# Patient Record
Sex: MALE | Race: WHITE | NOT HISPANIC OR LATINO | Employment: OTHER | URBAN - METROPOLITAN AREA
[De-identification: names, ages, dates, MRNs, and addresses within clinical notes are randomized per-mention and may not be internally consistent; named-entity substitution may affect disease eponyms.]

---

## 2017-01-25 DIAGNOSIS — L40.0 PSORIASIS VULGARIS: Primary | ICD-10-CM

## 2017-01-26 RX ORDER — TRIAMCINOLONE ACETONIDE 1 MG/G
OINTMENT TOPICAL 2 TIMES DAILY
Qty: 454 G | Refills: 1 | Status: SHIPPED | OUTPATIENT
Start: 2017-01-26 | End: 2017-05-08 | Stop reason: SDUPTHER

## 2017-04-19 ENCOUNTER — OFFICE VISIT (OUTPATIENT)
Dept: OPHTHALMOLOGY | Facility: CLINIC | Age: 77
End: 2017-04-19
Payer: MEDICARE

## 2017-04-19 DIAGNOSIS — L40.9 PSORIASIS: Primary | ICD-10-CM

## 2017-04-19 DIAGNOSIS — H02.533 LID RETRACTION, RIGHT: ICD-10-CM

## 2017-04-19 DIAGNOSIS — H02.536 LID RETRACTION, LEFT: ICD-10-CM

## 2017-04-19 PROCEDURE — 99499 UNLISTED E&M SERVICE: CPT | Mod: S$GLB,,,

## 2017-04-19 PROCEDURE — 99999 PR PBB SHADOW E&M-EST. PATIENT-LVL II: CPT | Mod: PBBFAC,,,

## 2017-04-19 PROCEDURE — 92012 INTRM OPH EXAM EST PATIENT: CPT | Mod: S$GLB,,,

## 2017-04-19 RX ORDER — CLOBETASOL PROPIONATE 0.5 MG/G
CREAM TOPICAL 2 TIMES DAILY
Qty: 60 G | Refills: 3 | Status: SHIPPED | OUTPATIENT
Start: 2017-04-19 | End: 2017-06-02

## 2017-04-19 NOTE — PROGRESS NOTES
HPI     epiphora    Additional comments: constant tearing           Comments   76 yr old here for evaluation of constant tearing.  History of psoriasis.    Mr. Majano states that his face has been affected by the psoriasis and he   was having symptoms to the brow.  The skin was flaking off into the eyes   causing tearing, symptoms progressed and his face became swollen and very   red.  The lower lids started to pull from the eye.  Mr. Majano is   concerned because of the constant tearing has become a fall risk, he is   walking with a cane because he is not seeing well through the tearing.    Using cortisone cream on face TID, including upper and lower lids.  AFT's   using 3-4 times a day.  Baby shampoo once a day.           Last edited by Rozina Ochoa on 4/19/2017  8:40 AM. (History)            Assessment /Plan     For exam results, see Encounter Report.    Psoriasis    Lid retraction, right    Lid retraction, left        I have reviewed the patient history,review of systems,and findings as recorded by the technician and resident and accept.  Significant  Systemic skin changes but main lid problem is contracture causing retraction. Since corneas clear on regimen of  Lubricants,will strart using both the triamcinalone cream and the psoriasis Rx to both lower lids and nose twice daily.  Consider light treatments for the generalized psoriasis. Continue AT

## 2017-04-19 NOTE — MR AVS SNAPSHOT
[facility]  [address]  [facility] [address]  Phone: [phone]  Fax: [phone]                  Darinel Majano   2017 8:30 AM   Office Visit    Description:  Male : 1940   Provider:  Marquise Chand MD   Department:  [facility]           Reason for Visit     epiphora           Diagnoses this Visit        Comments    Psoriasis    -  Primary     Lid retraction, right         Lid retraction, left                To Do List           Goals (5 Years of Data)     None      [facility] On Call     [facility] On Call Nurse Care Line -  Assistance  Unless otherwise directed by your provider, please contact [facility], our nurse care line that is available for  assistance.     Registered nurses in the [facility] provide: appointment scheduling, clinical advisement, health education, and other advisory services.  Call: [phone] (toll free)               Medications           Message regarding Medications     Verify the changes and/or additions to your medication regime listed below are the same as discussed with your clinician today.  If any of these changes or additions are incorrect, please notify your healthcare provider.             Verify that the below list of medications is an accurate representation of the medications you are currently taking.  If none reported, the list may be blank. If incorrect, please contact your healthcare provider. Carry this list with you in case of emergency.           Current Medications     triamcinolone acetonide 0.1% (KENALOG) 0.1 % ointment Apply topically 2 (two) times daily. Prn flare. Not more than 2 weeks straight in the same [facility]           Clinical Reference Information           Allergies as of 2017     No Known Allergies      Immunizations Administered on Date of Encounter - 2017     None      Instructions    Use both the creams on lower lids and nose twice daily. Continue with  artificial tears.  Consider the light treatment for generalized changes (Dr. Coon).   Return if lids/eyes get worse.       Language Assistance Services     ATTENTION: Language assistance services are available, free of charge. Please call [phone].      ATENCIÓN: Si prema rodriguez, tiene a fam disposición servicios gratuitos de asistencia lingüística. Llame al [phone].     [facility] Ý: N?u b?n nói Ti?ng Vi?t, có các d?ch v? h? tr? ngôn ng? mi?n phí dành cho b?n. G?i s? [phone].         [facility] complies with applicable Federal civil rights laws and does not discriminate on the basis of race, color, national origin, age, disability, or sex.

## 2017-04-19 NOTE — PATIENT INSTRUCTIONS
Use both the creams on lower lids and nose twice daily. Continue with artificial tears.  Consider the light treatment for generalized changes (Dr. Coon).   Return if lids/eyes get worse.

## 2017-04-26 ENCOUNTER — TELEPHONE (OUTPATIENT)
Dept: DERMATOLOGY | Facility: CLINIC | Age: 77
End: 2017-04-26

## 2017-04-26 NOTE — TELEPHONE ENCOUNTER
----- Message from Lashay Taylor MA sent at 4/26/2017  7:55 AM CDT -----  Contact: pts daughter Angela  Clobetasol cream too expensive. Pt also wants urgent appt  ----- Message -----     From: Angela Husain     Sent: 4/25/2017   4:32 PM       To: Jaymie MANSFIELD Staff    JGD-pt- pts daughter Angela is calling to speak with the nurse pt said some medication was called in pts daughter said its to expensive pt is having a psoriasis flare pt needs to be seen asap pts daughter would like to be at the appt. Pt is in pain it burns. Can you please call Angela [phone].    TEMO       Pt will be scheduled tomorrow

## 2017-04-27 ENCOUNTER — LAB VISIT (OUTPATIENT)
Dept: LAB | Facility: HOSPITAL | Age: 77
End: 2017-04-27
Attending: DERMATOLOGY
Payer: MEDICARE

## 2017-04-27 ENCOUNTER — OFFICE VISIT (OUTPATIENT)
Dept: DERMATOLOGY | Facility: CLINIC | Age: 77
End: 2017-04-27
Payer: MEDICARE

## 2017-04-27 DIAGNOSIS — L53.9 ERYTHRODERMA: Primary | ICD-10-CM

## 2017-04-27 DIAGNOSIS — L53.9 ERYTHRODERMA: ICD-10-CM

## 2017-04-27 LAB
ALBUMIN SERPL BCP-MCNC: 3.1 G/DL
ALP SERPL-CCNC: 78 U/L
ALT SERPL W/O P-5'-P-CCNC: 62 U/L
ANION GAP SERPL CALC-SCNC: 14 MMOL/L
AST SERPL-CCNC: 41 U/L
BILIRUB SERPL-MCNC: 1.2 MG/DL
BUN SERPL-MCNC: 7 MG/DL
CALCIUM SERPL-MCNC: 8.8 MG/DL
CHLORIDE SERPL-SCNC: 104 MMOL/L
CHOLEST/HDLC SERPL: 1.6 {RATIO}
CO2 SERPL-SCNC: 20 MMOL/L
CREAT SERPL-MCNC: 0.7 MG/DL
EST. GFR  (AFRICAN AMERICAN): >60 ML/MIN/1.73 M^2
EST. GFR  (NON AFRICAN AMERICAN): >60 ML/MIN/1.73 M^2
FERRITIN SERPL-MCNC: 261 NG/ML
GLUCOSE SERPL-MCNC: 92 MG/DL
HBV SURFACE AB SER-ACNC: NEGATIVE M[IU]/ML
HBV SURFACE AG SERPL QL IA: NEGATIVE
HCV AB SERPL QL IA: NEGATIVE
HDL/CHOLESTEROL RATIO: 64 %
HDLC SERPL-MCNC: 111 MG/DL
HDLC SERPL-MCNC: 71 MG/DL
LDLC SERPL CALC-MCNC: 28.4 MG/DL
NONHDLC SERPL-MCNC: 40 MG/DL
POTASSIUM SERPL-SCNC: 3.5 MMOL/L
PROT SERPL-MCNC: 6.7 G/DL
SODIUM SERPL-SCNC: 138 MMOL/L
TRIGL SERPL-MCNC: 58 MG/DL
TSH SERPL DL<=0.005 MIU/L-ACNC: 2.42 UIU/ML
VIT B12 SERPL-MCNC: 861 PG/ML

## 2017-04-27 PROCEDURE — 36415 COLL VENOUS BLD VENIPUNCTURE: CPT | Mod: PO

## 2017-04-27 PROCEDURE — 11101 PR BIOPSY, EACH ADDED LESION: CPT | Mod: S$GLB,,, | Performed by: DERMATOLOGY

## 2017-04-27 PROCEDURE — 82607 VITAMIN B-12: CPT

## 2017-04-27 PROCEDURE — 11100 PR BIOPSY OF SKIN LESION: CPT | Mod: S$GLB,,, | Performed by: DERMATOLOGY

## 2017-04-27 PROCEDURE — 86038 ANTINUCLEAR ANTIBODIES: CPT

## 2017-04-27 PROCEDURE — 80061 LIPID PANEL: CPT

## 2017-04-27 PROCEDURE — 99499 UNLISTED E&M SERVICE: CPT | Mod: S$GLB,,, | Performed by: DERMATOLOGY

## 2017-04-27 PROCEDURE — 99214 OFFICE O/P EST MOD 30 MIN: CPT | Mod: 25,S$GLB,, | Performed by: DERMATOLOGY

## 2017-04-27 PROCEDURE — 80053 COMPREHEN METABOLIC PANEL: CPT

## 2017-04-27 PROCEDURE — 86803 HEPATITIS C AB TEST: CPT

## 2017-04-27 PROCEDURE — 88305 TISSUE EXAM BY PATHOLOGIST: CPT | Performed by: PATHOLOGY

## 2017-04-27 PROCEDURE — 99999 PR PBB SHADOW E&M-EST. PATIENT-LVL III: CPT | Mod: PBBFAC,,, | Performed by: DERMATOLOGY

## 2017-04-27 PROCEDURE — 86706 HEP B SURFACE ANTIBODY: CPT

## 2017-04-27 PROCEDURE — 84443 ASSAY THYROID STIM HORMONE: CPT

## 2017-04-27 PROCEDURE — 1160F RVW MEDS BY RX/DR IN RCRD: CPT | Mod: S$GLB,,, | Performed by: DERMATOLOGY

## 2017-04-27 PROCEDURE — 82728 ASSAY OF FERRITIN: CPT

## 2017-04-27 PROCEDURE — 1159F MED LIST DOCD IN RCRD: CPT | Mod: S$GLB,,, | Performed by: DERMATOLOGY

## 2017-04-27 PROCEDURE — 87340 HEPATITIS B SURFACE AG IA: CPT

## 2017-04-27 RX ORDER — PREDNISONE 10 MG/1
TABLET ORAL
Qty: 49 TABLET | Refills: 0 | Status: SHIPPED | OUTPATIENT
Start: 2017-04-27 | End: 2017-06-06 | Stop reason: ALTCHOICE

## 2017-04-27 NOTE — Clinical Note
Just FYI- pt severely erythrodermic, im doing an extensive w/u before i start him on any systemic for presumed erythrodermic psoraisis,. Thanks, jairo

## 2017-04-27 NOTE — MR AVS SNAPSHOT
[facility]   [facility]  [place] [address]  Phone: [phone]  Fax: [phone]                  Darinel Majano   2017 11:10 AM   Office Visit    Description:  Male : 1940   Provider:  Little Coon MD   Department:  [facility]           Reason for Visit     Psoriasis           Diagnoses this Visit        Comments    Erythroderma    -  Primary            To Do List           Future Appointments        Provider Department Dept Phone    2017 12:15 PM LAB, [facility] [phone]    2017 12:30 PM LAB, FIDELINAIRIAMRIK [facility] [phone]    5/10/2017 11:10 AM MD [facility] [phone]    2017 8:10 AM Little Coon MD [facility] [phone]      Goals (5 Years of Data)     None       These Medications        Disp Refills Start End    predniSONE (DELTASONE) 10 MG tablet 49 tablet 0 2017     Sig 4 pills po qam x 1 week then 2 pills po qam x 1 week then 1 pill po qam x 1 week    Pharmacy: [id number] - [place]  [address] [facility] #: [phone]         [facility]     [facility] Nurse Care [facility] -  Assistance  Unless otherwise directed by your provider, please contact [facility], our nurse care line that is available for  assistance.     Registered nurses in the [facility] provide: appointment scheduling, clinical advisement, health education, and other advisory services.  Call: [phone] (toll free)               Medications           Message regarding Medications     Verify the changes and/or additions to your medication regime listed below are the same as discussed with your clinician today.  If any of these changes or additions are incorrect, please notify your healthcare provider.        START taking these NEW medications        Refills    predniSONE (DELTASONE) 10 MG tablet 0    Sig: Sig 4 pills po qam x  1 week then 2 pills po qam x 1 week then 1 pill po qam x 1 week    Class: Normal           Verify that the below list of medications is an accurate representation of the medications you are currently taking.  If none reported, the list may be blank. If incorrect, please contact your healthcare provider. Carry this list with you in case of emergency.           Current Medications     clobetasol (TEMOVATE) 0.05 % cream Apply topically 2 (two) times daily.    predniSONE (DELTASONE) 10 MG tablet Sig 4 pills po qam x 1 week then 2 pills po qam x 1 week then 1 pill po qam x 1 week    triamcinolone acetonide 0.1% (KENALOG) 0.1 % ointment Apply topically 2 (two) times daily. Prn flare. Not more than 2 weeks straight in the same [facility]           Clinical Reference Information           Allergies as of 4/27/2017     No Known Allergies      Immunizations Administered on Date of Encounter - 4/27/2017     None      Orders Placed During Today's Visit      Normal Orders This Visit    Tissue Specimen To Pathology, Dermatology     Tissue Specimen To Pathology, Dermatology     Future Labs/Procedures Expected by Expires    ANKUR  4/27/2017 4/27/2018    CBC auto differential  4/27/2017 6/26/2018    Comprehensive metabolic panel  4/27/2017 4/27/2018    FERRITIN  4/27/2017 6/26/2018    Hepatitis B surface antibody  4/27/2017 4/27/2018    Hepatitis B surface antigen  4/27/2017 4/27/2018    Hepatitis C antibody  4/27/2017 4/27/2018    Lipid panel  4/27/2017 4/27/2018    Quantiferon Gold TB  4/27/2017 4/27/2018    TSH  4/27/2017 4/27/2018    VITAMIN B12  4/27/2017 6/26/2018    X-Ray Chest PA And Lateral  4/27/2017 4/27/2018      Instructions    Discussed benefits and risks of treatment with prednisone including but not limited to increased appetite, weight gain, irritability, insomnia, fluid retention, GI upset, increased blood pressure, and increased blood sugars. If Prednisone is needed long term (>4 weeks), additional side effects such  as kidney stones, gastric ulceration, avascular necrosis of femoral head may be encountered.    Recommend taking daily prilosec or zantac as well as Vit D and Ca++ supplementation while on Prednisone.    Discussed  benefits and risks of Taltz including but not limited to injection site reactions, increased risk of infection hiram candida/tinea, and decreased tumor surveillance. Patient informed to discontinue Taltz and notify our office if an infection develops.  Patient counseled to avoid live vaccines.    Start Taltz at 160mg SQ on day 1 then 80mg SQ day 14 and qoweek to week 12 (# 8 vials total) then 80mg SQ qmonth    Will need CBC and LFTs q 3 months x 2 then q 3 - 6 months. Will need Quantiferon gold annually         Language Assistance Services     ATTENTION: Language assistance services are available, free of charge. Please call [phone].      ATENCIÓN: Si habla jennifer, tiene a fam disposición servicios gratuitos de asistencia lingüística. Llame al [phone].     [facility] Ý: N?u b?n nói Ti?ng Vi?t, có các d?ch v? h? tr? ngôn ng? mi?n phí dành cho b?n. G?i s? [phone].         [facility] complies with applicable Federal civil rights laws and does not discriminate on the basis of race, color, national origin, age, disability, or sex.

## 2017-04-27 NOTE — PATIENT INSTRUCTIONS
Discussed benefits and risks of treatment with prednisone including but not limited to increased appetite, weight gain, irritability, insomnia, fluid retention, GI upset, increased blood pressure, and increased blood sugars. If Prednisone is needed long term (>4 weeks), additional side effects such as kidney stones, gastric ulceration, avascular necrosis of femoral head may be encountered.    Recommend taking daily prilosec or zantac as well as Vit D and Ca++ supplementation while on Prednisone.    Discussed  benefits and risks of Taltz including but not limited to injection site reactions, increased risk of infection hirma candida/tinea, and decreased tumor surveillance. Patient informed to discontinue Taltz and notify our office if an infection develops.  Patient counseled to avoid live vaccines.    Start Taltz at 160mg SQ on day 1 then 80mg SQ day 14 and qoweek to week 12 (# 8 vials total) then 80mg SQ qmonth    Will need CBC and LFTs q 3 months x 2 then q 3 - 6 months. Will need Quantiferon gold annually

## 2017-04-27 NOTE — PROGRESS NOTES
Subjective:       Patient ID:  Darinel Majano is a 76 y.o. male who presents for   Chief Complaint   Patient presents with    Psoriasis     HPI Comments: Pt c/o psoriasis flare all over body since January.  Pt with shaking chills and diffuse redness. Tx with clobetasol cream x a few days. Also has been using tac cream and coconut lotion. Not sure it tx is helping.  Pt has severe burning and itching.  Wife states he has progressed but did not want to come in to the doctor. He sits in his truck with the  Heater on low.    Pt isn on no systemic medications at all and takes only a multivitamin at home.     Hx of PV - had lung infiltrates after 1-2 injections of Humira      Psoriasis         Review of Systems   Constitutional: Positive for chills, fatigue and malaise. Negative for fever, weight loss and night sweats.   HENT: Negative for mouth sores.    Eyes: Positive for eye watering.   Musculoskeletal: Negative for joint swelling and arthralgias.   Skin: Positive for itching, rash and dry skin.        Objective:    Physical Exam   Constitutional: He appears well-developed and well-nourished. No distress.   Neurological: He is alert and oriented to person, place, and time. He is not disoriented.   Psychiatric: He has a normal mood and affect.   Skin:   Areas Examined (abnormalities noted in diagram):   Scalp / Hair Palpated and Inspected  Head / Face Inspection Performed  Neck Inspection Performed  Chest / Axilla Inspection Performed  Abdomen Inspection Performed  Genitals / Buttocks / Groin Inspection Performed  Back Inspection Performed  RUE Inspected  LUE Inspection Performed  RLE Inspected  LLE Inspection Performed  Nails and Digits Inspection Performed                   Diagram Legend     Erythematous scaling macule/papule c/w actinic keratosis       Vascular papule c/w angioma      Pigmented verrucoid papule/plaque c/w seborrheic keratosis      Yellow umbilicated papule c/w sebaceous hyperplasia       Irregularly shaped tan macule c/w lentigo     1-2 mm smooth white papules consistent with Milia      Movable subcutaneous cyst with punctum c/w epidermal inclusion cyst      Subcutaneous movable cyst c/w pilar cyst      Firm pink to brown papule c/w dermatofibroma      Pedunculated fleshy papule(s) c/w skin tag(s)      Evenly pigmented macule c/w junctional nevus     Mildly variegated pigmented, slightly irregular-bordered macule c/w mildly atypical nevus      Flesh colored to evenly pigmented papule c/w intradermal nevus       Pink pearly papule/plaque c/w basal cell carcinoma      Erythematous hyperkeratotic cursted plaque c/w SCC      Surgical scar with no sign of skin cancer recurrence      Open and closed comedones      Inflammatory papules and pustules      Verrucoid papule consistent consistent with wart     Erythematous eczematous patches and plaques     Dystrophic onycholytic nail with subungual debris c/w onychomycosis     Umbilicated papule    Erythematous-base heme-crusted tan verrucoid plaque consistent with inflamed seborrheic keratosis     Erythematous Silvery Scaling Plaque c/w Psoriasis     See annotation                      Assessment / Plan:      Pathology Orders:      Normal Orders This Visit    Tissue Specimen To Pathology, Dermatology     Questions:    Directional Terms:  Other(comment)    Clinical information:  r/o erythrodermic psoriasis    Specific Site:  right thigh    Tissue Specimen To Pathology, Dermatology     Questions:    Directional Terms:  Other(comment)    Clinical information:  r/o erythrodermic psoriasis    Specific Site:  mid back        Erythroderma    Punch biopsy procedure note: x 2   Punch biopsy performed after verbal consent obtained. Area marked and prepped with alcohol. Approximately 1cc of 1% lidocaine with epinephrine injected. 4 mm disposable punch used to remove lesion. Hemostasis obtained and biopsy site closed with 1 - 2 Prolene sutures. Wound care instructions  reviewed with patient and handout given.    -     CBC auto differential; Future  -     Comprehensive metabolic panel; Future  -     Lipid panel; Future  -     TSH; Future  -     ANKUR; Future  -     FERRITIN; Future  -     VITAMIN B12; Future  -     Quantiferon Gold TB; Future  -     Tissue Specimen To Pathology, Dermatology  -     Tissue Specimen To Pathology, Dermatology  -     Hepatitis C antibody; Future  -     Hepatitis B surface antigen; Future  -     Hepatitis B surface antibody; Future  -     X-Ray Chest PA And Lateral; Future  -     predniSONE (DELTASONE) 10 MG tablet; Sig 4 pills po qam x 1 week then 2 pills po qam x 1 week then 1 pill po qam x 1 week  Dispense: 49 tablet; Refill: 0    Discussed benefits and risks of treatment with prednisone including but not limited to increased appetite, weight gain, irritability, insomnia, fluid retention, GI upset, increased blood pressure, and increased blood sugars. If Prednisone is needed long term (>4 weeks), additional side effects such as kidney stones, gastric ulceration, avascular necrosis of femoral head may be encountered.    Recommend taking daily prilosec or zantac as well as Vit D and Ca++ supplementation while on Prednisone.    Discussed  benefits and risks of Taltz including but not limited to injection site reactions, increased risk of infection hiram candida/tinea, and decreased tumor surveillance. Patient informed to discontinue Taltz and notify our office if an infection develops.  Patient counseled to avoid live vaccines.    Start Taltz at 160mg SQ on day 1 then 80mg SQ day 14 and qoweek to week 12 (# 8 vials total) then 80mg SQ qmonth    Will need CBC and LFTs q 3 months x 2 then q 3 - 6 months. Will need Quantiferon gold annually    Pt needs to be seen by PCP and we will consider taltz. Discussed systemic steroids was only a bridge to a diff treatment.   Pt can get sauna suit and apply medication and aquaphor then suit   Pt must see PCP before we  start any biologic          Return in about 2 weeks (around 5/11/2017).

## 2017-04-28 DIAGNOSIS — Z79.899 ENCOUNTER FOR LONG-TERM (CURRENT) USE OF HIGH-RISK MEDICATION: Primary | ICD-10-CM

## 2017-04-28 LAB — ANA SER QL IF: NORMAL

## 2017-05-01 ENCOUNTER — HOSPITAL ENCOUNTER (OUTPATIENT)
Dept: RADIOLOGY | Facility: HOSPITAL | Age: 77
Discharge: HOME | End: 2017-05-01
Attending: DERMATOLOGY
Payer: MEDICARE

## 2017-05-01 DIAGNOSIS — L53.9 ERYTHRODERMA: ICD-10-CM

## 2017-05-01 PROCEDURE — 71020 XR CHEST PA AND LATERAL: CPT | Mod: 26,,, | Performed by: RADIOLOGY

## 2017-05-01 PROCEDURE — 71020 XR CHEST PA AND LATERAL: CPT | Mod: TC,PO

## 2017-05-04 ENCOUNTER — LAB VISIT (OUTPATIENT)
Dept: LAB | Facility: HOSPITAL | Age: 77
End: 2017-05-04
Attending: INTERNAL MEDICINE
Payer: MEDICARE

## 2017-05-04 ENCOUNTER — OFFICE VISIT (OUTPATIENT)
Dept: INTERNAL MEDICINE | Facility: CLINIC | Age: 77
End: 2017-05-04
Payer: MEDICARE

## 2017-05-04 VITALS
BODY MASS INDEX: 31.6 KG/M2 | DIASTOLIC BLOOD PRESSURE: 75 MMHG | HEIGHT: 60 IN | WEIGHT: 160.94 LBS | SYSTOLIC BLOOD PRESSURE: 130 MMHG

## 2017-05-04 DIAGNOSIS — R10.9 ABDOMINAL PAIN, OTHER SPECIFIED SITE: ICD-10-CM

## 2017-05-04 DIAGNOSIS — E55.9 VITAMIN D DEFICIENCY DISEASE: ICD-10-CM

## 2017-05-04 DIAGNOSIS — D84.9 IMMUNOSUPPRESSED STATUS: ICD-10-CM

## 2017-05-04 DIAGNOSIS — Z00.00 ANNUAL PHYSICAL EXAM: Primary | ICD-10-CM

## 2017-05-04 DIAGNOSIS — R63.4 UNEXPLAINED WEIGHT LOSS: ICD-10-CM

## 2017-05-04 DIAGNOSIS — D64.9 ANEMIA, UNSPECIFIED TYPE: ICD-10-CM

## 2017-05-04 DIAGNOSIS — I10 ESSENTIAL HYPERTENSION: ICD-10-CM

## 2017-05-04 DIAGNOSIS — L40.50 PSORIATIC ARTHRITIS: ICD-10-CM

## 2017-05-04 DIAGNOSIS — D69.6 THROMBOCYTOPENIA: ICD-10-CM

## 2017-05-04 DIAGNOSIS — M1A.0720 CHRONIC IDIOPATHIC GOUT INVOLVING TOE OF LEFT FOOT WITHOUT TOPHUS: ICD-10-CM

## 2017-05-04 DIAGNOSIS — F10.20 UNCOMPLICATED ALCOHOL DEPENDENCE: ICD-10-CM

## 2017-05-04 DIAGNOSIS — L40.0 PSORIASIS VULGARIS: ICD-10-CM

## 2017-05-04 DIAGNOSIS — N40.0 BENIGN NON-NODULAR PROSTATIC HYPERPLASIA WITHOUT LOWER URINARY TRACT SYMPTOMS: ICD-10-CM

## 2017-05-04 DIAGNOSIS — I77.1 TORTUOUS AORTA: ICD-10-CM

## 2017-05-04 DIAGNOSIS — K63.5 HYPERPLASTIC POLYP OF TRANSVERSE COLON: ICD-10-CM

## 2017-05-04 DIAGNOSIS — R74.8 ELEVATED LIVER ENZYMES: ICD-10-CM

## 2017-05-04 DIAGNOSIS — M81.0 OSTEOPOROSIS WITHOUT CURRENT PATHOLOGICAL FRACTURE, UNSPECIFIED OSTEOPOROSIS TYPE: ICD-10-CM

## 2017-05-04 DIAGNOSIS — K70.9 LIVER DISEASE DUE TO ALCOHOL: ICD-10-CM

## 2017-05-04 DIAGNOSIS — R76.11 POSITIVE TB TEST: ICD-10-CM

## 2017-05-04 DIAGNOSIS — J92.9 PLEURAL PLAQUE: ICD-10-CM

## 2017-05-04 LAB
25(OH)D3+25(OH)D2 SERPL-MCNC: 42 NG/ML
URATE SERPL-MCNC: 7.6 MG/DL

## 2017-05-04 PROCEDURE — 1157F ADVNC CARE PLAN IN RCRD: CPT | Mod: S$GLB,,, | Performed by: INTERNAL MEDICINE

## 2017-05-04 PROCEDURE — 1159F MED LIST DOCD IN RCRD: CPT | Mod: S$GLB,,, | Performed by: INTERNAL MEDICINE

## 2017-05-04 PROCEDURE — 99499 UNLISTED E&M SERVICE: CPT | Mod: S$GLB,,, | Performed by: INTERNAL MEDICINE

## 2017-05-04 PROCEDURE — 82306 VITAMIN D 25 HYDROXY: CPT

## 2017-05-04 PROCEDURE — 84165 PROTEIN E-PHORESIS SERUM: CPT | Mod: 26,,, | Performed by: PATHOLOGY

## 2017-05-04 PROCEDURE — 1126F AMNT PAIN NOTED NONE PRSNT: CPT | Mod: S$GLB,,, | Performed by: INTERNAL MEDICINE

## 2017-05-04 PROCEDURE — 84165 PROTEIN E-PHORESIS SERUM: CPT

## 2017-05-04 PROCEDURE — 1160F RVW MEDS BY RX/DR IN RCRD: CPT | Mod: S$GLB,,, | Performed by: INTERNAL MEDICINE

## 2017-05-04 PROCEDURE — 99215 OFFICE O/P EST HI 40 MIN: CPT | Mod: S$GLB,,, | Performed by: INTERNAL MEDICINE

## 2017-05-04 PROCEDURE — 36415 COLL VENOUS BLD VENIPUNCTURE: CPT

## 2017-05-04 PROCEDURE — 99999 PR PBB SHADOW E&M-EST. PATIENT-LVL IV: CPT | Mod: PBBFAC,,, | Performed by: INTERNAL MEDICINE

## 2017-05-04 PROCEDURE — 3078F DIAST BP <80 MM HG: CPT | Mod: S$GLB,,, | Performed by: INTERNAL MEDICINE

## 2017-05-04 PROCEDURE — 3075F SYST BP GE 130 - 139MM HG: CPT | Mod: S$GLB,,, | Performed by: INTERNAL MEDICINE

## 2017-05-04 PROCEDURE — 84550 ASSAY OF BLOOD/URIC ACID: CPT

## 2017-05-04 RX ORDER — TRIAMCINOLONE ACETONIDE 1 MG/G
OINTMENT TOPICAL 2 TIMES DAILY
Qty: 454 G | Refills: 1 | Status: CANCELLED | OUTPATIENT
Start: 2017-05-04

## 2017-05-04 NOTE — MR AVS SNAPSHOT
[facility]  [address]  [facility] [address]  Phone: [phone]  Fax: [phone]                  Darinel Majano   2017 10:00 AM   Office Visit    Description:  Male : 1940   Provider:  Annabella Montiel MD   Department:  [facility]           Reason for Visit     Annual Exam           Diagnoses this Visit        Comments    Annual physical exam    -  Primary     Psoriasis vulgaris         Hyperplastic polyp of transverse colon         Thrombocytopenia         Psoriatic arthritis         Essential hypertension         Chronic idiopathic gout involving toe of left foot without tophus         Liver disease due to alcohol         Benign non-nodular prostatic hyperplasia without lower urinary tract symptoms         Uncomplicated alcohol dependence         Osteoporosis without current pathological fracture, unspecified osteoporosis type         Positive TB test         Tortuous aorta         Pleural plaque         Elevated liver enzymes         Anemia, unspecified type         Immunosuppressed status         Abdominal pain, other specified site         Unexplained weight loss         Vitamin D deficiency disease                To Do List           Future Appointments        Provider Department Dept Phone    5/10/2017 11:10 AM MD [facility] [phone]    2017 8:10 AM Little Coon MD [facility] [phone]      To Schedule:     Please call the Endoscopy Department at [phone] to schedule your appointment.          Goals (5 Years of Data)     None      Follow-Up and Disposition     Return in about 4 weeks (around 2017) for EP.      [facility]     [facility] Nurse Care Line - 24/7 Assistance  Unless otherwise directed by your provider, please contact [facility], our nurse care line that is available for 24/7 assistance.     Registered nurses in the [facility] provide:  appointment scheduling, clinical advisement, health education, and other advisory services.  Call: [phone] (toll free)               Medications           Message regarding Medications     Verify the changes and/or additions to your medication regime listed below are the same as discussed with your clinician today.  If any of these changes or additions are incorrect, please notify your healthcare provider.             Verify that the below list of medications is an accurate representation of the medications you are currently taking.  If none reported, the list may be blank. If incorrect, please contact your healthcare provider. Carry this list with you in case of emergency.           Current Medications     predniSONE (DELTASONE) 10 MG tablet Sig 4 pills po qam x 1 week then 2 pills po qam x 1 week then 1 pill po qam x 1 week    triamcinolone acetonide 0.1% (KENALOG) 0.1 % ointment Apply topically 2 (two) times daily. Prn flare. Not more than 2 weeks straight in the same locaiton    clobetasol (TEMOVATE) 0.05 % cream Apply topically 2 (two) times daily.           Clinical Reference Information           Your Vitals Were     BP Height Weight BMI       130/75 (BP Location: Left arm, Patient Position: Sitting, BP Method: Manual) 5' (1.524 m) 73 kg (160 lb 15 oz) 31.43 kg/m2       Blood Pressure          Most Recent Value    BP  130/75      Allergies as of 5/4/2017     Humira [Adalimumab]      Immunizations Administered on Date of Encounter - 5/4/2017     None      Orders Placed During Today's Visit      Normal Orders This Visit    Ambulatory consult to Hematology     Ambulatory consult to Infectious Disease     Ambulatory consult to Psychiatry     Ambulatory Referral to Hepatology     Ambulatory referral to Pulmonology     Case request GI: COLONOSCOPY     Future Labs/Procedures Expected by Expires    CT Abdomen Pelvis W Wo Contrast  5/4/2017 5/4/2018    CT Chest Without Contrast  5/4/2017 5/4/2018    DXA Bone  Density Spine And Hip  5/4/2017 8/2/2017    Protein electrophoresis, serum  5/4/2017 5/4/2018    Uric acid  5/4/2017 5/4/2018    Urinalysis  5/4/2017 7/3/2018    Vitamin D  5/4/2017 (Approximate) 5/4/2018      Language Assistance Services     ATTENTION: Language assistance services are available, free of charge. Please call [phone].      ATENCIÓN: Si habla español, tiene a fam disposición servicios gratuitos de asistencia lingüística. Llame al [phone].     CH[facility] Ý: N?u b?n nói Ti?ng Vi?t, có các d?ch v? h? tr? ngôn ng? mi?n phí dành cho b?n. G?i s? [phone].         [facility] complies with applicable Federal civil rights laws and does not discriminate on the basis of race, color, national origin, age, disability, or sex.

## 2017-05-04 NOTE — PROGRESS NOTES
Subjective:       Patient ID: Darinel Majano is a 76 y.o. male.    Chief Complaint:  Annual exam      HPI Comments: Annual exam,    Here with his wife.    Bad flare up of psoriasis all over the body even arms, legs and face.  So bad on face that eye lid is being oulled away.    He had a bad reaction to Humira years ago    On prednisone for about 1 weeks- being tapered.    ETOH 12 beers nightly.    Due for colonoscopy polyp 2012 hyperplastic    Liver biopsy 2100 no cirrhosis: LIVER; NEEDLE BIOPSY:  NO STEATOSIS, NO IRON.  MINIMAL NONSPECIFIC LYMPHOCYTIC PORTAL BASED INFLAMMATION.  MILD PORTAL EXPANSION.  NO ZONE 3 FIBROSIS.  NO CIRRHOSIS.  IRON AND TRICHROME WITH APPROPRIATE CONTROLS    BP OK    Gout no issues.    Osteopenia 2013 no falls or fractures- due for DEXA.    CXR: tortuous aorta.  Pleural plaque.  Former smoker    Inderminate Quantiferon!    Anemia and low platelets.  Normal B12.  Liver labs elevated.    Patient Active Problem List:     Liver disease due to alcohol     Benign non-nodular prostatic hyperplasia without lower urinary tract symptoms     Osteoporosis     Thrombocytopenia     Psoriasis     Chronic idiopathic gout involving toe of left foot without tophus     Psoriatic arthritis     Alcohol dependence: 12 beers daily     Essential hypertension     Hyperplastic polyp of transverse colon: 2012 repeat 5 years      Review of Systems   Constitutional: Positive for unexpected weight change.        He says deliberate; wife not so sure   HENT: Negative for congestion, postnasal drip, rhinorrhea and sinus pressure.    Eyes: Negative for redness and visual disturbance.   Respiratory: Negative for apnea, choking, shortness of breath and stridor.    Cardiovascular: Negative for chest pain, palpitations and leg swelling.   Gastrointestinal: Negative for abdominal pain, constipation, diarrhea and nausea.   Genitourinary: Negative for difficulty urinating, flank pain, frequency, testicular pain and urgency.    Musculoskeletal: Positive for gait problem. Negative for arthralgias, back pain and myalgias.   Skin: Positive for rash. Negative for color change.   Neurological: Positive for tremors. Negative for seizures, light-headedness and numbness.   Psychiatric/Behavioral: The patient is nervous/anxious.        Objective:      Physical Exam   Constitutional: He is oriented to person, place, and time. He appears well-developed and well-nourished.   HENT:   Head: Normocephalic and atraumatic.   Right Ear: External ear normal.   Left Ear: External ear normal.   Eyes: Conjunctivae are normal.   R eye lid slightly pulled down   Neck: Normal range of motion. Neck supple. No thyromegaly present.   Cardiovascular: Normal rate and regular rhythm.    No murmur heard.  Pulmonary/Chest: Effort normal and breath sounds normal. No respiratory distress. He has no wheezes.   Abdominal: Soft. He exhibits no distension. There is no tenderness.   Liver firm, enlarged   Musculoskeletal: He exhibits no edema or tenderness.   Lymphadenopathy:     He has no cervical adenopathy.   Neurological: He is alert and oriented to person, place, and time. No cranial nerve deficit.   Tremulous   Skin: Skin is warm and dry. Rash noted.   Diffuse erythroderma and scaling   Psychiatric: He has a normal mood and affect. His behavior is normal.       Assessment:       1. Annual physical exam    2. Psoriasis vulgaris    3. Hyperplastic polyp of transverse colon: 2012 repeat 5 years    4. Thrombocytopenia    5. Psoriatic arthritis    6. Essential hypertension    7. Chronic idiopathic gout involving toe of left foot without tophus    8. Liver disease due to alcohol    9. Benign non-nodular prostatic hyperplasia without lower urinary tract symptoms    10. Alcohol dependence: 12 beers daily    11. Osteoporosis without current pathological fracture, unspecified osteoporosis type    12. 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 Tortuous aorta: see  CXR May 2017    14. Pleural plaque: see CXR May 2017    15. Elevated liver enzymes    16. Anemia, unspecified type    17. Immunosuppressed status    18. Abdominal pain, other specified site    19. Unexplained weight loss    20. Vitamin D deficiency disease        Plan:         Labs today  Urine home collect  DEXA  CT chest  CT abdomen and pelvis  ID ASAP for positive PPD on steroids  Hepatology  Hematology  Pulmonary  Psych- Dr Rosado for alcohol- ASAP.  Advised not to stop drinking abruptly - needs supervised detox  EP 1 month  Patient evaluated for over40 minutes withhis wife during this appoinment, including diagnostic testing and treatment.  All questions answered,  chart reviewed and care coordinated.  Alarm sx reviewed

## 2017-05-05 ENCOUNTER — PATIENT MESSAGE (OUTPATIENT)
Dept: INTERNAL MEDICINE | Facility: CLINIC | Age: 77
End: 2017-05-05

## 2017-05-05 ENCOUNTER — TELEPHONE (OUTPATIENT)
Dept: HEMATOLOGY/ONCOLOGY | Facility: CLINIC | Age: 77
End: 2017-05-05

## 2017-05-05 LAB
ALBUMIN SERPL ELPH-MCNC: 3.54 G/DL
ALPHA1 GLOB SERPL ELPH-MCNC: 0.34 G/DL
ALPHA2 GLOB SERPL ELPH-MCNC: 0.91 G/DL
B-GLOBULIN SERPL ELPH-MCNC: 0.88 G/DL
GAMMA GLOB SERPL ELPH-MCNC: 1.32 G/DL
PATHOLOGIST INTERPRETATION SPE: NORMAL
PROT SERPL-MCNC: 7 G/DL

## 2017-05-08 ENCOUNTER — TELEPHONE (OUTPATIENT)
Dept: RADIOLOGY | Facility: HOSPITAL | Age: 77
End: 2017-05-08

## 2017-05-08 DIAGNOSIS — L40.0 PSORIASIS VULGARIS: ICD-10-CM

## 2017-05-08 RX ORDER — TRIAMCINOLONE ACETONIDE 1 MG/G
OINTMENT TOPICAL
Qty: 454 G | Refills: 3 | Status: SHIPPED | OUTPATIENT
Start: 2017-05-08 | End: 2017-05-10 | Stop reason: SDUPTHER

## 2017-05-08 RX ORDER — TRIAMCINOLONE ACETONIDE 1 MG/G
OINTMENT TOPICAL 2 TIMES DAILY
Qty: 454 G | Refills: 1 | Status: SHIPPED | OUTPATIENT
Start: 2017-05-08 | End: 2017-05-24

## 2017-05-08 NOTE — TELEPHONE ENCOUNTER
----- Message from Lashay Taylor MA sent at 5/8/2017 12:29 PM CDT -----  Contact: Johny christopher at [phone]      ----- Message -----     From: Alexia Urbina     Sent: 5/8/2017  11:53 AM       To: Jaymie [facility]ANSFIELD Staff    Jaymie appt-Pt has been waiting for his Tac cream 1Lb jar.  Please call pharmacy and pt and let know why it has taken so long.  The small tube of something else that was sent over did  not help.  Wants his TAC 1lb jar.  Pt can be reached at [phone].  Please call pt and pharmacy and let know what is gong on.  thanks

## 2017-05-09 ENCOUNTER — PATIENT MESSAGE (OUTPATIENT)
Dept: DERMATOLOGY | Facility: CLINIC | Age: 77
End: 2017-05-09

## 2017-05-09 ENCOUNTER — TELEPHONE (OUTPATIENT)
Dept: INTERNAL MEDICINE | Facility: CLINIC | Age: 77
End: 2017-05-09

## 2017-05-09 ENCOUNTER — HOSPITAL ENCOUNTER (OUTPATIENT)
Dept: RADIOLOGY | Facility: HOSPITAL | Age: 77
Discharge: HOME | End: 2017-05-09
Attending: INTERNAL MEDICINE
Payer: MEDICARE

## 2017-05-09 DIAGNOSIS — J92.9 PLEURAL PLAQUE: ICD-10-CM

## 2017-05-09 DIAGNOSIS — R76.11 POSITIVE TB TEST: ICD-10-CM

## 2017-05-09 DIAGNOSIS — R10.9 ABDOMINAL PAIN, OTHER SPECIFIED SITE: ICD-10-CM

## 2017-05-09 DIAGNOSIS — I77.1 TORTUOUS AORTA: ICD-10-CM

## 2017-05-09 DIAGNOSIS — K70.9 LIVER DISEASE DUE TO ALCOHOL: ICD-10-CM

## 2017-05-09 DIAGNOSIS — R63.4 UNEXPLAINED WEIGHT LOSS: ICD-10-CM

## 2017-05-09 PROCEDURE — 74177 CT ABD & PELVIS W/CONTRAST: CPT | Mod: TC

## 2017-05-09 PROCEDURE — 25500020 PHARM REV CODE 255: Performed by: INTERNAL MEDICINE

## 2017-05-09 PROCEDURE — 71260 CT THORAX DX C+: CPT | Mod: 26,,, | Performed by: RADIOLOGY

## 2017-05-09 PROCEDURE — 74177 CT ABD & PELVIS W/CONTRAST: CPT | Mod: 26,,, | Performed by: RADIOLOGY

## 2017-05-09 PROCEDURE — 71260 CT THORAX DX C+: CPT | Mod: TC

## 2017-05-09 RX ADMIN — IOHEXOL 75 ML: 350 INJECTION, SOLUTION INTRAVENOUS at 09:05

## 2017-05-09 RX ADMIN — IOHEXOL 15 ML: 350 INJECTION, SOLUTION INTRAVENOUS at 07:05

## 2017-05-09 RX ADMIN — IOHEXOL 15 ML: 350 INJECTION, SOLUTION INTRAVENOUS at 08:05

## 2017-05-09 NOTE — TELEPHONE ENCOUNTER
I spoke to him and reviewed his test results.  I've encouraged him to keep his follow-up in hepatology and also in pulmonary given the abnormalities identified on his scan.  He is very worried about cirrhosis.  Not sure if he needs another liver biopsy but this will be evaluated closely when he sees hepatology.  He will see me in early June as well.

## 2017-05-10 ENCOUNTER — TELEPHONE (OUTPATIENT)
Dept: HEMATOLOGY/ONCOLOGY | Facility: CLINIC | Age: 77
End: 2017-05-10

## 2017-05-10 ENCOUNTER — OFFICE VISIT (OUTPATIENT)
Dept: INFECTIOUS DISEASES | Facility: CLINIC | Age: 77
End: 2017-05-10
Payer: MEDICARE

## 2017-05-10 VITALS
BODY MASS INDEX: 31.12 KG/M2 | DIASTOLIC BLOOD PRESSURE: 92 MMHG | HEART RATE: 115 BPM | WEIGHT: 158.5 LBS | SYSTOLIC BLOOD PRESSURE: 159 MMHG | HEIGHT: 60 IN | TEMPERATURE: 98 F

## 2017-05-10 DIAGNOSIS — R76.11 POSITIVE TB TEST: Primary | ICD-10-CM

## 2017-05-10 DIAGNOSIS — L40.50 PSORIATIC ARTHRITIS: ICD-10-CM

## 2017-05-10 DIAGNOSIS — K70.9 LIVER DISEASE DUE TO ALCOHOL: ICD-10-CM

## 2017-05-10 DIAGNOSIS — D84.9 IMMUNOSUPPRESSED STATUS: ICD-10-CM

## 2017-05-10 PROCEDURE — 99999 PR PBB SHADOW E&M-EST. PATIENT-LVL III: CPT | Mod: PBBFAC,,, | Performed by: INTERNAL MEDICINE

## 2017-05-10 PROCEDURE — 87385 HISTOPLASMA CAPSUL AG IA: CPT

## 2017-05-10 PROCEDURE — 3077F SYST BP >= 140 MM HG: CPT | Mod: S$GLB,,, | Performed by: INTERNAL MEDICINE

## 2017-05-10 PROCEDURE — 3080F DIAST BP >= 90 MM HG: CPT | Mod: S$GLB,,, | Performed by: INTERNAL MEDICINE

## 2017-05-10 PROCEDURE — 1159F MED LIST DOCD IN RCRD: CPT | Mod: S$GLB,,, | Performed by: INTERNAL MEDICINE

## 2017-05-10 PROCEDURE — 1160F RVW MEDS BY RX/DR IN RCRD: CPT | Mod: S$GLB,,, | Performed by: INTERNAL MEDICINE

## 2017-05-10 PROCEDURE — 99204 OFFICE O/P NEW MOD 45 MIN: CPT | Mod: S$GLB,,, | Performed by: INTERNAL MEDICINE

## 2017-05-10 PROCEDURE — 1126F AMNT PAIN NOTED NONE PRSNT: CPT | Mod: S$GLB,,, | Performed by: INTERNAL MEDICINE

## 2017-05-10 NOTE — LETTER
May 10, 2017      Annabella Montiel MD  [address]  [facility] [address]           [facility]ious [facility]  [address]  [facility] [address]  Phone: [phone]  Fax: [phone]          Patient: Darinel Majano   MR Number: [id number]   YOB: 1940   Date of Visit: 5/10/2017       Dear Dr. Annabella Motniel:    Thank you for referring Darinel Majano to me for evaluation. Attached you will find relevant portions of my assessment and plan of care.    If you have questions, please do not hesitate to call me. I look forward to following Darinel Majano along with you.    Sincerely,    Pj Davis MD    Enclosure  CC:  No Recipients    If you would like to receive this communication electronically, please contact [email or web address] or [phone] to request more information on [employer] access.    For providers and/or their staff who would like to refer a patient to [facility], please contact us through our one-stop-shop provider referral line, [facility], at [phone].    If you feel you have received this communication in error or would no longer like to receive these types of communications, please e-mail [email or web address]

## 2017-05-10 NOTE — PROGRESS NOTES
Infectious Diseases Clinic Note    Subjective:       Patient ID: Darinel Majano is a 76 y.o. male.    Chief Complaint: Positive tb test    HPI    75 y/o M h/o psoriasis (humira in past had bad reaction) currently on prednisone 10 mg daily, HTN, gout here for eval for indeterminate quant gold and lung mass    CT a/p with 3.6 cm mass with smooth margins and adjacent pleural thicking in R lung base  2008 quant gold negative  2017 quant gold indeterminate    [place]  [place], never in [facility]  No Tob, drinks 1 case of beer daily  Worked construction  No known TB exposures      Past Medical History:   Diagnosis Date    Anemia 5/4/2017    BPH (benign prostatic hypertrophy)     Chronic gout     Cortical senile cataract 5/15/2012    Degenerative disc disease     Degenerative disc disease     Degenerative disc disease     Essential hypertension 2/19/2016    Gout     Hyperplastic polyp of transverse colon: 2012 repeat 5 years 5/4/2017    Osteoporosis     Psoriasis     Psoriasis     Substance abuse     Thrombocytopenia        Social History     Social History    Marital status:      Spouse name: N/A    Number of children: N/A    Years of education: N/A     Occupational History    Not on file.     Social History Main Topics    Smoking status: Former Smoker     Packs/day: 1.00     Years: 20.00    Smokeless tobacco: Never Used    Alcohol use 7.2 oz/week     12 Cans of beer per week      Comment: 84 beers a week    Drug use: No    Sexual activity: Not on file     Other Topics Concern    Not on file     Social History Narrative         Current Outpatient Prescriptions:     clobetasol (TEMOVATE) 0.05 % cream, Apply topically 2 (two) times daily., Disp: 60 g, Rfl: 3    predniSONE (DELTASONE) 10 MG tablet, Sig 4 pills po qam x 1 week then 2 pills po qam x 1 week then 1 pill po qam x 1 week, Disp: 49 tablet, Rfl: 0    triamcinolone acetonide 0.1%  (KENALOG) 0.1 % ointment, Apply topically 2 (two) times daily. Prn flare. Not more than 2 weeks straight in the same locaiton, Disp: 454 g, Rfl: 1    Review of Systems   Constitutional: Negative for activity change, appetite change, chills, fatigue and fever.   HENT: Negative for congestion, dental problem, mouth sores and sinus pressure.    Eyes: Negative for pain, redness and visual disturbance.   Respiratory: Negative for cough, shortness of breath and wheezing.    Cardiovascular: Negative for chest pain and leg swelling.   Gastrointestinal: Negative for abdominal distention, abdominal pain, diarrhea, nausea and vomiting.   Endocrine: Negative for polyuria.   Genitourinary: Negative for decreased urine volume, dysuria and frequency.   Musculoskeletal: Negative for joint swelling.   Skin: Negative for color change.   Allergic/Immunologic: Negative for food allergies.   Neurological: Negative for dizziness, weakness and headaches.   Hematological: Negative for adenopathy.   Psychiatric/Behavioral: Negative for agitation and confusion. The patient is not nervous/anxious.            Objective:      Vitals:    05/10/17 1316   BP: (!) 159/92   Pulse: (!) 115   Temp: 98 °F (36.7 °C)     Physical Exam   Constitutional: He is oriented to person, place, and time. He appears well-developed and well-nourished.   HENT:   Head: Normocephalic and atraumatic.   Mouth/Throat: Oropharynx is clear and moist.   Eyes: Conjunctivae are normal.   Neck: Neck supple.   Cardiovascular: Normal rate, regular rhythm and normal heart sounds.    No murmur heard.  Pulmonary/Chest: Effort normal and breath sounds normal. No respiratory distress. He has no wheezes.   Abdominal: Soft. Bowel sounds are normal. He exhibits no distension. There is no tenderness.   Musculoskeletal: Normal range of motion. He exhibits no edema or tenderness.   Lymphadenopathy:     He has no cervical adenopathy.   Neurological: He is alert and oriented to person, place,  and time. Coordination normal.   Skin: Skin is warm and dry. No rash noted.   Diffusely erythematous and dry   Psychiatric: He has a normal mood and affect. His behavior is normal.           Assessment/Plan:       No diagnosis found.    77 y/o M h/o psoriasis (humira in past had bad reaction) currently on prednisone 10 mg daily, HTN, gout here for eval for indeterminate quant gold and lung mass  - check Tspot, if positive will not likely be able to treat patient given drinking 1 case of beer daily and would need to monitor with serial Xrays  - with lung mass (unclear how long present) Likely needs biopsy of this to r/o infection vs malignancy  - will send non invasive markers at this time  - counseled on ETOH use

## 2017-05-10 NOTE — TELEPHONE ENCOUNTER
----- Message from Jerica Gibbons sent at 5/10/2017  2:59 PM CDT -----  Patient's wife would like the nurse to give her a call back about the patient's appointment for Friday. She can be reached at [phone].      Could not leave message

## 2017-05-11 DIAGNOSIS — Z11.1 SCREENING-PULMONARY TB: Primary | ICD-10-CM

## 2017-05-12 LAB
HISTOPLASMA AG VALUE: 0 NG/ML
HISTOPLASMA ANTIGEN URINE: NEGATIVE

## 2017-05-15 ENCOUNTER — LAB VISIT (OUTPATIENT)
Dept: LAB | Facility: HOSPITAL | Age: 77
End: 2017-05-15
Payer: MEDICARE

## 2017-05-15 ENCOUNTER — INITIAL CONSULT (OUTPATIENT)
Dept: HEMATOLOGY/ONCOLOGY | Facility: CLINIC | Age: 77
End: 2017-05-15
Payer: MEDICARE

## 2017-05-15 VITALS
DIASTOLIC BLOOD PRESSURE: 70 MMHG | HEIGHT: 62 IN | WEIGHT: 158.5 LBS | BODY MASS INDEX: 29.17 KG/M2 | TEMPERATURE: 99 F | HEART RATE: 105 BPM | SYSTOLIC BLOOD PRESSURE: 149 MMHG

## 2017-05-15 DIAGNOSIS — Z11.1 SCREENING-PULMONARY TB: ICD-10-CM

## 2017-05-15 DIAGNOSIS — D52.0 ANEMIA, MACROCYTIC, NUTRITIONAL: Primary | ICD-10-CM

## 2017-05-15 PROCEDURE — 1125F AMNT PAIN NOTED PAIN PRSNT: CPT | Mod: S$GLB,,, | Performed by: INTERNAL MEDICINE

## 2017-05-15 PROCEDURE — 99205 OFFICE O/P NEW HI 60 MIN: CPT | Mod: S$GLB,,, | Performed by: INTERNAL MEDICINE

## 2017-05-15 PROCEDURE — 3077F SYST BP >= 140 MM HG: CPT | Mod: S$GLB,,, | Performed by: INTERNAL MEDICINE

## 2017-05-15 PROCEDURE — 1159F MED LIST DOCD IN RCRD: CPT | Mod: S$GLB,,, | Performed by: INTERNAL MEDICINE

## 2017-05-15 PROCEDURE — 1160F RVW MEDS BY RX/DR IN RCRD: CPT | Mod: S$GLB,,, | Performed by: INTERNAL MEDICINE

## 2017-05-15 PROCEDURE — 99999 PR PBB SHADOW E&M-EST. PATIENT-LVL III: CPT | Mod: PBBFAC,,, | Performed by: INTERNAL MEDICINE

## 2017-05-15 PROCEDURE — 3078F DIAST BP <80 MM HG: CPT | Mod: S$GLB,,, | Performed by: INTERNAL MEDICINE

## 2017-05-15 NOTE — LETTER
May 15, 2017      Annabella Montiel MD  [address]  [facility] [address]           [facility]  [address]  [facility] [address]  Phone: [phone]          Patient: Darinel Majano   MR Number: [id number]   YOB: 1940   Date of Visit: 5/15/2017       Dear Dr. Annabella Montiel:    Thank you for referring Darinel Majano to me for evaluation. Attached you will find relevant portions of my assessment and plan of care.    If you have questions, please do not hesitate to call me. I look forward to following Darinel Majano along with you.    Sincerely,    Janet Munguia MD    Enclosure  CC:  No Recipients    If you would like to receive this communication electronically, please contact [email or web address] or [phone] to request more information on [employer] access.    For providers and/or their staff who would like to refer a patient to [facility], please contact us through our one-stop-shop provider referral line, [facility] , at [phone].    If you feel you have received this communication in error or would no longer like to receive these types of communications, please e-mail [email or web address]

## 2017-05-15 NOTE — PROGRESS NOTES
Subjective:       Patient ID: Darinel Majano is a 76 y.o. male.    Chief Complaint: Anemia    Darinel presents with his wife for evaluation of a macrocytic anemia. Macrocytosis and thrombocytopenia date back to at least 2004 [facility]. Most pertinent to the patient's CBC changes is a history of alcohol use, 1 case of beer daily that the patient stopped 2 weeks ago. He also has liver enzyme changes and splenomegaly. His appetite and nutrition were low while drinking alcohol. Since starting new therapy for psoriasis he stopped all ETOH use and his skin is improving significantly.    HPI  Review of Systems   Constitutional: Negative.    HENT: Negative.    Eyes: Negative.    Respiratory: Negative.    Cardiovascular: Negative.    Gastrointestinal: Negative.    Endocrine: Negative.    Genitourinary: Negative.    Skin: Negative.    Allergic/Immunologic: Negative for environmental allergies, food allergies and immunocompromised state.   Neurological: Negative.    Hematological: Negative for adenopathy. Does not bruise/bleed easily.   Psychiatric/Behavioral: Negative.        Objective:      Physical Exam   Constitutional: He is oriented to person, place, and time. He appears well-developed and well-nourished.   HENT:   Head: Normocephalic and atraumatic.   Mouth/Throat: No oropharyngeal exudate.   Eyes: Conjunctivae are normal. No scleral icterus.   Neck: Normal range of motion. Neck supple.   Cardiovascular: Normal rate and intact distal pulses.    Pulmonary/Chest: Effort normal. No respiratory distress.   Abdominal: He exhibits no distension. There is no tenderness.   Musculoskeletal: Normal range of motion. He exhibits no edema.   Neurological: He is alert and oriented to person, place, and time. No cranial nerve deficit.   Skin: Skin is warm and dry.   Psychiatric: He has a normal mood and affect. His behavior is normal. Judgment and thought content normal.   Nursing note and vitals reviewed.      Assessment:        1. Anemia, macrocytic, nutritional        Plan:       Check status of B12, folate, homocysteine, and methylmalonic acid.   If nutritional studies do not explain CBC changes (macrocytic anemia) we will need a marrow biopsy  Thrombocytopenia is likely explained by ETOH liver changes and splenomegaly.  Return visit 5/18 to review results with patient.

## 2017-05-17 ENCOUNTER — OFFICE VISIT (OUTPATIENT)
Dept: DERMATOLOGY | Facility: CLINIC | Age: 77
End: 2017-05-17
Payer: MEDICARE

## 2017-05-17 DIAGNOSIS — L53.9 ERYTHRODERMA: ICD-10-CM

## 2017-05-17 DIAGNOSIS — D48.9 NEOPLASM OF UNCERTAIN BEHAVIOR: Primary | ICD-10-CM

## 2017-05-17 DIAGNOSIS — L40.0 PSORIASIS VULGARIS: ICD-10-CM

## 2017-05-17 PROCEDURE — 1160F RVW MEDS BY RX/DR IN RCRD: CPT | Mod: S$GLB,,, | Performed by: DERMATOLOGY

## 2017-05-17 PROCEDURE — 88305 TISSUE EXAM BY PATHOLOGIST: CPT | Performed by: PATHOLOGY

## 2017-05-17 PROCEDURE — 99999 PR PBB SHADOW E&M-EST. PATIENT-LVL II: CPT | Mod: PBBFAC,,, | Performed by: DERMATOLOGY

## 2017-05-17 PROCEDURE — 11100 PR BIOPSY OF SKIN LESION: CPT | Mod: S$GLB,,, | Performed by: DERMATOLOGY

## 2017-05-17 PROCEDURE — 99214 OFFICE O/P EST MOD 30 MIN: CPT | Mod: 25,S$GLB,, | Performed by: DERMATOLOGY

## 2017-05-17 PROCEDURE — 99499 UNLISTED E&M SERVICE: CPT | Mod: S$GLB,,, | Performed by: DERMATOLOGY

## 2017-05-17 PROCEDURE — 1159F MED LIST DOCD IN RCRD: CPT | Mod: S$GLB,,, | Performed by: DERMATOLOGY

## 2017-05-17 NOTE — PROGRESS NOTES
Subjective:       Patient ID:  Darinel Majano is a 76 y.o. male who presents for   Chief Complaint   Patient presents with    Psoriasis     HPI Comments: Pthere from erythrodermic psoriasis f/u. Pt is significantly improved on oral prednisone. Path indicates AGEP /pustular psoriasis. No new medications since he started flaring.  He has seen ID and they are working up his lung lesion and indeterminant quant gold  Pt is tolerating pred well. Chills have improved.     Also complains of lesion on right 3rd digit x 1 month. Started small and grew larger very quickly. Moderately painful. Getting larger. No tx. Not bleeding.       Psoriasis         Review of Systems   Constitutional: Negative for fever.   Respiratory: Negative for cough and shortness of breath.    Skin: Positive for itching, rash and dry skin.        Objective:    Physical Exam   Constitutional: He appears well-developed and well-nourished. No distress.   Neurological: He is alert and oriented to person, place, and time. He is not disoriented.   Psychiatric: He has a normal mood and affect.   Skin:   Areas Examined (abnormalities noted in diagram):   Scalp / Hair Palpated and Inspected  Head / Face Inspection Performed  Neck Inspection Performed  Chest / Axilla Inspection Performed  Abdomen Inspection Performed  Genitals / Buttocks / Groin Inspection Performed  Back Inspection Performed  RUE Inspected  LUE Inspection Performed  RLE Inspected  LLE Inspection Performed  Nails and Digits Inspection Performed                   r 3rd digit         Diagram Legend     Erythematous scaling macule/papule c/w actinic keratosis       Vascular papule c/w angioma      Pigmented verrucoid papule/plaque c/w seborrheic keratosis      Yellow umbilicated papule c/w sebaceous hyperplasia      Irregularly shaped tan macule c/w lentigo     1-2 mm smooth white papules consistent with Milia      Movable subcutaneous cyst with punctum c/w epidermal inclusion cyst       Subcutaneous movable cyst c/w pilar cyst      Firm pink to brown papule c/w dermatofibroma      Pedunculated fleshy papule(s) c/w skin tag(s)      Evenly pigmented macule c/w junctional nevus     Mildly variegated pigmented, slightly irregular-bordered macule c/w mildly atypical nevus      Flesh colored to evenly pigmented papule c/w intradermal nevus       Pink pearly papule/plaque c/w basal cell carcinoma      Erythematous hyperkeratotic cursted plaque c/w SCC      Surgical scar with no sign of skin cancer recurrence      Open and closed comedones      Inflammatory papules and pustules      Verrucoid papule consistent consistent with wart     Erythematous eczematous patches and plaques     Dystrophic onycholytic nail with subungual debris c/w onychomycosis     Umbilicated papule    Erythematous-base heme-crusted tan verrucoid plaque consistent with inflamed seborrheic keratosis     Erythematous Silvery Scaling Plaque c/w Psoriasis     See annotation      Assessment / Plan:      Pathology Orders:      Normal Orders This Visit    Tissue Specimen To Pathology, Dermatology     Questions:    Directional Terms:  Other(comment)    Clinical information:  r/o SCC    Specific Site:  right third digit        Neoplasm of uncertain behavior  Shave biopsy procedure note:    Shave biopsy performed after verbal consent including risk of infection, scar, recurrence, need for additional treatment of site. Area prepped with alcohol, anesthetized with approximately 1.0cc of 1% lidocaine with epinephrine. Lesional tissue shaved with razor blade. Hemostasis achieved with application of aluminum chloride followed by hyfrecation. No complications. Dressing applied. Wound care explained.    If biopsy positive for malignancy, refer to Dr. Floyd for Mohs surgery consultation.  -     Tissue Specimen To Pathology, Dermatology    Erythroderma  Psoriasis vulgaris  Pt has multiple medical co- morbidities making tx systemically difficult.   Systemic pred has calmed down the erythroderma.   Having w/u for liver, anemia, ID in progress, until he is cleared, biologics or immunosuppressants are not an option  WIll consider soriatane if cleared by hepatology  Will try to start NBUVB though if he becomes erythrodermic again, this is not a great option  TAC 0.1% ointment to body then sauna suit or warm pj's  Good skin care regimen discussed including limiting to one bath or shower/day, using lukewarm water with mild soap and moisturizing cream to skin 1 - 2x/day. Brochure was provided and reviewed with patient.  cerave cream   Dove soap sensitive skin   Dovonoex oitnment to face              Return in about 4 weeks (around 6/14/2017) for prn bx report, .

## 2017-05-18 ENCOUNTER — OFFICE VISIT (OUTPATIENT)
Dept: HEMATOLOGY/ONCOLOGY | Facility: CLINIC | Age: 77
End: 2017-05-18
Payer: MEDICARE

## 2017-05-18 ENCOUNTER — TELEPHONE (OUTPATIENT)
Dept: INTERNAL MEDICINE | Facility: CLINIC | Age: 77
End: 2017-05-18

## 2017-05-18 ENCOUNTER — TELEPHONE (OUTPATIENT)
Dept: HEMATOLOGY/ONCOLOGY | Facility: CLINIC | Age: 77
End: 2017-05-18

## 2017-05-18 VITALS
TEMPERATURE: 99 F | BODY MASS INDEX: 28.39 KG/M2 | HEIGHT: 62 IN | WEIGHT: 154.31 LBS | HEART RATE: 83 BPM | SYSTOLIC BLOOD PRESSURE: 160 MMHG | DIASTOLIC BLOOD PRESSURE: 72 MMHG

## 2017-05-18 DIAGNOSIS — D52.0 ANEMIA, MACROCYTIC, NUTRITIONAL: Primary | ICD-10-CM

## 2017-05-18 DIAGNOSIS — D53.9 MACROCYTIC ANEMIA: Primary | ICD-10-CM

## 2017-05-18 LAB — T-SPOT TB SCREENING TEST: NORMAL

## 2017-05-18 PROCEDURE — 1126F AMNT PAIN NOTED NONE PRSNT: CPT | Mod: S$GLB,,, | Performed by: INTERNAL MEDICINE

## 2017-05-18 PROCEDURE — 1160F RVW MEDS BY RX/DR IN RCRD: CPT | Mod: S$GLB,,, | Performed by: INTERNAL MEDICINE

## 2017-05-18 PROCEDURE — 99999 PR PBB SHADOW E&M-EST. PATIENT-LVL III: CPT | Mod: PBBFAC,,, | Performed by: INTERNAL MEDICINE

## 2017-05-18 PROCEDURE — 99215 OFFICE O/P EST HI 40 MIN: CPT | Mod: S$GLB,,, | Performed by: INTERNAL MEDICINE

## 2017-05-18 PROCEDURE — 3077F SYST BP >= 140 MM HG: CPT | Mod: S$GLB,,, | Performed by: INTERNAL MEDICINE

## 2017-05-18 PROCEDURE — 1157F ADVNC CARE PLAN IN RCRD: CPT | Mod: S$GLB,,, | Performed by: INTERNAL MEDICINE

## 2017-05-18 PROCEDURE — 3078F DIAST BP <80 MM HG: CPT | Mod: S$GLB,,, | Performed by: INTERNAL MEDICINE

## 2017-05-18 PROCEDURE — 1159F MED LIST DOCD IN RCRD: CPT | Mod: S$GLB,,, | Performed by: INTERNAL MEDICINE

## 2017-05-18 NOTE — TELEPHONE ENCOUNTER
Called pt to schedule his BMBX , pt was told to report to the second floor in the main hospital Same Day Surgery Dept. Pt was told to be NPO starting at midnight the day prior to surgery. Pt was advised to hold all blood thinning medications prior to his BMBX & was advised to have a ride home. Pt voiced verbal understanding of these directions. Will also mail instruction sheet to pt's home.  Case request pended and routed to Shannon Wu.    Chrissie Kidd

## 2017-05-18 NOTE — TELEPHONE ENCOUNTER
With his permission- reviewed test results with wife- Natividad    Lung abnormalities, liver possible cirrhosis; ASCVD-    Keep all appointments in Hepatology and Pulmonary, and me in June.   Continues to work reducing alcohol.      Call or return sooner with problems before next appt

## 2017-05-19 ENCOUNTER — PATIENT MESSAGE (OUTPATIENT)
Dept: HEMATOLOGY/ONCOLOGY | Facility: CLINIC | Age: 77
End: 2017-05-19

## 2017-05-19 NOTE — PROGRESS NOTES
Subjective:       Patient ID: Darinel Majano is a 76 y.o. male.    Chief Complaint: Anemia    Darinel presents with his wife for evaluation of a macrocytic anemia. Macrocytosis and thrombocytopenia date back to at least 2004 by [facility] lab review. Most pertinent to the patient's CBC changes is a history of alcohol use, 1 case of beer daily that the patient stopped 2 weeks ago. He also has liver enzyme changes and splenomegaly. His appetite and nutrition were low while drinking alcohol. Since starting new therapy for psoriasis he stopped all ETOH use and his skin is improving significantly.    Follow-up 5/18/17  B12 studies are normal. Patient does have a reticulocytosis without blood loss or evidence of peripheral hemolysis  Recommend marrow biopsy    Anemia   There has been no bruising/bleeding easily.     Review of Systems   Constitutional: Negative.    HENT: Negative.    Eyes: Negative.    Respiratory: Negative.    Cardiovascular: Negative.    Gastrointestinal: Negative.    Endocrine: Negative.    Genitourinary: Negative.    Skin: Negative.    Allergic/Immunologic: Negative for environmental allergies, food allergies and immunocompromised state.   Neurological: Negative.    Hematological: Negative for adenopathy. Does not bruise/bleed easily.   Psychiatric/Behavioral: Negative.        Objective:      Physical Exam   Constitutional: He is oriented to person, place, and time. He appears well-developed and well-nourished.   HENT:   Head: Normocephalic and atraumatic.   Mouth/Throat: No oropharyngeal exudate.   Eyes: Conjunctivae are normal. No scleral icterus.   Neck: Normal range of motion. Neck supple.   Cardiovascular: Normal rate and intact distal pulses.    Pulmonary/Chest: Effort normal. No respiratory distress.   Abdominal: He exhibits no distension. There is no tenderness.   Musculoskeletal: Normal range of motion. He exhibits no edema.   Neurological: He is alert and oriented to person, place, and time. No  cranial nerve deficit.   Skin: Skin is warm and dry.   Psychiatric: He has a normal mood and affect. His behavior is normal. Judgment and thought content normal.   Nursing note and vitals reviewed.      Assessment:       1. Anemia, macrocytic, nutritional        Plan:       Recommend marrow biopsy to assess macrocytic anemia inpatient over 65.  History of alcohol use. Reticulocytosis.

## 2017-05-21 ENCOUNTER — ANESTHESIA EVENT (OUTPATIENT)
Dept: SURGERY | Facility: HOSPITAL | Age: 77
End: 2017-05-21
Payer: MEDICARE

## 2017-05-22 ENCOUNTER — ANESTHESIA (OUTPATIENT)
Dept: SURGERY | Facility: HOSPITAL | Age: 77
End: 2017-05-22
Payer: MEDICARE

## 2017-05-22 ENCOUNTER — HOSPITAL ENCOUNTER (OUTPATIENT)
Facility: HOSPITAL | Age: 77
Discharge: HOME | End: 2017-05-22
Attending: INTERNAL MEDICINE | Admitting: INTERNAL MEDICINE
Payer: MEDICARE

## 2017-05-22 VITALS
HEIGHT: 62 IN | DIASTOLIC BLOOD PRESSURE: 63 MMHG | WEIGHT: 154 LBS | BODY MASS INDEX: 28.34 KG/M2 | SYSTOLIC BLOOD PRESSURE: 144 MMHG | TEMPERATURE: 98 F | HEART RATE: 77 BPM | OXYGEN SATURATION: 100 % | RESPIRATION RATE: 18 BRPM

## 2017-05-22 DIAGNOSIS — D53.9 MACROCYTIC ANEMIA: Primary | ICD-10-CM

## 2017-05-22 LAB — BONE MARROW WRIGHT STAIN COMMENT: NORMAL

## 2017-05-22 PROCEDURE — 36000705 HC OR TIME LEV I EA ADD 15 MIN: Performed by: INTERNAL MEDICINE

## 2017-05-22 PROCEDURE — D9220A PRA ANESTHESIA: Mod: ANES,,, | Performed by: ANESTHESIOLOGY

## 2017-05-22 PROCEDURE — 63600175 PHARM REV CODE 636 W HCPCS: Performed by: NURSE ANESTHETIST, CERTIFIED REGISTERED

## 2017-05-22 PROCEDURE — 88184 FLOWCYTOMETRY/ TC 1 MARKER: CPT | Performed by: PATHOLOGY

## 2017-05-22 PROCEDURE — 88237 TISSUE CULTURE BONE MARROW: CPT

## 2017-05-22 PROCEDURE — 38221 DX BONE MARROW BIOPSIES: CPT | Mod: LT,,, | Performed by: NURSE PRACTITIONER

## 2017-05-22 PROCEDURE — 71000044 HC DOSC ROUTINE RECOVERY FIRST HOUR: Performed by: INTERNAL MEDICINE

## 2017-05-22 PROCEDURE — 88305 TISSUE EXAM BY PATHOLOGIST: CPT | Mod: 26,,, | Performed by: PATHOLOGY

## 2017-05-22 PROCEDURE — 85097 BONE MARROW INTERPRETATION: CPT | Mod: ,,, | Performed by: PATHOLOGY

## 2017-05-22 PROCEDURE — 25000003 PHARM REV CODE 250: Performed by: ANESTHESIOLOGY

## 2017-05-22 PROCEDURE — 25000003 PHARM REV CODE 250: Performed by: NURSE ANESTHETIST, CERTIFIED REGISTERED

## 2017-05-22 PROCEDURE — 88185 FLOWCYTOMETRY/TC ADD-ON: CPT | Performed by: PATHOLOGY

## 2017-05-22 PROCEDURE — 88313 SPECIAL STAINS GROUP 2: CPT

## 2017-05-22 PROCEDURE — 88189 FLOWCYTOMETRY/READ 16 & >: CPT | Mod: ,,, | Performed by: PATHOLOGY

## 2017-05-22 PROCEDURE — 88311 DECALCIFY TISSUE: CPT | Mod: 26,,, | Performed by: PATHOLOGY

## 2017-05-22 PROCEDURE — 36000704 HC OR TIME LEV I 1ST 15 MIN: Performed by: INTERNAL MEDICINE

## 2017-05-22 PROCEDURE — D9220A PRA ANESTHESIA: Mod: CRNA,,, | Performed by: NURSE ANESTHETIST, CERTIFIED REGISTERED

## 2017-05-22 PROCEDURE — 88305 TISSUE EXAM BY PATHOLOGIST: CPT | Performed by: PATHOLOGY

## 2017-05-22 PROCEDURE — 88264 CHROMOSOME ANALYSIS 20-25: CPT

## 2017-05-22 PROCEDURE — 88313 SPECIAL STAINS GROUP 2: CPT | Mod: 26,,, | Performed by: PATHOLOGY

## 2017-05-22 PROCEDURE — 37000009 HC ANESTHESIA EA ADD 15 MINS: Performed by: INTERNAL MEDICINE

## 2017-05-22 PROCEDURE — 25000003 PHARM REV CODE 250: Performed by: INTERNAL MEDICINE

## 2017-05-22 PROCEDURE — 37000008 HC ANESTHESIA 1ST 15 MINUTES: Performed by: INTERNAL MEDICINE

## 2017-05-22 PROCEDURE — 71000015 HC POSTOP RECOV 1ST HR: Performed by: INTERNAL MEDICINE

## 2017-05-22 RX ORDER — PROPOFOL 10 MG/ML
VIAL (ML) INTRAVENOUS CONTINUOUS PRN
Status: DISCONTINUED | OUTPATIENT
Start: 2017-05-22 | End: 2017-05-22

## 2017-05-22 RX ORDER — MIDAZOLAM HYDROCHLORIDE 1 MG/ML
INJECTION, SOLUTION INTRAMUSCULAR; INTRAVENOUS
Status: DISCONTINUED | OUTPATIENT
Start: 2017-05-22 | End: 2017-05-22

## 2017-05-22 RX ORDER — PROPOFOL 10 MG/ML
VIAL (ML) INTRAVENOUS
Status: DISCONTINUED | OUTPATIENT
Start: 2017-05-22 | End: 2017-05-22

## 2017-05-22 RX ORDER — SODIUM CHLORIDE 9 MG/ML
INJECTION, SOLUTION INTRAVENOUS CONTINUOUS
Status: DISCONTINUED | OUTPATIENT
Start: 2017-05-22 | End: 2017-05-22 | Stop reason: HOSPADM

## 2017-05-22 RX ORDER — LIDOCAINE HYDROCHLORIDE 20 MG/ML
INJECTION, SOLUTION EPIDURAL; INFILTRATION; INTRACAUDAL; PERINEURAL
Status: DISCONTINUED | OUTPATIENT
Start: 2017-05-22 | End: 2017-05-22 | Stop reason: HOSPADM

## 2017-05-22 RX ORDER — LIDOCAINE HCL/PF 100 MG/5ML
SYRINGE (ML) INTRAVENOUS
Status: DISCONTINUED | OUTPATIENT
Start: 2017-05-22 | End: 2017-05-22

## 2017-05-22 RX ORDER — LIDOCAINE HYDROCHLORIDE 10 MG/ML
1 INJECTION, SOLUTION EPIDURAL; INFILTRATION; INTRACAUDAL; PERINEURAL ONCE
Status: DISCONTINUED | OUTPATIENT
Start: 2017-05-22 | End: 2017-05-22 | Stop reason: HOSPADM

## 2017-05-22 RX ADMIN — MIDAZOLAM HYDROCHLORIDE 1 MG: 1 INJECTION, SOLUTION INTRAMUSCULAR; INTRAVENOUS at 11:05

## 2017-05-22 RX ADMIN — PROPOFOL 150 MCG/KG/MIN: 10 INJECTION, EMULSION INTRAVENOUS at 11:05

## 2017-05-22 RX ADMIN — PROPOFOL 40 MG: 10 INJECTION, EMULSION INTRAVENOUS at 11:05

## 2017-05-22 RX ADMIN — PROPOFOL 20 MG: 10 INJECTION, EMULSION INTRAVENOUS at 11:05

## 2017-05-22 RX ADMIN — LIDOCAINE HYDROCHLORIDE 60 MG: 20 INJECTION, SOLUTION INTRAVENOUS at 11:05

## 2017-05-22 RX ADMIN — SODIUM CHLORIDE: 0.9 INJECTION, SOLUTION INTRAVENOUS at 11:05

## 2017-05-22 NOTE — INTERVAL H&P NOTE
The patient has been examined and the H&P has been reviewed:    I concur with the findings and no changes have occurred since H&P was written. Okay to proceed with bone marrow bx and aspiration.     Anesthesia/Surgery risks, benefits and alternative options discussed and understood by patient/family.          There are no hospital problems to display for this patient.

## 2017-05-22 NOTE — PROCEDURES
PROCEDURE NOTE:  Date of Procedure: 5/22/17  Bone Marrow Biopsy and Aspiration  Indication: Macrocytic anemia with reticulocytosis without blood loss or evidence of peripheral hemolysis.  Consent: Informed consent was obtained from patient.  Timeout: Done and documented.  Position: Right lateral.   Site: Left posterior illiac crest.  Prep: Betadine.  Needle used: 11 gauge Jamshidi needle.  Anesthetic: 1% lidocaine 5 cc.  Biopsy: The biopsy needle was introduced into the marrow cavity and an aspirate was obtained without complications and sent for flow cytometry and cytogenetics. Two core biopsies were obtained without difficulty and sent for routine histologic examination.  Complications: None.  Disposition: The patient was discharged home per anesthesia protocol.  Blood loss: Minimal.     Simi Roca DNP, NP  Hematology/Oncology

## 2017-05-22 NOTE — ANESTHESIA RELEASE NOTE
"Anesthesia Release from PACU Note    Patient: Darinel Majano    Procedure(s) Performed: Procedure(s) (LRB):  BIOPSY-BONE MARROW (Left)    Anesthesia type: general    Post pain: Adequate analgesia    Post assessment: no apparent anesthetic complications    Last Vitals:   Visit Vitals  BP (!) 101/57 (BP Location: Right arm, Patient Position: Lying, BP Method: Automatic)   Pulse 75   Temp 36.4 °C (97.6 °F) (Axillary)   Resp 16   Ht 5' 2" (1.575 m)   Wt 69.9 kg (154 lb)   SpO2 99%   BMI 28.17 kg/m²       Post vital signs: stable    Level of consciousness: awake, alert  and oriented    Nausea/Vomiting: no nausea/no vomiting    Complications: none    Airway Patency: patent    Respiratory: unassisted, spontaneous ventilation, room air    Cardiovascular: stable and blood pressure at baseline    Hydration: euvolemic  "

## 2017-05-22 NOTE — ANESTHESIA PREPROCEDURE EVALUATION
05/22/2017  Darinel Majano is a 76 y.o., male.  Pre-operative evaluation for Procedure(s) (LRB):  BIOPSY-BONE MARROW (Left)    Darinel Majano is a 76 y.o. 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      Current Outpatient Prescriptions on File Prior to Encounter   Medication Sig Dispense Refill    clobetasol (TEMOVATE) 0.05 % cream Apply topically 2 (two) times daily. 60 g 3    predniSONE (DELTASONE) 10 MG tablet Sig 4 pills po qam x 1 week then 2 pills po qam x 1 week then 1 pill po qam x 1 week 49 tablet 0    triamcinolone acetonide 0.1% (KENALOG) 0.1 % ointment Apply topically 2 (two) times daily. Prn flare. Not more than 2 weeks straight in the same locaiton 454 g 1       Past Surgical History:   Procedure Laterality Date    CATARACT EXTRACTION      CHOLECYSTECTOMY  2012    EYE SURGERY      Right Hand Surgery         Social History     Social History    Marital status:      Spouse name: N/A    Number of children: N/A    Years of education: N/A     Occupational History     Not on file.     Social History Main Topics    Smoking status: Former Smoker     Packs/day: 1.00     Years: 20.00    Smokeless tobacco: Never Used    Alcohol use 7.2 oz/week     12 Cans of beer per week      Comment: 84 beers a week    Drug use: No    Sexual activity: Not on file     Other Topics Concern    Not on file     Social History Narrative    No narrative on file         Vital Signs Range (Last 24H):  Temp:  [36.5 °C (97.7 °F)]   Pulse:  [87]   Resp:  [20]   BP: (187)/(84)   SpO2:  [100 %]       CBC: No results for input(s): WBC, RBC, HGB, HCT, PLT, MCV, MCH, MCHC in the last 72 hours.    CMP: No results for input(s): NA, K, CL, CO2, BUN, CREATININE, GLU, MG, PHOS, CALCIUM, ALBUMIN, PROT, ALKPHOS, ALT, AST, BILITOT in the last 72 hours.    INR  No results for input(s): INR, PROTIME, APTT in the last 72 hours.    Invalid input(s): PT        Diagnostic Studies:      EKD Echo:      Anesthesia Evaluation         Review of Systems  Anesthesia Hx:  History of prior surgery of interest to airway management or planning: Denies Family Hx of Anesthesia complications.  Denies Personal Hx of Anesthesia complications.   Cardiovascular:  Functional Capacity unable to determine  Denies Coronary Artery Disease.  Hypertension    Pulmonary:  Denies Pulmonary Symptoms.  Denies Asthma.  Denies Chronic Obstructive Pulmonary Disease (COPD).    Hepatic/GI:  Denies Esophageal / Stomach Disorders        Physical Exam  General:  Obesity    Airway/Jaw/Neck:  Airway Findings: Mouth Opening: Normal General Airway Assessment: Adult  Mallampati: III  Improves to II with phonation.  TM Distance: Normal, at least 6 cm  Jaw/Neck Findings:     Neck ROM: Normal ROM      Dental:  Dental Findings: In tact   Chest/Lungs:  Chest/Lungs Findings: Clear to auscultation, Normal Respiratory Rate     Heart/Vascular:  Heart Findings: Rate: Normal  Rhythm: Regular Rhythm  Sounds: Normal             Anesthesia Plan  Type of Anesthesia,  risks & benefits discussed:  Anesthesia Type:  general  Patient's Preference:   Intra-op Monitoring Plan:   Intra-op Monitoring Plan Comments:   Post Op Pain Control Plan: multimodal analgesia, IV/PO Opiods PRN and per primary service following discharge from PACU  Post Op Pain Control Plan Comments:   Induction:   IV  Beta Blocker:  Patient is not currently on a Beta-Blocker (No further documentation required).       Informed Consent: Patient understands risks and agrees with Anesthesia plan.  Questions answered. Anesthesia consent signed with patient.  ASA Score: 3     Day of Surgery Review of History & Physical:    H&P update referred to the surgeon.     Anesthesia Plan Notes: NPO since 2000 yesterday        Ready For Surgery From Anesthesia Perspective.

## 2017-05-22 NOTE — DISCHARGE INSTRUCTIONS
Discharge instructions for having a Bone Marrow Aspiration / Biopsy:    Keep Bandage in place for 24 hours.  - Do not shower or take a tube bath during this time (you may sponge bathe).  - Call the nurse or physician for excessive bleeding or pain at biopsy site.  - You may take Tylenol as needed for pain.    You have received medication to sedate you.  - Do not drive a car or operate heavy machinery for the rest of the day.  - You may resume other activities as tolerated.    You can call [phone] for any problems during the hours of 8:00 AM-5:00PM.    For an emergency after 5:00 PM you can call [phone] and have the  page the Hematologist / Oncologist on call.

## 2017-05-22 NOTE — TRANSFER OF CARE
"Anesthesia Transfer of Care Note    Patient: Darinel Majano    Procedure(s) Performed: Procedure(s) (LRB):  BIOPSY-BONE MARROW (Left)    Patient location: [facility]    Anesthesia Type: general    Transport from OR: Transported from OR on room air with adequate spontaneous ventilation    Post pain: adequate analgesia    Post assessment: no apparent anesthetic complications    Post vital signs: stable    Level of consciousness: awake    Nausea/Vomiting: no nausea/vomiting    Complications: none    Transfer of care protocol was followed      Last vitals:   Visit Vitals  BP (!) 194/75 (BP Location: Right arm, Patient Position: Lying, BP Method: Automatic)   Pulse 88   Temp 36.4 °C (97.6 °F) (Axillary)   Resp 16   Ht 5' 2" (1.575 m)   Wt 69.9 kg (154 lb)   SpO2 96%   BMI 28.17 kg/m²     "

## 2017-05-22 NOTE — DISCHARGE SUMMARY
[facility]  Hematology/Oncology  Discharge Summary      Patient Name: Darinel Majano  MRN: [id number]  Admission Date: 5/22/2017  Hospital Length of Stay: 0 days  Discharge Date and Time:  05/22/2017 11:54 AM  Attending Physician: Janet Munguia MD   Discharging Provider: Simi Roca NP  Primary Care Provider: Annabella Montiel MD    Procedure(s) (LRB):  BIOPSY-BONE MARROW (Left)     Hospital Course: Patient admitted to pre op today for a bone marrow aspiration and biopsy. Pt was consented for a bone marrow biopsy. Patient was sedated per anesthesia and a bone marrow biopsy and aspiration was performed in the OR (see procedure note). Patient was then transferred to post op and discharged home when appropriate per anesthesia.       Pending Diagnostic Studies:     None        Final Active Diagnoses:    Diagnosis Date Noted POA    PRINCIPAL PROBLEM:  Macrocytic anemia [D53.9] 05/22/2017 Unknown      Problems Resolved During this Admission:    Diagnosis Date Noted Date Resolved POA      Discharged Condition: stable    Disposition: Home or Self Care    Follow Up:    Patient Instructions:     Diet general     Activity as tolerated     Call MD for:  temperature >100.4     Call MD for:  severe uncontrolled pain     Call MD for:  redness, tenderness, or signs of infection (pain, swelling, redness, odor or green/yellow discharge around incision site)     Remove dressing in 24 hours       Medications:  Reconciled Home Medications:   Current Discharge Medication List      CONTINUE these medications which have NOT CHANGED    Details   clobetasol (TEMOVATE) 0.05 % cream Apply topically 2 (two) times daily.  Qty: 60 g, Refills: 3    Associated Diagnoses: Psoriasis      predniSONE (DELTASONE) 10 MG tablet Sig 4 pills po qam x 1 week then 2 pills po qam x 1 week then 1 pill po qam x 1 week  Qty: 49 tablet, Refills: 0    Associated Diagnoses: Erythroderma      triamcinolone acetonide 0.1% (KENALOG) 0.1 % ointment  Apply topically 2 (two) times daily. Prn flare. Not more than 2 weeks straight in the same locaiton  Qty: 454 g, Refills: 1    Associated Diagnoses: Psoriasis vulgaris           F/U with Dr. Janet Munguia in BMT Clinic.    Simi Roca NP  Hematology/Oncology  [facility]

## 2017-05-22 NOTE — PROGRESS NOTES
Discharge instructions given to patient and spouse and verbalized an understanding.  Patient denies pain and nausea, tolerating po, and patient ready to go home and to be discharge via wheelchair.

## 2017-05-22 NOTE — ANESTHESIA POSTPROCEDURE EVALUATION
"Anesthesia Post Evaluation    Patient: Darinel Majano    Procedure(s) Performed: Procedure(s) (LRB):  BIOPSY-BONE MARROW (Left)    Final Anesthesia Type: general  Patient location during evaluation: PACU  Patient participation: Yes- Able to Participate  Level of consciousness: awake and alert and oriented  Post-procedure vital signs: reviewed and stable  Pain management: adequate  Airway patency: patent  PONV status at discharge: No PONV  Anesthetic complications: no      Cardiovascular status: blood pressure returned to baseline and hemodynamically stable  Respiratory status: unassisted, spontaneous ventilation and room air  Hydration status: euvolemic  Follow-up not needed.        Visit Vitals  BP (!) 101/57 (BP Location: Right arm, Patient Position: Lying, BP Method: Automatic)   Pulse 75   Temp 36.4 °C (97.6 °F) (Axillary)   Resp 16   Ht 5' 2" (1.575 m)   Wt 69.9 kg (154 lb)   SpO2 99%   BMI 28.17 kg/m²       Pain/Enoch Score: Pain Assessment Performed: Yes (5/22/2017 11:56 AM)  Presence of Pain: denies (5/22/2017 11:56 AM)      "

## 2017-05-23 LAB
BODY SITE - BONE MARROW: NORMAL
BONE MARROW IRON STAIN COMMENT: NORMAL
CLINICAL DIAGNOSIS - BONE MARROW: NORMAL
FLOW CYTOMETRY ANTIBODIES ANALYZED - BONE MARROW: NORMAL
FLOW CYTOMETRY COMMENT - BONE MARROW: NORMAL
FLOW CYTOMETRY INTERPRETATION - BONE MARROW: NORMAL

## 2017-05-24 ENCOUNTER — OFFICE VISIT (OUTPATIENT)
Dept: HEPATOLOGY | Facility: CLINIC | Age: 77
End: 2017-05-24
Payer: MEDICARE

## 2017-05-24 ENCOUNTER — HOSPITAL ENCOUNTER (OUTPATIENT)
Dept: RADIOLOGY | Facility: CLINIC | Age: 77
Discharge: HOME | End: 2017-05-24
Attending: INTERNAL MEDICINE
Payer: MEDICARE

## 2017-05-24 VITALS
RESPIRATION RATE: 18 BRPM | HEIGHT: 62 IN | TEMPERATURE: 96 F | WEIGHT: 154.31 LBS | DIASTOLIC BLOOD PRESSURE: 79 MMHG | OXYGEN SATURATION: 100 % | BODY MASS INDEX: 28.39 KG/M2 | SYSTOLIC BLOOD PRESSURE: 177 MMHG | HEART RATE: 85 BPM

## 2017-05-24 DIAGNOSIS — M81.0 OSTEOPOROSIS WITHOUT CURRENT PATHOLOGICAL FRACTURE, UNSPECIFIED OSTEOPOROSIS TYPE: ICD-10-CM

## 2017-05-24 DIAGNOSIS — D69.6 THROMBOCYTOPENIA: ICD-10-CM

## 2017-05-24 DIAGNOSIS — F10.20 UNCOMPLICATED ALCOHOL DEPENDENCE: ICD-10-CM

## 2017-05-24 DIAGNOSIS — R74.8 ELEVATED LIVER ENZYMES: Primary | ICD-10-CM

## 2017-05-24 PROCEDURE — 1159F MED LIST DOCD IN RCRD: CPT | Mod: S$GLB,,, | Performed by: PHYSICIAN ASSISTANT

## 2017-05-24 PROCEDURE — 99999 PR PBB SHADOW E&M-EST. PATIENT-LVL IV: CPT | Mod: PBBFAC,,, | Performed by: PHYSICIAN ASSISTANT

## 2017-05-24 PROCEDURE — 1160F RVW MEDS BY RX/DR IN RCRD: CPT | Mod: S$GLB,,, | Performed by: PHYSICIAN ASSISTANT

## 2017-05-24 PROCEDURE — 99499 UNLISTED E&M SERVICE: CPT | Mod: S$GLB,,, | Performed by: PHYSICIAN ASSISTANT

## 2017-05-24 PROCEDURE — 77080 DXA BONE DENSITY AXIAL: CPT | Mod: 26,,, | Performed by: INTERNAL MEDICINE

## 2017-05-24 PROCEDURE — 99214 OFFICE O/P EST MOD 30 MIN: CPT | Mod: S$GLB,,, | Performed by: PHYSICIAN ASSISTANT

## 2017-05-24 PROCEDURE — 3077F SYST BP >= 140 MM HG: CPT | Mod: S$GLB,,, | Performed by: PHYSICIAN ASSISTANT

## 2017-05-24 PROCEDURE — 1157F ADVNC CARE PLAN IN RCRD: CPT | Mod: 8P,S$GLB,, | Performed by: PHYSICIAN ASSISTANT

## 2017-05-24 PROCEDURE — 3078F DIAST BP <80 MM HG: CPT | Mod: S$GLB,,, | Performed by: PHYSICIAN ASSISTANT

## 2017-05-24 PROCEDURE — 1126F AMNT PAIN NOTED NONE PRSNT: CPT | Mod: S$GLB,,, | Performed by: PHYSICIAN ASSISTANT

## 2017-05-24 RX ORDER — TRIAMCINOLONE ACETONIDE 1 MG/G
CREAM TOPICAL 2 TIMES DAILY
COMMUNITY
End: 2017-07-05

## 2017-05-24 RX ORDER — SPIRONOLACTONE 50 MG/1
50 TABLET, FILM COATED ORAL DAILY
Qty: 30 TABLET | Refills: 0 | Status: SHIPPED | OUTPATIENT
Start: 2017-05-24 | End: 2017-07-10 | Stop reason: SDUPTHER

## 2017-05-24 RX ORDER — FUROSEMIDE 20 MG/1
20 TABLET ORAL DAILY
Qty: 30 TABLET | Refills: 0 | Status: SHIPPED | OUTPATIENT
Start: 2017-05-24 | End: 2017-07-10 | Stop reason: SDUPTHER

## 2017-05-24 NOTE — PROGRESS NOTES
"HEPATOLOGY CLINIC VISIT NOTE     REFERRING PROVIDER: Dr. Annabella Montiel     REASON FOR VISIT: Elevated liver enzymes     HISTORY: This is a 76 y.o. White male here for evaluation of elevated liver enzymes, referred by his PCP.  He has been seen in the past by Dr. Duff. His labs were concerning for cirrhosis in 2011, had liver biopsy showing no advanced fibrosis. Labs reveal mildly elevated transaminases. He has hypoalbuminemia, hyperbilirubinemia, and thrombocytopenia noted. He does have a h/o daily alcohol use. When asked how much he would consume in one sitting, he stated, "2 beers for every hour in 24 hours." He reports he stopped about 1 month ago. PCP recommended IP detox with Dr. Rosado, but patient tapered off on his own. He is undergoing a heme onc work up given anemia and reticulocytosis had BM biopsy    PMH significant for severe psoriasis, only currently on topical agents. He has had imaging for a lung mass- has had imaging, scheduled with pulmonary.     Liver staging:  Reports biopsy 5 years ago   AST 41, ALT 62  Tbili 1.2  Albumin 3.1  PLTs 110    Risk Factors:  AI:  Biliary:   ETOH: >case per day for many years- difficult to quantify how long   Hereditary:   Medications:  NAFLD/CLAY:  Other:   Viral hepatitis:    Denies jaundice, dark urine, abdominal distention, hematemesis, melena, slowed mentation.   No abnormal skin rashes. No generalized joint pain.                   Past Medical History:   Diagnosis Date    Anemia 5/4/2017    BPH (benign prostatic hypertrophy)     Chronic gout     Cortical senile cataract 5/15/2012    Degenerative disc disease     Degenerative disc disease     Degenerative disc disease     Essential hypertension 2/19/2016    Gout     Hyperplastic polyp of transverse colon: 2012 repeat 5 years 5/4/2017    Osteoporosis     Psoriasis     Psoriasis     Substance abuse     Thrombocytopenia          Past Surgical History:   Procedure Laterality Date    CATARACT " EXTRACTION      CHOLECYSTECTOMY  2012    EYE SURGERY      Right Hand Surgery       FAMILY HISTORY: Negative for liver disease    SOCIAL HISTORY:   History   Smoking Status    Former Smoker    Packs/day: 1.00    Years: 20.00   Smokeless Tobacco    Never Used       History   Alcohol Use    7.2 oz/week    12 Cans of beer per week     Comment: 84 beers a week   d/c month ago, 2 beers per hour in 24 hours- many years     History   Drug Use No       ROS:   No fever, chills  No chest pain, dyspnea, cough  No abdominal pain, nausea, vomiting  (+) skin rashes   (+) lower extremity edema  No depression or anxiety      PHYSICAL EXAM:  Friendly White male, in no acute distress; alert and oriented to person, place and time  VITALS: reviewed  HEENT: Sclerae anicteric.   NECK: Supple  CVS: Regular rate and rhythm. No murmurs  LUNGS: Normal respiratory effort. Clear bilaterally  ABDOMEN: Flat, soft, nontender. No organomegaly or masses. No ascites or hernias.   SKIN: Warm and dry. No jaundice, psoriasis plaques to abdomen, peeling noted to bilateral hands    EXTREMITIES: mild LL edema bilaterally  NEURO/PSYCH: Normal gate. Memory intact. Thought and speech pattern appropriate. Behavior normal. No depression or anxiety noted.    RECENT LABS:  Lab Results   Component Value Date    WBC 7.27 05/01/2017    HGB 12.2 (L) 05/01/2017     (L) 05/01/2017     Lab Results   Component Value Date    INR 1.0 09/08/2012     Lab Results   Component Value Date    AST 41 (H) 04/27/2017    ALT 62 (H) 04/27/2017    BILITOT 1.2 (H) 04/27/2017    ALBUMIN 3.1 (L) 04/27/2017    ALKPHOS 78 04/27/2017    CREATININE 0.7 04/27/2017    BUN 7 (L) 04/27/2017     04/27/2017    K 3.5 04/27/2017    AFP 4.8 03/13/2012     RECENT IMAGING:  CT chest, abdomen and pelvis   Narrative     Procedure comments: The patient was surveyed from the lung apices through the pelvis after the administration of 75 cc Omni 350 IV contrast as well as oral contrast  and data was reconstructed for coronal, sagittal, and axial images.    Comparison: Chest radiograph 5/1/2017, and 2/28/2014, CT abdomen pelvis 7/3/2012.    Findings:    Examination of the vascular and soft tissue structures at the base of the neck is unremarkable aside from a thyroid subcentimeter hypodensity too small to further characterize..    A left sided aortic arch with 3 branch vessels is identified.  The thoracic aorta maintains normal caliber, contour, and course with mild atherosclerotic calcification within its course.  The heart is not enlarged and there is no evidence of pericardial effusion.    The trachea is midline, the proximal airways are patent, and the lungs are symmetrically expanded.  Examination of the lung fields demonstrates a 3.6 cm mass with some internal hyperdensity, and peripheral contrast enhancement. This mass demonstrates smooth margins and is located in the right lower lobe lung base. Distortion of local vessels is not apparent. There appears to be local pleural thickening.     There is no axillary, mediastinal, or hilar lymphadenopathy.    The esophagus maintains a normal course and caliber.     The liver is normal in size and attenuation with no focal hepatic abnormality.  Gallbladder surgically absent..  There is no intra-or extrahepatic biliary ductal dilatation.    The stomach, pancreas, and adrenal glands are unremarkable. There is splenomegaly.    The kidneys are small size bilaterally. There is a left-sided punctate nephrolithiasis appears nonobstructing..  They concentrate and excrete contrast properly on delayed imaging.  There is no evidence of hydronephrosis.  The ureters appear normal in course and caliber without evidence of ureteral dilatation. The urinary bladder is nondistended.     The visualized loops of small and large bowel show no evidence of obstruction or inflammation.  There is marked diverticulosis without evidence of diverticulitis most prominent in the  sigmoid colon.    There is no ascites, free fluid, or intraperitoneal free air.     There is no evidence of lymph node enlargement in the abdomen or pelvis.    The abdominal aorta is normal in course and caliber with mild atherosclerotic calcifications.    The osseus structures demonstrate degenerative change without evidence of acute fracture or osseus destructive process.      The extraperitoneal soft tissues are unremarkable, aside from a fat-containing umbilical hernia.   Impression         There is a 3.6 mass with smooth margins and adjacent pleural thickening in the right lung base, which may correlate to findings on recent and remote chest radiographs 5/1/2017, 2/28/2014. This is nonspecific finding, and may represent atelectasis, infection, noninfectious inflammation, or neoplasm. Clinical considerations may determine the role for three-month followup, PET/CT, or biopsy.    Some findings in keeping with patient's history of alcohol use, including splenomegaly and minimal recannulization of the umbilical vein, which may relate to hepatic fibrosis or cirrhosis.    Other incidental findings including punctate left nephrolith, bilaterally small sized kidneys, fat-containing umbilical hernia, dense calcific atherosclerosis of the coronary vessels, and mild calcific atherosclerosis of aorta, and its branch vessels.    This report has been flagged in the [facility] medical record.     ASSESSMENT  76 y.o. White male with:  1. ELEVATED TRANSAMINASES  -- likely 2/2 fibrosis and heavy alcohol use  -- will rule out other causes for liver disease    2. ABNORMAL LFTs  -- concerning for cirrhosis  -- liver biopsy in 2011, no advanced fibrosis but heavy alcohol consumption persisted    3. H/O ALCOHOL ABUSE  -- reports 48 beers per day  -- reports d/c at first of May  -- encouraged for absolute cessation    4. ANKLE EDEMA  -- Lasix 20mg, Spironolactone 50mg, repeat CMP in 1 week     EDUCATION:  Discussed need to minimize and  abstain completely from alcohol as this is causing liver dysfunction. Discussed that alcohol abuse can cause cirrhosis. Pt understands that continued alcohol abuse can be detrimental to his liver. Discussed potential outcomes of cirrhosis, including liver cancer, liver failure, liver transplant, death.     PLAN:  1. Labs today  2. Fibroscan  3. Begin Lasix and Spironolactone for ankle edema, labs in 1 week  4. Follow up in 3 months     Thank you for allowing me to participate in the care of Darinel Nair PA-C

## 2017-05-24 NOTE — LETTER
May 24, 2017      Annabella Montiel MD  [address]  [facility] [address]           [facility] - Hepatology  [address]  [facility] [address]  Phone: [phone]  Fax: [phone]          Patient: Darinel Majano   MR Number: [id number]   YOB: 1940   Date of Visit: 5/24/2017       Dear Dr. Annabella Montiel:    Thank you for referring Darinel Majano to me for evaluation. Attached you will find relevant portions of my assessment and plan of care.    If you have questions, please do not hesitate to call me. I look forward to following Darinel Majano along with you.    Sincerely,    Raymond Nair PA-C    Enclosure  CC:  No Recipients    If you would like to receive this communication electronically, please contact [email or web address] or [phone] to request more information on [facility] Link access.    For providers and/or their staff who would like to refer a patient to [facility], please contact us through our one-stop-shop provider referral line, [facility], at [phone].    If you feel you have received this communication in error or would no longer like to receive these types of communications, please e-mail [email or web address]

## 2017-05-25 ENCOUNTER — PROCEDURE VISIT (OUTPATIENT)
Dept: HEPATOLOGY | Facility: CLINIC | Age: 77
End: 2017-05-25
Attending: INTERNAL MEDICINE
Payer: MEDICARE

## 2017-05-25 DIAGNOSIS — R74.8 ELEVATED LIVER ENZYMES: ICD-10-CM

## 2017-05-25 DIAGNOSIS — D69.6 THROMBOCYTOPENIA: ICD-10-CM

## 2017-05-25 DIAGNOSIS — F10.20 UNCOMPLICATED ALCOHOL DEPENDENCE: ICD-10-CM

## 2017-05-25 PROCEDURE — 91200 LIVER ELASTOGRAPHY: CPT | Mod: S$GLB,,, | Performed by: PHYSICIAN ASSISTANT

## 2017-05-25 NOTE — PROCEDURES
Procedures   Name: Darinel Majano  Date of Procedure : 2017   :: Raymond Nair PA-C  Diagnosis: Alcohol  Probe: XL    Fibroscan readin.1 KPa    IQR/med:20 %    Fibrosis:F 0-1

## 2017-05-26 ENCOUNTER — OFFICE VISIT (OUTPATIENT)
Dept: OTOLARYNGOLOGY | Facility: CLINIC | Age: 77
End: 2017-05-26
Payer: MEDICARE

## 2017-05-26 VITALS — TEMPERATURE: 98 F | DIASTOLIC BLOOD PRESSURE: 76 MMHG | HEART RATE: 73 BPM | SYSTOLIC BLOOD PRESSURE: 148 MMHG

## 2017-05-26 DIAGNOSIS — K14.8 TONGUE LESION: ICD-10-CM

## 2017-05-26 PROCEDURE — 99999 PR PBB SHADOW E&M-EST. PATIENT-LVL III: CPT | Mod: PBBFAC,,, | Performed by: OTOLARYNGOLOGY

## 2017-05-26 PROCEDURE — 1159F MED LIST DOCD IN RCRD: CPT | Mod: S$GLB,,, | Performed by: OTOLARYNGOLOGY

## 2017-05-26 PROCEDURE — 88305 TISSUE EXAM BY PATHOLOGIST: CPT | Performed by: PATHOLOGY

## 2017-05-26 PROCEDURE — 99203 OFFICE O/P NEW LOW 30 MIN: CPT | Mod: 25,S$GLB,, | Performed by: OTOLARYNGOLOGY

## 2017-05-26 PROCEDURE — 88305 TISSUE EXAM BY PATHOLOGIST: CPT | Mod: 26,,, | Performed by: PATHOLOGY

## 2017-05-26 PROCEDURE — 1126F AMNT PAIN NOTED NONE PRSNT: CPT | Mod: S$GLB,,, | Performed by: OTOLARYNGOLOGY

## 2017-05-26 PROCEDURE — 40812 EXCISE/REPAIR MOUTH LESION: CPT | Mod: S$GLB,,, | Performed by: OTOLARYNGOLOGY

## 2017-05-26 NOTE — PROGRESS NOTES
Chief Complaint   Patient presents with    laceration to left side of tongue       HPI   76 y.o. male presents for evaluation of a lesion of the L lateral oral tongue.  He states that this lesion has been present for ~1-2 mos.  It has become quite painful and has begun to limit his ability to eat.  He denies natanael dysphagia or SOB.  He is has lost weight recently but is concerned that this may be due to other issues.  He is a former smoker.     Review of Systems   Constitutional: Negative for fatigue and unexpected weight change.   HENT: Per HPI.  Eyes: Negative for visual disturbance.   Respiratory: Negative for shortness of breath, hemoptysis   Cardiovascular: Negative for chest pain and palpitations.   Musculoskeletal: Negative for decreased ROM, back pain.   Skin: Negative for rash, sunburn, itching.   Neurological: Negative for dizziness and seizures.   Hematological: Negative for adenopathy. Does not bruise/bleed easily.   Endocrine: Negative for rapid weight loss/weight gain, heat/cold intolerance.     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    Pleural plaque: see CXR May 2017    Psoriasis vulgaris    Macrocytic anemia    Tongue lesion           Past Surgical History   Past Surgical History:   Procedure Laterality Date    CATARACT EXTRACTION      CHOLECYSTECTOMY  2012    EYE SURGERY      Right Hand Surgery           Family History    Family History   Problem Relation Age of Onset    Cataracts Mother     Diabetes Mother     Hypertension Mother     Cancer Father      stomach- smoker    Stomach cancer Father     No Known Problems Sister     Heart disease Brother      PPM    Amblyopia Neg Hx     Blindness Neg Hx     Glaucoma Neg Hx     Retinal detachment Neg Hx     Strabismus Neg Hx     Stroke Neg Hx     Thyroid disease Neg Hx     Macular degeneration Neg Hx     Lupus Neg Hx     Rheum arthritis Neg Hx     Psoriasis Neg Hx     Melanoma Neg Hx            Social History   .  Social History     Social History    Marital status:      Spouse name: N/A    Number of children: N/A    Years of education: N/A     Occupational History    Not on file.     Social History Main Topics    Smoking status: Former Smoker     Packs/day: 1.00     Years: 20.00    Smokeless tobacco: Never Used    Alcohol use 7.2 oz/week     12 Cans of beer per week      Comment: 84 beers a week    Drug use: No    Sexual activity: Not on file     Other Topics Concern    Not on file     Social History Narrative    No narrative on file         Allergies   Review of patient's allergies indicates:   Allergen Reactions    Humira [adalimumab] Other (See Comments)           Physical Exam     Vitals:    05/26/17 1131   BP: (!) 148/76   Pulse: 73   Temp: 97.8 °F (36.6 °C)         There is no height or weight on file to calculate BMI.      General: AOx3, NAD   Respiratory:  Symmetric chest rise, normal effort  Right Ear: External Auditory Canal WNL,TM w/o masses/lesions/perforations.  Middle ear without evidence of effusion.   Left Ear: External Auditory Canal WNL,TM w/o masses/lesions/perforations.  Middle ear without evidence of effusion.   Nose: No gross nasal septal deviation. Inferior Turbinates WNL bilaterally. No septal perforation. No masses/lesions.   Oral Cavity:  Oral Tongue mobile.  ~1 cm ulcer with ~1 cm of surrounding leukoplakia.  There is  induration at the inferior aspect of this lesion.  Adjacent to this site is a jagged, broken tooth.  Hard Palate WNL. No buccal or FOM lesions.  Oropharynx:  No masses/lesions of the posterior pharyngeal wall. Tonsillar fossa without lesions. Soft palate without masses. Midline uvula.   Neck: No scars.  No cervical lymphadenopathy, thyromegaly or thyroid nodules.  Normal range of motion.    Face: House Brackmann I bilaterally.       We discussed the risks, benefits, indications, and alternatives to oral biopsy. I explained that the risks of biopsy in the oral cavity include, but are not limited to, infection, bleeding, scarring, recurrence, failure to achieve a diagnosis, and the need for additional procedures.  Remotely, cheek or facial weakness could be possible if deep extension was identified.  Time was allowed for questions, and all questions were answered to the patient's apparent satisfaction.  Informed consent was obtained.    A timeout was performed according to the universal protocol with full patient participation.  1% lidocaine with 1:100,000 epinephrine was then injected submucosally in the region of the lesion with a 27-gauge needle.  The patient was draped in the standard fashion.  The lesion was identified, and full thickness of mucosa including the lesion was sharply excised down to the submucosal layer with a 4mm punch.  The lesion was severed from its base with the curved scissors and passed off the table for permanent pathologic analysis.  Hemostasis was achieved with direct pressure.  The wound was closed with interrupted 3-0 Vicryl suture.  The patient tolerated this procedure well.  There were no apparent complications.      Assessment   1. Tongue lesion        Plan   76 y.o. male with a L tongue lesion.  DDx includes SCCA vs benign ulcer vs dental trauma.  Biopsy obtained today.  I will contact him with biopsy results.

## 2017-05-26 NOTE — LETTER
May 26, 2017      Little Coon MD  [address]  [facility] [address]           [facility]  [address]  [facility] [address]  Phone: [phone]  Fax: [phone]          Patient: Darinel Majano   MR Number: [id number]   YOB: 1940   Date of Visit: 5/26/2017       Dear Dr. Little Coon:    Thank you for referring Darinel Majano to me for evaluation. Attached you will find relevant portions of my assessment and plan of care.    If you have questions, please do not hesitate to call me. I look forward to following Darinel Majano along with you.    Sincerely,    Bryson Heck MD    Enclosure  CC:  No Recipients    If you would like to receive this communication electronically, please contact [email or web address] or [phone] to request more information on [employer] Link access.    For providers and/or their staff who would like to refer a patient to [facility], please contact us through our one-stop-shop provider referral line, [facility], at [phone].    If you feel you have received this communication in error or would no longer like to receive these types of communications, please e-mail [email or web address]

## 2017-05-29 ENCOUNTER — PATIENT MESSAGE (OUTPATIENT)
Dept: INTERNAL MEDICINE | Facility: CLINIC | Age: 77
End: 2017-05-29

## 2017-05-30 ENCOUNTER — TELEPHONE (OUTPATIENT)
Dept: HEPATOLOGY | Facility: CLINIC | Age: 77
End: 2017-05-30

## 2017-05-30 DIAGNOSIS — R74.8 ELEVATED LIVER ENZYMES: Primary | ICD-10-CM

## 2017-05-30 LAB
CHROM BANDING METHOD: NORMAL
CHROMOSOME ANALYSIS BM ADDITIONAL INFORMATION: NORMAL
CHROMOSOME ANALYSIS BM RELEASED BY: NORMAL
CHROMOSOME ANALYSIS BM RESULT SUMMARY: NORMAL
CLINICAL CYTOGENETICIST REVIEW: NORMAL
KARYOTYP MAR: NORMAL
REASON FOR REFERRAL (NARRATIVE): NORMAL
REF LAB TEST METHOD: NORMAL
SPECIMEN SOURCE: NORMAL
SPECIMEN: NORMAL

## 2017-05-30 NOTE — TELEPHONE ENCOUNTER
----- Message from Raymond Nair PA-C sent at 5/30/2017  1:12 PM CDT -----  Please let him know that these labs are stable. They do not reveal any other concerning cause for liver damage than alcohol. He will need vaccination for HBV, I will send these to pharmacy to determine cost.     If he has started lasix and furosemide, have him repeat a BMP in 1 week.     Thanks

## 2017-05-30 NOTE — TELEPHONE ENCOUNTER
I spoke with patient's wife and message from PEPE Nair relayed.  She states that he has started lasix.  BMP scheduled 6/6 and reminder notice mailed.  Wife states that info will be passed on to her .

## 2017-06-01 ENCOUNTER — TELEPHONE (OUTPATIENT)
Dept: OTOLARYNGOLOGY | Facility: CLINIC | Age: 77
End: 2017-06-01

## 2017-06-01 NOTE — TELEPHONE ENCOUNTER
I spoke to  Melecio regarding Mr. Majano's biopsy.  RTC in 2-3 weeks if his tongue lesion has not resolved.

## 2017-06-02 ENCOUNTER — OFFICE VISIT (OUTPATIENT)
Dept: HEMATOLOGY/ONCOLOGY | Facility: CLINIC | Age: 77
End: 2017-06-02
Payer: MEDICARE

## 2017-06-02 VITALS
HEIGHT: 62 IN | OXYGEN SATURATION: 100 % | WEIGHT: 149.06 LBS | HEART RATE: 104 BPM | BODY MASS INDEX: 27.43 KG/M2 | TEMPERATURE: 98 F | RESPIRATION RATE: 20 BRPM | DIASTOLIC BLOOD PRESSURE: 74 MMHG | SYSTOLIC BLOOD PRESSURE: 170 MMHG

## 2017-06-02 DIAGNOSIS — D53.9 MACROCYTIC ANEMIA: Primary | ICD-10-CM

## 2017-06-02 PROCEDURE — 99215 OFFICE O/P EST HI 40 MIN: CPT | Mod: S$GLB,,, | Performed by: INTERNAL MEDICINE

## 2017-06-02 PROCEDURE — 99999 PR PBB SHADOW E&M-EST. PATIENT-LVL III: CPT | Mod: PBBFAC,,, | Performed by: INTERNAL MEDICINE

## 2017-06-02 PROCEDURE — 1126F AMNT PAIN NOTED NONE PRSNT: CPT | Mod: S$GLB,,, | Performed by: INTERNAL MEDICINE

## 2017-06-02 PROCEDURE — 1159F MED LIST DOCD IN RCRD: CPT | Mod: S$GLB,,, | Performed by: INTERNAL MEDICINE

## 2017-06-02 PROCEDURE — 99499 UNLISTED E&M SERVICE: CPT | Mod: S$GLB,,, | Performed by: INTERNAL MEDICINE

## 2017-06-02 NOTE — PROGRESS NOTES
Subjective:       Patient ID: Darinel Majano is a 76 y.o. male.    Chief Complaint: Follow-up and Results (bone marrow biopsy )    Darinel presents with his wife for evaluation of a macrocytic anemia. Macrocytosis and thrombocytopenia date back to at least 2004 by [facility] lab review. Most pertinent to the patient's CBC changes is a history of alcohol use, 1 case of beer daily that the patient stopped 2 weeks ago. He also has liver enzyme changes and splenomegaly. His appetite and nutrition were low while drinking alcohol. Since starting new therapy for psoriasis he stopped all ETOH use and his skin is improving significantly.    Follow-up 5/18/17  B12 studies are normal. Patient does have a reticulocytosis without blood loss or evidence of peripheral hemolysis  Recommend marrow biopsy    Follow-up 6/2/17  Return visit to review bone marrow biopsy results.  Bone marrow is normal. MCV elevation is likely due to alcoholism.  Darinel has not had any alcohol since April 2017.     Anemia   There has been no bruising/bleeding easily.     Review of Systems   Constitutional: Negative.    HENT: Negative.    Eyes: Negative.    Respiratory: Negative.    Cardiovascular: Negative.    Gastrointestinal: Negative.    Endocrine: Negative.    Genitourinary: Negative.    Skin: Negative.    Allergic/Immunologic: Negative for environmental allergies, food allergies and immunocompromised state.   Neurological: Negative.    Hematological: Negative for adenopathy. Does not bruise/bleed easily.   Psychiatric/Behavioral: Negative.        Objective:      Physical Exam   Constitutional: He is oriented to person, place, and time. He appears well-developed and well-nourished.   HENT:   Head: Normocephalic and atraumatic.   Mouth/Throat: No oropharyngeal exudate.   Eyes: Conjunctivae are normal. No scleral icterus.   Neck: Normal range of motion. Neck supple.   Cardiovascular: Normal rate and intact distal pulses.    Pulmonary/Chest: Effort normal.  No respiratory distress.   Abdominal: He exhibits no distension. There is no tenderness.   Musculoskeletal: Normal range of motion. He exhibits no edema.   Neurological: He is alert and oriented to person, place, and time. No cranial nerve deficit.   Skin: Skin is warm and dry.   Psychiatric: He has a normal mood and affect. His behavior is normal. Judgment and thought content normal.   Nursing note and vitals reviewed.      Assessment:       1. Macrocytic anemia        Plan:       History of alcohol use. Reticulocytosis.  Marrow is normal. Macrocytic anemia due to alcohol.  Recommend observation of CBC with repeat in next 3-4 months  Patient with complete with PCP.    Return to hematology as needed.

## 2017-06-06 ENCOUNTER — LAB VISIT (OUTPATIENT)
Dept: LAB | Facility: HOSPITAL | Age: 77
End: 2017-06-06
Attending: INTERNAL MEDICINE
Payer: MEDICARE

## 2017-06-06 ENCOUNTER — OFFICE VISIT (OUTPATIENT)
Dept: PULMONOLOGY | Facility: CLINIC | Age: 77
End: 2017-06-06
Payer: MEDICARE

## 2017-06-06 ENCOUNTER — PATIENT MESSAGE (OUTPATIENT)
Dept: HEPATOLOGY | Facility: CLINIC | Age: 77
End: 2017-06-06

## 2017-06-06 VITALS
HEART RATE: 79 BPM | BODY MASS INDEX: 26.17 KG/M2 | OXYGEN SATURATION: 99 % | SYSTOLIC BLOOD PRESSURE: 142 MMHG | DIASTOLIC BLOOD PRESSURE: 72 MMHG | WEIGHT: 147.69 LBS | HEIGHT: 63 IN

## 2017-06-06 DIAGNOSIS — R74.8 ELEVATED LIVER ENZYMES: ICD-10-CM

## 2017-06-06 DIAGNOSIS — R91.1 LUNG NODULE: Primary | ICD-10-CM

## 2017-06-06 LAB
ANION GAP SERPL CALC-SCNC: 8 MMOL/L
BUN SERPL-MCNC: 13 MG/DL
CALCIUM SERPL-MCNC: 9.6 MG/DL
CHLORIDE SERPL-SCNC: 105 MMOL/L
CO2 SERPL-SCNC: 26 MMOL/L
CREAT SERPL-MCNC: 0.7 MG/DL
EST. GFR  (AFRICAN AMERICAN): >60 ML/MIN/1.73 M^2
EST. GFR  (NON AFRICAN AMERICAN): >60 ML/MIN/1.73 M^2
GLUCOSE SERPL-MCNC: 100 MG/DL
POTASSIUM SERPL-SCNC: 5.1 MMOL/L
SODIUM SERPL-SCNC: 139 MMOL/L

## 2017-06-06 PROCEDURE — 99203 OFFICE O/P NEW LOW 30 MIN: CPT | Mod: S$GLB,,, | Performed by: INTERNAL MEDICINE

## 2017-06-06 PROCEDURE — 80048 BASIC METABOLIC PNL TOTAL CA: CPT

## 2017-06-06 PROCEDURE — 1159F MED LIST DOCD IN RCRD: CPT | Mod: S$GLB,,, | Performed by: INTERNAL MEDICINE

## 2017-06-06 PROCEDURE — 36415 COLL VENOUS BLD VENIPUNCTURE: CPT

## 2017-06-06 PROCEDURE — 99999 PR PBB SHADOW E&M-EST. PATIENT-LVL III: CPT | Mod: PBBFAC,,, | Performed by: INTERNAL MEDICINE

## 2017-06-06 NOTE — PROGRESS NOTES
Subjective:       Patient ID: Darinel Majano is a 76 y.o. male.    Chief Complaint: Abnormal Chest X-ray    HPI   Darinel Majano 76 y.o. male    has a past medical history of Anemia (5/4/2017); BPH (benign prostatic hypertrophy); Chronic gout; Cortical senile cataract (5/15/2012); Degenerative disc disease; Degenerative disc disease; Degenerative disc disease; Essential hypertension (2/19/2016); Gout; Hyperplastic polyp of transverse colon: 2012 repeat 5 years (5/4/2017); Osteoporosis; Psoriasis; Psoriasis; Substance abuse; and Thrombocytopenia.    has a past surgical history that includes Cataract extraction; Cholecystectomy (2012); Eye surgery; and Right Hand Surgery.   reports that he has quit smoking. He has a 20.00 pack-year smoking history. He has never used smokeless tobacco. He reports that he drinks about 7.2 oz of alcohol per week . He reports that he does not use drugs.  Referred by: Dr. Annabella Montiel  Who had concerns including Abnormal Chest X-ray.  The patient's last visit with me was on 4/10/2013.    Patient well known to me from hospital 5 years ago  Doing well, but had a ct chest to evaluate liver disease and noted to have rll nodule  No fever chills, ns, wt changes, nausea, vomiting, diarrhea, constipation, chest pain, tightness, pressure  20py tobacco  No further alcohol  No history of cancer  Had a RLL empyema 5 years ago      Review of Systems    Objective:      Physical Exam  Personal Diagnostic Review    No flowsheet data found.      Assessment:       1. Lung nodule        Outpatient Encounter Prescriptions as of 6/6/2017   Medication Sig Dispense Refill    furosemide (LASIX) 20 MG tablet Take 1 tablet (20 mg total) by mouth once daily. 30 tablet 0    multivitamin with minerals tablet Take 1 tablet by mouth once daily.      spironolactone (ALDACTONE) 50 MG tablet Take 1 tablet (50 mg total) by mouth once daily. 30 tablet 0    triamcinolone acetonide 0.1% (KENALOG) 0.1 % cream Apply  topically 2 (two) times daily.      [DISCONTINUED] predniSONE (DELTASONE) 10 MG tablet Sig 4 pills po qam x 1 week then 2 pills po qam x 1 week then 1 pill po qam x 1 week 49 tablet 0     No facility-administered encounter medications on file as of 6/6/2017.      No orders of the defined types were placed in this encounter.    Plan:            I personally reviewed the      1. CXR   2. CXR report   3. CT chest   4. CT chest report     Assessment:  Darinel was seen today for abnormal chest x-ray.    Diagnoses and all orders for this visit:    Lung nodule        Plan:  Will ask IR to evaluate for biopsy  If unable to do so due to proximity to diaphragm, liver, then will get PET CT and if positive and amenable, will ask thoracic surgery to resect.       No Follow-up on file.    There are no Patient Instructions on file for this visit.    Immunization History   Administered Date(s) Administered    Influenza - High Dose 09/21/2012, 11/07/2015    Pneumococcal Conjugate - 13 Valent 02/19/2016    Pneumococcal Polysaccharide - 23 Valent 09/21/2012

## 2017-06-06 NOTE — LETTER
June 6, 2017      Annabella Montiel MD  [address]  [place] [address]           [facility] - Pulmonary [facility]  [address]  [facility] [address]  Phone: [phone]          Patient: Darinel Majano   MR Number: [id number]   YOB: 1940   Date of Visit: 6/6/2017       Dear Dr. Annabella Montiel:    Thank you for referring Darniel Majano to me for evaluation. Attached you will find relevant portions of my assessment and plan of care.    If you have questions, please do not hesitate to call me. I look forward to following Darinel Majano along with you.    Sincerely,    Latricia Ruffin MD    Enclosure  CC:  No Recipients    If you would like to receive this communication electronically, please contact [email or web address] or [phone] to request more information on [employer] Link access.    For providers and/or their staff who would like to refer a patient to [facility], please contact us through our one-stop-shop provider referral line, [facility] , at [phone].    If you feel you have received this communication in error or would no longer like to receive these types of communications, please e-mail [email or web address]

## 2017-06-08 ENCOUNTER — TELEPHONE (OUTPATIENT)
Dept: PULMONOLOGY | Facility: CLINIC | Age: 77
End: 2017-06-08

## 2017-06-08 DIAGNOSIS — R91.1 PULMONARY NODULE: Primary | ICD-10-CM

## 2017-06-08 NOTE — TELEPHONE ENCOUNTER
----- Message from Cesar Summers Jr., MD sent at 6/6/2017  4:15 PM CDT -----  Depends on his breathing but i think its worth a try.  Place the order for IR lung biopsy and [facility] can book it.      [facility], ct prone, RLL image 44, any of us to do. Thanks garland      ----- Message -----  From: Latricia Ruffin MD  Sent: 6/6/2017  10:29 AM  To: Cesar Summers Jr., MD    Would you review this patient's ct chest and see if the RLL nodule is amenable to biopsy? It is close to the diaphragm so that may be a contraindication.  Thanks, SJ

## 2017-06-09 ENCOUNTER — OFFICE VISIT (OUTPATIENT)
Dept: INTERNAL MEDICINE | Facility: CLINIC | Age: 77
End: 2017-06-09
Payer: MEDICARE

## 2017-06-09 VITALS
HEART RATE: 98 BPM | OXYGEN SATURATION: 99 % | HEIGHT: 62 IN | SYSTOLIC BLOOD PRESSURE: 126 MMHG | TEMPERATURE: 98 F | DIASTOLIC BLOOD PRESSURE: 70 MMHG | WEIGHT: 146.81 LBS | BODY MASS INDEX: 27.02 KG/M2

## 2017-06-09 DIAGNOSIS — N20.0 KIDNEY STONE ON LEFT SIDE: ICD-10-CM

## 2017-06-09 DIAGNOSIS — K42.9 UMBILICAL HERNIA WITHOUT OBSTRUCTION AND WITHOUT GANGRENE: ICD-10-CM

## 2017-06-09 DIAGNOSIS — M81.0 AGE-RELATED OSTEOPOROSIS WITHOUT CURRENT PATHOLOGICAL FRACTURE: ICD-10-CM

## 2017-06-09 DIAGNOSIS — K70.9 LIVER DISEASE DUE TO ALCOHOL: ICD-10-CM

## 2017-06-09 DIAGNOSIS — D69.6 THROMBOCYTOPENIA: ICD-10-CM

## 2017-06-09 DIAGNOSIS — R74.8 ELEVATED LIVER ENZYMES: ICD-10-CM

## 2017-06-09 DIAGNOSIS — I10 ESSENTIAL HYPERTENSION: ICD-10-CM

## 2017-06-09 DIAGNOSIS — C44.622 SQUAMOUS CELL CANCER OF SKIN OF RIGHT HAND: ICD-10-CM

## 2017-06-09 DIAGNOSIS — I70.0 AORTIC ATHEROSCLEROSIS: ICD-10-CM

## 2017-06-09 DIAGNOSIS — K57.30 DIVERTICULOSIS OF LARGE INTESTINE WITHOUT HEMORRHAGE: ICD-10-CM

## 2017-06-09 DIAGNOSIS — J92.9 PLEURAL PLAQUE: Primary | ICD-10-CM

## 2017-06-09 PROBLEM — K14.8 TONGUE LESION: Status: RESOLVED | Noted: 2017-05-26 | Resolved: 2017-06-09

## 2017-06-09 PROCEDURE — 99499 UNLISTED E&M SERVICE: CPT | Mod: S$GLB,,, | Performed by: INTERNAL MEDICINE

## 2017-06-09 PROCEDURE — 1159F MED LIST DOCD IN RCRD: CPT | Mod: S$GLB,,, | Performed by: INTERNAL MEDICINE

## 2017-06-09 PROCEDURE — 99999 PR PBB SHADOW E&M-EST. PATIENT-LVL IV: CPT | Mod: PBBFAC,,, | Performed by: INTERNAL MEDICINE

## 2017-06-09 PROCEDURE — 99214 OFFICE O/P EST MOD 30 MIN: CPT | Mod: S$GLB,,, | Performed by: INTERNAL MEDICINE

## 2017-06-09 PROCEDURE — 1126F AMNT PAIN NOTED NONE PRSNT: CPT | Mod: S$GLB,,, | Performed by: INTERNAL MEDICINE

## 2017-06-09 RX ORDER — ALENDRONATE SODIUM 70 MG/1
70 TABLET ORAL
Qty: 4 TABLET | Refills: 11 | Status: SHIPPED | OUTPATIENT
Start: 2017-06-09 | End: 2018-07-13 | Stop reason: SDUPTHER

## 2017-06-09 NOTE — PATIENT INSTRUCTIONS
Living with Osteoporosis: Preventing Fractures  If you have osteoporosis, you can do a lot to reduce its effect on your life. Knowing how to prevent fractures and spinal curvature can help you live more comfortably and safely with this disease.    Reducing your risk for fractures  The most common fracture sites in people with osteoporosis are the wrist, spine, and hip. These fractures are often caused by accidents and falls. All fractures are painful and may limit what you can do. But hip fractures are very serious. They need surgery, and it can take months to recover. To reduce your risk for fractures:  · Get regular exercise. Try walking, swimming, or weight training.  · Eat foods that are rich in calcium, or take calcium supplements.  · Make your home safe to help avoid accidents.  · Take extra precautions not to fall in risky areas, such as icy sidewalks.  Understanding spinal fractures  Your spine is made up of many bones called vertebrae. Osteoporosis can cause the vertebrae in your spine to collapse. As a result, your upper back may arch forward, creating a curvature. Spine fractures may also result from back strain and bad posture. You will also lose height. Your lower spine must then adjust to keep your body balanced. This can cause back pain. To prevent or lessen these spinal changes:  · Practice good posture.  · Use proper techniques if you need to lift heavy objects.  · Do back exercises to help your posture.  · Lie on your back when you have pain.  · Ask your healthcare provider about these and other ways to help your spine.  Date Last Reviewed: 10/17/2015  © 2263-1927 [employer]. [address] [place] [address], [place] [address]. All rights reserved. This information is not intended as a substitute for professional medical care. Always follow your healthcare professional's instructions.        Living with Osteoporosis: Regular Exercise  If you have osteoporosis, exercise is vital for your  health. It can prevent bone fractures and spine changes. It will slow bone loss. Exercise will strengthen your body. It can also be fun. A variety of exercises is best. See below for exercises that can help you. Before you start, though, talk to your healthcare provider to be sure these exercises are right for you.    Resistance exercises. These build muscle strength and maintain bone mass. They also make you less prone to injury. Exercises include lifting small weights, doing push-ups and sit-ups, using elastic exercise bands, and using weight machines.  Weight-bearing activities. These help your whole body. They also help you maintain bone mass. Activities include walking, dancing, and housework.  Nonweight-bearing exercises. These help prevent back strain and pain. They do this by building the trunk and leg muscles. Exercises that help with flexibility can prevent falls. Examples include swimming, water exercise, and stretching.  Staying safe  Here are tips to stay safe:   · Always check with your healthcare provider before starting any new exercise program.  · Use weights only as instructed.  · Stop any exercise that causes pain.   Date Last Reviewed: 10/17/2015  © 0855-8845 [employer]. [address] [place] [address]. All rights reserved. This information is not intended as a substitute for professional medical care. Always follow your healthcare professional's instructions.        Osteoporosis: Understanding Bone Loss     A balanced system keeps building and resorbing bone.   The body has a natural system for maintaining bone. Understanding this system can help you learn how to maintain your bones.  A balanced system supports the body  The body is always losing (resorbing) and making bone. This process is called remodeling. Bone-resorbing cells take bone apart. They do this so the minerals can be used to repair an injury or make new bone. Bone-making cells form new bone using calcium  and other minerals. These minerals come from the food you eat. When this bone-making system is in balance, the same amount of bone is built and resorbed.        An unbalanced system cant give support     An unblalanced system builds too little bone and resorbs too much.   Changes in hormone levels, activity, medications, or diet can affect the bone-making system. When the system gets out of balance, the amount of bone lost is greater than the amount of bone made. This can cause osteopenia (when bone starts to become less dense). Left untreated, bone loss gets worse, leading to osteoporosis. Weak bones cant support the body. In fact, they can fracture just from the weight of your body. This often happens in vertebrae (bones of the spine). When vertebrae fracture, parts of the spine compress. This causes the back to bend or hump over, and it can also cause back pain.  Date Last Reviewed: 10/11/2015  © 5671-5948 [employer]. [address] [place] [address]. All rights reserved. This information is not intended as a substitute for professional medical care. Always follow your healthcare professional's instructions.        Discharge Instructions: Eating a Low-Potassium Diet  Your health care provider has prescribed a low-potassium diet for you. This kind of diet is advised for people who have certain kidney problems. Potassium is needed for muscle function. But too much potassium is a health risk. Potassium is found in many foods. Read below to find out how to change your diet.  Foods to limit  Some foods are high in potassium. Limit your daily intake of the foods in the list below.  · Fruits: apricots (canned and fresh), bananas, cantaloupe, honeydew melon, kiwi, nectarines, pomegranates, oranges, orange juice, pears, dried fruits (apricots, dates, figs, prunes), and prune juice  · Vegetables: asparagus, avocado, artichoke, bamboo shoots, beets, brussels sprouts, cabbage, celery, chard, okra,  potatoes (white and sweet), pumpkin, rutabaga, spinach (cooked), squash, tomato, tomato sauce, tomato juice, and vegetable juice cocktail  · Legumes: black-eyed peas, chickpeas, lentils, lima beans, navy beans, red kidney beans, soybeans, and split peas  · Nuts and seeds: almonds, Brazil nuts, cashews, peanuts, peanut butter, pecans, pumpkin seeds, sunflower seeds, and walnuts  · Breads and cereals: bran and whole-grain products  · Dairy foods: milk, cheese, ice cream, yogurt  · Animal protein: all forms of animal protein  · Other: chocolate, cocoa, coconut milk, and molasses  Tips  · Ask your health care provider how much potassium you are allowed each day. This will help you figure out serving sizes for your needs.  · Check labels for potassium. It may be listed as potassium chloride.  · Do not use salt substitutes. These often have potassium in them.  · Cook frozen fruits and vegetables in water. Rinse and drain them well before eating.  · Drain liquid from all canned fruits and vegetables. Rinse them before eating.  · Reduce the potassium in potatoes. Peel them, slice thinly, and soak in water for at least 4 hours.  · Reduce the potassium in green leafy vegetables. Soak them in water for at least 4 hours.  · Eat white rice and refined white flour products. These include white bread, pasta, and grits.  Follow-up  Make a follow-up appointment as advised by our staff.  When to call your health care provider  Call your health care provider right away if you have any of the following:  · Fatigue  · Shortness of breath  · Chest pain  · Slow, irregular heartbeat  · Fainting  · Dizziness  · Lightheadedness  · Confusion   Date Last Reviewed: 6/21/2015  © 0060-8111 [employer]. [address] [place] [address], [place] [address]. All rights reserved. This information is not intended as a substitute for professional medical care. Always follow your healthcare professional's  instructions.        Diverticulosis    Diverticulosis means that small pouches have formed in the wall of your large intestine (colon). Most often, this problem causes no symptoms and is common as people age. But the pouches in the colon are at risk of becoming infected. When this happens, the condition is called diverticulitis. Although most people with diverticulosis never develop diverticulitis, it is still not uncommon. Rectal bleeding can also occur and in less common situations, a type of colon inflammation called colitis.  While most people do not have symptoms, some people with diverticulosis may have:  · Abdominal cramps and pain  · Bloating  · Constipation  · Change in bowel habits  Causes  The exact cause of diverticulosis (and diverticulitis) has not been proved, but a few things are associated with the condition:  · Low-fiber diet  · Constipation  · Lack of exercise  Your healthcare provider will talk with you about how to manage your condition. Diet changes may be all that are needed to help control diverticulosis and prevent progression to diverticulitis. If you develop diverticulitis, you will likely need other treatments.  Home care  You may be told to take fiber supplements daily. Fiber adds bulk to the stool so that it passes through the colon more easily. Stool softeners may be recommended. You may also be given medications for pain relief. Be sure to take all medications as directed.  In the past, people were told to avoid corn, nuts, and seeds. This is no longer necessary.  Follow these guidelines when caring for yourself at home:  · Eat unprocessed foods that are high in fiber. Whole grains, fruits, and vegetables are good choices.  · Drink 6 to 8 glasses of water every day unless your healthcare provider has you limit how much fluid you should have.  · Watch for changes in your bowel movements. Tell your provider if you notice any changes.  · Begin an exercise program. Ask your provider how  to get started. Generally, walking is the best.  · Get plenty of rest and sleep.  Follow-up care  Follow up with your healthcare provider, or as advised. Regular visits may be needed to check on your health. Sometimes special procedures such as colonoscopy, are needed after an episode of diverticulitis or blooding. Be sure to keep all your appointments.  If a stool sample was taken, or cultures were done, you should be told if they are positive, or if your treatment needs to be changed. You can call as directed for the results.  If X-rays were done, a radiologist will look at them. You will be told if there is a change in your treatment.  If antibiotics were prescribed, be sure to finish them all.  When to seek medical advice  Call your healthcare provider right away if any of these occur:  · Fever of 100.4°F (38°C) or higher, or as directed by your healthcare provider  · Severe cramps in the lower left side of the abdomen or pain that is getting worse  · Tenderness in the lower left side of the abdomen or worsening pain throughout the abdomen  · Diarrhea or constipation that doesn't get better within 24 hours  · Nausea and vomiting  · Bleeding from the rectum  Call 911  Call emergency services if any of the following occur:  · Trouble breathing  · Confusion  · Very drowsy or trouble awakening  · Fainting or loss of consciousness  · Rapid heart rate  · Chest pain  Date Last Reviewed: 12/30/2015 © 2000-2016 [employer] [address] [place] [address]. All rights reserved. This information is not intended as a substitute for professional medical care. Always follow your healthcare professional's instructions.        Understanding Diverticulosis and Diverticulitis     Pouches or diverticula usually occur in the lower part of the colon called the sigmoid.     The colon (large intestine) is the last part of the digestive tract. It absorbs water from stool and changes it from a liquid to a solid.  In certain cases, small pouches called diverticula can form in the colon wall. This condition is called diverticulosis. The pouches can become infected. If this happens, it becomes a more serious problem called diverticulitis. These problems can be painful. But they can be managed.  Managing your condition  Diet changes or medicines may be prescribed.   If you have diverticulosis  Recommendations include:  · Diet changes are often enough to control symptoms. The main changes are adding fiber (roughage) and drinking more water. Fiber absorbs water as it travels through your colon. This helps your stool stay soft and move smoothly. Water helps this process.  · If needed, you may be told to take over-the-counter stool softeners.  · To help relieve pain, antispasmodic medicines may be prescribed.  · Watch for changes in your bowel movements. Tell the healthcare provider if you notice any changes.  · Begin an exercise program. Ask your healthcare provider how to get started.  · Get plenty of rest and sleep.   If you have diverticulitis  Treatment depends on how bad your symptoms are.  · For mild symptoms. You may be put on a liquid diet for a short time. Antibiotics are usually prescribed. If these two steps relieve your symptoms, you may then be prescribed a high-fiber diet. If you still have symptoms, your healthcare provider will discuss more treatment choices with you.  · For severe symptoms. You may need to be admitted to the hospital. There, you can be given IV antibiotics and fluids. You will also be put on a low-fiber or liquid diet. Although not common, surgery is needed in some people with severe symptoms.  Talent to colon health     Diverticulitis occurs when the pouches become infected or inflamed.     Help keep your colon healthy with a diet that includes plenty of high-fiber fruits, vegetables, and whole grains. Drink plenty of liquids like water and juice. Maintain a healthy lifestyle including regular  exercise, stress management, and adequate rest and sleep.   Date Last Reviewed: 7/1/2016  © 1118-1230 [employer]. [address] [place] [address], [place] [address]. All rights reserved. This information is not intended as a substitute for professional medical care. Always follow your healthcare professional's instructions.        Kidney Stone (Urine)  Does this test have other names?  Urine stone risk profile, 24-hour collection  What is this test?  This test checks your urine for chemicals that might cause your body to form kidney stones. The test also looks for blood in your urine, which can be a symptom of kidney stones.  Kidney stones are hard masses of minerals and salts that can form in your kidneys. They can be as small as a grain of sand or more than an inch in diameter. Usually theses stones or crystals pass through your body when you urinate. But sometimes they can get stuck in your urinary tract and cause a lot of pain.  Why do I need this test?  You may need this test if your healthcare provider suspects that you have kidney stones. Symptoms of kidney stones include:  · Pain in your lower belly (abdomen) or side  · Nausea and vomiting  · Sudden, strong urge to urinate  · Pain when urinating  · Blood in your urine  You may also have this test if you had a kidney stone or you are being treated for kidney stones. If you have had a kidney stone or any treatments for a kidney stone, you should wait 1 to 2 months, or until you have completely recovered, before having this test.  You will need to repeat the test at least twice so your healthcare provider can compare the results.  What other tests might I have along with this test?  Your healthcare provider may also order imaging tests. These include an ultrasound, CT scan and, a special type of X-ray (pyelogram) that uses a dye to look for kidney stones.  Your provider is also likely to order blood tests, to look for calcium, phosphate, uric acid,  oxalate, and citrate. These are some of the chemicals that are most likely to cause your body to form kidney stones.  What do my test results mean?  Many things may affect your lab test results. These include the method each lab uses to do the test. Even if your test results are different from the normal value, you may not have a problem. To learn what the results mean for you, talk with your healthcare provider.  The results will show whether your urine has high or low levels of the chemicals that are most likely to cause stones to form. These chemicals are calcium, phosphate, uric acid, oxalate, and citrate.  If your levels are not normal, it may mean that you have a kidney stone or stones.  Abnormal levels may also mean that you have another kidney disorder, such as a urinary tract infection.  How is this test done?  This test requires a 24-hour urine sample. For this sample, you must collect all of your urine for 24 hours. Empty your bladder completely first in the morning without collecting it and note the time. Then collect your urine every time you go to the bathroom over the next 24 hours. As you collect the urine, store it in the refrigerator.  Does this test pose any risks?  This test does not pose any known risks.  What might affect my test results?  Having this test too soon after treatment for a previous kidney stone can affect your results. You should wait several months after treatment before having this test.  How do I get ready for this test?  You don't need to prepare for this test.    Date Last Reviewed: 8/15/2015  © 8374-9644 [employer]. [address] [place] [address], [place] [address]. All rights reserved. This information is not intended as a substitute for professional medical care. Always follow your healthcare professional's instructions.        Kidney Stone, Undescended, No Symptoms    A kidney stone (nephrolithiasis) begins as tiny crystals that form inside the kidney where urine  is made. Most kidney stones enlarge to about 1/8 to 1/4 inch in size before leaving the kidney and moving toward the bladder. There are 4 types of kidney stones. Eighty percent are calcium stones--mostly calcium oxalate but also some with calcium phosphate. The other 3 types include uric acid stones, struvite stones (from a preceding infection), and rarely, cystine stones.  When the stone breaks free and begins to move down the ureter (the narrow tube joining the kidney to the bladder) it often causes sharp back and side pain, often with nausea and vomiting. When the stone reaches the bladder, the pain stops. Once in your bladder, the kidney stone may pass through the urethra (urinary opening) while you are urinating (which may cause pain to start again). Or, it may break into such small fragments that you dont notice it passing.  Your kidney stone is still inside the kidney. There is no way to predict how long it will be before it breaks free and causes any symptoms. Most stones will pass on their own within a few hours to a few days (sometimes longer). You may notice a red, pink, or brown color to your urine. This is normal while passing a kidney stone. A large stone may not pass on its own and may require special procedures to remove it. These procedures include lithotripsy, which uses ultrasound waves to break up the stone; ureteroscopy, which pushes a thin, basket-like instrument through the urethra and bladder and into the ureter to pull out the stone; and various types of direct surgery through the skin.  Home care  The following guidelines will help you care for yourself at home:  · Drink plenty of fluids. This increases urine flow and reduces the risk of further stone formation. Healthy adults (no heart/liver/kidney disease) who have had a kidney stone should drink 12, 8-ounce glasses of fluids per day. Most of this should be water. The goal is to produce 1.5 to 2 quarts of almost colorless urine per 24  hours.  · You should collect your urine in a container and then drain it through a strainer to collect any stones or pieces of stones. Take these to your healthcare provider to help identify your specific type of stone to aid in future treatment and dietary changes.  · Try to stay as active as possible since this will help the stone pass. Don't stay in bed unless you have pain that prevents you from getting up.  · If you develop pain, you may take ibuprofen or naproxen for pain, unless another medicine was prescribed. If you have chronic liver or kidney disease or ever had a stomach ulcer or GI bleeding, talk with your healthcare provider before using these medicines.  Prevention  Each year, there is a 5% to 10% chance that a new stone will form (50% chance over the next 5 to 7 years). The risk is higher if you have a family history of kidney stones or have certain chronic illnesses such as hypertension, obesity, or diabetes. However, there are lifestyle and dietary changes that you can make to reduce the risk of a recurrence.  Most kidney stones are made of calcium. The following is advice for preventing a recurrence of calcium stones.  If you dont know the type of stone you have, follow this advice until the cause of your stone is determined.  Things that help:  · The most important thing you can do is to drink plenty of fluids each day, as described above.   · Certain foods, such as wheat, rice, rye, barley and beans, contain phytate, a compound that may lower the risk of recurrence of any type of stone.  · Eat more fruits and vegetables (especially those high in potassium).  · Eat foods high in natural citrate like fruit and fruit juices (using low sugar).  · Low calcium contributes to the formation of calcium type kidney stones. Eat a normal calcium diet and speak with your doctor if you are taking calcium supplements. It may be detrimental to reduce your calcium intake. New research shows that eating  calcium-rich and oxalate-rich foods together lowers your risk of stones by binding the minerals in the stomach and intestines before they can reach the kidneys.  · Limit salt intake to 2 grams (1 teaspoon) per day. Use limited amounts when cooking, and dont add salt at the table. Processed and canned foods are usually high in salt.  · Spinach, rhubarb, peanuts, cashews and almonds, grapefruit and grapefruit juice are all high oxalate foods and should be reduced, or eaten with calcium rich foods. These foods include dairy, dark leafy greens, soy products, and calcium enriched foods.  · Reducing the amount of animal meat in your diet may lower your risk of uric acid stones.  · Avoid excess sugar (sucrose) and fructose (sweetener in many soft drinks) in your diet.   · If you take vitamin C as a supplement, do not take more than 1,000 milligrams (mg) per day.  · A dietitian or your healthcare provider can provide you with specific details about dietary changes to prevent kidney stone recurrence.  Follow-up care  Follow up with your healthcare provider, or as advised. Talk with your healthcare provider about urine and blood tests to find out the cause of your stone.  If you had an X-ray, CT scan, or other diagnostic test, you will be notified of any findings that may affect your care.  Call 911  Call 911 if you have any of these:  · Weakness, dizziness, or fainting  When to seek medical advice  Call your healthcare provider right away if any of the these occur:  · Severe sharp back or side pain  · Repeated vomiting or unable to keep down fluids  · Fever of 100.4ºF (38ºC) or higher, or as directed by your health care provider  · Blood (pink or red color) in your urine  · Foul smelling or cloudy urine  · Unable to pass urine for 8 hours or increasing bladder pressure  Date Last Reviewed: 10/1/2016  © 5519-1279 [employer]. [address] [address], [place] [address]. All rights reserved. This information is  not intended as a substitute for professional medical care. Always follow your healthcare professional's instructions.        Understanding Kidney Stones  Your kidneys are bean-shaped organs. They help filter extra salts, waste, and water from your body. You need to drink enough water every day to help flush the extra salts into your urine.     What are kidney stones?  Kidney stones are made up of chemical crystals that separate out from urine. These crystals clump together to make stones. They form in the calyx of the kidney. They may stay in the kidney or move into the urinary tract.   Why kidney stones form  Kidneys form stones for many reasons. If you dont drink enough water, for instance, you wont have enough urine to dilute chemicals. Then the chemicals may form crystals, which can develop into stones:  · Fluid loss (dehydration) can concentrate urine, causing stones to form.  · Certain foods contain large amounts of the chemicals that sometimes crystallize into stones. Eating foods that contain a lot of meat or salt can lead to more kidney stones.  · Kidney infections foster stones by slowing urine flow or changing the acid balance of your urine.  · Family history. If family members have had kidney stones, youre more likely to have them, too.  · Deficiencies of certain substances in the urine that help protect you from forming stones can also increase the formation of stones.  Where stones form  Stones begin in the cup-shaped part of the kidney (calyx). Some stay in the calyx and grow. Others move into the kidney pelvis or into the ureter. There they can lodge, block the flow of urine, and cause pain.  Symptoms  Many stones cause sudden and severe pain and bloody urine. Others cause nausea or frequent, burning urination. Symptoms often depend on your stones size and location. Fever may indicate a serious infection. Call your doctor right away if you develop a fever.  Date Last Reviewed: 1/5/2015  ©  2730-8515 [employer]. [address] [place] [address], [place] [address]. All rights reserved. This information is not intended as a substitute for professional medical care. Always follow your healthcare professional's instructions.        Preventing Kidney Stones  If youve had a kidney stone, you may worry that youll have another. Removing or passing your stone doesnt prevent future stones. With your doctors help, though, you can reduce your risk of forming new stones. Follow up with your doctor to help detect new stones. You may need follow-up every 3 months to a year for a lifetime.    Drink lots of water  Staying well-hydrated is the best way to reduce your risk of future stones. Drink 8 12-ounce glasses of water daily. Have 2 with each meal and 2 between meals. Try keeping a pitcher of water nearby during the day and at night.  Take medications if needed  Medications, including vitamins and minerals, may be prescribed for certain types of stones. You may want to write your doses and medication times on a calendar. Some medications decrease stone-forming chemicals in your blood. Others help prevent those chemicals from crystallizing in urine. Still others help keep a normal acid balance in your urine.  Follow your prescribed diet  Your doctor will tell you which foods contain the chemicals you should avoid. Your doctor may also suggest talking to a dietitian. He or she can help you plan meals youll enjoy. These meals wont put you at risk for future stones. You may be told to limit certain foods, depending on which type of stones youve had. You should limit the amount of salt in your food to about 2 grams a day. This will help prevent most types of kidney stones. Make sure you get an adequate amount of calcium in your diet.  For calcium oxalate stones: Limit animal protein, such as meat, eggs, and fish. Limit grapefruit juice and alcohol. Limit high-oxalate foods (such as cola, tea, chocolate,  spinach, rhubarb, wheat bran, and peanuts).  For uric acid stones: Limit high-purine foods, such as mushrooms, peas, beans, anchovies, meat, poultry, shellfish, and organ meats. These foods increase uric acid production.  For cystine stones: Limit high-methionine foods (fish is the most common, but eggs and meats, also). These foods increase production of cystine.  Date Last Reviewed: 1/5/2015  © 2651-0455 [employer]. [address] [place] [address], [place] [address]. All rights reserved. This information is not intended as a substitute for professional medical care. Always follow your healthcare professional's instructions.

## 2017-06-11 NOTE — PROGRESS NOTES
Subjective:       Patient ID: Darinel Majano is a 76 y.o. male.    Chief Complaint: Follow-up (test results)    Follow up review multiple issues; wife Natividad with him    Cirrhosis.   Has not had a drink since May.  Reviewed.    Lung mass- to be biopsed through IR    Skin cancer on finger.    Positibe PPD but negative T spot per ID    Anemia - acceptalne bone marrow biopsy- thought related to ETOH    To see DERM soon    ASCVD- reviewed.    Diverticulosis    Renal stone no obstruction; discussed natural history and sx.    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   HENT: Negative for congestion, ear pain and postnasal drip.    Eyes: Negative.  Negative for visual disturbance.   Respiratory: Negative for cough, chest tightness, shortness of breath and wheezing.    Cardiovascular: Negative.    Gastrointestinal: Negative.    Skin:        Psoriasis better       Objective:      Physical Exam   Constitutional: He is  oriented to person, place, and time. He appears well-developed and well-nourished.   HENT:   Head: Normocephalic and atraumatic.   Right Ear: External ear normal.   Left Ear: External ear normal.   Eyes: Conjunctivae and EOM are normal. Pupils are equal, round, and reactive to light.   Neck: Normal range of motion. Neck supple. No thyromegaly present.   Cardiovascular: Normal rate.    No murmur heard.  Pulmonary/Chest: Effort normal. No respiratory distress. He has no wheezes.   Abdominal: Soft. He exhibits no distension. There is no tenderness.   Musculoskeletal: He exhibits no edema or tenderness.   Lymphadenopathy:     He has no cervical adenopathy.   Neurological: He is alert and oriented to person, place, and time. No cranial nerve deficit.   Skin: Skin is warm and dry.   Psoriasis  Lesion middle finger   Psychiatric: He has a normal mood and affect. His behavior is normal.       Assessment:       1. Pleural plaque: see CXR May 2017    2. Essential hypertension    3. Liver disease due to alcohol    4. Elevated liver enzymes    5. Thrombocytopenia    6. Age-related osteoporosis without current pathological fracture: see DEXA 2017    7. Squamous cell cancer of skin of right hand    8. Aortic atherosclerosis: see CT scan 5/17    9. Diverticulosis of large intestine without hemorrhage: see CT scan 5/17    10. Kidney stone L see CT 5/17    11. Umbilical hernia without obstruction and without gangrene        Plan:         Pleural plaque: see CXR May 2017/lung mass: for assessment through IR and Pulmonary- keep appts    Essential hypertension: Low salt diet, exercise, 5-10-# weight loss.  Call if BP > 135/85 on a regular basis.    Liver disease due to alcohol: to be monitored through hepatology; avoid ETOH    Elevated liver enzymes: to be monitored through hepatology; avoid ETOH    Thrombocytopenia: to be monitored; avoid ETOH    Age-related osteoporosis without current pathological fracture: see DEXA 2017  -      alendronate (FOSAMAX) 70 MG tablet; Take 1 tablet (70 mg total) by mouth every 7 days.  Dispense: 4 tablet; Refill: 11    Squamous cell cancer of skin of right hand: keep DERM follow up    Aortic atherosclerosis: see CT scan 5/17: discussed.  Very low lipids.  Will monitor.  At this point will avoid aspirin and statins given other issues; alarm sx reviewed    Diverticulosis of large intestine without hemorrhage: see CT scan 5/17: no issues; sx reviewed    Kidney stone L see CT 5/17: no sx.  Hydration; handouts given    Umbilical hernia without obstruction and without gangrene: no sx.  Cautions reviewed      Follow up with me in 3 months  Keep other scheduled follow ups  Continue to avoid alcohol!!!  Tdap recommended    Patient counseled for over 50% of his 25 minute appt, all questions answered,  chart reviewed and care coordinated.

## 2017-06-14 ENCOUNTER — TELEPHONE (OUTPATIENT)
Dept: HEPATOLOGY | Facility: CLINIC | Age: 77
End: 2017-06-14

## 2017-06-14 ENCOUNTER — INITIAL CONSULT (OUTPATIENT)
Dept: DERMATOLOGY | Facility: CLINIC | Age: 77
End: 2017-06-14
Payer: MEDICARE

## 2017-06-14 VITALS
HEIGHT: 62 IN | BODY MASS INDEX: 26.87 KG/M2 | SYSTOLIC BLOOD PRESSURE: 150 MMHG | DIASTOLIC BLOOD PRESSURE: 81 MMHG | HEART RATE: 79 BPM | WEIGHT: 146 LBS

## 2017-06-14 DIAGNOSIS — C44.622: Primary | ICD-10-CM

## 2017-06-14 DIAGNOSIS — R74.8 ELEVATED LIVER ENZYMES: Primary | ICD-10-CM

## 2017-06-14 PROCEDURE — 99499 UNLISTED E&M SERVICE: CPT | Mod: S$GLB,,, | Performed by: DERMATOLOGY

## 2017-06-14 PROCEDURE — 99999 PR PBB SHADOW E&M-EST. PATIENT-LVL III: CPT | Mod: PBBFAC,,, | Performed by: DERMATOLOGY

## 2017-06-14 PROCEDURE — 99214 OFFICE O/P EST MOD 30 MIN: CPT | Mod: S$GLB,,, | Performed by: DERMATOLOGY

## 2017-06-14 PROCEDURE — 1125F AMNT PAIN NOTED PAIN PRSNT: CPT | Mod: S$GLB,,, | Performed by: DERMATOLOGY

## 2017-06-14 PROCEDURE — 1159F MED LIST DOCD IN RCRD: CPT | Mod: S$GLB,,, | Performed by: DERMATOLOGY

## 2017-06-14 NOTE — PROGRESS NOTES
REFERRING MD:  Little Coon M.D.    CHIEF COMPLAINT:  New patient being consulted for Mohs' surgery evaluation.    HISTORY OF PRESENT ILLNESS:  76 y.o. male presents with a 1 year(s) history of growth on the R third digit. (+) growing. Pt states it initially looked like a boil. Pt also states that the lesion grew back post biopsy 2 weeks ago. (+) has full body psoriasis.    Negative for scabbing.  Negative for crusting.  Negative for bleeding.  Negative for itching.    Biopsy consistent with squamous cell carcinoma.     No prior treatment.    Pacemaker: No  Defibrillator: No  Artificial joints: No  Artificial heart valves: No    PAST MEDICAL HISTORY:  Past Medical History:   Diagnosis Date    Age-related osteoporosis without current pathological fracture: see DEXA 2017 6/9/2017    Anemia 5/4/2017    Aortic atherosclerosis: see CT scan 5/17 6/9/2017    BPH (benign prostatic hypertrophy)     Chronic gout     Cortical senile cataract 5/15/2012    Degenerative disc disease     Degenerative disc disease     Degenerative disc disease     Diverticulosis of large intestine without hemorrhage: see CT scan 5/17 6/9/2017    Essential hypertension 2/19/2016    Gout     Hyperplastic polyp of transverse colon: 2012 repeat 5 years 5/4/2017    Kidney stone on left side 6/9/2017    Osteoporosis     Psoriasis     Psoriasis     Squamous cell cancer of skin of right hand 6/9/2017    Squamous cell cancer of skin of right hand 6/9/2017    Substance abuse     Thrombocytopenia     Umbilical hernia: see CT 5/17 6/9/2017       PAST SURGICAL HISTORY:  Past Surgical History:   Procedure Laterality Date    CATARACT EXTRACTION      CHOLECYSTECTOMY  2012    EYE SURGERY      Right Hand Surgery          SOCIAL HISTORY:  Dependencies:  former smoker    PERTINENT MEDICATIONS:  See medications list.    ALLERGIES:  Humira [adalimumab]    ROS:  Skin: See HPI  Constitutional: No fatigue, fever, malaise, weight loss, or night  sweats.  Cardiovascular: No chest pain, palpitations, or edema.  Respiratory: No coughing, wheezing, SOB, or sputum production.    Physical Exam   Skin:               General: Mood and affect normal. Alert and orient X3. Normal appearance.  Head/Face:  no suspicious lesions  RUE: R proximal, ulnar aspect 3rd digit with a 7 x 10 mm pink nodule located 6.5 cm proximally from the fingertip.   Lymph nodes: right epitrochlear are not enlarged    IMPRESSION:  Biopsy proven well-differentiated squamous cell carcinoma, R third digit, path# [id number].    PLAN:  The diagnosis and the pathology report were discussed in detail with the patient. Treatment options were reviewed, including Mohs Micrographic Surgery, radiation, topical therapy, and standard excision.  After careful review of patient's history and physical exam, and after discussion of treatment options, the decision was made to perform Mohs micrographic surgery.    Scheduled patient for Mohs Micrographic Surgery. Risks, benefits, and alternatives of Mohs' surgery discussed with the patient. Discussed repair options including complex closure, skin flap, skin graft and second intention healing with the patient. Pre-operative instructions provided to the patient.    Spent 30 minutes in coordination of care and/or consultation with patient discussing diagnosis, treatment options, risks and benefits of each. All questions answered.

## 2017-06-16 ENCOUNTER — TELEPHONE (OUTPATIENT)
Dept: HEPATOLOGY | Facility: CLINIC | Age: 77
End: 2017-06-16

## 2017-06-16 NOTE — TELEPHONE ENCOUNTER
----- Message from Raymond Nair PA-C sent at 6/7/2017  3:51 PM CDT -----  Can you reach out to patient to see how swelling is doing? His labs are stable so there is room to increase fluid pills.

## 2017-06-16 NOTE — TELEPHONE ENCOUNTER
Pt swelling is down pts wife states diuretics working well his legs look great and no need to adjust meds at this time, he is having a Mohs procedure done on 6/22/17 and his labs for you the following day, is that ok to do, they wanted to make sure that anything they did on the MOHS procedure didn't or wouldn't interfere with his labs the next day, please advise if we need to change the labs or not, thanks.

## 2017-06-20 ENCOUNTER — PATIENT MESSAGE (OUTPATIENT)
Dept: HEPATOLOGY | Facility: CLINIC | Age: 77
End: 2017-06-20

## 2017-06-21 ENCOUNTER — TELEPHONE (OUTPATIENT)
Dept: HEPATOLOGY | Facility: CLINIC | Age: 77
End: 2017-06-21

## 2017-06-21 NOTE — TELEPHONE ENCOUNTER
MA called patient able to speak to his wife, per wife he will have appt tomorrow here and she will let us know what is his blood pressure as soon as they check them. SHERIDAN

## 2017-06-22 ENCOUNTER — PATIENT MESSAGE (OUTPATIENT)
Dept: PULMONOLOGY | Facility: CLINIC | Age: 77
End: 2017-06-22

## 2017-06-22 ENCOUNTER — PROCEDURE VISIT (OUTPATIENT)
Dept: DERMATOLOGY | Facility: CLINIC | Age: 77
End: 2017-06-22
Payer: MEDICARE

## 2017-06-22 VITALS
HEIGHT: 62 IN | SYSTOLIC BLOOD PRESSURE: 150 MMHG | BODY MASS INDEX: 26.87 KG/M2 | DIASTOLIC BLOOD PRESSURE: 65 MMHG | HEART RATE: 70 BPM | WEIGHT: 146 LBS

## 2017-06-22 DIAGNOSIS — C44.622: Primary | ICD-10-CM

## 2017-06-22 PROCEDURE — 99499 UNLISTED E&M SERVICE: CPT | Mod: S$GLB,,, | Performed by: DERMATOLOGY

## 2017-06-22 PROCEDURE — 17311 MOHS 1 STAGE H/N/HF/G: CPT | Mod: S$GLB,,, | Performed by: DERMATOLOGY

## 2017-06-22 PROCEDURE — 13132 CMPLX RPR F/C/C/M/N/AX/G/H/F: CPT | Mod: 51,S$GLB,, | Performed by: DERMATOLOGY

## 2017-06-22 RX ORDER — CEPHALEXIN 500 MG/1
500 CAPSULE ORAL 3 TIMES DAILY
Qty: 30 CAPSULE | Refills: 0 | Status: SHIPPED | OUTPATIENT
Start: 2017-06-22 | End: 2017-07-02

## 2017-06-22 RX ORDER — OXYCODONE AND ACETAMINOPHEN 5; 325 MG/1; MG/1
1 TABLET ORAL
Qty: 20 TABLET | Refills: 0 | Status: SHIPPED | OUTPATIENT
Start: 2017-06-22 | End: 2017-07-05

## 2017-06-22 NOTE — PROGRESS NOTES
PROCEDURE: Mohs' Micrographic Surgery    INDICATION: Location in genitalia, including nipple/areola, digits, nail unit. Biopsy-proven skin cancer of cosmetically and functionally important areas, including head, neck, genital, hand, foot, or areas known for having difficulty in healing, such as the lower anterior legs. Tumors with aggressive clinical behavior (rapidly growing, greater than 1 cm in diameter). Tumor with ill-defined borders.    REFERRING MD: Little Coon M.D.    CASE NUMBER:     ANESTHETIC: 2 cc 0.5% Lidocaine with Epi 1:200,000 mixed 1:1 with 0.5% Bupivacaine    SURGICAL PREP: Hibiclens    SURGEON: Maria G Floyd MD    ASSISTANTS: Mary Brooks PA-C and Judith Sanderson MA    PREOPERATIVE DIAGNOSIS: squamous cell carcinoma    POSTOPERATIVE DIAGNOSIS: squamous cell carcinoma    PATHOLOGIC DIAGNOSIS: squamous cell carcinoma- invasive, well differentiated    HISTOLOGY OF SPECIMENS IN FIRST STAGE:   Tumor Type: No tumor seen.    STAGES OF MOHS' SURGERY PERFORMED: 1    TUMOR-FREE PLANE ACHIEVED: Yes    HEMOSTASIS: electrocoagulation     SPECIMENS: 4     LOCATION: right (proximal ulnar) third digit. Patient verified location.    INITIAL LESION SIZE: 0.9 x 1.5 cm    FINAL DEFECT SIZE: 1.4 x 1.8 cm    WOUND REPAIR/DISPOSITION: The patient tolerated Mohs' Micrographic Surgery for a squamous cell carcinoma very well. When the tumor was completely removed, a repair of the surgical defect was undertaken.      PROCEDURE: Complex Linear Repair    INDICATION: Status post Mohs' Micrographic Surgery for squamous cell carcinoma.    CASE NUMBER:     SURGEON: Maria G Floyd MD    ASSISTANTS: Mary Brooks PA-C and Judith Sanderson MA    ANESTHETIC: 1 cc 0.5% Lidocaine with Epi 1:200,000 mixed 1:1 with 0.5% Bupivacaine    SURGICAL PREP: Hibiclens    LOCATION: right (proximal ulnar) third digit    DEFECT SIZE: 1.4 x 1.8 cm    WOUND REPAIR/DISPOSITION:  After the patient's carcinoma had been completely removed with  "Mohs' Micrographic Surgery, a repair of the surgical defect was undertaken. The patient was returned to the operating suite where the area of right proximal ulnar third digit was prepped, draped, and anesthetized in the usual sterile fashion. The wound was widely undermined in all directions. Then, electrocoagulation was used to obtain meticulous hemostasis. 4-0 Vicryl buried vertical mattress sutures were placed into the subcutaneous and dermal plane to close the wound and rolando the cutaneous wound edge. Bilateral dog ears were identified and were removed by a standard Burow's triangle technique. The cutaneous wound edges were closed using interrupted 4-0 Prolene suture.    The patient tolerated the procedure well.    The area was cleaned and dressed appropriately and the patient was given wound care instructions, as well as appointment for follow-up evaluation. Patient was placed on Percocet 5 prn postop pain and Keflex 500 mg TID x 10 days. Will leave sutures in 3 weeks.     LENGTH OF REPAIR: 3.2 cm    Vitals:    06/22/17 0739 06/22/17 1015   BP: 138/77 (!) 150/65   BP Location: Left arm    Patient Position: Sitting    BP Method: Automatic    Pulse: 82 70   Weight: 66.2 kg (146 lb)    Height: 5' 2" (1.575 m)          "

## 2017-06-23 ENCOUNTER — LAB VISIT (OUTPATIENT)
Dept: LAB | Facility: HOSPITAL | Age: 77
End: 2017-06-23
Attending: INTERNAL MEDICINE
Payer: MEDICARE

## 2017-06-23 DIAGNOSIS — R74.8 ELEVATED LIVER ENZYMES: ICD-10-CM

## 2017-06-23 LAB
ANION GAP SERPL CALC-SCNC: 9 MMOL/L
BUN SERPL-MCNC: 11 MG/DL
CALCIUM SERPL-MCNC: 8.9 MG/DL
CHLORIDE SERPL-SCNC: 108 MMOL/L
CO2 SERPL-SCNC: 23 MMOL/L
CREAT SERPL-MCNC: 0.7 MG/DL
EST. GFR  (AFRICAN AMERICAN): >60 ML/MIN/1.73 M^2
EST. GFR  (NON AFRICAN AMERICAN): >60 ML/MIN/1.73 M^2
GLUCOSE SERPL-MCNC: 104 MG/DL
POTASSIUM SERPL-SCNC: 3.6 MMOL/L
SODIUM SERPL-SCNC: 140 MMOL/L

## 2017-06-23 PROCEDURE — 36415 COLL VENOUS BLD VENIPUNCTURE: CPT

## 2017-06-23 PROCEDURE — 80048 BASIC METABOLIC PNL TOTAL CA: CPT

## 2017-06-26 ENCOUNTER — TELEPHONE (OUTPATIENT)
Dept: HEPATOLOGY | Facility: CLINIC | Age: 77
End: 2017-06-26

## 2017-06-26 DIAGNOSIS — R91.1 PULMONARY NODULE: Primary | ICD-10-CM

## 2017-06-26 DIAGNOSIS — K70.9 LIVER DISEASE DUE TO ALCOHOL: Primary | ICD-10-CM

## 2017-06-27 DIAGNOSIS — R91.1 PULMONARY NODULE: Primary | ICD-10-CM

## 2017-06-28 ENCOUNTER — SURGERY (OUTPATIENT)
Age: 77
End: 2017-06-28

## 2017-06-28 ENCOUNTER — HOSPITAL ENCOUNTER (OUTPATIENT)
Facility: HOSPITAL | Age: 77
Discharge: HOME | End: 2017-06-28
Attending: INTERNAL MEDICINE | Admitting: INTERNAL MEDICINE
Payer: MEDICARE

## 2017-06-28 ENCOUNTER — TELEPHONE (OUTPATIENT)
Dept: PULMONOLOGY | Facility: CLINIC | Age: 77
End: 2017-06-28

## 2017-06-28 VITALS
TEMPERATURE: 98 F | RESPIRATION RATE: 16 BRPM | HEART RATE: 86 BPM | WEIGHT: 144 LBS | BODY MASS INDEX: 26.5 KG/M2 | SYSTOLIC BLOOD PRESSURE: 178 MMHG | DIASTOLIC BLOOD PRESSURE: 83 MMHG | HEIGHT: 62 IN | OXYGEN SATURATION: 100 %

## 2017-06-28 DIAGNOSIS — R91.8 LUNG MASS: Primary | ICD-10-CM

## 2017-06-28 DIAGNOSIS — R91.1 PULMONARY NODULE: ICD-10-CM

## 2017-06-28 RX ORDER — SODIUM CHLORIDE 9 MG/ML
500 INJECTION, SOLUTION INTRAVENOUS ONCE
Status: DISCONTINUED | OUTPATIENT
Start: 2017-06-28 | End: 2017-06-28 | Stop reason: HOSPADM

## 2017-06-28 RX ORDER — FENTANYL CITRATE 50 UG/ML
50 INJECTION, SOLUTION INTRAMUSCULAR; INTRAVENOUS
Status: DISCONTINUED | OUTPATIENT
Start: 2017-06-28 | End: 2017-06-28 | Stop reason: HOSPADM

## 2017-06-28 RX ORDER — MIDAZOLAM HYDROCHLORIDE 1 MG/ML
1 INJECTION INTRAMUSCULAR; INTRAVENOUS
Status: DISCONTINUED | OUTPATIENT
Start: 2017-06-28 | End: 2017-06-28 | Stop reason: HOSPADM

## 2017-06-28 NOTE — TELEPHONE ENCOUNTER
Discussed with Dr Summers regarding RLL nodule. Mass appears to have been present on 11/2012 film. Will plan to check PET CT given risk of proximity to diaphragm.

## 2017-06-28 NOTE — H&P
Radiology History & Physical      SUBJECTIVE:     Chief Complaint: Lesion in RLL of lung    History of Present Illness:  Darinel Majano is a 76 y.o. male who presents for CT guided lung biopsy.    Past Medical History:   Diagnosis Date    Age-related osteoporosis without current pathological fracture: see DEXA 2017 6/9/2017    Anemia 5/4/2017    Aortic atherosclerosis: see CT scan 5/17 6/9/2017    BPH (benign prostatic hypertrophy)     Chronic gout     Cortical senile cataract 5/15/2012    Degenerative disc disease     Degenerative disc disease     Degenerative disc disease     Diverticulosis of large intestine without hemorrhage: see CT scan 5/17 6/9/2017    Essential hypertension 2/19/2016    Gout     Hyperplastic polyp of transverse colon: 2012 repeat 5 years 5/4/2017    Kidney stone on left side 6/9/2017    Osteoporosis     Psoriasis     Psoriasis     Squamous cell cancer of skin of right hand 6/9/2017    Squamous cell cancer of skin of right hand 6/9/2017    Substance abuse     Thrombocytopenia     Umbilical hernia: see CT 5/17 6/9/2017     Past Surgical History:   Procedure Laterality Date    CATARACT EXTRACTION      CHOLECYSTECTOMY  2012    EYE SURGERY      Right Hand Surgery         Home Meds:   Prior to Admission medications    Medication Sig Start Date End Date Taking? Authorizing Provider   cephALEXin (KEFLEX) 500 MG capsule Take 1 capsule (500 mg total) by mouth 3 (three) times daily. 6/22/17 7/2/17 Yes Mary Brooks PA-C   furosemide (LASIX) 20 MG tablet Take 1 tablet (20 mg total) by mouth once daily. 5/24/17 5/24/18 Yes Raymond Nair PA-C   multivitamin with minerals tablet Take 1 tablet by mouth once daily.   Yes Historical Provider, MD   spironolactone (ALDACTONE) 50 MG tablet Take 1 tablet (50 mg total) by mouth once daily. 5/24/17 5/24/18 Yes Raymond Nair PA-C   alendronate (FOSAMAX) 70 MG tablet Take 1 tablet (70 mg total) by mouth every 7 days. 6/9/17 6/9/18   Annabella Montiel MD   oxycodone-acetaminophen (PERCOCET) 5-325 mg per tablet Take 1 tablet by mouth every 4 to 6 hours as needed for Pain. 6/22/17   Mary Brooks PA-C   triamcinolone acetonide 0.1% (KENALOG) 0.1 % cream Apply topically 2 (two) times daily.    Historical Provider, MD     Anticoagulants/Antiplatelets: no anticoagulation    Allergies:   Review of patient's allergies indicates:   Allergen Reactions    Humira [adalimumab] Other (See Comments)     Sedation History:  no adverse reactions    Review of Systems:   Hematological: no known coagulopathies  Respiratory: no shortness of breath  Cardiovascular: no chest pain  Gastrointestinal: no abdominal pain  Genito-Urinary: no dysuria  Musculoskeletal: negative  Neurological: no TIA or stroke symptoms         OBJECTIVE:     Vital Signs (Most Recent)  Temp: 97.8 °F (36.6 °C) (06/28/17 1211)  Pulse: 87 (06/28/17 1211)  Resp: 16 (06/28/17 1211)  BP: (!) 159/77 (06/28/17 1211)  SpO2: 100 % (06/28/17 1211)    Physical Exam:  ASA: 2  Mallampati: 2    General: no acute distress  Mental Status: alert and oriented to person, place and time  HEENT: normocephalic, atraumatic  Chest: unlabored breathing  Heart: regular heart rate  Abdomen: nondistended  Extremity: moves all extremities    Laboratory  Lab Results   Component Value Date    INR 0.9 06/28/2017       Lab Results   Component Value Date    WBC 9.55 06/28/2017    HGB 12.6 (L) 06/28/2017    HCT 37.7 (L) 06/28/2017    MCV 97 06/28/2017     06/28/2017      Lab Results   Component Value Date     06/23/2017     06/23/2017    K 3.6 06/23/2017     06/23/2017    CO2 23 06/23/2017    BUN 11 06/23/2017    CREATININE 0.7 06/23/2017    CALCIUM 8.9 06/23/2017    MG 1.8 02/28/2014    ALT 22 05/24/2017    AST 17 05/24/2017    ALBUMIN 2.9 (L) 05/24/2017    BILITOT 1.0 05/24/2017    BILIDIR 0.4 (H) 07/12/2012       ASSESSMENT/PLAN:     Sedation Plan: moderate  Patient will undergo right lung  biopsy.    Martell Eastman MD  PGY-4  Department of Radiology  [phone]

## 2017-06-28 NOTE — PROCEDURES
Radiology Post-Procedure Note    Pre Op Diagnosis: RLL lung lesion.    Post Op Diagnosis: Same    Procedure: Lung biopsy    Procedure performed by: Cesar Summers MD    Written Informed Consent Obtained: Yes  Specimen Removed: NO    Findings:     Lung biopsy NOT performed secondary to location of lesion and presence on preliminary CT scan. Plan of care communicated with patient, family and Dr. Ruffin.    Patient tolerated procedure well.    Martell Eastman MD  PGY-4  Department of Radiology  [phone]

## 2017-06-28 NOTE — DISCHARGE SUMMARY
Radiology Discharge Summary      Hospital Course: No complications    Admit Date: 6/28/2017  Discharge Date: 06/28/2017     Instructions Given to Patient: Yes  Diet: Resume prior diet  Activity: activity as tolerated    Description of Condition on Discharge: Stable  Vital Signs (Most Recent): Temp: 97.8 °F (36.6 °C) (06/28/17 1211)  Pulse: 86 (06/28/17 1403)  Resp: 16 (06/28/17 1403)  BP: (!) 178/83 (06/28/17 1403)  SpO2: 100 % (06/28/17 1403)    Discharge Disposition: Home    Discharge Diagnosis: RLL lung lesion.     Follow-up: per Pulmonary Medicine    Martell Eastman MD  PGY-4  Department of Radiology  [phone]

## 2017-06-28 NOTE — PROGRESS NOTES
Dr. Summers speaking with patient and family member.  Ok for pt to be discharged.    1435-Pt with family, ambulated out of room, gait steady.

## 2017-07-05 ENCOUNTER — OFFICE VISIT (OUTPATIENT)
Dept: DERMATOLOGY | Facility: CLINIC | Age: 77
End: 2017-07-05
Payer: MEDICARE

## 2017-07-05 DIAGNOSIS — L40.0 PSORIASIS VULGARIS: Primary | ICD-10-CM

## 2017-07-05 PROCEDURE — 99999 PR PBB SHADOW E&M-EST. PATIENT-LVL II: CPT | Mod: PBBFAC,,, | Performed by: DERMATOLOGY

## 2017-07-05 PROCEDURE — 99213 OFFICE O/P EST LOW 20 MIN: CPT | Mod: S$GLB,,, | Performed by: DERMATOLOGY

## 2017-07-05 PROCEDURE — 1125F AMNT PAIN NOTED PAIN PRSNT: CPT | Mod: S$GLB,,, | Performed by: DERMATOLOGY

## 2017-07-05 PROCEDURE — 1159F MED LIST DOCD IN RCRD: CPT | Mod: S$GLB,,, | Performed by: DERMATOLOGY

## 2017-07-05 RX ORDER — TRIAMCINOLONE ACETONIDE 1 MG/G
OINTMENT TOPICAL
Qty: 454 G | Refills: 3 | Status: SHIPPED | OUTPATIENT
Start: 2017-07-05 | End: 2018-08-09 | Stop reason: SDUPTHER

## 2017-07-05 RX ORDER — TRIAMCINOLONE ACETONIDE 1 MG/G
OINTMENT TOPICAL
COMMUNITY
Start: 2017-06-30 | End: 2017-07-05 | Stop reason: SDUPTHER

## 2017-07-05 NOTE — PROGRESS NOTES
Subjective:       Patient ID:  Darinel Majano is a 76 y.o. male who presents for   Chief Complaint   Patient presents with    Psoriasis     Pt presents for follow up for psoriasis.  Better since visit 5/17/17.  Using TAC ointment bid.  Helps.  C/o severe itching and burning particularly on back and legs.  Also using cerave ointment.    Pt still being worked up by pulm and ID for lung nodule. Was unable to get CT guided biopsy.        Psoriasis         Review of Systems   Constitutional: Negative for fever and chills.   HENT: Negative for sore throat.    Musculoskeletal: Negative for arthralgias.   Skin: Positive for itching, rash and dry skin.        Objective:    Physical Exam   Constitutional: He appears well-developed and well-nourished. No distress.   Neurological: He is alert and oriented to person, place, and time. He is not disoriented.   Psychiatric: He has a normal mood and affect.   Skin:   Areas Examined (abnormalities noted in diagram):   Scalp / Hair Palpated and Inspected  Head / Face Inspection Performed  Neck Inspection Performed  Chest / Axilla Inspection Performed  Abdomen Inspection Performed  Genitals / Buttocks / Groin Inspection Performed  Back Inspection Performed  RUE Inspected  LUE Inspection Performed  RLE Inspected  LLE Inspection Performed  Nails and Digits Inspection Performed                       Diagram Legend     Erythematous scaling macule/papule c/w actinic keratosis       Vascular papule c/w angioma      Pigmented verrucoid papule/plaque c/w seborrheic keratosis      Yellow umbilicated papule c/w sebaceous hyperplasia      Irregularly shaped tan macule c/w lentigo     1-2 mm smooth white papules consistent with Milia      Movable subcutaneous cyst with punctum c/w epidermal inclusion cyst      Subcutaneous movable cyst c/w pilar cyst      Firm pink to brown papule c/w dermatofibroma      Pedunculated fleshy papule(s) c/w skin tag(s)      Evenly pigmented macule c/w junctional  nevus     Mildly variegated pigmented, slightly irregular-bordered macule c/w mildly atypical nevus      Flesh colored to evenly pigmented papule c/w intradermal nevus       Pink pearly papule/plaque c/w basal cell carcinoma      Erythematous hyperkeratotic cursted plaque c/w SCC      Surgical scar with no sign of skin cancer recurrence      Open and closed comedones      Inflammatory papules and pustules      Verrucoid papule consistent consistent with wart     Erythematous eczematous patches and plaques     Dystrophic onycholytic nail with subungual debris c/w onychomycosis     Umbilicated papule    Erythematous-base heme-crusted tan verrucoid plaque consistent with inflamed seborrheic keratosis     Erythematous Silvery Scaling Plaque c/w Psoriasis     See annotation      Assessment / Plan:        Psoriasis vulgaris  Pt with many medical comorbidities making systemic therapy not an option at this time. If lung bc clear, can consider taltz  Will do light therapy if insurance approves, consider home light unit  S/p MOhs for scc right 3rd digit.   Pt has PET scan scheduled for Friday but he is unsure he will follow through.   CLodan shampoo for scalp ..  -     NB-UVB Light Therapy; Future  -     triamcinolone acetonide 0.1% (KENALOG) 0.1 % ointment; aaa qd to affected areas.  Avoid face and groin  Dispense: 454 g; Refill: 3             Return for prn approval of light therapy .

## 2017-07-06 ENCOUNTER — TELEPHONE (OUTPATIENT)
Dept: DERMATOLOGY | Facility: CLINIC | Age: 77
End: 2017-07-06

## 2017-07-06 NOTE — TELEPHONE ENCOUNTER
Spoke with wife about scheduling light treatments and she declined at this time due to many doctor appts for Mr Majano. They will call when he can keep appts.

## 2017-07-07 ENCOUNTER — HOSPITAL ENCOUNTER (OUTPATIENT)
Dept: RADIOLOGY | Facility: HOSPITAL | Age: 77
Discharge: HOME | End: 2017-07-07
Attending: INTERNAL MEDICINE
Payer: MEDICARE

## 2017-07-07 DIAGNOSIS — R91.8 LUNG MASS: ICD-10-CM

## 2017-07-07 PROCEDURE — 78815 PET IMAGE W/CT SKULL-THIGH: CPT | Mod: 26,PI,, | Performed by: NUCLEAR MEDICINE

## 2017-07-07 PROCEDURE — A9552 F18 FDG: HCPCS

## 2017-07-09 ENCOUNTER — PATIENT MESSAGE (OUTPATIENT)
Dept: HEPATOLOGY | Facility: CLINIC | Age: 77
End: 2017-07-09

## 2017-07-10 ENCOUNTER — PATIENT MESSAGE (OUTPATIENT)
Dept: PULMONOLOGY | Facility: CLINIC | Age: 77
End: 2017-07-10

## 2017-07-10 RX ORDER — SPIRONOLACTONE 50 MG/1
50 TABLET, FILM COATED ORAL DAILY
Qty: 30 TABLET | Refills: 6 | Status: SHIPPED | OUTPATIENT
Start: 2017-07-10 | End: 2017-12-04 | Stop reason: SDUPTHER

## 2017-07-10 RX ORDER — FUROSEMIDE 20 MG/1
20 TABLET ORAL DAILY
Qty: 30 TABLET | Refills: 6 | Status: SHIPPED | OUTPATIENT
Start: 2017-07-10 | End: 2018-03-19 | Stop reason: SDUPTHER

## 2017-07-12 ENCOUNTER — TELEPHONE (OUTPATIENT)
Dept: PULMONOLOGY | Facility: CLINIC | Age: 77
End: 2017-07-12

## 2017-07-12 DIAGNOSIS — R91.1 PULMONARY NODULE: Primary | ICD-10-CM

## 2017-07-12 NOTE — TELEPHONE ENCOUNTER
----- Message from Mark Anglin MD sent at 7/12/2017  9:10 AM CDT -----  We can attempt. Breathing motion will be our biggest enemy.    Mark    ----- Message -----  From: Marci Ayala MD  Sent: 7/12/2017   8:32 AM  To: Cesar Summers Jr., MD, Mark Anglin MD, #    It would be a little bit riskier than the run of the mill lung biopsy, but worthwhile to attempt given PET findings.    Marci   ----- Message -----  From: Cesar Summers Jr., MD  Sent: 7/11/2017   7:22 PM  To: Mark Anglin MD, Puneet Xiao MD, #    Surma, I am out all week I copied mark stevens, and marci.  I am sure one of them can review and advise.  I will be back Monday July 17.  Gaurang    ----- Message -----  From: Latricia Ruffin MD  Sent: 7/11/2017   9:26 AM  To: MD Ean Mckenzie Jr.,   Please see the Pet scan on this patient that we deferred biopsy. SUV 4.7. I think it needs to be biopsies. Let me know what you think.  SJ

## 2017-07-13 ENCOUNTER — OFFICE VISIT (OUTPATIENT)
Dept: DERMATOLOGY | Facility: CLINIC | Age: 77
End: 2017-07-13
Payer: MEDICARE

## 2017-07-13 DIAGNOSIS — Z09 POSTOP CHECK: Primary | ICD-10-CM

## 2017-07-13 PROCEDURE — 99024 POSTOP FOLLOW-UP VISIT: CPT | Mod: S$GLB,,, | Performed by: DERMATOLOGY

## 2017-07-13 PROCEDURE — 99999 PR PBB SHADOW E&M-EST. PATIENT-LVL II: CPT | Mod: PBBFAC,,, | Performed by: DERMATOLOGY

## 2017-07-13 NOTE — PROGRESS NOTES
76 y.o. male patient is here for suture removal following Mohs' surgery.    Patient reports no problems with right (proximal ulna) third digit.    WOUND PE:  The right (proximal ulna) third digit sutures intact. Wound healing well. Good skin edges. No signs or symptoms of infection.      IMPRESSION:  Healing operative site from Mohs' surgery SCC, right (proximal ulna) third digit s/p Mohs with CLC, postop day # 21.    PLAN:  Sutures removed today. Steri-strips applied.  Continue wound care.  Keep moist with Aquaphor.    RTC:  In 1 month.

## 2017-07-17 ENCOUNTER — PATIENT MESSAGE (OUTPATIENT)
Dept: PULMONOLOGY | Facility: CLINIC | Age: 77
End: 2017-07-17

## 2017-07-21 DIAGNOSIS — R91.1 PULMONARY NODULE: Primary | ICD-10-CM

## 2017-07-24 ENCOUNTER — LAB VISIT (OUTPATIENT)
Dept: LAB | Facility: HOSPITAL | Age: 77
End: 2017-07-24
Attending: INTERNAL MEDICINE
Payer: MEDICARE

## 2017-07-24 DIAGNOSIS — K70.9 LIVER DISEASE DUE TO ALCOHOL: ICD-10-CM

## 2017-07-24 LAB
ANION GAP SERPL CALC-SCNC: 7 MMOL/L
BUN SERPL-MCNC: 14 MG/DL
CALCIUM SERPL-MCNC: 9.5 MG/DL
CHLORIDE SERPL-SCNC: 105 MMOL/L
CO2 SERPL-SCNC: 29 MMOL/L
CREAT SERPL-MCNC: 0.8 MG/DL
EST. GFR  (AFRICAN AMERICAN): >60 ML/MIN/1.73 M^2
EST. GFR  (NON AFRICAN AMERICAN): >60 ML/MIN/1.73 M^2
GLUCOSE SERPL-MCNC: 90 MG/DL
POTASSIUM SERPL-SCNC: 4.3 MMOL/L
SODIUM SERPL-SCNC: 141 MMOL/L

## 2017-07-24 PROCEDURE — 80048 BASIC METABOLIC PNL TOTAL CA: CPT

## 2017-07-24 PROCEDURE — 36415 COLL VENOUS BLD VENIPUNCTURE: CPT | Mod: PO

## 2017-07-26 ENCOUNTER — HOSPITAL ENCOUNTER (OUTPATIENT)
Facility: HOSPITAL | Age: 77
Discharge: HOME | End: 2017-07-26
Attending: RADIOLOGY | Admitting: RADIOLOGY
Payer: MEDICARE

## 2017-07-26 VITALS
OXYGEN SATURATION: 100 % | RESPIRATION RATE: 20 BRPM | HEART RATE: 65 BPM | SYSTOLIC BLOOD PRESSURE: 134 MMHG | HEIGHT: 62 IN | WEIGHT: 143 LBS | TEMPERATURE: 98 F | BODY MASS INDEX: 26.31 KG/M2 | DIASTOLIC BLOOD PRESSURE: 63 MMHG

## 2017-07-26 DIAGNOSIS — R91.1 PULMONARY NODULE: ICD-10-CM

## 2017-07-26 LAB
GRAM STN SPEC: NORMAL
GRAM STN SPEC: NORMAL

## 2017-07-26 PROCEDURE — 87070 CULTURE OTHR SPECIMN AEROBIC: CPT

## 2017-07-26 PROCEDURE — 63600175 PHARM REV CODE 636 W HCPCS: Performed by: FAMILY MEDICINE

## 2017-07-26 PROCEDURE — 87116 MYCOBACTERIA CULTURE: CPT

## 2017-07-26 PROCEDURE — 88305 TISSUE EXAM BY PATHOLOGIST: CPT | Performed by: PATHOLOGY

## 2017-07-26 PROCEDURE — 88333 PATH CONSLTJ SURG CYTO XM 1: CPT | Mod: 26,,, | Performed by: PATHOLOGY

## 2017-07-26 PROCEDURE — 87102 FUNGUS ISOLATION CULTURE: CPT

## 2017-07-26 PROCEDURE — 87075 CULTR BACTERIA EXCEPT BLOOD: CPT

## 2017-07-26 PROCEDURE — 88305 TISSUE EXAM BY PATHOLOGIST: CPT | Mod: 26,,, | Performed by: PATHOLOGY

## 2017-07-26 PROCEDURE — 87205 SMEAR GRAM STAIN: CPT

## 2017-07-26 RX ORDER — MIDAZOLAM HYDROCHLORIDE 1 MG/ML
1 INJECTION INTRAMUSCULAR; INTRAVENOUS
Status: DISCONTINUED | OUTPATIENT
Start: 2017-07-26 | End: 2017-07-26 | Stop reason: HOSPADM

## 2017-07-26 RX ORDER — FENTANYL CITRATE 50 UG/ML
50 INJECTION, SOLUTION INTRAMUSCULAR; INTRAVENOUS
Status: DISCONTINUED | OUTPATIENT
Start: 2017-07-26 | End: 2017-07-26 | Stop reason: HOSPADM

## 2017-07-26 RX ORDER — SODIUM CHLORIDE 9 MG/ML
500 INJECTION, SOLUTION INTRAVENOUS ONCE
Status: DISCONTINUED | OUTPATIENT
Start: 2017-07-26 | End: 2017-07-26 | Stop reason: HOSPADM

## 2017-07-26 RX ADMIN — FENTANYL CITRATE 25 MCG: 50 INJECTION, SOLUTION INTRAMUSCULAR; INTRAVENOUS at 09:07

## 2017-07-26 RX ADMIN — MIDAZOLAM HYDROCHLORIDE 1 MG: 1 INJECTION, SOLUTION INTRAMUSCULAR; INTRAVENOUS at 08:07

## 2017-07-26 NOTE — DISCHARGE SUMMARY
Radiology Discharge Summary      Hospital Course: No complications    Admit Date: 7/26/2017  Discharge Date: 07/26/2017     Instructions Given to Patient: Yes  Diet: Resume prior diet  Activity: activity as tolerated and no driving for today    Description of Condition on Discharge: Stable  Vital Signs (Most Recent): Temp: 98 °F (36.7 °C) (07/26/17 0930)  Pulse: 65 (07/26/17 1200)  Resp: 20 (07/26/17 1200)  BP: 134/63 (07/26/17 1200)  SpO2: 100 % (07/26/17 1200)    Discharge Disposition: Home    Discharge Diagnosis: right lower lobe lung lesion     Follow-up: per Pulmonology     Martell Eastman MD  PGY-5  Department of Radiology  [phone]

## 2017-07-26 NOTE — DISCHARGE INSTRUCTIONS
"For scheduling: Call Thi at [phone]    For questions or concerns call: [facility] 8 AM- 5PM: [phone].   **After hours and weekends: Call [phone] and ask for "Radiology Resident on call".    For immediate concerns that are not emergent, you may call our radiology clinic at: [phone]      "

## 2017-07-26 NOTE — H&P
Radiology History & Physical      SUBJECTIVE:     Chief Complaint: Right lower lung hypermetabolic lesion.    History of Present Illness:    Darinel Majano is a 76 y.o. male who presents for right lung lesion biopsy.    Past Medical History:   Diagnosis Date    Age-related osteoporosis without current pathological fracture: see DEXA 2017 6/9/2017    Anemia 5/4/2017    Aortic atherosclerosis: see CT scan 5/17 6/9/2017    BPH (benign prostatic hypertrophy)     Chronic gout     Cortical senile cataract 5/15/2012    Degenerative disc disease     Degenerative disc disease     Degenerative disc disease     Diverticulosis of large intestine without hemorrhage: see CT scan 5/17 6/9/2017    Essential hypertension 2/19/2016    Gout     Hyperplastic polyp of transverse colon: 2012 repeat 5 years 5/4/2017    Kidney stone on left side 6/9/2017    Osteoporosis     Psoriasis     Psoriasis     Squamous cell cancer of skin of right hand 6/9/2017    Squamous cell cancer of skin of right hand 6/9/2017    Substance abuse     Thrombocytopenia     Umbilical hernia: see CT 5/17 6/9/2017     Past Surgical History:   Procedure Laterality Date    CATARACT EXTRACTION      CHOLECYSTECTOMY  2012    EYE SURGERY      Right Hand Surgery         Home Meds:   Prior to Admission medications    Medication Sig Start Date End Date Taking? Authorizing Provider   alendronate (FOSAMAX) 70 MG tablet Take 1 tablet (70 mg total) by mouth every 7 days. 6/9/17 6/9/18 Yes Annabella Montiel MD   furosemide (LASIX) 20 MG tablet Take 1 tablet (20 mg total) by mouth once daily. 7/10/17 7/10/18 Yes Raymond Nair PA-C   spironolactone (ALDACTONE) 50 MG tablet Take 1 tablet (50 mg total) by mouth once daily. 7/10/17 7/10/18 Yes Raymond Nair PA-C   triamcinolone acetonide 0.1% (KENALOG) 0.1 % ointment aaa qd to affected areas.  Avoid face and groin 7/5/17  Yes Little Coon MD   multivitamin with minerals tablet Take 1 tablet by  mouth once daily.    Historical Provider, MD     Anticoagulants/Antiplatelets: no anticoagulation    Allergies:   Review of patient's allergies indicates:   Allergen Reactions    Humira [adalimumab] Other (See Comments)     Sedation History:  no adverse reactions    Review of Systems:   Hematological: no known coagulopathies  Respiratory: no shortness of breath  Cardiovascular: no chest pain  Gastrointestinal: no abdominal pain  Genito-Urinary: no dysuria  Musculoskeletal: negative  Neurological: no TIA or stroke symptoms         OBJECTIVE:     Vital Signs (Most Recent)  Temp: 97.9 °F (36.6 °C) (07/26/17 0739)  Pulse: 83 (07/26/17 0739)  Resp: 20 (07/26/17 0739)  BP: (!) 141/62 (07/26/17 0739)  SpO2: 100 % (07/26/17 0739)    Physical Exam:  ASA: 2  Mallampati: 2    General: no acute distress  Mental Status: alert and oriented to person, place and time  HEENT: normocephalic, atraumatic  Chest: unlabored breathing  Heart: regular heart rate  Abdomen: nondistended  Extremity: moves all extremities    Laboratory  Lab Results   Component Value Date    INR 0.9 06/28/2017       Lab Results   Component Value Date    WBC 9.55 06/28/2017    HGB 12.6 (L) 06/28/2017    HCT 37.7 (L) 06/28/2017    MCV 97 06/28/2017     06/28/2017      Lab Results   Component Value Date    GLU 90 07/24/2017     07/24/2017    K 4.3 07/24/2017     07/24/2017    CO2 29 07/24/2017    BUN 14 07/24/2017    CREATININE 0.8 07/24/2017    CALCIUM 9.5 07/24/2017    MG 1.8 02/28/2014    ALT 22 05/24/2017    AST 17 05/24/2017    ALBUMIN 2.9 (L) 05/24/2017    BILITOT 1.0 05/24/2017    BILIDIR 0.4 (H) 07/12/2012       ASSESSMENT/PLAN:     Sedation Plan: moderate.  Patient will undergo right lung lesion biopsy.    Martell Eastman MD  PGY-5  Department of Radiology  [phone]

## 2017-07-26 NOTE — PROCEDURES
Radiology Post-Procedure Note    Pre Op Diagnosis: Right lung mass    Post Op Diagnosis: Right lung mass    Procedure: right lung biopsy    Procedure performed by: Cesar Summers MD and Martell Eastman MD    Written Informed Consent Obtained: Yes    Specimen Removed: YES 5 biopsy specimens     Estimated Blood Loss: Minimal    Findings:   Using CT guidance a 19g sheath needle was placed into a Right lung mass. 20g monopty biopsy gun used to take 5 core biopsy samples. Specimen sent to pathology.     Additional specimen sent to lab: Yes    Patient tolerated procedure well.    Martell Eastman MD  PGY-5  Department of Radiology  [phone]

## 2017-07-28 ENCOUNTER — TELEPHONE (OUTPATIENT)
Dept: PULMONOLOGY | Facility: CLINIC | Age: 77
End: 2017-07-28

## 2017-07-28 DIAGNOSIS — R91.1 PULMONARY NODULE: Primary | ICD-10-CM

## 2017-07-28 NOTE — TELEPHONE ENCOUNTER
Called patient's daughter,  pathology results reviewed. Questions answered.   Plan on repeat ct in 10/2017

## 2017-07-29 LAB — BACTERIA SPEC AEROBE CULT: NO GROWTH

## 2017-08-02 LAB — BACTERIA SPEC ANAEROBE CULT: NORMAL

## 2017-08-08 ENCOUNTER — PATIENT MESSAGE (OUTPATIENT)
Dept: PULMONOLOGY | Facility: CLINIC | Age: 77
End: 2017-08-08

## 2017-08-17 ENCOUNTER — OFFICE VISIT (OUTPATIENT)
Dept: DERMATOLOGY | Facility: CLINIC | Age: 77
End: 2017-08-17
Payer: MEDICARE

## 2017-08-17 VITALS — WEIGHT: 143 LBS | HEIGHT: 62 IN | BODY MASS INDEX: 26.31 KG/M2

## 2017-08-17 DIAGNOSIS — D04.61 SQUAMOUS CELL CARCINOMA IN SITU OF SKIN OF HAND, RIGHT: Primary | ICD-10-CM

## 2017-08-17 PROCEDURE — 99212 OFFICE O/P EST SF 10 MIN: CPT | Mod: S$GLB,,, | Performed by: DERMATOLOGY

## 2017-08-17 PROCEDURE — 1126F AMNT PAIN NOTED NONE PRSNT: CPT | Mod: S$GLB,,, | Performed by: DERMATOLOGY

## 2017-08-17 PROCEDURE — 99999 PR PBB SHADOW E&M-EST. PATIENT-LVL II: CPT | Mod: PBBFAC,,, | Performed by: DERMATOLOGY

## 2017-08-17 PROCEDURE — 1159F MED LIST DOCD IN RCRD: CPT | Mod: S$GLB,,, | Performed by: DERMATOLOGY

## 2017-08-17 PROCEDURE — 3008F BODY MASS INDEX DOCD: CPT | Mod: S$GLB,,, | Performed by: DERMATOLOGY

## 2017-08-17 NOTE — PROGRESS NOTES
77 y.o. male patient is here for wound check after surgery.    Patient reports no problems.    WOUND PE:  The R 3rd digit incision is well healed. Mild firmness and erythema of scar. No nodularity.      IMPRESSION:  Healing operative site from Mohs' surgery, SCC R 3rd digit s/p Mohs with CLC, postop week #8, all healed and no evidence of recurrence    PLAN:    Recommended massage tid.  Reassured patient that redness and firmness will fade with time.  Daily SPF.  Regular skin checks.    RTC:  In 3-6 months with Little Coon M.D. for skin check or sooner if new concern arises.

## 2017-08-18 ENCOUNTER — OFFICE VISIT (OUTPATIENT)
Dept: HEPATOLOGY | Facility: CLINIC | Age: 77
End: 2017-08-18
Payer: MEDICARE

## 2017-08-18 ENCOUNTER — LAB VISIT (OUTPATIENT)
Dept: LAB | Facility: HOSPITAL | Age: 77
End: 2017-08-18
Payer: MEDICARE

## 2017-08-18 VITALS
DIASTOLIC BLOOD PRESSURE: 63 MMHG | WEIGHT: 150.13 LBS | OXYGEN SATURATION: 100 % | RESPIRATION RATE: 18 BRPM | HEIGHT: 62 IN | BODY MASS INDEX: 27.63 KG/M2 | SYSTOLIC BLOOD PRESSURE: 145 MMHG | TEMPERATURE: 97 F | HEART RATE: 76 BPM

## 2017-08-18 DIAGNOSIS — K74.00 LIVER FIBROSIS: ICD-10-CM

## 2017-08-18 DIAGNOSIS — R74.8 ELEVATED LIVER ENZYMES: Primary | ICD-10-CM

## 2017-08-18 DIAGNOSIS — R74.8 ELEVATED LIVER ENZYMES: ICD-10-CM

## 2017-08-18 LAB
AFP SERPL-MCNC: 5.1 NG/ML
ALBUMIN SERPL BCP-MCNC: 3.8 G/DL
ALP SERPL-CCNC: 71 U/L
ALT SERPL W/O P-5'-P-CCNC: 12 U/L
ANION GAP SERPL CALC-SCNC: 8 MMOL/L
AST SERPL-CCNC: 17 U/L
BASOPHILS # BLD AUTO: 0.02 K/UL
BASOPHILS NFR BLD: 0.2 %
BILIRUB SERPL-MCNC: 0.6 MG/DL
BUN SERPL-MCNC: 14 MG/DL
CALCIUM SERPL-MCNC: 10.1 MG/DL
CHLORIDE SERPL-SCNC: 103 MMOL/L
CO2 SERPL-SCNC: 29 MMOL/L
CREAT SERPL-MCNC: 0.8 MG/DL
DIFFERENTIAL METHOD: ABNORMAL
EOSINOPHIL # BLD AUTO: 0.2 K/UL
EOSINOPHIL NFR BLD: 2.4 %
ERYTHROCYTE [DISTWIDTH] IN BLOOD BY AUTOMATED COUNT: 13.2 %
EST. GFR  (AFRICAN AMERICAN): >60 ML/MIN/1.73 M^2
EST. GFR  (NON AFRICAN AMERICAN): >60 ML/MIN/1.73 M^2
GLUCOSE SERPL-MCNC: 93 MG/DL
HCT VFR BLD AUTO: 40.4 %
HGB BLD-MCNC: 13.8 G/DL
INR PPP: 0.9
LYMPHOCYTES # BLD AUTO: 1.7 K/UL
LYMPHOCYTES NFR BLD: 19.3 %
MCH RBC QN AUTO: 30.8 PG
MCHC RBC AUTO-ENTMCNC: 34.2 G/DL
MCV RBC AUTO: 90 FL
MONOCYTES # BLD AUTO: 0.8 K/UL
MONOCYTES NFR BLD: 8.7 %
NEUTROPHILS # BLD AUTO: 6.1 K/UL
NEUTROPHILS NFR BLD: 69.2 %
PLATELET # BLD AUTO: 142 K/UL
PMV BLD AUTO: 9.4 FL
POTASSIUM SERPL-SCNC: 3.8 MMOL/L
PROT SERPL-MCNC: 7.7 G/DL
PROTHROMBIN TIME: 9.6 SEC
RBC # BLD AUTO: 4.48 M/UL
SODIUM SERPL-SCNC: 140 MMOL/L
WBC # BLD AUTO: 8.84 K/UL

## 2017-08-18 PROCEDURE — 99213 OFFICE O/P EST LOW 20 MIN: CPT | Mod: S$GLB,,, | Performed by: PHYSICIAN ASSISTANT

## 2017-08-18 PROCEDURE — 99999 PR PBB SHADOW E&M-EST. PATIENT-LVL IV: CPT | Mod: PBBFAC,,, | Performed by: PHYSICIAN ASSISTANT

## 2017-08-18 PROCEDURE — 3008F BODY MASS INDEX DOCD: CPT | Mod: S$GLB,,, | Performed by: PHYSICIAN ASSISTANT

## 2017-08-18 PROCEDURE — 1126F AMNT PAIN NOTED NONE PRSNT: CPT | Mod: S$GLB,,, | Performed by: PHYSICIAN ASSISTANT

## 2017-08-18 PROCEDURE — 82105 ALPHA-FETOPROTEIN SERUM: CPT

## 2017-08-18 PROCEDURE — 36415 COLL VENOUS BLD VENIPUNCTURE: CPT

## 2017-08-18 PROCEDURE — 99499 UNLISTED E&M SERVICE: CPT | Mod: S$GLB,,, | Performed by: PHYSICIAN ASSISTANT

## 2017-08-18 PROCEDURE — 85610 PROTHROMBIN TIME: CPT

## 2017-08-18 PROCEDURE — 80053 COMPREHEN METABOLIC PANEL: CPT

## 2017-08-18 PROCEDURE — 1159F MED LIST DOCD IN RCRD: CPT | Mod: S$GLB,,, | Performed by: PHYSICIAN ASSISTANT

## 2017-08-18 PROCEDURE — 85025 COMPLETE CBC W/AUTO DIFF WBC: CPT

## 2017-08-18 PROCEDURE — 3078F DIAST BP <80 MM HG: CPT | Mod: S$GLB,,, | Performed by: PHYSICIAN ASSISTANT

## 2017-08-18 PROCEDURE — 3077F SYST BP >= 140 MM HG: CPT | Mod: S$GLB,,, | Performed by: PHYSICIAN ASSISTANT

## 2017-08-18 NOTE — PROGRESS NOTES
"HEPATOLOGY CLINIC VISIT NOTE     REFERRING PROVIDER: Dr. Annabella Montiel     REASON FOR VISIT: Elevated liver enzymes     HISTORY: This is a 77 y.o. White male here for evaluation follow up. He was last seen by me 05/2017. He presented for evaluation of elevated liver enzymes in the setting of heavy alcohol use. His work up was negative for A1AT, AIH, viral hepatitis, Magdiel's.  His work up for fibrosis has been unclear. He had a liver biopsy in 2011 showing no advanced fibrosis. Labs reveal mildly elevated transaminases. He has hypoalbuminemia, hyperbilirubinemia, and thrombocytopenia noted. At first visit, he had LLE edema which has been greatly improved with lasix and spironolactone.      He did have a h/o daily alcohol use. When asked how much he would consume in one sitting, he stated, "2 beers for every hour in 24 hours." He reports he stopped about 1 month ago. PCP recommended IP detox with Dr. Rosado, but patient tapered off on his own. Pt reports no alcohol since mothers' day and that he doesn't not miss or crave.     PMH significant for severe psoriasis, only currently on topical agents. He has had imaging for a lung mass- has had imaging, scheduled with pulmonary.     Liver staging:  Reports biopsy 5 years ago; no advanced fibrosis  Fibroscan F0-F1  AST 41, ALT 62  Tbili 1.2  Albumin 3.1  PLTs 110    Risk Factors:  AI:  Biliary:   ETOH: >case per day for many years- difficult to quantify how long   Hereditary:   Medications:  NAFLD/CLAY:  Other:   Viral hepatitis:    Denies jaundice, dark urine, abdominal distention, hematemesis, melena, slowed mentation.   No abnormal skin rashes. No generalized joint pain.                   Past Medical History:   Diagnosis Date    Age-related osteoporosis without current pathological fracture: see DEXA 2017 6/9/2017    Anemia 5/4/2017    Aortic atherosclerosis: see CT scan 5/17 6/9/2017    BPH (benign prostatic hypertrophy)     Chronic gout     Cortical senile " cataract 5/15/2012    Degenerative disc disease     Degenerative disc disease     Degenerative disc disease     Diverticulosis of large intestine without hemorrhage: see CT scan 5/17 6/9/2017    Essential hypertension 2/19/2016    Gout     Hyperplastic polyp of transverse colon: 2012 repeat 5 years 5/4/2017    Kidney stone on left side 6/9/2017    Osteoporosis     Psoriasis     Psoriasis     Squamous cell cancer of skin of right hand 6/9/2017    Squamous cell cancer of skin of right hand 6/9/2017    Substance abuse     Thrombocytopenia     Umbilical hernia: see CT 5/17 6/9/2017     Past Surgical History:   Procedure Laterality Date    CATARACT EXTRACTION      CHOLECYSTECTOMY  2012    EYE SURGERY      Right Hand Surgery       FAMILY HISTORY: Negative for liver disease    SOCIAL HISTORY:   History   Smoking Status    Former Smoker    Packs/day: 1.00    Years: 20.00   Smokeless Tobacco    Never Used       History   Alcohol Use    7.2 oz/week    12 Cans of beer per week     Comment: 84 beers a week   d/c month ago, 2 beers per hour in 24 hours- many years     History   Drug Use No       ROS:   No fever, chills  No chest pain, dyspnea, cough  No abdominal pain, nausea, vomiting  (+) skin rashes   No lower extremity edema  No depression or anxiety      PHYSICAL EXAM:  Friendly White male, in no acute distress; alert and oriented to person, place and time  VITALS: reviewed  HEENT: Sclerae anicteric.   NECK: Supple  LUNGS: Normal respiratory effort.  ABDOMEN: Flat, soft, nontender.   SKIN: Warm and dry. No jaundice, psoriasis plaques to abdomen, peeling noted to bilateral hands    EXTREMITIES: mild LL edema bilaterally  NEURO/PSYCH: Normal gate. Memory intact. Thought and speech pattern appropriate. Behavior normal. No depression or anxiety noted.    RECENT LABS:  Lab Results   Component Value Date    WBC 9.55 06/28/2017    HGB 12.6 (L) 06/28/2017     06/28/2017     Lab Results   Component  Value Date    INR 0.9 06/28/2017     Lab Results   Component Value Date    AST 17 05/24/2017    ALT 22 05/24/2017    BILITOT 1.0 05/24/2017    ALBUMIN 2.9 (L) 05/24/2017    ALKPHOS 66 05/24/2017    CREATININE 0.8 07/24/2017    BUN 14 07/24/2017     07/24/2017    K 4.3 07/24/2017    AFP 5.0 05/24/2017     RECENT IMAGING:  CT chest, abdomen and pelvis   Narrative     Procedure comments: The patient was surveyed from the lung apices through the pelvis after the administration of 75 cc Omni 350 IV contrast as well as oral contrast and data was reconstructed for coronal, sagittal, and axial images.    Comparison: Chest radiograph 5/1/2017, and 2/28/2014, CT abdomen pelvis 7/3/2012.    Findings:    Examination of the vascular and soft tissue structures at the base of the neck is unremarkable aside from a thyroid subcentimeter hypodensity too small to further characterize..    A left sided aortic arch with 3 branch vessels is identified.  The thoracic aorta maintains normal caliber, contour, and course with mild atherosclerotic calcification within its course.  The heart is not enlarged and there is no evidence of pericardial effusion.    The trachea is midline, the proximal airways are patent, and the lungs are symmetrically expanded.  Examination of the lung fields demonstrates a 3.6 cm mass with some internal hyperdensity, and peripheral contrast enhancement. This mass demonstrates smooth margins and is located in the right lower lobe lung base. Distortion of local vessels is not apparent. There appears to be local pleural thickening.     There is no axillary, mediastinal, or hilar lymphadenopathy.    The esophagus maintains a normal course and caliber.     The liver is normal in size and attenuation with no focal hepatic abnormality.  Gallbladder surgically absent..  There is no intra-or extrahepatic biliary ductal dilatation.    The stomach, pancreas, and adrenal glands are unremarkable. There is  splenomegaly.    The kidneys are small size bilaterally. There is a left-sided punctate nephrolithiasis appears nonobstructing..  They concentrate and excrete contrast properly on delayed imaging.  There is no evidence of hydronephrosis.  The ureters appear normal in course and caliber without evidence of ureteral dilatation. The urinary bladder is nondistended.     The visualized loops of small and large bowel show no evidence of obstruction or inflammation.  There is marked diverticulosis without evidence of diverticulitis most prominent in the sigmoid colon.    There is no ascites, free fluid, or intraperitoneal free air.     There is no evidence of lymph node enlargement in the abdomen or pelvis.    The abdominal aorta is normal in course and caliber with mild atherosclerotic calcifications.    The osseus structures demonstrate degenerative change without evidence of acute fracture or osseus destructive process.      The extraperitoneal soft tissues are unremarkable, aside from a fat-containing umbilical hernia.   Impression         There is a 3.6 mass with smooth margins and adjacent pleural thickening in the right lung base, which may correlate to findings on recent and remote chest radiographs 5/1/2017, 2/28/2014. This is nonspecific finding, and may represent atelectasis, infection, noninfectious inflammation, or neoplasm. Clinical considerations may determine the role for three-month followup, PET/CT, or biopsy.    Some findings in keeping with patient's history of alcohol use, including splenomegaly and minimal recannulization of the umbilical vein, which may relate to hepatic fibrosis or cirrhosis.    Other incidental findings including punctate left nephrolith, bilaterally small sized kidneys, fat-containing umbilical hernia, dense calcific atherosclerosis of the coronary vessels, and mild calcific atherosclerosis of aorta, and its branch vessels.    This report has been flagged in the [facility]  record.     ASSESSMENT  77 y.o. White male with:  1. ELEVATED TRANSAMINASES  -- improving    2. ABNORMAL LFTs  -- concerning for cirrhosis  -- liver biopsy in 2011, no advanced fibrosis but heavy alcohol consumption persisted  -- improving  -- unclear liver staging, will plan to initiate q6 month HCC screening   U/S: due 11/2017   AFP: WNL      3. ANKLE EDEMA  -- Lasix 20mg, Spironolactone 50mg, stable on current dosing      PLAN:  1. Labs today  2. Follow up with labs and U/S in 3 months     Thank you for allowing me to participate in the care of Darinel Nair PA-C

## 2017-08-30 LAB — FUNGUS SPEC CULT: NORMAL

## 2017-09-27 LAB
ACID FAST MOD KINY STN SPEC: NORMAL
MYCOBACTERIUM SPEC QL CULT: NORMAL

## 2017-10-09 ENCOUNTER — OFFICE VISIT (OUTPATIENT)
Dept: PODIATRY | Facility: CLINIC | Age: 77
End: 2017-10-09
Payer: MEDICARE

## 2017-10-09 VITALS
HEIGHT: 62 IN | WEIGHT: 150 LBS | DIASTOLIC BLOOD PRESSURE: 73 MMHG | SYSTOLIC BLOOD PRESSURE: 129 MMHG | BODY MASS INDEX: 27.6 KG/M2 | HEART RATE: 85 BPM

## 2017-10-09 DIAGNOSIS — L84 CORN OR CALLUS: ICD-10-CM

## 2017-10-09 DIAGNOSIS — I73.9 ASYMPTOMATIC PVD (PERIPHERAL VASCULAR DISEASE): ICD-10-CM

## 2017-10-09 DIAGNOSIS — B35.1 ONYCHOMYCOSIS: ICD-10-CM

## 2017-10-09 DIAGNOSIS — G60.9 IDIOPATHIC PERIPHERAL NEUROPATHY: Primary | ICD-10-CM

## 2017-10-09 DIAGNOSIS — M20.5X9 CLAW TOE, ACQUIRED, UNSPECIFIED LATERALITY: ICD-10-CM

## 2017-10-09 DIAGNOSIS — I87.2 VENOUS INSUFFICIENCY OF BOTH LOWER EXTREMITIES: ICD-10-CM

## 2017-10-09 PROCEDURE — 11721 DEBRIDE NAIL 6 OR MORE: CPT | Mod: 59,Q9,S$GLB, | Performed by: PODIATRIST

## 2017-10-09 PROCEDURE — 99203 OFFICE O/P NEW LOW 30 MIN: CPT | Mod: 25,S$GLB,, | Performed by: PODIATRIST

## 2017-10-09 PROCEDURE — 11056 PARNG/CUTG B9 HYPRKR LES 2-4: CPT | Mod: Q9,S$GLB,, | Performed by: PODIATRIST

## 2017-10-09 PROCEDURE — 99999 PR PBB SHADOW E&M-EST. PATIENT-LVL III: CPT | Mod: PBBFAC,,, | Performed by: PODIATRIST

## 2017-10-09 NOTE — PROCEDURES
"Routine Foot Care  Date/Time: 10/9/2017 10:24 AM  Performed by: SONU MCKEON  Authorized by: SONU MCKEON     Time out: Immediately prior to procedure a "time out" was called to verify the correct patient, procedure, equipment, support staff and site/side marked as required.    Consent Done?:  Yes (Verbal)  Hyperkeratotic Skin Lesions?: Yes    Number of trimmed lesions:  2  Location(s):  Left 3rd Toe and Left 5th Metatarsal Head    Nail Care Type:  Debride(Left 1st Toe, Left 3rd Toe, Left 4th Toe, Left 5th Toe, Right 1st Toe, Right 2nd Toe, Right 3rd Toe, Right 4th Toe and Right 5th Toe)  Patient tolerance:  Patient tolerated the procedure well with no immediate complications        "

## 2017-10-10 NOTE — PROGRESS NOTES
"Subjective:      Patient ID: Darinel Majano is a 77 y.o. male.    Chief Complaint: Foot Pain (Left pt states not at the moment but at the end of the day 5/10)    Darinel is a 77 y.o. male who presents to the clinic for evaluation and treatment of high risk feet. Darinel has a past medical history of Age-related osteoporosis without current pathological fracture: see DEXA 2017 (6/9/2017); Anemia (5/4/2017); Aortic atherosclerosis: see CT scan 5/17 (6/9/2017); BPH (benign prostatic hypertrophy); Chronic gout; Cortical senile cataract (5/15/2012); Degenerative disc disease; Diverticulosis of large intestine without hemorrhage: see CT scan 5/17 (6/9/2017); Essential hypertension (2/19/2016); Gout; Hyperplastic polyp of transverse colon: 2012 repeat 5 years (5/4/2017); Kidney stone on left side (6/9/2017); Osteoporosis; Psoriasis; Squamous cell cancer of skin of right hand (6/9/2017); Substance abuse; Thrombocytopenia; and Umbilical hernia: see CT 5/17 (6/9/2017). The patient's chief complaint is long, thick toenails. This patient has documented high risk feet requiring routine maintenance secondary to peripheral vascular disease. Accompanied by his wife who acts also as historian and care taker.     PCP: Annabella Montiel MD    Date Last Seen by PCP: 6/9/17    Current shoe gear:  Affected Foot: Slip-on shoes     Unaffected Foot: Slip-on shoes    Last encounter in this department: Visit date not found    Hemoglobin A1C   Date Value Ref Range Status   06/29/2011 4.7 4.0 - 6.2 % Final   01/05/2011 4.6 4.0 - 6.2 % Final     Vitals:    10/09/17 0956   BP: 129/73   Pulse: 85   Weight: 68 kg (150 lb)   Height: 5' 2" (1.575 m)   PainSc: 0-No pain      Past Medical History:   Diagnosis Date    Age-related osteoporosis without current pathological fracture: see DEXA 2017 6/9/2017    Anemia 5/4/2017    Aortic atherosclerosis: see CT scan 5/17 6/9/2017    BPH (benign prostatic hypertrophy)     Chronic gout     Cortical senile " cataract 5/15/2012    Degenerative disc disease     Diverticulosis of large intestine without hemorrhage: see CT scan 5/17 6/9/2017    Essential hypertension 2/19/2016    Gout     Hyperplastic polyp of transverse colon: 2012 repeat 5 years 5/4/2017    Kidney stone on left side 6/9/2017    Osteoporosis     Psoriasis     Squamous cell cancer of skin of right hand 6/9/2017    Substance abuse     Thrombocytopenia     Umbilical hernia: see CT 5/17 6/9/2017       Past Surgical History:   Procedure Laterality Date    CATARACT EXTRACTION      CHOLECYSTECTOMY  2012    EYE SURGERY      Right Hand Surgery         Family History   Problem Relation Age of Onset    Cataracts Mother     Diabetes Mother     Hypertension Mother     Cancer Father      stomach- smoker    Stomach cancer Father     No Known Problems Sister     Heart disease Brother      PPM    Amblyopia Neg Hx     Blindness Neg Hx     Glaucoma Neg Hx     Retinal detachment Neg Hx     Strabismus Neg Hx     Stroke Neg Hx     Thyroid disease Neg Hx     Macular degeneration Neg Hx     Lupus Neg Hx     Rheum arthritis Neg Hx     Psoriasis Neg Hx     Melanoma Neg Hx        Social History     Social History    Marital status:      Spouse name: N/A    Number of children: N/A    Years of education: N/A     Social History Main Topics    Smoking status: Former Smoker     Packs/day: 1.00     Years: 20.00    Smokeless tobacco: Never Used    Alcohol use No      Comment: 84 beers a week    Drug use: No    Sexual activity: Not Asked     Other Topics Concern    None     Social History Narrative    None       Current Outpatient Prescriptions   Medication Sig Dispense Refill    alendronate (FOSAMAX) 70 MG tablet Take 1 tablet (70 mg total) by mouth every 7 days. 4 tablet 11    FLUZONE HIGH-DOSE 2017-18, PF, 180 mcg/0.5 mL vaccine       furosemide (LASIX) 20 MG tablet Take 1 tablet (20 mg total) by mouth once daily. 30 tablet 6     multivitamin with minerals tablet Take 1 tablet by mouth once daily.      spironolactone (ALDACTONE) 50 MG tablet Take 1 tablet (50 mg total) by mouth once daily. 30 tablet 6    triamcinolone acetonide 0.1% (KENALOG) 0.1 % ointment aaa qd to affected areas.  Avoid face and groin 454 g 3     No current facility-administered medications for this visit.        Review of patient's allergies indicates:   Allergen Reactions    Humira [adalimumab] Other (See Comments)       Review of Systems   Constitution: Negative for chills, fever, weakness and malaise/fatigue.   HENT: Positive for hearing loss.    Cardiovascular: Positive for leg swelling.   Skin: Positive for color change, dry skin and nail changes.   Musculoskeletal: Positive for back pain and muscle cramps.   Gastrointestinal: Negative for nausea and vomiting.   Neurological: Positive for paresthesias. Negative for numbness.           Objective:      Physical Exam   Constitutional: He is oriented to person, place, and time. No distress.   Cardiovascular:   Pulses:       Dorsalis pedis pulses are 1+ on the right side, and 1+ on the left side.        Posterior tibial pulses are 1+ on the right side, and 1+ on the left side.   CFT< 3 secs all toes bilateral foot, skin temp warm to cool bilateral foot, no hair growth bilateral lower extremity, moderate pitting lower extremity edema with multiple varicosities and telangiectasias bilateral. + rubor on dependency of foot bilateral.      Musculoskeletal:        Right foot: There is decreased range of motion and deformity.        Left foot: There is decreased range of motion and deformity.   EV/IN + 4/5 bilateral. PF/DF toes bilateral +4/5 bilateral. Remaining muscle groups bilateral +5/5.    Non-reducible claw toe deformities 2-5 bilateral foot.    No pain with ROM or MMT bilateral lower extremity.   Feet:   Right Foot:   Protective Sensation: 10 sites tested. 3 sites sensed.   Skin Integrity: Positive for dry skin.  Negative for ulcer, blister, skin breakdown, erythema, warmth or callus.   Left Foot:   Protective Sensation: 10 sites tested. 6 sites sensed.   Skin Integrity: Positive for dry skin. Negative for ulcer, blister, skin breakdown, erythema, warmth or callus.   Neurological: He is alert and oriented to person, place, and time. A sensory deficit is present.   Vibratory sensation severely decreased bilateral foot along medial first met head with tuning fork.   Skin: Skin is dry and intact. Capillary refill takes less than 2 seconds. No ecchymosis noted. He is not diaphoretic.   Erythematous patches involving the legs bilateral.    Nails 1-5 right and 1,3,4,5 left foot are elongated 3-4 mm, thickened 1-2 mm with loosening, debris.    No open lesions or macerations bilateral lower extremity.    Hyperkeratotic skin plantar fifth met head and lateral third toe PIPJ left foot.             Assessment:       Encounter Diagnoses   Name Primary?    Idiopathic peripheral neuropathy Yes    Asymptomatic PVD (peripheral vascular disease)     Venous insufficiency of both lower extremities     Claw toe, acquired, unspecified laterality     Onychomycosis     Corn or callus - Left Foot          Plan:       Darinel was seen today for foot pain.    Diagnoses and all orders for this visit:    Idiopathic peripheral neuropathy  -     Ambulatory Referral to Neurology  -     Foot Care    Asymptomatic PVD (peripheral vascular disease)  -     Foot Care    Venous insufficiency of both lower extremities  -     Foot Care    Claw toe, acquired, unspecified laterality    Onychomycosis  -     Foot Care    [place] or callus - Left Foot  -     Foot Care      I counseled the patient on his conditions, their implications and medical management.    Shoe inspection. Patient instructed on proper foot hygeine. We discussed wearing proper shoe gear, daily foot inspections, never walking without protective shoe gear, never putting sharp instruments to feet,  routine podiatric nail visits every 2-3 months.      Routine foot care per attached note.    Refer to Neurology to further assess reduced sensation to both feet and lower extremity muscle weakness. Suspect secondary to alcoholism which is well documented.    RTC 3 months or prn.

## 2017-10-17 ENCOUNTER — OFFICE VISIT (OUTPATIENT)
Dept: PULMONOLOGY | Facility: CLINIC | Age: 77
End: 2017-10-17
Payer: MEDICARE

## 2017-10-17 ENCOUNTER — HOSPITAL ENCOUNTER (OUTPATIENT)
Dept: RADIOLOGY | Facility: HOSPITAL | Age: 77
Discharge: HOME | End: 2017-10-17
Attending: INTERNAL MEDICINE
Payer: MEDICARE

## 2017-10-17 ENCOUNTER — TELEPHONE (OUTPATIENT)
Dept: CARDIOTHORACIC SURGERY | Facility: CLINIC | Age: 77
End: 2017-10-17

## 2017-10-17 VITALS
HEART RATE: 71 BPM | BODY MASS INDEX: 28.84 KG/M2 | WEIGHT: 156.75 LBS | HEIGHT: 62 IN | OXYGEN SATURATION: 99 % | SYSTOLIC BLOOD PRESSURE: 124 MMHG | DIASTOLIC BLOOD PRESSURE: 71 MMHG

## 2017-10-17 DIAGNOSIS — R91.1 PULMONARY NODULE: Primary | ICD-10-CM

## 2017-10-17 DIAGNOSIS — R91.1 PULMONARY NODULE: ICD-10-CM

## 2017-10-17 PROCEDURE — 71250 CT THORAX DX C-: CPT | Mod: 26,,, | Performed by: RADIOLOGY

## 2017-10-17 PROCEDURE — 99214 OFFICE O/P EST MOD 30 MIN: CPT | Mod: S$GLB,,, | Performed by: INTERNAL MEDICINE

## 2017-10-17 PROCEDURE — 99999 PR PBB SHADOW E&M-EST. PATIENT-LVL III: CPT | Mod: PBBFAC,,, | Performed by: INTERNAL MEDICINE

## 2017-10-17 PROCEDURE — 71250 CT THORAX DX C-: CPT | Mod: TC

## 2017-10-17 NOTE — PROGRESS NOTES
Subjective:       Patient ID: Darinel Majano is a 77 y.o. male.    Chief Complaint: No chief complaint on file.    HPI   Darinel Majano 77 y.o. male    has a past medical history of Age-related osteoporosis without current pathological fracture: see DEXA 2017 (6/9/2017); Anemia (5/4/2017); Aortic atherosclerosis: see CT scan 5/17 (6/9/2017); BPH (benign prostatic hypertrophy); Chronic gout; Cortical senile cataract (5/15/2012); Degenerative disc disease; Diverticulosis of large intestine without hemorrhage: see CT scan 5/17 (6/9/2017); Essential hypertension (2/19/2016); Gout; Hyperplastic polyp of transverse colon: 2012 repeat 5 years (5/4/2017); Kidney stone on left side (6/9/2017); Osteoporosis; Psoriasis; Squamous cell cancer of skin of right hand (6/9/2017); Substance abuse; Thrombocytopenia; and Umbilical hernia: see CT 5/17 (6/9/2017).    has a past surgical history that includes Cataract extraction; Cholecystectomy (2012); Eye surgery; and Right Hand Surgery.   reports that he has quit smoking. He has a 20.00 pack-year smoking history. He has never used smokeless tobacco. He reports that he does not drink alcohol or use drugs.  Referred by: No ref. provider found  Who had no chief complaint listed for this encounter.  The patient's last visit with me was on 6/6/2017.    Here for follow up of pulmonary nodule  No fever chills, ns, wt changes, nausea, vomiting, diarrhea, constipation, chest pain, tightness, pressure    Review of Systems    Objective:      Physical Exam  Personal Diagnostic Review    No flowsheet data found.      Assessment:       1. Pulmonary nodule        Outpatient Encounter Prescriptions as of 10/17/2017   Medication Sig Dispense Refill    alendronate (FOSAMAX) 70 MG tablet Take 1 tablet (70 mg total) by mouth every 7 days. 4 tablet 11    FLUZONE HIGH-DOSE 2017-18, PF, 180 mcg/0.5 mL vaccine       furosemide (LASIX) 20 MG tablet Take 1 tablet (20 mg total) by mouth once daily. 30  tablet 6    multivitamin with minerals tablet Take 1 tablet by mouth once daily.      spironolactone (ALDACTONE) 50 MG tablet Take 1 tablet (50 mg total) by mouth once daily. 30 tablet 6    triamcinolone acetonide 0.1% (KENALOG) 0.1 % ointment aaa qd to affected areas.  Avoid face and groin 454 g 3     No facility-administered encounter medications on file as of 10/17/2017.      Orders Placed This Encounter   Procedures    Ambulatory consult to Cardiology     Referral Priority:   Routine     Referral Type:   Consultation     Referral Reason:   Specialty Services Required     Requested Specialty:   Cardiology     Number of Visits Requested:   1    Ambulatory consult to Thoracic Surgery\     Referral Priority:   Routine     Referral Type:   Consultation     Number of Visits Requested:   1    LUNG VOLUMES     Standing Status:   Future     Standing Expiration Date:   10/17/2018    Spirometry without Bronchodilator     Standing Status:   Future     Standing Expiration Date:   10/17/2018    DLCO-Carbon Monoxide Diffusing Capacity     Standing Status:   Future     Standing Expiration Date:   10/17/2018     Plan:            I personally reviewed the      1. CT chest   2. CT chest report     Assessment:  Diagnoses and all orders for this visit:    Pulmonary nodule  -     LUNG VOLUMES; Future  -     Spirometry without Bronchodilator; Future  -     DLCO-Carbon Monoxide Diffusing Capacity; Future  -     Ambulatory consult to Cardiology  -     Ambulatory consult to Thoracic Surgery          Plan:  CT chest reviewed with family  RLL nodule increased in size  + PET 7/2017  Biopsy non-diagnostic  Concern for malignancy given +PET vs rounded atelectasis per report  Pfts, cards consult for preop given coronary artery calcification on ct  Refer to Thoracic surgery      Return in about 3 months (around 1/17/2018).    There are no Patient Instructions on file for this visit.    Immunization History   Administered Date(s)  Administered    Influenza - High Dose 09/21/2012, 11/07/2015    Pneumococcal Conjugate - 13 Valent 02/19/2016    Pneumococcal Polysaccharide - 23 Valent 09/21/2012

## 2017-10-17 NOTE — TELEPHONE ENCOUNTER
"Received a referral from Dr. Ruffin re: RLL mass.  PT had a CT chest/abdomen/pelvis on 5/9/17 for a workup due to liver disease. Lung mass was incidentally found on CT, notes "There is a 3.6 mass with smooth margins and adjacent pleural thickening in the right lung base, which may correlate to findings on recent and remote chest radiographs 5/1/2017, 2/28/2014. "  IR attempted biopsy on 6/28/17 and did not proceed "elected not to biopsy the lesion today due to the risk of bleeding as well as pneumothorax or possibly even hepatic injury-bleed.  Plan is to obtain a PET scan and if positive then pursue biopsy at that time.  Patient and his family were informed."  PET performed on 7/7/17 revealed "Positron emission tomography images demonstrate a hypermetabolic lesion within the posterior basal segment of the right lower lobe measuring approximately 2.8 cm, with an SUV max of 4.7, concerning for malignancy. Mildly increased FDG activity is seen involving bilateral axillary lymph nodes, more likely to be inflammatory or reactive in nature. "   IR biopsy re-attempted on 7/26/17, pathology revealed "1. Right lung, biopsy: Pulmonary parenchyma with nonspecific inflammation and fibrosis. Negative for carcinoma, lymphoma, or granuloma."  Repeat CT chest on 10/17/17 revealed "A mass within the posterior segment of the right lower lobe now measures 4.1 cm, previously measuring 3.6 cm on 5/9/17 and 2.8 cm on 7/7/17. There is adjacent pleural thickening and tethering. There is no pleural fluid."    PFT's set up for 10/19 by Dr. Ruffin's office, spoke with pt's wife and coordinate a consultation with Dr. Chandra immediately following the PFT's. Provided her with date/time/location information of both appts. Notified Dr. Ruffin's staff of the scheduled appt.  "

## 2017-10-19 ENCOUNTER — OFFICE VISIT (OUTPATIENT)
Dept: CARDIOTHORACIC SURGERY | Facility: CLINIC | Age: 77
End: 2017-10-19
Payer: MEDICARE

## 2017-10-19 ENCOUNTER — HOSPITAL ENCOUNTER (OUTPATIENT)
Dept: PULMONOLOGY | Facility: CLINIC | Age: 77
Discharge: HOME | End: 2017-10-19
Payer: MEDICARE

## 2017-10-19 VITALS
SYSTOLIC BLOOD PRESSURE: 151 MMHG | OXYGEN SATURATION: 99 % | BODY MASS INDEX: 28.52 KG/M2 | WEIGHT: 155 LBS | HEIGHT: 62 IN | DIASTOLIC BLOOD PRESSURE: 76 MMHG | HEART RATE: 79 BPM

## 2017-10-19 DIAGNOSIS — R91.1 PULMONARY NODULE: ICD-10-CM

## 2017-10-19 DIAGNOSIS — R91.1 PULMONARY NODULE: Primary | ICD-10-CM

## 2017-10-19 LAB
PRE FEV1 FVC: 80
PRE FEV1: 1.6
PRE FVC: 1.99
PREDICTED FEV1 FVC: 81
PREDICTED FEV1: 1.61
PREDICTED FVC: 2.04

## 2017-10-19 PROCEDURE — 99205 OFFICE O/P NEW HI 60 MIN: CPT | Mod: S$GLB,,, | Performed by: THORACIC SURGERY (CARDIOTHORACIC VASCULAR SURGERY)

## 2017-10-19 PROCEDURE — 99999 PR PBB SHADOW E&M-EST. PATIENT-LVL IV: CPT | Mod: PBBFAC,,, | Performed by: THORACIC SURGERY (CARDIOTHORACIC VASCULAR SURGERY)

## 2017-10-19 PROCEDURE — 94010 BREATHING CAPACITY TEST: CPT | Mod: S$GLB,,, | Performed by: INTERNAL MEDICINE

## 2017-10-19 PROCEDURE — 94729 DIFFUSING CAPACITY: CPT | Mod: S$GLB,,, | Performed by: INTERNAL MEDICINE

## 2017-10-19 PROCEDURE — 94727 GAS DIL/WSHOT DETER LNG VOL: CPT | Mod: S$GLB,,, | Performed by: INTERNAL MEDICINE

## 2017-10-19 NOTE — PROGRESS NOTES
History & Physical    SUBJECTIVE:     History of Present Illness:   77 y.o. male presents with PMH of BPH, HTN, Psoriasis, thrombocytopenia, squamous cell carcinoma of right hand and history of EtOH abuse (quit drinking May 2017) presents for surgical evaluation of right lung base enlarging mass. Patient reports mass incidentally found during routine CXR ordered by his PCP. After a chest CT, he was referred to Dr. Ruffin who proceeded with PET and IR biopsy. Mass was mildly PET avid with SUV max of 4.7 but no other sites of metastasis were found. IR biopsy pathology non-diagnostic. Repeat chest CT 3 months later revealed mass was growing thus he was referred to thoracic surgery. Patient reports long history of poorly controlled psoriasis. Currently he is not taking any steroids or immunosuppressants, only using topicals. In the past year, he has been worked up by Hematologist and Hepatologist for thrombocytopenia. Most recent LFTS and CBC shows a return to normal ranges since he quit drinking. Denies cardiac history; however, he does have an appointment with a cardiologist on Monday due to significant atherosclerosis seen on CT and PET.  Today he reports feeling well. Denies fever, chills, SOB, CP, N/V or changes in bowel and bladder functioning.     PSH only significant for cholecystectomy, skin cancer resection and cataracts.   Former smoker. Quit 40 years ago. 20 pack year history. Former heavy alcohol user (12 beers/night). Quit all drinking May 2017. Retired .       Chief Complaint   Patient presents with    Consult       Review of patient's allergies indicates:   Allergen Reactions    Humira [adalimumab] Other (See Comments)       Current Outpatient Prescriptions   Medication Sig Dispense Refill    alendronate (FOSAMAX) 70 MG tablet Take 1 tablet (70 mg total) by mouth every 7 days. 4 tablet 11    FLUZONE HIGH-DOSE 2017-18, PF, 180 mcg/0.5 mL vaccine       furosemide (LASIX) 20 MG tablet Take 1  tablet (20 mg total) by mouth once daily. 30 tablet 6    multivitamin with minerals tablet Take 1 tablet by mouth once daily.      spironolactone (ALDACTONE) 50 MG tablet Take 1 tablet (50 mg total) by mouth once daily. 30 tablet 6    triamcinolone acetonide 0.1% (KENALOG) 0.1 % ointment aaa qd to affected areas.  Avoid face and groin 454 g 3     No current facility-administered medications for this visit.        Past Medical History:   Diagnosis Date    Age-related osteoporosis without current pathological fracture: see DEXA 2017 6/9/2017    Anemia 5/4/2017    Aortic atherosclerosis: see CT scan 5/17 6/9/2017    BPH (benign prostatic hypertrophy)     Chronic gout     Cortical senile cataract 5/15/2012    Degenerative disc disease     Diverticulosis of large intestine without hemorrhage: see CT scan 5/17 6/9/2017    Essential hypertension 2/19/2016    Gout     Hyperplastic polyp of transverse colon: 2012 repeat 5 years 5/4/2017    Kidney stone on left side 6/9/2017    Osteoporosis     Psoriasis     Squamous cell cancer of skin of right hand 6/9/2017    Substance abuse     Thrombocytopenia     Umbilical hernia: see CT 5/17 6/9/2017     Past Surgical History:   Procedure Laterality Date    CATARACT EXTRACTION      CHOLECYSTECTOMY  2012    EYE SURGERY      Right Hand Surgery       Family History   Problem Relation Age of Onset    Cataracts Mother     Diabetes Mother     Hypertension Mother     Cancer Father      stomach- smoker    Stomach cancer Father     No Known Problems Sister     Heart disease Brother      PPM    Amblyopia Neg Hx     Blindness Neg Hx     Glaucoma Neg Hx     Retinal detachment Neg Hx     Strabismus Neg Hx     Stroke Neg Hx     Thyroid disease Neg Hx     Macular degeneration Neg Hx     Lupus Neg Hx     Rheum arthritis Neg Hx     Psoriasis Neg Hx     Melanoma Neg Hx      Social History   Substance Use Topics    Smoking status: Former Smoker     Packs/day:  "1.00     Years: 20.00    Smokeless tobacco: Never Used    Alcohol use No      Comment: 84 beers a week        Review of Systems:  Review of Systems   Constitutional: Positive for fatigue. Negative for activity change, appetite change, diaphoresis and fever.   HENT: Negative for congestion, trouble swallowing and voice change.    Eyes: Negative.    Respiratory: Negative for cough, chest tightness, shortness of breath and wheezing.    Cardiovascular: Positive for leg swelling. Negative for chest pain and palpitations.   Gastrointestinal: Negative.    Endocrine: Negative.    Genitourinary: Negative.    Musculoskeletal: Positive for back pain and gait problem.   Skin: Positive for rash.   Allergic/Immunologic: Negative.    Hematological: Negative.    Psychiatric/Behavioral: Negative.        OBJECTIVE:     Vital Signs (Most Recent)  Pulse: 79 (10/19/17 0924)  BP: (!) 151/76 (10/19/17 0924)  SpO2: 99 % (10/19/17 0924)  5' 2" (1.575 m)  70.3 kg (155 lb)     Physical Exam:  Physical Exam   Constitutional: He is oriented to person, place, and time. He appears well-developed and well-nourished.   HENT:   Head: Normocephalic and atraumatic.   Mouth/Throat: Oropharynx is clear and moist.   Eyes: EOM are normal. Pupils are equal, round, and reactive to light.   Neck: Normal range of motion. Neck supple.   Cardiovascular: Normal rate, regular rhythm, normal heart sounds and intact distal pulses.    Pulmonary/Chest: Effort normal and breath sounds normal. No respiratory distress. He exhibits no tenderness.   Abdominal: Soft. Bowel sounds are normal.   Lymphadenopathy:     He has no cervical adenopathy.   Neurological: He is alert and oriented to person, place, and time.   Skin: Skin is warm and dry. Rash noted.   Psychiatric: He has a normal mood and affect.   Vitals reviewed.    Laboratory    PFTS:   FEV1- 1.6L 100%  DLCO- 13.0 59%    Diagnostic Results:    7/7/17 PET CT:   1. Hypermetabolic lesion within the posterior basal " segment of the right lower lobe with an SUV max of 4.7, concerning for malignancy.  2. No other areas of hypermetabolism to suggest metastatic spread.  3. Mildly increased FDG activity of bilateral axillary lymph nodes, likely to be inflammatory or reactive in nature.    10/17/17 Chest CT:   1.  There has been interval growth of the mass in the posterior segment of the right lower lobe, now measuring 4.1 cm. This mass is hypermetabolic on previous PET/CT, though biopsy on 7/26/17 was negative for malignancy. It is located in the area of prior opacification and pleural effusion reported on CT 11/21/12. Given the overall constellation of imaging findings, this is most consistent with an area of round atelectasis. However, given recent growth, consider repeat biopsy or close follow up.  2. Calcification of the the mitral valve and annulus, aorta, and coronary arteries.     ASSESSMENT/PLAN:      77 y.o. male presents with PMH of BPH, HTN, Psoriasis, thrombocytopenia, squamous cell carcinoma of right hand and history of EtOH abuse (quit drinking May 2017) presents for surgical evaluation of right lung base enlarging mass.    PLAN:Plan   Cardiology appointment on Monday for clearance of general anesthesia and possible right lower lobectomy in the setting of significant arthrosclerotic disease.   Plan for right VATS wedge, possible right lower lobectomy, MLND and possible thoracotomy.   Appropriate patient education regarding the claudette-operative period as well as intraperative details were discussed. Risks, including but not limited to, bleeding, infection, pain and anesthetic complication were discussed. Patient was given the opportunity to ask questions and to have those questions answered to their satisfaction. Patient verbalized understanding to both procedure and associated risks. Consent was obtained.    ATTENDING ATTESTATION:    I evaluated the patient and I agree with the assessment and plan.  Enlarging PET-avid RLL  mass.  Previous CT-guided core biopsy non-diagnostic.  Appears amenable to VATS wedge for diagnosis.  Pending results of cardiology work-up, will plan for wedge resection with intraoperative frozen section and possible completion lobectomy under the same anesthetic.  Has what appears to be significant CAD on chest CT.  Long discussion with patient and his wife about the indication and rationale for lung resection, the details of the operation, as well as its risks, benefits and potential outcomes.  We also discussed the alternative treatments, including repeat CT-guided biopsy, and the risks of no treatment.  They were given the opportunity to ask questions and I answered all of their questions to their satisfaction.  They demonstrated understanding and appreciation of above info.  He has decided to proceed with Right VATS wedge resection, possible lobectomy, thoracotomy and mediastinal lymph node dissection on November 10, 2017.

## 2017-10-23 ENCOUNTER — ANESTHESIA EVENT (OUTPATIENT)
Dept: SURGERY | Facility: HOSPITAL | Age: 77
DRG: 165 | End: 2017-10-23
Payer: MEDICARE

## 2017-10-23 ENCOUNTER — OFFICE VISIT (OUTPATIENT)
Dept: CARDIOLOGY | Facility: CLINIC | Age: 77
End: 2017-10-23
Payer: MEDICARE

## 2017-10-23 VITALS
BODY MASS INDEX: 28.8 KG/M2 | WEIGHT: 156.5 LBS | DIASTOLIC BLOOD PRESSURE: 60 MMHG | SYSTOLIC BLOOD PRESSURE: 130 MMHG | HEIGHT: 62 IN | HEART RATE: 72 BPM

## 2017-10-23 DIAGNOSIS — F10.20 UNCOMPLICATED ALCOHOL DEPENDENCE: ICD-10-CM

## 2017-10-23 DIAGNOSIS — I10 ESSENTIAL HYPERTENSION: ICD-10-CM

## 2017-10-23 DIAGNOSIS — I70.0 AORTIC ATHEROSCLEROSIS: ICD-10-CM

## 2017-10-23 DIAGNOSIS — R91.8 MASS OF RIGHT LUNG: Primary | ICD-10-CM

## 2017-10-23 DIAGNOSIS — Z01.818 PREOP TESTING: Primary | ICD-10-CM

## 2017-10-23 PROCEDURE — 99204 OFFICE O/P NEW MOD 45 MIN: CPT | Mod: S$GLB,,, | Performed by: INTERNAL MEDICINE

## 2017-10-23 PROCEDURE — 99999 PR PBB SHADOW E&M-EST. PATIENT-LVL III: CPT | Mod: PBBFAC,,, | Performed by: INTERNAL MEDICINE

## 2017-10-23 NOTE — LETTER
October 23, 2017      Latricia Ruffin MD  [address] [facility] [address]           [facility]  [address]  [place] [address]  Phone: [phone]          Patient: Darinel Majano   MR Number: [id number]   YOB: 1940   Date of Visit: 10/23/2017       Dear Dr. Latricia Ruffin:    Thank you for referring Darinel Majano to me for evaluation. Attached you will find relevant portions of my assessment and plan of care.    If you have questions, please do not hesitate to call me. I look forward to following Darinel Majano along with you.    Sincerely,    Adalid Rob MD    Enclosure  CC:  No Recipients    If you would like to receive this communication electronically, please contact [email or web address] or [phone] to request more information on [employer] Link access.    For providers and/or their staff who would like to refer a patient to [facility], please contact us through our one-stop-shop provider referral line, [facility] , at [phone].    If you feel you have received this communication in error or would no longer like to receive these types of communications, please e-mail [email or web address]

## 2017-10-23 NOTE — PROGRESS NOTES
Subjective:    Patient ID:  Darinel Majano is a 77 y.o. male who presents for evaluation of pre-op evaluation    HPI     The patient is a 77 year old male , followed by Drs Dinesh and Augustin, former alcohol abuse, thrombocytopenia-resolved, was found to have a right lung mass which had increased is size. IR biopsy was non diagnostic. Pet revealed hyperbolic activity in the lung mass but no evidence of mets. He was seen by Dr Chandra surgery is planned. He has arthritic issues that limit his ambulation and stair climbing. He has no history of CAD or exertiona chest pain. There are atherosclerotic changes in aortic on CT scan. He has a very distant history of smoking      CONCLUSIONS     1 - Concentric remodeling.     2 - Normal left ventricular function (EF 65%).     3 - Small pericardial effusion.     4 - Right pleural effusion.         This document has been electronically    SIGNED BY: Meena Fernandes M.D. On: 09/11/2012 09:06  Lab Results   Component Value Date     08/18/2017    K 3.8 08/18/2017     08/18/2017    CO2 29 08/18/2017    BUN 14 08/18/2017    CREATININE 0.8 08/18/2017    GLU 93 08/18/2017    HGBA1C 4.7 06/29/2011    MG 1.8 02/28/2014    AST 17 08/18/2017    ALT 12 08/18/2017    ALBUMIN 3.8 08/18/2017    PROT 7.7 08/18/2017    BILITOT 0.6 08/18/2017    WBC 8.84 08/18/2017    HGB 13.8 (L) 08/18/2017    HCT 40.4 08/18/2017    MCV 90 08/18/2017     (L) 08/18/2017    INR 0.9 08/18/2017    PSA 4.76 (H) 07/17/2013    TSH 2.425 04/27/2017         Lab Results   Component Value Date    CHOL 111 (L) 04/27/2017    HDL 71 04/27/2017    TRIG 58 04/27/2017       Lab Results   Component Value Date    LDLCALC 28.4 (L) 04/27/2017       Past Medical History:   Diagnosis Date    Age-related osteoporosis without current pathological fracture: see DEXA 2017 6/9/2017    Anemia 5/4/2017    Aortic atherosclerosis: see CT scan 5/17 6/9/2017    BPH (benign prostatic hypertrophy)     Chronic gout      Cortical senile cataract 5/15/2012    Degenerative disc disease     Diverticulosis of large intestine without hemorrhage: see CT scan 5/17 6/9/2017    Essential hypertension 2/19/2016    Gout     Hyperplastic polyp of transverse colon: 2012 repeat 5 years 5/4/2017    Kidney stone on left side 6/9/2017    Osteoporosis     Psoriasis     Squamous cell cancer of skin of right hand 6/9/2017    Substance abuse     Thrombocytopenia     Umbilical hernia: see CT 5/17 6/9/2017       Current Outpatient Prescriptions:     alendronate (FOSAMAX) 70 MG tablet, Take 1 tablet (70 mg total) by mouth every 7 days., Disp: 4 tablet, Rfl: 11    furosemide (LASIX) 20 MG tablet, Take 1 tablet (20 mg total) by mouth once daily., Disp: 30 tablet, Rfl: 6    multivitamin with minerals tablet, Take 1 tablet by mouth once daily., Disp: , Rfl:     spironolactone (ALDACTONE) 50 MG tablet, Take 1 tablet (50 mg total) by mouth once daily., Disp: 30 tablet, Rfl: 6    triamcinolone acetonide 0.1% (KENALOG) 0.1 % ointment, aaa qd to affected areas.  Avoid face and groin, Disp: 454 g, Rfl: 3    FLUZONE HIGH-DOSE 2017-18, PF, 180 mcg/0.5 mL vaccine, , Disp: , Rfl:         Review of Systems   Constitution: Negative for decreased appetite, diaphoresis, fever, weakness, malaise/fatigue, weight gain and weight loss.   HENT: Positive for hearing loss. Negative for congestion, ear discharge, ear pain and nosebleeds.    Eyes: Negative for blurred vision, double vision and visual disturbance.   Cardiovascular: Negative for chest pain, claudication, cyanosis, dyspnea on exertion, irregular heartbeat, leg swelling, near-syncope, orthopnea, palpitations, paroxysmal nocturnal dyspnea and syncope.   Respiratory: Negative for cough, hemoptysis, shortness of breath, sleep disturbances due to breathing, snoring, sputum production and wheezing.    Endocrine: Negative for polydipsia, polyphagia and polyuria.   Hematologic/Lymphatic: Negative for  "adenopathy and bleeding problem. Does not bruise/bleed easily.   Skin: Negative for color change, nail changes, poor wound healing and rash.   Musculoskeletal: Positive for arthritis. Negative for muscle cramps and muscle weakness.   Gastrointestinal: Negative for abdominal pain, anorexia, change in bowel habit, hematochezia, nausea and vomiting.   Genitourinary: Negative for dysuria, frequency and hematuria.   Neurological: Negative for brief paralysis, difficulty with concentration, excessive daytime sleepiness, dizziness, focal weakness, headaches, light-headedness, seizures and vertigo.   Psychiatric/Behavioral: Negative for altered mental status and depression.   Allergic/Immunologic: Negative for persistent infections.        Objective:/60   Pulse 72   Ht 5' 2" (1.575 m)   Wt 71 kg (156 lb 8.4 oz)   BMI 28.63 kg/m²           Physical Exam   Constitutional: He is oriented to person, place, and time. He appears well-developed and well-nourished.   HENT:   Head: Normocephalic.   Right Ear: External ear normal.   Left Ear: External ear normal.   Nose: Nose normal.   Inspection of lips, teeth and gums normal   Eyes: EOM are normal. Pupils are equal, round, and reactive to light. No scleral icterus.   Neck: Normal range of motion. Neck supple. No JVD present. No tracheal deviation present. No thyromegaly present.   Cardiovascular: Normal rate, regular rhythm and intact distal pulses.  Exam reveals no gallop and no friction rub.    No murmur heard.  Pulses:       Femoral pulses are 2+ on the right side, and 2+ on the left side.       Dorsalis pedis pulses are 0 on the right side, and 0 on the left side.        Posterior tibial pulses are 0 on the right side, and 0 on the left side.   Pulmonary/Chest: Effort normal and breath sounds normal.   Abdominal: Bowel sounds are normal. He exhibits no distension. There is no hepatosplenomegaly. There is no tenderness. There is no guarding.   Musculoskeletal: Normal " range of motion. He exhibits no edema or tenderness.   Lymphadenopathy:   Palpation of neck and groin lymph nodes normal   Neurological: He is alert and oriented to person, place, and time. No cranial nerve deficit. He exhibits normal muscle tone. Coordination normal.   Skin: Skin is dry.   Palpation of skin normal   Psychiatric: His behavior is normal. Judgment and thought content normal.         Assessment:       1. Mass of right lung    2. Aortic atherosclerosis: see CT scan 5/17    3. Essential hypertension    4. Alcohol dependence: 12 beers daily         Plan:       Darinel was seen today for pre-op exam.    Diagnoses and all orders for this visit:    Mass of right lung  -     EKG 12-LEAD; Future  -     NM Multi Pharm Stress Cardiac Component; Future    Aortic atherosclerosis: see CT scan 5/17  -     EKG 12-LEAD; Future  -     NM Multi Pharm Stress Cardiac Component; Future    Essential hypertension    Alcohol dependence: 12 beers daily    Nuclear stress test was normal. Low CV risk for planned surgery

## 2017-10-23 NOTE — PRE-PROCEDURE INSTRUCTIONS
Preop screen completed.  Preop instructions and preop medication instructions reviewed with patient.  Patient instructed to hold/stop all blood thinning medications, prior to surgery, following the pre-surgery recommended guidelines.  Patient verbalized understanding.

## 2017-10-23 NOTE — ANESTHESIA PREPROCEDURE EVALUATION
Anesthesia Assessment: Preoperative EQUATION    Planned Procedure: Procedure(s) (LRB):  THORACOSCOPY-VIDEO ASSISTED (VATS) W/WEDGE LUNG RESECTION (Right)  DISSECTION-LYMPH NODE (Right)  THORACOSCOPY-VIDEO ASSISTED (VATS) W/ LOBECTOMY (Right)  Requested Anesthesia Type:General  Surgeon: Oswaldo Chandra MD  Service: Thoracic  Known or anticipated Date of Surgery:11/10/2017    Surgeon notes: reviewed and Pulmonary nodule    Electronic QUestionnaire Assessment completed via nurse interview with patient.        NO AQ        Triage considerations:     The patient has no apparent active cardiac condition (No unstable coronary Syndrome such as severe unstable angina or recent [<1 month] myocardial infarction, decompensated CHF, severe valvular   disease or significant arrhythmia)    Previous anesthesia records:5/22/1722-Yqiqps-Xusw Marrow-Local/MAC-Airway/Jaw/Neck:  Airway Findings: Mouth Opening: Normal General Airway Assessment: Adult  Mallampati: III  Improves to II with phonation.  TM Distance: Normal, at least 6 cm  Jaw/Neck Findings:     Neck ROM: Normal ROM - No PONV -no apparent anesthetic complications    Anesthesia Hx:  History of prior surgery of interest to airway management or planning: Denies Family Hx of Anesthesia complications.  Denies Personal Hx of Anesthesia complications.         Past Surgical History:   Procedure Laterality Date    CATARACT EXTRACTION        CHOLECYSTECTOMY   2012    EYE SURGERY        Right Hand Surgery         Last PCP note: 6-12 months ago , within [facility] , focused visit   Subspecialty notes: Cardiology: General, Hematology/Oncology, Pulmonary, Dermatology/Hepatology/Infectious Diseases/Ophthalmology/Optometry/Otolaryngology/Podiatry    Other important co-morbidities:   Past Medical History:   Diagnosis Date    Age-related osteoporosis without current pathological fracture: see DEXA 2017 6/9/2017    Anemia 5/4/2017    Aortic atherosclerosis: see CT scan 5/17 6/9/2017     "BPH (benign prostatic hypertrophy)      Chronic gout      Cortical senile cataract 5/15/2012    Degenerative disc disease      Diverticulosis of large intestine without hemorrhage: see CT scan 5/17 6/9/2017    Essential hypertension 2/19/2016    Gout      Hyperplastic polyp of transverse colon: 2012 repeat 5 years 5/4/2017    Kidney stone on left side 6/9/2017    Osteoporosis      Psoriasis      Squamous cell cancer of skin of right hand 6/9/2017    Substance abuse      Thrombocytopenia      Umbilical hernia: see CT 5/17 6/9/2017             Tests already available:  Results have been reviewed.Labs-8/18/17-AFP Tumor Marker/CBC/PT-INR/CMP/CXR 1 view-7/26/17            Instructions given. (See in Nurse's note)    Optimization:  Anesthesia Preop Clinic Assessment  Indicated    Medical Opinion Indicated       Plan:    Testing:  T&S and EKG   Pre-anesthesia  visit       Visit focus: concerns in complex and/or prolonged anesthesia     Consultation:Cardiac "Clearance" requested from Dr. Adalid Rob-In-basket message sent on 10/23/17-Pending     Patient  has previously scheduled Medical Appointment:Appointments on 10/30/17 prior to surgery date.    Navigation: Tests Scheduled. Lab-T&S on 10/30/17 @ 10a/ EKG on 10/30/17 @ 10:30a             Consults scheduled.POC appointment on 10/30/17 @ 9a & Cardiac "Clearance" requested from Dr. Adalid Rob-In-basket message sent on 10/23/17-Pending             Results will be tracked by Preop Clinic.                 Jenna Carnes RN  10/23/17    Addendum: Pt. "cleared"/Low CV risk for planned surgery per Dr. Workman-note on 10/23/17 & updated on 10/31/17. Jenna Carnes RN  11/7/17                                                                                                        10/23/2017  Darinel Majano is a 77 y.o., male.    Anesthesia Evaluation         Review of Systems  Anesthesia Hx:  No problems with previous Anesthesia   Social:  Alcohol Use, " Non-Smoker Stopped drinking in 4/2017 was doing about 12+ BEERS PER DAY   Hematology/Oncology:         -- Anemia: --  Thrombocytopenia (142): chronic   --  Cancer in past history:    Cardiovascular:   Exercise tolerance: good Hypertension Denies CAD.     Denies Angina. Ambulates with a cane. Walks about one mile daily. Grocery shopping.  Denies chest pain or sob.       10/30-Nuclear stress test:    Impression: NORMAL MYOCARDIAL PERFUSION  1. The perfusion scan is free of evidence for myocardial ischemia or injury.   2. Resting wall motion is physiologic.   3. Visually estimated LV function is normal.   4. The ventricular volumes are normal at rest and stress.   5. The extracardiac distribution of radioactivity is normal.   6. There was no previous study available to compare. Functional Capacity unable to determine, Can you climb two flights of stairs? ==> Yes  Denies Coronary Artery Disease.  Hypertension    Pulmonary:   Denies Asthma.  Denies Recent URI.  Denies Sleep Apnea.  Possible Obstructive Sleep Apnea , (STOP/BANG) Symptoms S - Snoring (loud), A - Age > 50, N - Neck circumference > 40 cms and G - Gender (Male > Female)  Chest Tumor/Mass: (Pet revealed hyperbolic activity in the lung mass but no evidence of mets. )  Pulmonary Infection: (hx of + TB test when was taking humira.  TB test negative since off of humira.  Was evaluated by ID, never tx for TB. )    Renal/:   renal calculi BPH    Hepatic/GI:   Denies PUD. Denies Hiatal Hernia.  Denies GERD. Liver Disease, (wife reports some scarring of the liver is noted but no cirrhosis)  Denies Hepatitis.  Denies Esophageal / Stomach Disorders    Musculoskeletal:  Musculoskeletal General/Symptoms: Functional capacity is ambulatory with cane.  Joint Disease:  Gout    Neurological:   Denies CVA. Denies Seizures.    Endocrine:  Endocrine Normal Denies Diabetes. Denies Hypothyroidism.    Dermatological:   PSORIASIS- noted    Psych:  Psychiatric Normal            Physical Exam  General:  Obesity    Airway/Jaw/Neck:  Airway Findings: Mouth Opening: Normal Tongue: Normal  General Airway Assessment: Adult  Mallampati: I  TM Distance: Normal, at least 6 cm  Jaw/Neck Findings:     Neck ROM: Normal ROM  Neck Findings:     Eyes/Ears/Nose:  EYES/EARS/NOSE FINDINGS: Normal   Dental:  Dental Findings: In tact, Periodontal disease, Mild   Chest/Lungs:  Chest/Lungs Findings: Clear to auscultation, Normal Respiratory Rate     Heart/Vascular:  Heart Findings: Rate: Normal  Rhythm: Regular Rhythm  Sounds: Normal  Vascular Findings:  Edema Locations: BLE  Edema: +1 or +2        Mental Status:  Mental Status Findings:  Cooperative, Alert and Oriented         Labs, ekg, stress test 10/30 reviewed    Cardiac clearance with Dr. Rob:Nuclear stress test was normal. Low CV risk for planned surgery    Ludivina Emery RN 10/30/2017        Anesthesia Plan  Type of Anesthesia, risks & benefits discussed:  Anesthesia Type:  general  Patient's Preference: Proceed with anesthesia understanding that the risks are very small but could be serious or life threatening.  Intra-op Monitoring Plan: standard ASA monitors  Intra-op Monitoring Plan Comments:   Post Op Pain Control Plan:   Post Op Pain Control Plan Comments:   Induction:   IV  Beta Blocker:  Patient is not currently on a Beta-Blocker (No further documentation required).       Informed Consent: Patient understands risks and agrees with Anesthesia plan.  Questions answered. Anesthesia consent signed with patient.  ASA Score: 2     Day of Surgery Review of History & Physical: I have interviewed and examined the patient. I have reviewed the patient's H&P dated:            Ready For Surgery From Anesthesia Perspective.

## 2017-10-23 NOTE — PRE ADMISSION SCREENING
Anesthesia Assessment: Preoperative EQUATION    Planned Procedure: Procedure(s) (LRB):  THORACOSCOPY-VIDEO ASSISTED (VATS) W/WEDGE LUNG RESECTION (Right)  DISSECTION-LYMPH NODE (Right)  THORACOSCOPY-VIDEO ASSISTED (VATS) W/ LOBECTOMY (Right)  Requested Anesthesia Type:General  Surgeon: Oswaldo Chandra MD  Service: Thoracic  Known or anticipated Date of Surgery:11/10/2017    Surgeon notes: reviewed and Pulmonary nodule    Electronic QUestionnaire Assessment completed via nurse interview with patient.        NO AQ        Triage considerations:     The patient has no apparent active cardiac condition (No unstable coronary Syndrome such as severe unstable angina or recent [<1 month] myocardial infarction, decompensated CHF, severe valvular   disease or significant arrhythmia)    Previous anesthesia records:5/22/1795-[facility] Marrow-Local/MAC-Airway/Jaw/Neck:  Airway Findings: Mouth Opening: Normal General Airway Assessment: Adult  Mallampati: III  Improves to II with phonation.  TM Distance: Normal, at least 6 cm  Jaw/Neck Findings:     Neck ROM: Normal ROM - No PONV -no apparent anesthetic complications    Anesthesia Hx:  History of prior surgery of interest to airway management or planning: Denies Family Hx of Anesthesia complications.  Denies Personal Hx of Anesthesia complications.         Past Surgical History:   Procedure Laterality Date    CATARACT EXTRACTION        CHOLECYSTECTOMY   2012    EYE SURGERY        Right Hand Surgery         Last PCP note: 6-12 months ago , within [facility] , focused visit   Subspecialty notes: Cardiology: General, Hematology/Oncology, Pulmonary, Dermatology/Hepatology/Infectious Diseases/Ophthalmology/Optometry/Otolaryngology/Podiatry    Other important co-morbidities:   Past Medical History:   Diagnosis Date    Age-related osteoporosis without current pathological fracture: see DEXA 2017 6/9/2017    Anemia 5/4/2017    Aortic atherosclerosis: see CT scan 5/17 6/9/2017     "BPH (benign prostatic hypertrophy)      Chronic gout      Cortical senile cataract 5/15/2012    Degenerative disc disease      Diverticulosis of large intestine without hemorrhage: see CT scan 5/17 6/9/2017    Essential hypertension 2/19/2016    Gout      Hyperplastic polyp of transverse colon: 2012 repeat 5 years 5/4/2017    Kidney stone on left side 6/9/2017    Osteoporosis      Psoriasis      Squamous cell cancer of skin of right hand 6/9/2017    Substance abuse      Thrombocytopenia      Umbilical hernia: see CT 5/17 6/9/2017             Tests already available:  Results have been reviewed.Labs-8/18/17-AFP Tumor Marker/CBC/PT-INR/CMP/CXR 1 view-7/26/17            Instructions given. (See in Nurse's note)    Optimization:  Anesthesia Preop Clinic Assessment  Indicated    Medical Opinion Indicated       Plan:    Testing:  T&S and EKG   Pre-anesthesia  visit       Visit focus: concerns in complex and/or prolonged anesthesia     Consultation:Cardiac "Clearance" requested from Dr. Adalid Rob-In-basket message sent on 10/23/17-Pending     Patient  has previously scheduled Medical Appointment:Appointments on 10/30/17 prior to surgery date.    Navigation: Tests Scheduled. Lab-T&S on 10/30/17 @ 10a/ EKG on 10/30/17 @ 10:30a             Consults scheduled.POC appointment on 10/30/17 @ 9a & Cardiac "Clearance" requested from Dr. Adalid Rob-In-basket message sent on 10/23/17-Pending             Results will be tracked by Preop Clinic.                 Jenna Carnes RN  10/23/17  "

## 2017-10-26 ENCOUNTER — TELEPHONE (OUTPATIENT)
Dept: CARDIOLOGY | Facility: CLINIC | Age: 77
End: 2017-10-26

## 2017-10-26 NOTE — TELEPHONE ENCOUNTER
----- Message from Judy Dexter sent at 10/26/2017  9:20 AM CDT -----  Contact: Mary Davies call Mary in Pre-Op anesthesia [phone].   She is still waiting for a Cardiac Clr for this pt's procedure on 11/10/17.    Thank you    Dr. Rob  LOV 10/23/17

## 2017-10-27 ENCOUNTER — TELEPHONE (OUTPATIENT)
Dept: CARDIOLOGY | Facility: CLINIC | Age: 77
End: 2017-10-27

## 2017-10-30 ENCOUNTER — CLINICAL SUPPORT (OUTPATIENT)
Dept: CARDIOLOGY | Facility: CLINIC | Age: 77
End: 2017-10-30
Payer: MEDICARE

## 2017-10-30 ENCOUNTER — HOSPITAL ENCOUNTER (OUTPATIENT)
Dept: PREADMISSION TESTING | Facility: HOSPITAL | Age: 77
Discharge: HOME | End: 2017-10-30
Attending: ANESTHESIOLOGY
Payer: MEDICARE

## 2017-10-30 ENCOUNTER — HOSPITAL ENCOUNTER (OUTPATIENT)
Dept: CARDIOLOGY | Facility: CLINIC | Age: 77
Discharge: HOME | End: 2017-10-30
Attending: ANESTHESIOLOGY
Payer: MEDICARE

## 2017-10-30 VITALS
BODY MASS INDEX: 30.61 KG/M2 | DIASTOLIC BLOOD PRESSURE: 61 MMHG | WEIGHT: 155.88 LBS | HEART RATE: 82 BPM | RESPIRATION RATE: 20 BRPM | HEIGHT: 60 IN | TEMPERATURE: 98 F | SYSTOLIC BLOOD PRESSURE: 136 MMHG | OXYGEN SATURATION: 100 %

## 2017-10-30 DIAGNOSIS — I70.0 AORTIC ATHEROSCLEROSIS: ICD-10-CM

## 2017-10-30 DIAGNOSIS — Z01.818 PREOP TESTING: ICD-10-CM

## 2017-10-30 DIAGNOSIS — R91.8 MASS OF RIGHT LUNG: ICD-10-CM

## 2017-10-30 LAB — DIASTOLIC DYSFUNCTION: NO

## 2017-10-30 PROCEDURE — A9502 TC99M TETROFOSMIN: HCPCS | Mod: S$GLB,,, | Performed by: INTERNAL MEDICINE

## 2017-10-30 PROCEDURE — 93000 ELECTROCARDIOGRAM COMPLETE: CPT | Mod: S$GLB,,, | Performed by: INTERNAL MEDICINE

## 2017-10-30 PROCEDURE — 93015 CV STRESS TEST SUPVJ I&R: CPT | Mod: S$GLB,,, | Performed by: INTERNAL MEDICINE

## 2017-10-30 PROCEDURE — 78452 HT MUSCLE IMAGE SPECT MULT: CPT | Mod: S$GLB,,, | Performed by: INTERNAL MEDICINE

## 2017-10-30 NOTE — DISCHARGE INSTRUCTIONS
Your surgery has been scheduled for:__________________________________________    You should report to:  ____[facility], located on the [place] side of the first floor of the           [facility] ([phone])  ____[facility], located on the [facility] side of the            Second floor of the [facility] ([phone])  ____3rd Floor [facility] located on the [facility] side of the [facility] [phone]  Please Note   - Tell your doctor if you take Aspirin, products containing Aspirin, herbal medications  or blood thinners, such as Coumadin, Ticlid, or Plavix.  (Consult your provider regarding holding or stopping before surgery).  - Arrange for someone to drive you home following surgery.  You will not be allowed to leave the surgical facility alone or drive yourself home following sedation and anesthesia.  Before Surgery  - Stop taking all herbal medications 14days prior to surgery  - No Motrin/Advil (Ibuprofen) 7 days before surgery  - No Aleve (Naproxen) 7 days before surgery  - Stop Taking Asprin, products containing Asprin _____days before surgery  - Stop taking blood thinners_______days before surgery  - Refrain from drinking alcoholic beverages for 24hours before and after surgery  - Stop or limit smoking _________days before surgery  Night before Surgery  - DO NOT EAT OR DRINK ANYTHING AFTER MIDNIGHT, INCLUDING GUM, HARD CANDY, MINTS, OR CHEWING TOBACCO.  - Take a shower or bath (shower is recommended).  Bathe with Hibiclens soap or an antibacterial soap from the neck down.  If not supplied by your surgeon, hibiclens soap will need to be purchased over the counter in pharmacy.  Rinse soap off thoroughly.  - Shampoo your hair with your regular shampoo  The Day of Surgery  - Take another bath or shower with hibiclens or any antibacterial soap, to reduce the chance of infection.  - Take heart and blood  pressure medications with a small sip of water, as advised by the perioperative team.  - Do not take fluid pills  - You may brush your teeth and rinse your mouth, but do not swall any additional water.   - Do not apply perfumes, powder, body lotions or deodorant on the day of surgery.  - Nail polish should be removed.  - Do not wear makeup or moisturizer  - Wear comfortable clothes, such as a button front shirt and loose fitting pants.  - Leave all jewelry, including body piercings, and valuables at home.    - Bring any devices you will neeed after surgery such as crutches or canes.  - If you have sleep apnea, please bring your CPAP machine  In the event that your physical condition changes including the onset of a cold or respiratory illness, or if you have to delay or cancel your surgery, please notify your surgeon.  Anesthesia: General Anesthesia  Youre due to have surgery. During surgery, youll be given medication called anesthesia. (It is also called anesthetic.) This will keep you comfortable and pain-free. Your anesthesia provider will use general anesthesia. This sheet tells you more about it.  What is general anesthesia?     You are watched continuously during your procedure by the anesthesia provider   General anesthesia puts you into a state like deep sleep. It goes into the bloodstream (IV anesthetics), into the lungs (gas anesthetics), or both. You feel nothing during the procedure. You will not remember it. During the procedure, the anesthesia provider monitors you continuously. He or she checks your heart rate and rhythm, blood pressure, breathing, and blood oxygen.  · IV Anesthetics. IV anesthetics are given through an IV line in your arm. Theyre often given first. This is so you are asleep before a gas anesthetic is started. Some kinds of IV anesthetics relieve pain. Others relax you. Your doctor will decide which kind is best in your case.  · Gas Anesthetics. Gas anesthetics are breathed into  the lungs. They are often used to keep you asleep. They can be given through a facemask or a tube placed in your larynx or trachea (breathing tube).  ? If you have a facemask, your anesthesia provider will most likely place it over your nose and mouth while youre still awake. Youll breathe oxygen through the mask as your IV anesthetic is started. Gas anesthetic may be added through the mask.  ? If you have a tube in the larynx or trachea, it will be inserted into your throat after youre asleep.  Anesthesia tools and medications  You will likely have:  · IV anesthetics. These are put into an IV line into your bloodstream.  · Gas anesthetics. You breathe these anesthetics into your lungs, where they pass into your bloodstream.  · Pulse oximeter. This is a small clip that is attached to the end of your finger. This measures your blood oxygen level.  · Electrocardiography leads (electrodes). These are small sticky pads that are placed on your chest. They record your heart rate and rhythm.  · Blood pressure cuff. This reads your blood pressure.  Risks and possible complications  General anesthesia has some risks. These include:  · Breathing problems  · Nausea and vomiting  · Sore throat or hoarseness (usually temporary)  · Allergic reaction to the anesthetic  · Irregular heartbeat (rare)  · Cardiac arrest (rare)   Anesthesia safety  · Follow all instructions you are given for how long not to eat or drink before your procedure.  · Be sure your doctor knows what medications and drugs you take. This includes over-the-counter medications, herbs, supplements, alcohol or other drugs. You will be asked when those were last taken.  · Have an adult family member or friend drive you home after the procedure.  · For the first 24 hours after your surgery:  ? Do not drive or use heavy equipment.  ? Have a trusted family member or spouse make important decisions or sign documents.  ? Avoid alcohol.  ? Have a responsible adult stay  with you. He or she can watch for problems and help keep you safe.  Date Last Reviewed: 10/16/2014  © 7059-8645 [employer]. [address] [place] [address], [place] [address]. All rights reserved. This information is not intended as a substitute for professional medical care. Always follow your healthcare professional's instructions.

## 2017-10-31 ENCOUNTER — TELEPHONE (OUTPATIENT)
Dept: CARDIOLOGY | Facility: CLINIC | Age: 77
End: 2017-10-31

## 2017-11-02 ENCOUNTER — OFFICE VISIT (OUTPATIENT)
Dept: NEUROLOGY | Facility: CLINIC | Age: 77
End: 2017-11-02
Payer: MEDICARE

## 2017-11-02 VITALS
HEART RATE: 81 BPM | SYSTOLIC BLOOD PRESSURE: 133 MMHG | HEIGHT: 60 IN | BODY MASS INDEX: 30.23 KG/M2 | DIASTOLIC BLOOD PRESSURE: 69 MMHG | WEIGHT: 154 LBS

## 2017-11-02 DIAGNOSIS — G62.1 ALCOHOL-INDUCED POLYNEUROPATHY: ICD-10-CM

## 2017-11-02 PROCEDURE — 99204 OFFICE O/P NEW MOD 45 MIN: CPT | Mod: S$GLB,,, | Performed by: PSYCHIATRY & NEUROLOGY

## 2017-11-02 PROCEDURE — 99999 PR PBB SHADOW E&M-EST. PATIENT-LVL II: CPT | Mod: PBBFAC,,, | Performed by: PSYCHIATRY & NEUROLOGY

## 2017-11-02 NOTE — PROGRESS NOTES
Subjective:       Patient ID: Darinel Majano is a 77 y.o. male.    Chief Complaint:  Peripheral Neuropathy      History of Present Illness  HPI  Neuropathy  He describes no symptoms of neuropathy . Onset of symptoms was gradual, starting about 2 years ago and only when ambulating barefoot.. Symptoms are currently of severe and localized severity. Symptoms occur all day and last until he puts on shoes. The patient denies lancinating pain, numbness or burning.  Symptoms are symmetric. He also describes autonomic symptoms of  none. He denies  orthostasis, bloating, nausea, early satiety, alternating diarrhea and constipation, dry eyes and dry mouth, blurred vision and lack of sweating. Previous treatment has into been done.        Review of SystemsReview of Systems   Neurological: Positive for numbness.   All other systems reviewed and are negative.      Objective:      Neurologic Exam     Mental Status   Oriented to person, place, and time.   Registration: recalls 3 of 3 objects. Recall at 5 minutes: recalls 3 of 3 objects. Follows 3 step commands.   Attention: normal. Concentration: normal.   Speech: speech is normal   Level of consciousness: alert  Knowledge: good. Able to perform simple calculations.   Able to name object. Able to read. Able to repeat. Able to write. Normal comprehension.     Cranial Nerves   Cranial nerves II through XII intact.     CN III, IV, VI   Pupils are equal, round, and reactive to light.  Extraocular motions are normal.     CN VIII   Hearing: impaired  Right Rinne: AC > BC  Left Rinne: AC > BC  Almanzar: does not lateralize     Motor Exam   Muscle bulk: normal  Overall muscle tone: normal  Right arm pronator drift: absent  Left arm pronator drift: absent  Right leg tone: normal  Left leg tone: normal    Strength   Right neck flexion: 4/5  Left neck flexion: 4/5  Right neck extension: 4/5  Left neck extension: 4/5  Right deltoid: 4/5  Left deltoid: 4/5  Right biceps: 4/5  Left biceps:  4/5  Right triceps: 4/5  Left triceps: 4/5  Right wrist flexion: 4/5  Left wrist flexion: 4/5  Right wrist extension: 4/5  Left wrist extension: 4/5  Right interossei: 4/5  Left interossei: 4/5  Right abdominals: 4/5  Left abdominals: 4/5  Right iliopsoas: 4/5  Left iliopsoas: 4/5  Right quadriceps: 4/5  Left quadriceps: 4/5  Right hamstrin/5  Left hamstrin/5  Right glutei: 4/5  Left glutei: 4/5  Right anterior tibial: 4/5  Left anterior tibial: 4/5  Right posterior tibial: 4/5  Left posterior tibial: 4/5  Right peroneal: 4/5  Left peroneal: 4/5  Right gastroc: 4/5  Left gastroc: 4/5    Sensory Exam   Light touch normal.   Vibration normal.   Proprioception normal.   Pinprick normal.     Gait, Coordination, and Reflexes     Coordination   Finger to nose coordination: normal  Heel to shin coordination: normal  Tandem walking coordination: normal    Tremor   Resting tremor: present  Intention tremor: absent  Action tremor: absent    Reflexes   Right brachioradialis: 2+  Left brachioradialis: 2+  Right biceps: 2+  Left biceps: 2+  Right triceps: 2+  Left triceps: 2+  Right patellar: 2+  Left patellar: 2+  Right achilles: 2+  Left achilles: 2+  Right : 2+  Left : 2+  Right plantar: normal  Left plantar: normal  Right Levin: absent  Left Levin: absent  Right ankle clonus: absent  Left ankle clonus: absent  Right pendular knee jerk: absent  Left pendular knee jerk: absent      Physical Exam   Constitutional: He is oriented to person, place, and time. He appears well-nourished.   HENT:   Head: Normocephalic and atraumatic.   Eyes: EOM are normal. Pupils are equal, round, and reactive to light.   Neck: Normal range of motion. Neck supple.   Cardiovascular: Normal rate and regular rhythm.    Pulmonary/Chest: Effort normal.   Musculoskeletal: Normal range of motion.   Neurological: He is alert and oriented to person, place, and time. He has a normal Finger-Nose-Finger Test, a normal Heel to Shin Test and a  normal Tandem Gait Test.   Reflex Scores:       Tricep reflexes are 2+ on the right side and 2+ on the left side.       Bicep reflexes are 2+ on the right side and 2+ on the left side.       Brachioradialis reflexes are 2+ on the right side and 2+ on the left side.       Patellar reflexes are 2+ on the right side and 2+ on the left side.       Achilles reflexes are 2+ on the right side and 2+ on the left side.  Skin: Skin is warm and dry.   Psychiatric: He has a normal mood and affect. His speech is normal and behavior is normal. Judgment and thought content normal.   Vitals reviewed.        Assessment:     Problem List Items Addressed This Visit        Neuro    Alcohol-induced polyneuropathy     Neuropathy is secondary to ETOH. Check B12, folate, thiamine                 Plan:

## 2017-11-02 NOTE — LETTER
November 2, 2017      Kleber Celestin, DPM  [address] [facility] [address]           [facility]  [address] [place] [id number]  Phone: [phone]          Patient: Darinel Majano   MR Number: [id number]   YOB: 1940   Date of Visit: 11/2/2017       Dear Dr. Kleber Celestin:    Thank you for referring Darinel Majano to me for evaluation. Attached you will find relevant portions of my assessment and plan of care.    If you have questions, please do not hesitate to call me. I look forward to following Darinel Majano along with you.    Sincerely,    Chiara Barron MD    Enclosure  CC:  No Recipients    If you would like to receive this communication electronically, please contact [email or web address] or [phone] to request more information on [employer] access.    For providers and/or their staff who would like to refer a patient to [facility], please contact us through our one-stop-shop provider referral line, [facility], at [phone].    If you feel you have received this communication in error or would no longer like to receive these types of communications, please e-mail [email or web address]

## 2017-11-07 ENCOUNTER — TELEPHONE (OUTPATIENT)
Dept: NEUROLOGY | Facility: CLINIC | Age: 77
End: 2017-11-07

## 2017-11-07 NOTE — TELEPHONE ENCOUNTER
Left message on Mr. Majano's voicemail requesting a call back in reference to scheduling a Dec. F/u appt.

## 2017-11-09 ENCOUNTER — TELEPHONE (OUTPATIENT)
Dept: CARDIOTHORACIC SURGERY | Facility: CLINIC | Age: 77
End: 2017-11-09

## 2017-11-10 ENCOUNTER — HOSPITAL ENCOUNTER (INPATIENT)
Facility: HOSPITAL | Age: 77
LOS: 2 days | Discharge: HOME | DRG: 165 | End: 2017-11-12
Attending: THORACIC SURGERY (CARDIOTHORACIC VASCULAR SURGERY) | Admitting: THORACIC SURGERY (CARDIOTHORACIC VASCULAR SURGERY)
Payer: MEDICARE

## 2017-11-10 ENCOUNTER — ANESTHESIA (OUTPATIENT)
Dept: SURGERY | Facility: HOSPITAL | Age: 77
DRG: 165 | End: 2017-11-10
Payer: MEDICARE

## 2017-11-10 DIAGNOSIS — R91.8 LUNG MASS: Primary | ICD-10-CM

## 2017-11-10 DIAGNOSIS — R91.8 RIGHT LOWER LOBE LUNG MASS: Primary | ICD-10-CM

## 2017-11-10 LAB
ABO + RH BLD: NORMAL
ANION GAP SERPL CALC-SCNC: 7 MMOL/L
BLD GP AB SCN CELLS X3 SERPL QL: NORMAL
BUN SERPL-MCNC: 12 MG/DL
CALCIUM SERPL-MCNC: 7.9 MG/DL
CHLORIDE SERPL-SCNC: 107 MMOL/L
CO2 SERPL-SCNC: 24 MMOL/L
CREAT SERPL-MCNC: 0.7 MG/DL
EST. GFR  (AFRICAN AMERICAN): >60 ML/MIN/1.73 M^2
EST. GFR  (NON AFRICAN AMERICAN): >60 ML/MIN/1.73 M^2
GLUCOSE SERPL-MCNC: 125 MG/DL
GRAM STN SPEC: NORMAL
GRAM STN SPEC: NORMAL
POTASSIUM SERPL-SCNC: 4 MMOL/L
SODIUM SERPL-SCNC: 138 MMOL/L

## 2017-11-10 PROCEDURE — 71000039 HC RECOVERY, EACH ADD'L HOUR: Performed by: THORACIC SURGERY (CARDIOTHORACIC VASCULAR SURGERY)

## 2017-11-10 PROCEDURE — 63600175 PHARM REV CODE 636 W HCPCS: Performed by: NURSE ANESTHETIST, CERTIFIED REGISTERED

## 2017-11-10 PROCEDURE — 87205 SMEAR GRAM STAIN: CPT

## 2017-11-10 PROCEDURE — 88305 TISSUE EXAM BY PATHOLOGIST: CPT

## 2017-11-10 PROCEDURE — 87116 MYCOBACTERIA CULTURE: CPT

## 2017-11-10 PROCEDURE — D9220A PRA ANESTHESIA: Mod: CRNA,,, | Performed by: NURSE ANESTHETIST, CERTIFIED REGISTERED

## 2017-11-10 PROCEDURE — 87077 CULTURE AEROBIC IDENTIFY: CPT

## 2017-11-10 PROCEDURE — 36000710: Performed by: THORACIC SURGERY (CARDIOTHORACIC VASCULAR SURGERY)

## 2017-11-10 PROCEDURE — 63600175 PHARM REV CODE 636 W HCPCS: Performed by: PHYSICIAN ASSISTANT

## 2017-11-10 PROCEDURE — 63600175 PHARM REV CODE 636 W HCPCS: Performed by: SURGERY

## 2017-11-10 PROCEDURE — 27201037 HC PRESSURE MONITORING SET UP

## 2017-11-10 PROCEDURE — 87070 CULTURE OTHR SPECIMN AEROBIC: CPT

## 2017-11-10 PROCEDURE — C1729 CATH, DRAINAGE: HCPCS | Performed by: THORACIC SURGERY (CARDIOTHORACIC VASCULAR SURGERY)

## 2017-11-10 PROCEDURE — 0BBT4ZX EXCISION OF DIAPHRAGM, PERCUTANEOUS ENDOSCOPIC APPROACH, DIAGNOSTIC: ICD-10-PCS | Performed by: THORACIC SURGERY (CARDIOTHORACIC VASCULAR SURGERY)

## 2017-11-10 PROCEDURE — 86901 BLOOD TYPING SEROLOGIC RH(D): CPT

## 2017-11-10 PROCEDURE — 88331 PATH CONSLTJ SURG 1 BLK 1SPC: CPT

## 2017-11-10 PROCEDURE — 36000711: Performed by: THORACIC SURGERY (CARDIOTHORACIC VASCULAR SURGERY)

## 2017-11-10 PROCEDURE — 87102 FUNGUS ISOLATION CULTURE: CPT

## 2017-11-10 PROCEDURE — 71000033 HC RECOVERY, INTIAL HOUR: Performed by: THORACIC SURGERY (CARDIOTHORACIC VASCULAR SURGERY)

## 2017-11-10 PROCEDURE — 86900 BLOOD TYPING SEROLOGIC ABO: CPT

## 2017-11-10 PROCEDURE — 25000003 PHARM REV CODE 250: Performed by: THORACIC SURGERY (CARDIOTHORACIC VASCULAR SURGERY)

## 2017-11-10 PROCEDURE — 87186 SC STD MICRODIL/AGAR DIL: CPT

## 2017-11-10 PROCEDURE — 25000003 PHARM REV CODE 250: Performed by: SURGERY

## 2017-11-10 PROCEDURE — 87015 SPECIMEN INFECT AGNT CONCNTJ: CPT

## 2017-11-10 PROCEDURE — D9220A PRA ANESTHESIA: Mod: ANES,,, | Performed by: ANESTHESIOLOGY

## 2017-11-10 PROCEDURE — 25000003 PHARM REV CODE 250: Performed by: NURSE ANESTHETIST, CERTIFIED REGISTERED

## 2017-11-10 PROCEDURE — 88331 PATH CONSLTJ SURG 1 BLK 1SPC: CPT | Mod: 26,,,

## 2017-11-10 PROCEDURE — 37000009 HC ANESTHESIA EA ADD 15 MINS: Performed by: THORACIC SURGERY (CARDIOTHORACIC VASCULAR SURGERY)

## 2017-11-10 PROCEDURE — C9290 INJ, BUPIVACAINE LIPOSOME: HCPCS | Performed by: THORACIC SURGERY (CARDIOTHORACIC VASCULAR SURGERY)

## 2017-11-10 PROCEDURE — 87075 CULTR BACTERIA EXCEPT BLOOD: CPT

## 2017-11-10 PROCEDURE — 39599 UNLISTED PX DIAPHRAGM: CPT | Mod: ,,, | Performed by: THORACIC SURGERY (CARDIOTHORACIC VASCULAR SURGERY)

## 2017-11-10 PROCEDURE — 88305 TISSUE EXAM BY PATHOLOGIST: CPT | Mod: 26,,,

## 2017-11-10 PROCEDURE — 37000008 HC ANESTHESIA 1ST 15 MINUTES: Performed by: THORACIC SURGERY (CARDIOTHORACIC VASCULAR SURGERY)

## 2017-11-10 PROCEDURE — 20600001 HC STEP DOWN PRIVATE ROOM

## 2017-11-10 PROCEDURE — 88307 TISSUE EXAM BY PATHOLOGIST: CPT | Mod: 26,,,

## 2017-11-10 PROCEDURE — 36620 INSERTION CATHETER ARTERY: CPT | Mod: 59,,, | Performed by: ANESTHESIOLOGY

## 2017-11-10 PROCEDURE — 63600175 PHARM REV CODE 636 W HCPCS: Performed by: ANESTHESIOLOGY

## 2017-11-10 PROCEDURE — 80048 BASIC METABOLIC PNL TOTAL CA: CPT

## 2017-11-10 PROCEDURE — 63600175 PHARM REV CODE 636 W HCPCS: Performed by: THORACIC SURGERY (CARDIOTHORACIC VASCULAR SURGERY)

## 2017-11-10 RX ORDER — ENOXAPARIN SODIUM 100 MG/ML
40 INJECTION SUBCUTANEOUS
Status: DISCONTINUED | OUTPATIENT
Start: 2017-11-11 | End: 2017-11-12 | Stop reason: HOSPADM

## 2017-11-10 RX ORDER — SODIUM CHLORIDE 9 MG/ML
INJECTION, SOLUTION INTRAVENOUS CONTINUOUS PRN
Status: DISCONTINUED | OUTPATIENT
Start: 2017-11-10 | End: 2017-11-10

## 2017-11-10 RX ORDER — HYDROCODONE BITARTRATE AND ACETAMINOPHEN 5; 325 MG/1; MG/1
1 TABLET ORAL EVERY 4 HOURS PRN
Status: DISCONTINUED | OUTPATIENT
Start: 2017-11-10 | End: 2017-11-12 | Stop reason: HOSPADM

## 2017-11-10 RX ORDER — MIDAZOLAM HYDROCHLORIDE 1 MG/ML
INJECTION, SOLUTION INTRAMUSCULAR; INTRAVENOUS
Status: DISCONTINUED | OUTPATIENT
Start: 2017-11-10 | End: 2017-11-10

## 2017-11-10 RX ORDER — FENTANYL CITRATE 50 UG/ML
INJECTION, SOLUTION INTRAMUSCULAR; INTRAVENOUS
Status: DISCONTINUED | OUTPATIENT
Start: 2017-11-10 | End: 2017-11-10

## 2017-11-10 RX ORDER — BACITRACIN 50000 [IU]/1
INJECTION, POWDER, FOR SOLUTION INTRAMUSCULAR
Status: DISCONTINUED | OUTPATIENT
Start: 2017-11-10 | End: 2017-11-10 | Stop reason: HOSPADM

## 2017-11-10 RX ORDER — ROCURONIUM BROMIDE 10 MG/ML
INJECTION, SOLUTION INTRAVENOUS
Status: DISCONTINUED | OUTPATIENT
Start: 2017-11-10 | End: 2017-11-10

## 2017-11-10 RX ORDER — DEXTROSE MONOHYDRATE, SODIUM CHLORIDE, AND POTASSIUM CHLORIDE 50; 1.49; 4.5 G/1000ML; G/1000ML; G/1000ML
INJECTION, SOLUTION INTRAVENOUS CONTINUOUS
Status: DISCONTINUED | OUTPATIENT
Start: 2017-11-10 | End: 2017-11-10

## 2017-11-10 RX ORDER — FUROSEMIDE 20 MG/1
20 TABLET ORAL DAILY
Status: DISCONTINUED | OUTPATIENT
Start: 2017-11-11 | End: 2017-11-12 | Stop reason: HOSPADM

## 2017-11-10 RX ORDER — METOCLOPRAMIDE HYDROCHLORIDE 5 MG/ML
5 INJECTION INTRAMUSCULAR; INTRAVENOUS EVERY 6 HOURS PRN
Status: DISCONTINUED | OUTPATIENT
Start: 2017-11-10 | End: 2017-11-12 | Stop reason: HOSPADM

## 2017-11-10 RX ORDER — HYDROMORPHONE HYDROCHLORIDE 1 MG/ML
0.2 INJECTION, SOLUTION INTRAMUSCULAR; INTRAVENOUS; SUBCUTANEOUS EVERY 5 MIN PRN
Status: DISCONTINUED | OUTPATIENT
Start: 2017-11-10 | End: 2017-11-10 | Stop reason: HOSPADM

## 2017-11-10 RX ORDER — DIPHENHYDRAMINE HYDROCHLORIDE 50 MG/ML
25 INJECTION INTRAMUSCULAR; INTRAVENOUS EVERY 6 HOURS PRN
Status: DISCONTINUED | OUTPATIENT
Start: 2017-11-10 | End: 2017-11-10 | Stop reason: HOSPADM

## 2017-11-10 RX ORDER — MORPHINE SULFATE 2 MG/ML
2 INJECTION, SOLUTION INTRAMUSCULAR; INTRAVENOUS EVERY 4 HOURS PRN
Status: DISCONTINUED | OUTPATIENT
Start: 2017-11-10 | End: 2017-11-12 | Stop reason: HOSPADM

## 2017-11-10 RX ORDER — SPIRONOLACTONE 25 MG/1
50 TABLET ORAL DAILY
Status: DISCONTINUED | OUTPATIENT
Start: 2017-11-11 | End: 2017-11-12 | Stop reason: HOSPADM

## 2017-11-10 RX ORDER — MUPIROCIN 20 MG/G
1 OINTMENT TOPICAL 2 TIMES DAILY
Status: DISCONTINUED | OUTPATIENT
Start: 2017-11-10 | End: 2017-11-10

## 2017-11-10 RX ORDER — HYDROCODONE BITARTRATE AND ACETAMINOPHEN 10; 325 MG/1; MG/1
1 TABLET ORAL EVERY 4 HOURS PRN
Status: DISCONTINUED | OUTPATIENT
Start: 2017-11-10 | End: 2017-11-12 | Stop reason: HOSPADM

## 2017-11-10 RX ORDER — PROPOFOL 10 MG/ML
VIAL (ML) INTRAVENOUS
Status: DISCONTINUED | OUTPATIENT
Start: 2017-11-10 | End: 2017-11-10

## 2017-11-10 RX ORDER — ONDANSETRON 8 MG/1
8 TABLET, ORALLY DISINTEGRATING ORAL EVERY 8 HOURS PRN
Status: DISCONTINUED | OUTPATIENT
Start: 2017-11-10 | End: 2017-11-12 | Stop reason: HOSPADM

## 2017-11-10 RX ORDER — LIDOCAINE HCL/PF 100 MG/5ML
SYRINGE (ML) INTRAVENOUS
Status: DISCONTINUED | OUTPATIENT
Start: 2017-11-10 | End: 2017-11-10

## 2017-11-10 RX ORDER — ONDANSETRON 2 MG/ML
4 INJECTION INTRAMUSCULAR; INTRAVENOUS ONCE AS NEEDED
Status: DISCONTINUED | OUTPATIENT
Start: 2017-11-10 | End: 2017-11-10 | Stop reason: HOSPADM

## 2017-11-10 RX ADMIN — FENTANYL CITRATE 150 MCG: 50 INJECTION, SOLUTION INTRAMUSCULAR; INTRAVENOUS at 09:11

## 2017-11-10 RX ADMIN — HYDROCODONE BITARTRATE AND ACETAMINOPHEN 1 TABLET: 10; 325 TABLET ORAL at 12:11

## 2017-11-10 RX ADMIN — PROPOFOL 50 MG: 10 INJECTION, EMULSION INTRAVENOUS at 11:11

## 2017-11-10 RX ADMIN — HYDROMORPHONE HYDROCHLORIDE 0.2 MG: 1 INJECTION, SOLUTION INTRAMUSCULAR; INTRAVENOUS; SUBCUTANEOUS at 12:11

## 2017-11-10 RX ADMIN — SODIUM CHLORIDE, SODIUM GLUCONATE, SODIUM ACETATE, POTASSIUM CHLORIDE, MAGNESIUM CHLORIDE, SODIUM PHOSPHATE, DIBASIC, AND POTASSIUM PHOSPHATE: .53; .5; .37; .037; .03; .012; .00082 INJECTION, SOLUTION INTRAVENOUS at 10:11

## 2017-11-10 RX ADMIN — Medication 2 G: at 09:11

## 2017-11-10 RX ADMIN — SODIUM CHLORIDE: 0.9 INJECTION, SOLUTION INTRAVENOUS at 08:11

## 2017-11-10 RX ADMIN — PROPOFOL 150 MG: 10 INJECTION, EMULSION INTRAVENOUS at 09:11

## 2017-11-10 RX ADMIN — SODIUM CHLORIDE, SODIUM GLUCONATE, SODIUM ACETATE, POTASSIUM CHLORIDE, MAGNESIUM CHLORIDE, SODIUM PHOSPHATE, DIBASIC, AND POTASSIUM PHOSPHATE: .53; .5; .37; .037; .03; .012; .00082 INJECTION, SOLUTION INTRAVENOUS at 12:11

## 2017-11-10 RX ADMIN — ROCURONIUM BROMIDE 50 MG: 10 INJECTION, SOLUTION INTRAVENOUS at 09:11

## 2017-11-10 RX ADMIN — LIDOCAINE HYDROCHLORIDE 80 MG: 20 INJECTION, SOLUTION INTRAVENOUS at 09:11

## 2017-11-10 RX ADMIN — HYDROCODONE BITARTRATE AND ACETAMINOPHEN 1 TABLET: 10; 325 TABLET ORAL at 04:11

## 2017-11-10 RX ADMIN — FENTANYL CITRATE 50 MCG: 50 INJECTION, SOLUTION INTRAMUSCULAR; INTRAVENOUS at 10:11

## 2017-11-10 RX ADMIN — DEXTROSE MONOHYDRATE, SODIUM CHLORIDE, AND POTASSIUM CHLORIDE: 50; 4.5; 1.49 INJECTION, SOLUTION INTRAVENOUS at 12:11

## 2017-11-10 RX ADMIN — MORPHINE SULFATE 2 MG: 2 INJECTION, SOLUTION INTRAMUSCULAR; INTRAVENOUS at 06:11

## 2017-11-10 RX ADMIN — FENTANYL CITRATE 50 MCG: 50 INJECTION, SOLUTION INTRAMUSCULAR; INTRAVENOUS at 11:11

## 2017-11-10 RX ADMIN — MIDAZOLAM HYDROCHLORIDE 2 MG: 1 INJECTION, SOLUTION INTRAMUSCULAR; INTRAVENOUS at 09:11

## 2017-11-10 NOTE — NURSING TRANSFER
Nursing Transfer Note      11/10/2017     Transfer To: [id number]    Transfer via bed    Transfer with IV pole    Transported by PCT    Medicines sent: none    Chart send with patient: Yes    Notified: nurse liaison     Patient reassessed at: 7133

## 2017-11-10 NOTE — PROGRESS NOTES
Patient arrived on floor with no acute distress. Vitals stable. Chest tube to wall suction and is intact and patent. Site of chest tube insertion has gauze with moderate drainage. Upon PACU report, site was reported to have small drainage. Reported drainage report to MD on call. DONA.

## 2017-11-10 NOTE — PLAN OF CARE
Pt resting comfortably.    Call light in reach.    No questions or concerns at this time.   Wife at bedside

## 2017-11-10 NOTE — ANESTHESIA PROCEDURE NOTES
Arterial    Diagnosis: PAD    Patient location during procedure: done in OR  Procedure start time: 11/10/2017 9:35 AM  Timeout: 11/10/2017 9:35 AM  Procedure end time: 11/10/2017 9:45 AM  Staffing  Anesthesiologist: EDU TERRELL  Resident/CRNA: LISA GARCIA  Performed: resident/CRNA and anesthesiologist   Anesthesiologist was present at the time of the procedure.  Preanesthetic Checklist  Completed: patient identified, site marked, surgical consent, pre-op evaluation, timeout performed, IV checked, risks and benefits discussed, monitors and equipment checked and anesthesia consent givenArterial  Skin Prep: chlorhexidine gluconate  Orientation: left  Location: radial  Catheter Size: 20 G  Catheter placement by Ultrasound guidance. Heme positive aspiration all ports.  Vessel Caliber: small, patent, compressibility poor  Vascular Doppler:  not done  Needle advanced into vessel with real time Ultrasound guidance.  Sterile sheath used.Insertion Attempts: 3  Assessment  Dressing: secured with tape and tegaderm  Patient: Tolerated well  Additional Notes  Difficult placement. Initially tried L arm, however no patent vessel visualized.

## 2017-11-10 NOTE — INTERVAL H&P NOTE
The patient has been examined and the H&P has been reviewed:    I concur with the findings and no changes have occurred since H&P was written.    Anesthesia/Surgery risks, benefits and alternative options discussed and understood by patient/family.          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

## 2017-11-10 NOTE — BRIEF OP NOTE
[facility]  Brief Operative Note    SUMMARY     Surgery Date: 11/10/2017     Surgeon(s) and Role:     * Reno Bell MD - Resident - Assisting     * Oswaldo Chandra MD - Primary    Pre-op Diagnosis:  Pulmonary nodule [R91.1]    Post-op Diagnosis:  Post-Op Diagnosis Codes:     * Pulmonary nodule [R91.1]    Procedure(s):  THORACOSCOPY-VIDEO ASSISTED (VATS)  BIOPSY-DIAPHRAGM    Anesthesia: General      Description of the findings of the procedure: R VATS with adhesiolysis.  Dense adhesions inferior and posterior aspect of right lower lobe.  Firm mass palpated and appeared to originate from the diaphragm.  This was biopsied and purulence was expressed.  Frozen sections negative for malignancy.  Cultures and permanent biopsies sent.    Estimated Blood Loss: 100cc    Specimens:   Specimen (12h ago through future)    Start     Ordered    11/10/17 1125  Specimen to Pathology - Surgery  Once     Comments:  1. Diaphragm mass - FROZEN2. Diaphragm mass #2 - PERM3. Right lower lobe wedge - PERM      11/10/17 1136

## 2017-11-10 NOTE — TRANSFER OF CARE
"Anesthesia Transfer of Care Note    Patient: Darinel Majano    Procedure(s) Performed: Procedure(s):  THORACOSCOPY-VIDEO ASSISTED (VATS)  BIOPSY-DIAPHRAGM    Patient location: PACU    Anesthesia Type: general    Transport from OR: Transported from OR on 6-10 L/min O2 by face mask with adequate spontaneous ventilation    Post pain: adequate analgesia    Post assessment: no apparent anesthetic complications    Post vital signs: stable    Level of consciousness: awake    Nausea/Vomiting: no nausea/vomiting    Complications: none    Transfer of care protocol was followed      Last vitals:   Visit Vitals  /62   Pulse 85   Temp 36.6 °C (97.9 °F) (Oral)   Resp 18   Ht 5' 2" (1.575 m)   Wt 69.9 kg (154 lb)   SpO2 100%   BMI 28.17 kg/m²     "

## 2017-11-10 NOTE — ANESTHESIA POSTPROCEDURE EVALUATION
"Anesthesia Post Evaluation    Patient: Darinel Majano    Procedure(s) Performed: Procedure(s):  THORACOSCOPY-VIDEO ASSISTED (VATS)  BIOPSY-DIAPHRAGM    Final Anesthesia Type: general  Patient location during evaluation: PACU  Patient participation: Yes- Able to Participate  Level of consciousness: awake and alert  Post-procedure vital signs: reviewed and stable  Pain management: adequate  Airway patency: patent  PONV status at discharge: No PONV  Anesthetic complications: no      Cardiovascular status: blood pressure returned to baseline  Respiratory status: unassisted  Hydration status: euvolemic  Follow-up not needed.        Visit Vitals  /65   Pulse 75   Temp 36.6 °C (97.9 °F) (Temporal)   Resp 17   Ht 5' 2" (1.575 m)   Wt 69.9 kg (154 lb)   SpO2 100%   BMI 28.17 kg/m²       Pain/Enoch Score: Pain Assessment Performed: Yes (11/10/2017  1:43 PM)  Presence of Pain: complains of pain/discomfort (11/10/2017  1:43 PM)  Pain Rating Prior to Med Admin: 7 (11/10/2017 12:50 PM)  Enoch Score: 9 (11/10/2017  1:43 PM)      "

## 2017-11-10 NOTE — PROGRESS NOTES
Dr. Beltran said OK to do first dressing change over chest tube site due to site being saturated. Dr. Beltran stated to use gauze and tegaderm for dressing change. WCNADIR.

## 2017-11-11 LAB
BASOPHILS # BLD AUTO: 0.03 K/UL
BASOPHILS NFR BLD: 0.3 %
DIFFERENTIAL METHOD: ABNORMAL
EOSINOPHIL # BLD AUTO: 0.1 K/UL
EOSINOPHIL NFR BLD: 0.6 %
ERYTHROCYTE [DISTWIDTH] IN BLOOD BY AUTOMATED COUNT: 13.6 %
HCT VFR BLD AUTO: 35 %
HGB BLD-MCNC: 11.8 G/DL
IMM GRANULOCYTES # BLD AUTO: 0.03 K/UL
IMM GRANULOCYTES NFR BLD AUTO: 0.3 %
LYMPHOCYTES # BLD AUTO: 2 K/UL
LYMPHOCYTES NFR BLD: 18.5 %
MAGNESIUM SERPL-MCNC: 1.7 MG/DL
MCH RBC QN AUTO: 30.6 PG
MCHC RBC AUTO-ENTMCNC: 33.7 G/DL
MCV RBC AUTO: 91 FL
MONOCYTES # BLD AUTO: 1.3 K/UL
MONOCYTES NFR BLD: 11.6 %
NEUTROPHILS # BLD AUTO: 7.5 K/UL
NEUTROPHILS NFR BLD: 68.7 %
NRBC BLD-RTO: 0 /100 WBC
PHOSPHATE SERPL-MCNC: 3.5 MG/DL
PLATELET # BLD AUTO: 121 K/UL
PMV BLD AUTO: 9.7 FL
RBC # BLD AUTO: 3.86 M/UL
WBC # BLD AUTO: 10.86 K/UL

## 2017-11-11 PROCEDURE — 63600175 PHARM REV CODE 636 W HCPCS: Performed by: SURGERY

## 2017-11-11 PROCEDURE — 84100 ASSAY OF PHOSPHORUS: CPT

## 2017-11-11 PROCEDURE — 36415 COLL VENOUS BLD VENIPUNCTURE: CPT

## 2017-11-11 PROCEDURE — 83735 ASSAY OF MAGNESIUM: CPT

## 2017-11-11 PROCEDURE — 85025 COMPLETE CBC W/AUTO DIFF WBC: CPT

## 2017-11-11 PROCEDURE — 25000003 PHARM REV CODE 250: Performed by: SURGERY

## 2017-11-11 PROCEDURE — 20600001 HC STEP DOWN PRIVATE ROOM

## 2017-11-11 RX ADMIN — SPIRONOLACTONE 50 MG: 25 TABLET, FILM COATED ORAL at 09:11

## 2017-11-11 RX ADMIN — FUROSEMIDE 20 MG: 20 TABLET ORAL at 09:11

## 2017-11-11 RX ADMIN — ENOXAPARIN SODIUM 40 MG: 100 INJECTION SUBCUTANEOUS at 09:11

## 2017-11-11 RX ADMIN — HYDROCODONE BITARTRATE AND ACETAMINOPHEN 1 TABLET: 10; 325 TABLET ORAL at 09:11

## 2017-11-11 RX ADMIN — HYDROCODONE BITARTRATE AND ACETAMINOPHEN 1 TABLET: 10; 325 TABLET ORAL at 06:11

## 2017-11-11 NOTE — PLAN OF CARE
Problem: Patient Care Overview  Goal: Plan of Care Review  Outcome: Ongoing (interventions implemented as appropriate)  POC reviewed with patient and spouse who verbalized understanding. VSS. AAOX4. Remains free of falls and injury. Gregorio intact and patent putting out clear yellow drainage. RT chest tube intact and patent to wall suction. RT chest tube insertion site has a large amount of drainage - reported to night shift nurse that Dr. Zaman was going to assess the patient. Other incision site dressing is dry and intact wit scant sanguineous drainage. Tolerating clear liquid diet and denies nausea. Pain controlled with PRN medications per MAR. Educated on IS use. Patient denies chest pain & SOB. TEDS and SCDS in place. No acute events. No distress noted. Bed in lowest position, call light within reach, frequent rounds made for safety.

## 2017-11-11 NOTE — PROGRESS NOTES
[facility]  General Surgery  Progress Note    Subjective:         Post-Op Info:  Procedure(s):  THORACOSCOPY-VIDEO ASSISTED (VATS)  BIOPSY-DIAPHRAGM   1 Day Post-Op     Interval History: NAEON.  Pain controlled.  210cc serosanguinous chest tube output.  No pneumothorax on CXR, no air leak.      Medications:  Continuous Infusions:   Scheduled Meds:   enoxparin  40 mg Subcutaneous Q24H    furosemide  20 mg Oral Daily    spironolactone  50 mg Oral Daily     PRN Meds:hydrocodone-acetaminophen 10-325mg, hydrocodone-acetaminophen 5-325mg, metoclopramide HCl, morphine, ondansetron     Review of patient's allergies indicates:   Allergen Reactions    Humira [adalimumab] Other (See Comments)     Has lung issue and humira contraindicated     Objective:     Vital Signs (Most Recent):  Temp: (!) 95 °F (35 °C) (11/11/17 0723)  Pulse: 71 (11/11/17 0723)  Resp: 20 (11/11/17 0723)  BP: 125/61 (11/11/17 0723)  SpO2: 99 % (11/11/17 0723) Vital Signs (24h Range):  Temp:  [95 °F (35 °C)-98.5 °F (36.9 °C)] 95 °F (35 °C)  Pulse:  [70-87] 71  Resp:  [14-22] 20  SpO2:  [86 %-100 %] 99 %  BP: (103-145)/(56-81) 125/61     Weight: 69.9 kg (154 lb)  Body mass index is 28.17 kg/m².    Intake/Output - Last 3 Shifts       11/09 0700 - 11/10 0659 11/10 0700 - 11/11 0659 11/11 0700 - 11/12 0659    P.O.  1240     I.V. (mL/kg)  2022 (28.9)     Total Intake(mL/kg)  3262 (46.7)     Urine (mL/kg/hr)  1185 (0.7)     Stool  0 (0)     Chest Tube  210 (0.1)     Total Output   1395      Net   +1867             Stool Occurrence  0 x           Physical Exam  NAD, AAOx3  RRR  CTAB  R chest tube in place with serosanguinous drainage  Abd S/ND/NTTP    Significant Labs:  CBC:   Recent Labs  Lab 11/11/17 0415   WBC 10.86   RBC 3.86*   HGB 11.8*   HCT 35.0*   *   MCV 91   MCH 30.6   MCHC 33.7     BMP:   Recent Labs  Lab 11/10/17  1225 11/11/17 0415   *  --      --    K 4.0  --      --    CO2 24  --    BUN 12  --     CREATININE 0.7  --    CALCIUM 7.9*  --    MG  --  1.7     \    Assessment/Plan:     * Right lower lobe lung mass    78 yo M POD 1 s/p R VATS with biopsy of diaphragmatic mass    Regular diet  Boost supplementation  PO pain medication  Clamp chest tube  PT/OT  Ambulate TID  Lovenox  Home meds  D/c rufus Bell MD  General Surgery  [facility]

## 2017-11-11 NOTE — PLAN OF CARE
Problem: Patient Care Overview  Goal: Plan of Care Review  Outcome: Revised  POC reviewed with patient and spouse who verbalized understanding. VSS on room air. AAOX4. Remains free of falls and injury. Gregorio removed today and patient is due to void by 4 pm. RT chest tube removed today by MD - dressing site is dry and intact with scant drainage noted. RT chest tube insertion site has a moderate amount of drainage. Tolerating regular diet well and denies nausea. Pain controlled with PRN medications per MAR. Patient ambulated halls independently with cane numerous times today. Assisted with IS use. Patient denies chest pain & SOB. TEDS and SCDS in place. No acute events. No distress noted. Bed in lowest position, call light within reach, frequent rounds made for safety. WCTM.

## 2017-11-11 NOTE — PROGRESS NOTES
Patient chest tube dressing saturated with red drainage after dressing change. Reported this to Dr. Zaman and he said he will come assess the site. WCTM.

## 2017-11-11 NOTE — SUBJECTIVE & OBJECTIVE
Interval History: NAEON.  Pain controlled.  210cc serosanguinous chest tube output.  No pneumothorax on CXR, no air leak.      Medications:  Continuous Infusions:   Scheduled Meds:   enoxparin  40 mg Subcutaneous Q24H    furosemide  20 mg Oral Daily    spironolactone  50 mg Oral Daily     PRN Meds:hydrocodone-acetaminophen 10-325mg, hydrocodone-acetaminophen 5-325mg, metoclopramide HCl, morphine, ondansetron     Review of patient's allergies indicates:   Allergen Reactions    Humira [adalimumab] Other (See Comments)     Has lung issue and humira contraindicated     Objective:     Vital Signs (Most Recent):  Temp: (!) 95 °F (35 °C) (11/11/17 0723)  Pulse: 71 (11/11/17 0723)  Resp: 20 (11/11/17 0723)  BP: 125/61 (11/11/17 0723)  SpO2: 99 % (11/11/17 0723) Vital Signs (24h Range):  Temp:  [95 °F (35 °C)-98.5 °F (36.9 °C)] 95 °F (35 °C)  Pulse:  [70-87] 71  Resp:  [14-22] 20  SpO2:  [86 %-100 %] 99 %  BP: (103-145)/(56-81) 125/61     Weight: 69.9 kg (154 lb)  Body mass index is 28.17 kg/m².    Intake/Output - Last 3 Shifts       11/09 0700 - 11/10 0659 11/10 0700 - 11/11 0659 11/11 0700 - 11/12 0659    P.O.  1240     I.V. (mL/kg)  2022 (28.9)     Total Intake(mL/kg)  3262 (46.7)     Urine (mL/kg/hr)  1185 (0.7)     Stool  0 (0)     Chest Tube  210 (0.1)     Total Output   1395      Net   +1867             Stool Occurrence  0 x           Physical Exam  NAD, AAOx3  RRR  CTAB  R chest tube in place with serosanguinous drainage  Abd S/ND/NTTP    Significant Labs:  CBC:   Recent Labs  Lab 11/11/17  0415   WBC 10.86   RBC 3.86*   HGB 11.8*   HCT 35.0*   *   MCV 91   MCH 30.6   MCHC 33.7     BMP:   Recent Labs  Lab 11/10/17  1225 11/11/17  0415   *  --      --    K 4.0  --      --    CO2 24  --    BUN 12  --    CREATININE 0.7  --    CALCIUM 7.9*  --    MG  --  1.7     \

## 2017-11-11 NOTE — ASSESSMENT & PLAN NOTE
76 yo M POD 1 s/p R VATS with biopsy of diaphragmatic mass    Regular diet  Boost supplementation  PO pain medication  Clamp chest tube  PT/OT  Ambulate TID  Lovenox  Home meds  D/c rufus

## 2017-11-11 NOTE — PLAN OF CARE
Problem: Patient Care Overview  Goal: Plan of Care Review  Plan of care reviewed, all questions and concerns addressed. Patient vital signs remain normal and stable for patient, with no complaints of SOB, headaches, or dizziness. Patient urine output remains adequate, Patient is tolerating diet well with no complaints of nausea or vomiting. Patient pain well controlled with ordered pain medications. Pt chest tube remains patent with no drainage from site or SOB. Patient is resting quietly with side rails up and call light with in reach. Will continue to monitor patient status.

## 2017-11-12 VITALS
SYSTOLIC BLOOD PRESSURE: 122 MMHG | OXYGEN SATURATION: 98 % | DIASTOLIC BLOOD PRESSURE: 58 MMHG | WEIGHT: 154 LBS | TEMPERATURE: 98 F | HEART RATE: 84 BPM | RESPIRATION RATE: 20 BRPM | HEIGHT: 62 IN | BODY MASS INDEX: 28.34 KG/M2

## 2017-11-12 LAB
ANION GAP SERPL CALC-SCNC: 5 MMOL/L
BASOPHILS # BLD AUTO: 0.02 K/UL
BASOPHILS NFR BLD: 0.3 %
BUN SERPL-MCNC: 10 MG/DL
CALCIUM SERPL-MCNC: 9 MG/DL
CHLORIDE SERPL-SCNC: 104 MMOL/L
CO2 SERPL-SCNC: 29 MMOL/L
CREAT SERPL-MCNC: 0.7 MG/DL
DIFFERENTIAL METHOD: ABNORMAL
EOSINOPHIL # BLD AUTO: 0.1 K/UL
EOSINOPHIL NFR BLD: 1.2 %
ERYTHROCYTE [DISTWIDTH] IN BLOOD BY AUTOMATED COUNT: 13.5 %
EST. GFR  (AFRICAN AMERICAN): >60 ML/MIN/1.73 M^2
EST. GFR  (NON AFRICAN AMERICAN): >60 ML/MIN/1.73 M^2
GLUCOSE SERPL-MCNC: 115 MG/DL
HCT VFR BLD AUTO: 31.2 %
HGB BLD-MCNC: 10.3 G/DL
IMM GRANULOCYTES # BLD AUTO: 0.03 K/UL
IMM GRANULOCYTES NFR BLD AUTO: 0.4 %
LYMPHOCYTES # BLD AUTO: 1.4 K/UL
LYMPHOCYTES NFR BLD: 18 %
MAGNESIUM SERPL-MCNC: 1.8 MG/DL
MCH RBC QN AUTO: 29.6 PG
MCHC RBC AUTO-ENTMCNC: 33 G/DL
MCV RBC AUTO: 90 FL
MONOCYTES # BLD AUTO: 0.9 K/UL
MONOCYTES NFR BLD: 11.4 %
NEUTROPHILS # BLD AUTO: 5.3 K/UL
NEUTROPHILS NFR BLD: 68.7 %
NRBC BLD-RTO: 0 /100 WBC
PHOSPHATE SERPL-MCNC: 2.6 MG/DL
PLATELET # BLD AUTO: 107 K/UL
PMV BLD AUTO: 9.5 FL
POTASSIUM SERPL-SCNC: 4.2 MMOL/L
RBC # BLD AUTO: 3.48 M/UL
SODIUM SERPL-SCNC: 138 MMOL/L
WBC # BLD AUTO: 7.72 K/UL

## 2017-11-12 PROCEDURE — 84100 ASSAY OF PHOSPHORUS: CPT

## 2017-11-12 PROCEDURE — 63600175 PHARM REV CODE 636 W HCPCS: Performed by: SURGERY

## 2017-11-12 PROCEDURE — 83735 ASSAY OF MAGNESIUM: CPT

## 2017-11-12 PROCEDURE — 80048 BASIC METABOLIC PNL TOTAL CA: CPT

## 2017-11-12 PROCEDURE — 25000003 PHARM REV CODE 250: Performed by: SURGERY

## 2017-11-12 PROCEDURE — 36415 COLL VENOUS BLD VENIPUNCTURE: CPT

## 2017-11-12 PROCEDURE — 85025 COMPLETE CBC W/AUTO DIFF WBC: CPT

## 2017-11-12 RX ORDER — AMOXICILLIN 250 MG
1 CAPSULE ORAL DAILY
COMMUNITY
Start: 2017-11-12 | End: 2017-11-21

## 2017-11-12 RX ORDER — HYDROCODONE BITARTRATE AND ACETAMINOPHEN 5; 325 MG/1; MG/1
1 TABLET ORAL EVERY 4 HOURS PRN
Qty: 41 TABLET | Refills: 0 | Status: SHIPPED | OUTPATIENT
Start: 2017-11-12 | End: 2017-11-12

## 2017-11-12 RX ORDER — HYDROCODONE BITARTRATE AND ACETAMINOPHEN 5; 325 MG/1; MG/1
1 TABLET ORAL EVERY 4 HOURS PRN
Qty: 41 TABLET | Refills: 0 | Status: SHIPPED | OUTPATIENT
Start: 2017-11-12 | End: 2018-03-01

## 2017-11-12 RX ADMIN — SPIRONOLACTONE 50 MG: 25 TABLET, FILM COATED ORAL at 09:11

## 2017-11-12 RX ADMIN — FUROSEMIDE 20 MG: 20 TABLET ORAL at 09:11

## 2017-11-12 RX ADMIN — ENOXAPARIN SODIUM 40 MG: 100 INJECTION SUBCUTANEOUS at 09:11

## 2017-11-12 NOTE — ASSESSMENT & PLAN NOTE
78 yo M POD 2 s/p R VATS with biopsy of diaphragmatic mass    Regular diet  PO pain medication  D/c home  F/u 2 weeks

## 2017-11-12 NOTE — NURSING
"Spoke with MD Kessler about delaying vital signs to promote sleep for the patient. MD stated "that's fine if you could get one more set before he goes to bed tonight and one when he wakes up"  "

## 2017-11-12 NOTE — PLAN OF CARE
Problem: Patient Care Overview  Goal: Plan of Care Review  Outcome: Revised  Patient discharged to home. Discharge instructions verbally given and hard copy provided to patient and spouse. Hard copy prescription given to spouse. Supplied patient and spouse with gauze and tegaderm to apply to wound site for dressing change. Removed LT wrist 18g IV and 18g LT FA with cath tip in place. Patient tolerated IV removals well with bleeding controlled. Patient remains free of falls with no acute pain or distress noted. Patient left floor via wheelchair with spouse.

## 2017-11-12 NOTE — SUBJECTIVE & OBJECTIVE
Interval History: NAEON.  HD stable.  Labs wnl.  Chest tube removed yesterday.  Pt ambulating well.  Tolerating diet.    Medications:  Continuous Infusions:   Scheduled Meds:   enoxparin  40 mg Subcutaneous Q24H    furosemide  20 mg Oral Daily    spironolactone  50 mg Oral Daily     PRN Meds:hydrocodone-acetaminophen 10-325mg, hydrocodone-acetaminophen 5-325mg, metoclopramide HCl, morphine, ondansetron     Review of patient's allergies indicates:   Allergen Reactions    Humira [adalimumab] Other (See Comments)     Has lung issue and humira contraindicated     Objective:     Vital Signs (Most Recent):  Temp: 98.1 °F (36.7 °C) (11/12/17 0821)  Pulse: 84 (11/12/17 0821)  Resp: 20 (11/12/17 0821)  BP: (!) 122/58 (11/12/17 0821)  SpO2: 98 % (11/12/17 0821) Vital Signs (24h Range):  Temp:  [97 °F (36.1 °C)-98.3 °F (36.8 °C)] 98.1 °F (36.7 °C)  Pulse:  [76-96] 84  Resp:  [18-20] 20  SpO2:  [95 %-99 %] 98 %  BP: (113-152)/(54-65) 122/58     Weight: 69.9 kg (154 lb)  Body mass index is 28.17 kg/m².    Intake/Output - Last 3 Shifts       11/10 0700 - 11/11 0659 11/11 0700 - 11/12 0659 11/12 0700 - 11/13 0659    P.O. 1240 1900     I.V. (mL/kg) 2022 (28.9)      Total Intake(mL/kg) 3262 (46.7) 1900 (27.2)     Urine (mL/kg/hr) 1185 (0.7) 1100 (0.7) 400 (1.6)    Stool 0 (0) 0 (0) 0 (0)    Chest Tube 210 (0.1)      Total Output 1395 1100 400    Net +1867 +800 -400           Urine Occurrence  2 x     Stool Occurrence 0 x 0 x 0 x          Physical Exam  NAD, AAOx3  RRR  CTAB  Incisions CDI  Abd S/ND/NTTP    Significant Labs:  CBC:   Recent Labs  Lab 11/12/17 0705   WBC 7.72   RBC 3.48*   HGB 10.3*   HCT 31.2*   *   MCV 90   MCH 29.6   MCHC 33.0     BMP:   Recent Labs  Lab 11/12/17 0705 11/12/17 0828   GLU  --  115*   NA  --  138   K  --  4.2   CL  --  104   CO2  --  29   BUN  --  10   CREATININE  --  0.7   CALCIUM  --  9.0   MG 1.8  --

## 2017-11-12 NOTE — OP NOTE
Date of Procedure: 11/10/2017    Pre-operative Diagnosis: Right Lower Lobe Lung Mass    Post-operative Diagnosis: Subpleural Right Hemidiaphragm Mass    Procedure(s): Right Thoracoscopy with Biopsy of Diaphragm Mass    Surgeon: Oswaldo Chandra MD    Assistant(s): Mayito Bell MD    Anesthesia: GETA    Findings: Inflammatory adhesions between lung and chest wall circumferentially.  Subpleural mass on right hemidiaphragm with purulent and inflammatory contents, negative for malignancy on frozen    Estimated Blood Loss: 100mL    Drains: 24 [place]ian straight chest tube    Specimen(s): Right Diaphragm Mass    Complications: None    Indications for Procedure: 78 yo male with FDG-avid right pleural-based mass that has grown on serial CT scans.  Percutaneous biopsy was non-diagnostic.  It was decided to proceed with thoracoscopy for diagnosis and treatment.  Risks, benefits and possible outcomes were discussed in detail with the patient, and he  and his family were given the opportunity to ask questions and have those questions answered to their satisfaction.  he desires to proceed and signed consent.    Procedure in Detail: The patient was taken to the operating room and placed supine on the operating table.  Adequate general anesthesia was achieved.  Double lumen endotracheal tube was placed and its position and function were confirmed with bronchoscopy.  He was turned to the left lateral decubitus position, padded appropriately and secured.  Right chest was prepped and draped in standard sterile fashion.  Intravenous antibiotics were administered.  Right lung was isolated.  Time-out was performed.  A 1.5cm incision was made in the 8th intercostal space in the posterior axillary line.  Port and camera were inserted.  The lung was stuck to the chest wall circumferentially.  A 4cm incision was made in the 5th intercostal space in the anterior axillary line.  Subcutaneous tissue was divided with electrocautery and the chest  was entered.  The lung was freed from the chest wall bluntly.  Wound protector was placed.  The lower lobe was freed from the chest wall and diaphragm until the area of concern was identified.  There was a firm mass on the surface of the right hemidiaphragm and no appreciable lung mass.  The mass was biopsied using cupped forceps and purulent and inflammatory contents were expressed.  Frozen section revealed no malignancy.  The mass was enucleated.  The chest was irrigated with one liter of sterile water followed by one liter of normal saline with 50,000 Units of Bacitracin.  All irrigation was evacuated.  Then, 20mL of 1.3% Exparel was mixed with 10mL normal saline and used for a regional intercostal thoracic block; 3ml were injected into the intercostal spaces from T4-T9 under direct vision after aspiration to ensure extravascular injection. De powder was applied to the biopsy site.  Hemostasis was excellent.  A 24 Chinese straight chest tube was passed through the camera port and directed posterior apically.  The lung was inflated under direct vision and filled the chest cavity nicely.  Tube was secured to the skin with Neurolon suture.  Utility incision was closed in layers with absorbable suture.  Steri-strips and sterile dressings were applied.  All sponge, needle and instrument counts were correct at the end of the case.  The patient tolerated the procedure well.  There were no immediate complications.  He was extubated in the operating room.    Disposition: PACU in stable condition

## 2017-11-12 NOTE — PROGRESS NOTES
[facility]  General Surgery  Progress Note    Subjective:         Post-Op Info:  Procedure(s):  THORACOSCOPY-VIDEO ASSISTED (VATS)  BIOPSY-DIAPHRAGM   2 Days Post-Op     Interval History: NAEON.  HD stable.  Labs wnl.  Chest tube removed yesterday.  Pt ambulating well.  Tolerating diet.    Medications:  Continuous Infusions:   Scheduled Meds:   enoxparin  40 mg Subcutaneous Q24H    furosemide  20 mg Oral Daily    spironolactone  50 mg Oral Daily     PRN Meds:hydrocodone-acetaminophen 10-325mg, hydrocodone-acetaminophen 5-325mg, metoclopramide HCl, morphine, ondansetron     Review of patient's allergies indicates:   Allergen Reactions    Humira [adalimumab] Other (See Comments)     Has lung issue and humira contraindicated     Objective:     Vital Signs (Most Recent):  Temp: 98.1 °F (36.7 °C) (11/12/17 0821)  Pulse: 84 (11/12/17 0821)  Resp: 20 (11/12/17 0821)  BP: (!) 122/58 (11/12/17 0821)  SpO2: 98 % (11/12/17 0821) Vital Signs (24h Range):  Temp:  [97 °F (36.1 °C)-98.3 °F (36.8 °C)] 98.1 °F (36.7 °C)  Pulse:  [76-96] 84  Resp:  [18-20] 20  SpO2:  [95 %-99 %] 98 %  BP: (113-152)/(54-65) 122/58     Weight: 69.9 kg (154 lb)  Body mass index is 28.17 kg/m².    Intake/Output - Last 3 Shifts       11/10 0700 - 11/11 0659 11/11 0700 - 11/12 0659 11/12 0700 - 11/13 0659    P.O. 1240 1900     I.V. (mL/kg) 2022 (28.9)      Total Intake(mL/kg) 3262 (46.7) 1900 (27.2)     Urine (mL/kg/hr) 1185 (0.7) 1100 (0.7) 400 (1.6)    Stool 0 (0) 0 (0) 0 (0)    Chest Tube 210 (0.1)      Total Output 1395 1100 400    Net +1867 +800 -400           Urine Occurrence  2 x     Stool Occurrence 0 x 0 x 0 x          Physical Exam  NAD, AAOx3  RRR  CTAB  Incisions CDI  Abd S/ND/NTTP    Significant Labs:  CBC:   Recent Labs  Lab 11/12/17  0705   WBC 7.72   RBC 3.48*   HGB 10.3*   HCT 31.2*   *   MCV 90   MCH 29.6   MCHC 33.0     BMP:   Recent Labs  Lab 11/12/17 0705 11/12/17  0828   GLU  --  115*   NA  --  138   K   --  4.2   CL  --  104   CO2  --  29   BUN  --  10   CREATININE  --  0.7   CALCIUM  --  9.0   MG 1.8  --            Assessment/Plan:     * Right lower lobe lung mass    78 yo M POD 2 s/p R VATS with biopsy of diaphragmatic mass    Regular diet  PO pain medication  D/c home  F/u 2 weeks            Reno Bell MD  General Surgery  [facility]

## 2017-11-13 ENCOUNTER — PATIENT MESSAGE (OUTPATIENT)
Dept: CARDIOTHORACIC SURGERY | Facility: CLINIC | Age: 77
End: 2017-11-13

## 2017-11-13 ENCOUNTER — TELEPHONE (OUTPATIENT)
Dept: CARDIOTHORACIC SURGERY | Facility: CLINIC | Age: 77
End: 2017-11-13

## 2017-11-13 NOTE — DISCHARGE SUMMARY
[facility]  General Surgery  Discharge Summary      Patient Name: Darinel Majano  MRN: [id number]  Admission Date: 11/10/2017  Hospital Length of Stay: 2 days  Discharge Date and Time: 11/12/2017 11:07 AM  Attending Physician: Oswaldo Chandra MD  Discharging Provider: Reno Bell MD  Primary Care Provider: Annabella Montiel MD     HPI:  77 y.o. male presents with PMH of BPH, HTN, Psoriasis, thrombocytopenia, squamous cell carcinoma of right hand and history of EtOH abuse (quit drinking May 2017) presents for surgical evaluation of right lung base enlarging mass. Patient reports mass incidentally found during routine CXR ordered by his PCP. After a chest CT, he was referred to Dr. Ruffin who proceeded with PET and IR biopsy. Mass was mildly PET avid with SUV max of 4.7 but no other sites of metastasis were found. IR biopsy pathology non-diagnostic.    Procedure(s):  THORACOSCOPY-VIDEO ASSISTED (VATS)  BIOPSY-DIAPHRAGM     Hospital Course: Pt underwent R VATS with biopsy of diaphragmatic mass, this was found to be negative for malignancy on frozen section.  The procedure was uncomplicated.  Pt was given a regular diet and PO pain medication on POD 0 which he tolerated well.  Chest tube was removed without difficulty on POD 1.  Pt was discharged home in stable condition on POD 2.    Consults:     Significant Diagnostic Studies: see EMR    Pending Diagnostic Studies:     None        Final Active Diagnoses:    Diagnosis Date Noted POA    PRINCIPAL PROBLEM:  Right lower lobe lung mass [R91.8] 11/10/2017 Yes      Problems Resolved During this Admission:    Diagnosis Date Noted Date Resolved POA      Discharged Condition: good    Disposition: Home or Self Care    Follow Up:  Follow-up Information     Oswaldo Chandra MD In 2 weeks.    Specialty:  Cardiothoracic Surgery  Contact information:  [address] [facility] [address]  [phone]                 Patient Instructions:     Diet general      Activity as tolerated     Lifting restrictions   Order Comments: No lifting more than 10 pounds for 2 weeks     Call MD for:  redness, tenderness, or signs of infection (pain, swelling, redness, odor or green/yellow discharge around incision site)     Call MD for:  severe uncontrolled pain     Call MD for:  persistent nausea and vomiting or diarrhea     Call MD for:  temperature >100.4     Remove dressing in 24 hours     Shower on day dressing removed (No bath)       Medications:  Reconciled Home Medications:   Discharge Medication List as of 11/12/2017  9:46 AM      START taking these medications    Details   senna-docusate 8.6-50 mg (SENNA WITH DOCUSATE SODIUM) 8.6-50 mg per tablet Take 1 tablet by mouth once daily., Starting Sun 11/12/2017, OTC      hydrocodone-acetaminophen 5-325mg (NORCO) 5-325 mg per tablet Take 1 tablet by mouth every 4 (four) hours as needed., Starting Sun 11/12/2017, Print         CONTINUE these medications which have NOT CHANGED    Details   alendronate (FOSAMAX) 70 MG tablet Take 1 tablet (70 mg total) by mouth every 7 days., Starting Fri 6/9/2017, Until Sat 6/9/2018, Normal      furosemide (LASIX) 20 MG tablet Take 1 tablet (20 mg total) by mouth once daily., Starting Mon 7/10/2017, Until Tue 7/10/2018, Normal      multivitamin with minerals tablet Take 1 tablet by mouth once daily., Historical Med      spironolactone (ALDACTONE) 50 MG tablet Take 1 tablet (50 mg total) by mouth once daily., Starting Mon 7/10/2017, Until Tue 7/10/2018, Normal      triamcinolone acetonide 0.1% (KENALOG) 0.1 % ointment aaa qd to affected areas.  Avoid face and groin, Normal             Reno Bell MD  General Surgery  [facility]

## 2017-11-13 NOTE — TELEPHONE ENCOUNTER
Spoke with the pt's wife, she reports the pt is recovering well at home. She changed his dressings this morning with no issues. Pt reports only soreness to surgical site. They are aware of f/u appts on 11/27 and will call with any needs.

## 2017-11-14 LAB
BACTERIA SPEC AEROBE CULT: NORMAL
BACTERIA SPEC ANAEROBE CULT: NORMAL

## 2017-11-21 ENCOUNTER — OFFICE VISIT (OUTPATIENT)
Dept: HEPATOLOGY | Facility: CLINIC | Age: 77
End: 2017-11-21
Payer: MEDICARE

## 2017-11-21 ENCOUNTER — HOSPITAL ENCOUNTER (OUTPATIENT)
Dept: RADIOLOGY | Facility: HOSPITAL | Age: 77
Discharge: HOME | End: 2017-11-21
Attending: PHYSICIAN ASSISTANT
Payer: MEDICARE

## 2017-11-21 VITALS
BODY MASS INDEX: 28.76 KG/M2 | SYSTOLIC BLOOD PRESSURE: 154 MMHG | RESPIRATION RATE: 18 BRPM | DIASTOLIC BLOOD PRESSURE: 66 MMHG | HEIGHT: 62 IN | TEMPERATURE: 97 F | OXYGEN SATURATION: 100 % | HEART RATE: 82 BPM | WEIGHT: 156.31 LBS

## 2017-11-21 DIAGNOSIS — R74.8 ELEVATED LIVER ENZYMES: ICD-10-CM

## 2017-11-21 DIAGNOSIS — K63.5 HYPERPLASTIC POLYP OF TRANSVERSE COLON: ICD-10-CM

## 2017-11-21 DIAGNOSIS — K74.00 LIVER FIBROSIS: Primary | ICD-10-CM

## 2017-11-21 DIAGNOSIS — D64.9 ANEMIA, UNSPECIFIED TYPE: ICD-10-CM

## 2017-11-21 PROBLEM — N20.0 KIDNEY STONE ON LEFT SIDE: Status: RESOLVED | Noted: 2017-06-09 | Resolved: 2017-11-21

## 2017-11-21 PROBLEM — K42.9 UMBILICAL HERNIA: Status: RESOLVED | Noted: 2017-06-09 | Resolved: 2017-11-21

## 2017-11-21 PROBLEM — K57.30 DIVERTICULOSIS OF LARGE INTESTINE WITHOUT HEMORRHAGE: Status: RESOLVED | Noted: 2017-06-09 | Resolved: 2017-11-21

## 2017-11-21 PROBLEM — J92.9 PLEURAL PLAQUE: Status: RESOLVED | Noted: 2017-05-04 | Resolved: 2017-11-21

## 2017-11-21 PROBLEM — R91.8 MASS OF RIGHT LUNG: Status: RESOLVED | Noted: 2017-06-28 | Resolved: 2017-11-21

## 2017-11-21 PROCEDURE — 99214 OFFICE O/P EST MOD 30 MIN: CPT | Mod: S$GLB,,, | Performed by: PHYSICIAN ASSISTANT

## 2017-11-21 PROCEDURE — 76705 ECHO EXAM OF ABDOMEN: CPT | Mod: TC

## 2017-11-21 PROCEDURE — 76705 ECHO EXAM OF ABDOMEN: CPT | Mod: 26,,, | Performed by: RADIOLOGY

## 2017-11-21 PROCEDURE — 99999 PR PBB SHADOW E&M-EST. PATIENT-LVL III: CPT | Mod: PBBFAC,,, | Performed by: PHYSICIAN ASSISTANT

## 2017-11-21 NOTE — PROGRESS NOTES
"HEPATOLOGY CLINIC VISIT NOTE     REFERRING PROVIDER: Dr. Annabella Montiel     REASON FOR VISIT: Elevated liver enzymes     HISTORY: This is a 77 y.o. White male here for follow up as well as to discuss lab and U/S results. At first visit, he presented for evaluation of elevated liver enzymes in the setting of heavy alcohol use. His work up was negative for A1AT, AIH, viral hepatitis, Magdiel's.  His work up for fibrosis has been unclear. He had a liver biopsy in 2011 showing no advanced fibrosis. Labs reveal mildly elevated transaminases. He has hypoalbuminemia, hyperbilirubinemia, and thrombocytopenia noted. At first visit, he had LLE edema which has been greatly improved with lasix and spironolactone. His fibroscan did not reveal advanced fibrosis. Given his laboratory findings and edema, I have continued q6 month HCC screening. Discussed only definitive fibrosis staging would be repeating biopsy.     He did have a h/o daily alcohol use. When asked how much he would consume in one sitting, he stated, "2 beers for every hour in 24 hours." Pt reports no alcohol since mothers' day and that he doesn't not miss or crave.     PMH significant for severe psoriasis, only currently on topical agents. He has had imaging for a lung mass, pathology reassuring.     Liver staging:  Reports biopsy 5 years ago; no advanced fibrosis  Fibroscan F0-F1  Transaminases WNL  LFTs WNL  PLTs low end of normal     Risk Factors:  AI:  Biliary:   ETOH: >case per day for many years- difficult to quantify how long   Hereditary:   Medications:  NAFLD/CLAY:  Other:   Viral hepatitis:    Denies jaundice, dark urine, abdominal distention, hematemesis, melena, slowed mentation.   No abnormal skin rashes. No generalized joint pain.                   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    Anemia 5/4/2017    Aortic " atherosclerosis: see CT scan 5/17 6/9/2017    BPH (benign prostatic hypertrophy)     Chronic gout     Cortical senile cataract 5/15/2012    Degenerative disc disease     Diverticulosis of large intestine without hemorrhage: see CT scan 5/17 6/9/2017    Essential hypertension 2/19/2016    Gout     Hyperplastic polyp of transverse colon: 2012 repeat 5 years 5/4/2017    Kidney stone on left side 6/9/2017    Osteoporosis     Psoriasis     Squamous cell cancer of skin of right hand 6/9/2017    Substance abuse     Thrombocytopenia     Umbilical hernia: see CT 5/17 6/9/2017     Past Surgical History:   Procedure Laterality Date    CATARACT EXTRACTION      CHOLECYSTECTOMY  2012    EYE SURGERY      Right Hand Surgery       FAMILY HISTORY: Negative for liver disease    SOCIAL HISTORY:   History   Smoking Status    Former Smoker    Packs/day: 1.00    Years: 20.00   Smokeless Tobacco    Never Used     Comment: quit 40 yrs. ago       History   Alcohol Use No     Comment: Patient no longer drinks ETOHHistory-84 beers a week   d/c month ago, 2 beers per hour in 24 hours- many years     History   Drug Use No       ROS:   No fever, chills  No chest pain, dyspnea, cough  No abdominal pain, nausea, vomiting  (+) skin rashes   No lower extremity edema  No depression or anxiety      PHYSICAL EXAM:  Friendly White male, in no acute distress; alert and oriented to person, place and time  VITALS: reviewed  HEENT: Sclerae anicteric.   NECK: Supple  LUNGS: Normal respiratory effort.  ABDOMEN: Flat, soft, nontender.   SKIN: Warm and dry. No jaundice  EXTREMITIES: mild LL edema bilaterally  NEURO/PSYCH: Normal gate. Memory intact. Thought and speech pattern appropriate. Behavior normal. No depression or anxiety noted.    RECENT LABS:  Lab Results   Component Value Date    WBC 8.29 11/21/2017    HGB 11.2 (L) 11/21/2017     11/21/2017     Lab Results   Component Value Date    INR 0.9 11/21/2017     Lab Results    Component Value Date    AST 17 08/18/2017    ALT 12 08/18/2017    BILITOT 0.6 08/18/2017    ALBUMIN 3.8 08/18/2017    ALKPHOS 71 08/18/2017    CREATININE 0.7 11/12/2017    BUN 10 11/12/2017     11/12/2017    K 4.2 11/12/2017    AFP 5.1 08/18/2017     RECENT IMAGING:  U/S 11/2017  Time of Procedure: 11/21/17 07:43:00  Accession # [id number]    Reason for study: Elevated liver enzymes    Comparison: Ultrasound abdomen 8/20/12    Technique: Abdominal ultrasound was performed.    Findings: The liver is normal in size, measuring 15.3cm.  Hepatic parenchyma is echogenic and diffusely attenuating with an elevated hepatorenal index of 2.0 indicating greater than 5% hepatic steatosis. The liver is without evidence for masses.  No intra- or extrahepatic biliary ductal dilatation. The common bile duct measures 0.2 cm.  The gallbladder is surgically absent. The visualized portions of the pancreas and IVC appear normal. The spleen is normal in size and measures 11.5 x 5.1 cm. No ascites.   Impression         Hepatic steatosis.    Status post cholecystectomy.           CT chest, abdomen and pelvis   Narrative     Procedure comments: The patient was surveyed from the lung apices through the pelvis after the administration of 75 cc Omni 350 IV contrast as well as oral contrast and data was reconstructed for coronal, sagittal, and axial images.    Comparison: Chest radiograph 5/1/2017, and 2/28/2014, CT abdomen pelvis 7/3/2012.    Findings:    Examination of the vascular and soft tissue structures at the base of the neck is unremarkable aside from a thyroid subcentimeter hypodensity too small to further characterize..    A left sided aortic arch with 3 branch vessels is identified.  The thoracic aorta maintains normal caliber, contour, and course with mild atherosclerotic calcification within its course.  The heart is not enlarged and there is no evidence of pericardial effusion.    The trachea is midline, the proximal airways  are patent, and the lungs are symmetrically expanded.  Examination of the lung fields demonstrates a 3.6 cm mass with some internal hyperdensity, and peripheral contrast enhancement. This mass demonstrates smooth margins and is located in the right lower lobe lung base. Distortion of local vessels is not apparent. There appears to be local pleural thickening.     There is no axillary, mediastinal, or hilar lymphadenopathy.    The esophagus maintains a normal course and caliber.     The liver is normal in size and attenuation with no focal hepatic abnormality.  Gallbladder surgically absent..  There is no intra-or extrahepatic biliary ductal dilatation.    The stomach, pancreas, and adrenal glands are unremarkable. There is splenomegaly.    The kidneys are small size bilaterally. There is a left-sided punctate nephrolithiasis appears nonobstructing..  They concentrate and excrete contrast properly on delayed imaging.  There is no evidence of hydronephrosis.  The ureters appear normal in course and caliber without evidence of ureteral dilatation. The urinary bladder is nondistended.     The visualized loops of small and large bowel show no evidence of obstruction or inflammation.  There is marked diverticulosis without evidence of diverticulitis most prominent in the sigmoid colon.    There is no ascites, free fluid, or intraperitoneal free air.     There is no evidence of lymph node enlargement in the abdomen or pelvis.    The abdominal aorta is normal in course and caliber with mild atherosclerotic calcifications.    The osseus structures demonstrate degenerative change without evidence of acute fracture or osseus destructive process.      The extraperitoneal soft tissues are unremarkable, aside from a fat-containing umbilical hernia.   Impression         There is a 3.6 mass with smooth margins and adjacent pleural thickening in the right lung base, which may correlate to findings on recent and remote chest  radiographs 5/1/2017, 2/28/2014. This is nonspecific finding, and may represent atelectasis, infection, noninfectious inflammation, or neoplasm. Clinical considerations may determine the role for three-month followup, PET/CT, or biopsy.    Some findings in keeping with patient's history of alcohol use, including splenomegaly and minimal recannulization of the umbilical vein, which may relate to hepatic fibrosis or cirrhosis.    Other incidental findings including punctate left nephrolith, bilaterally small sized kidneys, fat-containing umbilical hernia, dense calcific atherosclerosis of the coronary vessels, and mild calcific atherosclerosis of aorta, and its branch vessels.    This report has been flagged in the [facility] medical record.     ASSESSMENT  77 y.o. White male with:  1. ? LIVER FIBROSIS  --Reports biopsy 5 years ago; no advanced fibrosis  --Fibroscan F0-F1  --  hypoalbuminemia, hyperbilirubinemia, and thrombocytopenia improved with alcohol cessation  -- splenomegaly and recanalized umbilical vein  -- Will continue q6 month HCC screening  - HCC screening:               - U/S: next due 05/2018               - AFP: WNL     (?)Portal HTN:               - EGD: ordered today, linked with colonoscopy       2. ANKLE EDEMA  -- Lasix 20mg, Spironolactone 50mg, stable on current dosing      PLAN:  1. Follow up visit in 6 months with HCC screening  2. EGD and colonoscopy as ordered     Thank you for allowing me to participate in the care of Darinel Nair PA-C

## 2017-11-27 ENCOUNTER — OFFICE VISIT (OUTPATIENT)
Dept: CARDIOTHORACIC SURGERY | Facility: CLINIC | Age: 77
End: 2017-11-27
Payer: MEDICARE

## 2017-11-27 ENCOUNTER — HOSPITAL ENCOUNTER (OUTPATIENT)
Dept: RADIOLOGY | Facility: HOSPITAL | Age: 77
Discharge: HOME | End: 2017-11-27
Payer: MEDICARE

## 2017-11-27 VITALS
WEIGHT: 162.5 LBS | HEART RATE: 87 BPM | SYSTOLIC BLOOD PRESSURE: 123 MMHG | OXYGEN SATURATION: 99 % | BODY MASS INDEX: 29.9 KG/M2 | HEIGHT: 62 IN | DIASTOLIC BLOOD PRESSURE: 71 MMHG

## 2017-11-27 DIAGNOSIS — R22.2 PLEURAL NODULE: Primary | ICD-10-CM

## 2017-11-27 DIAGNOSIS — R91.8 LUNG MASS: ICD-10-CM

## 2017-11-27 PROCEDURE — 71020 XR CHEST PA AND LATERAL: CPT | Mod: 26,,, | Performed by: RADIOLOGY

## 2017-11-27 PROCEDURE — 99024 POSTOP FOLLOW-UP VISIT: CPT | Mod: S$GLB,,, | Performed by: THORACIC SURGERY (CARDIOTHORACIC VASCULAR SURGERY)

## 2017-11-27 PROCEDURE — 71020 XR CHEST PA AND LATERAL: CPT | Mod: TC

## 2017-11-27 PROCEDURE — 99999 PR PBB SHADOW E&M-EST. PATIENT-LVL III: CPT | Mod: PBBFAC,,, | Performed by: THORACIC SURGERY (CARDIOTHORACIC VASCULAR SURGERY)

## 2017-11-27 NOTE — PROGRESS NOTES
Subjective:       Patient ID: Darinel Majano is a 77 y.o. male.    Chief Complaint: Post-op Evaluation    HPI   77 y.o. male presents with PMH of BPH, HTN, Psoriasis, thrombocytopenia, squamous cell carcinoma of right hand and history of EtOH abuse (quit drinking May 2017) here today for 2 week f/u of Right VATS wedge and diaphgram biopsy on 11/10/17. Doing well. Weaned off of pain meds. Exercise tolerance increasing. Few episodes of dizziness once discharged from hospital but have since resolved. Denies fever, chills, CP, SOB, cough, nausea, vomiting.         Review of Systems   Constitutional: Negative for chills, fatigue and fever.   Respiratory: Negative for cough, chest tightness and shortness of breath.    Cardiovascular: Negative for chest pain and palpitations.   Gastrointestinal: Negative for abdominal pain, nausea and vomiting.   Genitourinary: Negative for difficulty urinating.   Skin: Negative for color change and rash.   Psychiatric/Behavioral: Negative for agitation. The patient is not nervous/anxious.          Objective:      Physical Exam   Constitutional: He is oriented to person, place, and time. He appears well-developed and well-nourished.   HENT:   Head: Normocephalic and atraumatic.   Eyes: EOM are normal. Pupils are equal, round, and reactive to light.   Neck: Normal range of motion. Neck supple. No tracheal deviation present.   Cardiovascular: Normal rate, regular rhythm and normal heart sounds.    Pulmonary/Chest: Effort normal. He has decreased breath sounds in the right lower field.   Abdominal: Soft.   Musculoskeletal:   Ambulates with cane   Lymphadenopathy:     He has no cervical adenopathy.   Neurological: He is alert and oriented to person, place, and time.   Skin: Skin is warm and dry.   Right VATS healing well. No drainage or erythema. Suture intact.    Psychiatric: He has a normal mood and affect. Thought content normal.   Vitals reviewed.        Pathology:   1 DIAPHRAGM  MASS:   GRANULATION TISSUE WITH ACUTE AND CHRONIC INFLAMMATION, FOREIGN BODY GIANT CELL REACTION.  2 DIAPHRAGM MASS #2:   GRANULATION TISSUE WITH ACUTE AND CHRONIC INFLAMMATION, FOREIGN BODY GIANT CELL REACTION.  3 RIGHT LOWER LOBE WEDGE:   BENIGN LUNG TISSUE WITH FOCAL VASCULAR PROLIFERATION AND REACTIVE EPITHELIAL HYPERPLASTIC CHANGES.    CXR: reviewed. Stable post-operative changes.     Assessment:       77 y.o. male presents with PMH of BPH, HTN, Psoriasis, thrombocytopenia, squamous cell carcinoma of right hand and history of EtOH abuse (quit drinking May 2017) here today for 2 week f/u of Right VATS with biopsy on 11/10/17. Doing well. Pathology reviewed.     Plan:       RTC in 3 months with chest CT    ATTENDING ATTESTATION:    I evaluated the patient and I agree with the assessment and plan.  Doing well.  Pain controlled.  Incisions healing well, CXR with expected post-op change.  Path benign, detailed above.  Culture showed only few enterococcus.  RTC in 3 months with chest CT.

## 2017-12-04 RX ORDER — SPIRONOLACTONE 50 MG/1
50 TABLET, FILM COATED ORAL DAILY
Qty: 30 TABLET | Refills: 6 | Status: SHIPPED | OUTPATIENT
Start: 2017-12-04 | End: 2018-04-30 | Stop reason: CLARIF

## 2017-12-07 DIAGNOSIS — Z12.11 ENCOUNTER FOR COLONOSCOPY DUE TO HISTORY OF COLONIC POLYP: Primary | ICD-10-CM

## 2017-12-07 DIAGNOSIS — Z86.010 ENCOUNTER FOR COLONOSCOPY DUE TO HISTORY OF COLONIC POLYP: Primary | ICD-10-CM

## 2017-12-07 RX ORDER — POLYETHYLENE GLYCOL 3350, SODIUM SULFATE ANHYDROUS, SODIUM BICARBONATE, SODIUM CHLORIDE, POTASSIUM CHLORIDE 236; 22.74; 6.74; 5.86; 2.97 G/4L; G/4L; G/4L; G/4L; G/4L
4 POWDER, FOR SOLUTION ORAL ONCE
Qty: 4000 ML | Refills: 0 | Status: SHIPPED | OUTPATIENT
Start: 2017-12-07 | End: 2017-12-07

## 2017-12-14 LAB — FUNGUS SPEC CULT: NORMAL

## 2018-01-09 ENCOUNTER — OFFICE VISIT (OUTPATIENT)
Dept: PODIATRY | Facility: CLINIC | Age: 78
End: 2018-01-09
Payer: MEDICARE

## 2018-01-09 VITALS
SYSTOLIC BLOOD PRESSURE: 138 MMHG | BODY MASS INDEX: 29.81 KG/M2 | DIASTOLIC BLOOD PRESSURE: 51 MMHG | HEART RATE: 77 BPM | HEIGHT: 62 IN | WEIGHT: 162 LBS

## 2018-01-09 DIAGNOSIS — I73.9 ASYMPTOMATIC PVD (PERIPHERAL VASCULAR DISEASE): ICD-10-CM

## 2018-01-09 DIAGNOSIS — B35.1 ONYCHOMYCOSIS: ICD-10-CM

## 2018-01-09 DIAGNOSIS — G60.9 IDIOPATHIC PERIPHERAL NEUROPATHY: Primary | ICD-10-CM

## 2018-01-09 PROCEDURE — 99499 UNLISTED E&M SERVICE: CPT | Mod: S$GLB,,, | Performed by: PODIATRIST

## 2018-01-09 PROCEDURE — 11721 DEBRIDE NAIL 6 OR MORE: CPT | Mod: Q9,S$GLB,, | Performed by: PODIATRIST

## 2018-01-09 PROCEDURE — 99999 PR PBB SHADOW E&M-EST. PATIENT-LVL III: CPT | Mod: PBBFAC,,, | Performed by: PODIATRIST

## 2018-01-09 NOTE — PROCEDURES
"Routine Foot Care  Date/Time: 1/9/2018 10:15 AM  Performed by: SONU MCKEON  Authorized by: SONU MCKEON     Time out: Immediately prior to procedure a "time out" was called to verify the correct patient, procedure, equipment, support staff and site/side marked as required.    Consent Done?:  Yes (Verbal)  Hyperkeratotic Skin Lesions?: No      Nail Care Type:  Debride  Location(s): All  (Left 1st Toe, Left 3rd Toe, Left 2nd Toe, Left 4th Toe, Left 5th Toe, Right 1st Toe, Right 2nd Toe, Right 3rd Toe, Right 4th Toe and Right 5th Toe)  Patient tolerance:  Patient tolerated the procedure well with no immediate complications      "

## 2018-01-09 NOTE — PROGRESS NOTES
"Subjective:      Patient ID: Darinel Majano is a 77 y.o. male.    Chief Complaint: Callouses and Nail Care    Darinel is a 77 y.o. male who presents to the clinic for evaluation and treatment of high risk feet. 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 The patient's chief complaint is long, thick toenails. This patient has documented high risk feet requiring routine maintenance secondary to peripheral vascular disease. Accompanied by his wife who acts also as historian and care taker.     1/9/17: Pt seen today for diabetic foot exam and nail care. Presents with wife. Relates to minor pain to left 5th tailors bunion deformity. No other pedal complaints at this time.         PCP: Annabella Montiel MD    Date Last Seen by PCP: 6/9/17    Current shoe gear:  Affected Foot: Slip-on shoes     Unaffected Foot: Slip-on shoes    Last encounter in this department: Visit date not found    Hemoglobin A1C   Date Value Ref Range Status   06/29/2011 4.7 4.0 - 6.2 % Final   01/05/2011 4.6 4.0 - 6.2 % Final     Vitals:    01/09/18 0948   BP: (!) 138/51   Pulse: 77   Weight: 73.5 kg (162 lb)   Height: 5' 2" (1.575 m)   PainSc: 0-No pain      Past Medical History:   Diagnosis Date    Age-related osteoporosis without current pathological " fracture: see DEXA 2017 6/9/2017    Alcohol-induced polyneuropathy 11/2/2017    Anemia 5/4/2017    Aortic atherosclerosis: see CT scan 5/17 6/9/2017    BPH (benign prostatic hypertrophy)     Chronic gout     Cortical senile cataract 5/15/2012    Degenerative disc disease     Diverticulosis of large intestine without hemorrhage: see CT scan 5/17 6/9/2017    Essential hypertension 2/19/2016    Gout     Hyperplastic polyp of transverse colon: 2012 repeat 5 years 5/4/2017    Kidney stone on left side 6/9/2017    Osteoporosis     Psoriasis     Squamous cell cancer of skin of right hand 6/9/2017    Substance abuse     Thrombocytopenia     Umbilical hernia: see CT 5/17 6/9/2017       Past Surgical History:   Procedure Laterality Date    CATARACT EXTRACTION      CHOLECYSTECTOMY  2012    EYE SURGERY      Right Hand Surgery         Family History   Problem Relation Age of Onset    Cataracts Mother     Diabetes Mother     Hypertension Mother     Cancer Father      stomach- smoker    Stomach cancer Father     No Known Problems Sister     Heart disease Brother      PPM    Amblyopia Neg Hx     Blindness Neg Hx     Glaucoma Neg Hx     Retinal detachment Neg Hx     Strabismus Neg Hx     Stroke Neg Hx     Thyroid disease Neg Hx     Macular degeneration Neg Hx     Lupus Neg Hx     Rheum arthritis Neg Hx     Psoriasis Neg Hx     Melanoma Neg Hx     Anesthesia problems Neg Hx        Social History     Social History    Marital status:      Spouse name: N/A    Number of children: N/A    Years of education: N/A     Social History Main Topics    Smoking status: Former Smoker     Packs/day: 1.00     Years: 20.00    Smokeless tobacco: Never Used      Comment: quit 40 yrs. ago    Alcohol use No      Comment: Patient no longer drinks ETOHHistory-84 beers a week    Drug use: No    Sexual activity: Not Asked     Other Topics Concern    None     Social History Narrative    None        Current Outpatient Prescriptions   Medication Sig Dispense Refill    alendronate (FOSAMAX) 70 MG tablet Take 1 tablet (70 mg total) by mouth every 7 days. 4 tablet 11    furosemide (LASIX) 20 MG tablet Take 1 tablet (20 mg total) by mouth once daily. 30 tablet 6    hydrocodone-acetaminophen 5-325mg (NORCO) 5-325 mg per tablet Take 1 tablet by mouth every 4 (four) hours as needed. 41 tablet 0    multivitamin with minerals tablet Take 1 tablet by mouth once daily.      spironolactone (ALDACTONE) 50 MG tablet Take 1 tablet (50 mg total) by mouth once daily. 30 tablet 6    triamcinolone acetonide 0.1% (KENALOG) 0.1 % ointment aaa qd to affected areas.  Avoid face and groin 454 g 3     No current facility-administered medications for this visit.        Review of patient's allergies indicates:   Allergen Reactions    Humira [adalimumab] Other (See Comments)       Review of Systems   Constitution: Negative for chills, fever, weakness and malaise/fatigue.   HENT: Positive for hearing loss.    Cardiovascular: Positive for leg swelling.   Skin: Positive for color change, dry skin and nail changes.   Musculoskeletal: Positive for back pain and muscle cramps.   Gastrointestinal: Negative for nausea and vomiting.   Neurological: Positive for paresthesias. Negative for numbness.           Objective:      Physical Exam   Constitutional: He is oriented to person, place, and time. No distress.   Cardiovascular:   Pulses:       Dorsalis pedis pulses are 1+ on the right side, and 1+ on the left side.        Posterior tibial pulses are 1+ on the right side, and 1+ on the left side.   CFT< 3 secs all toes bilateral foot, skin temp warm to cool bilateral foot L>R, no hair growth bilateral lower extremity, moderate pitting lower extremity edema with multiple varicosities and telangiectasias bilateral. + rubor on dependency of foot bilateral.      Musculoskeletal:        Right foot: There is decreased range of motion and  deformity.        Left foot: There is decreased range of motion and deformity.   EV/IN + 4/5 bilateral. PF/DF toes bilateral +4/5 bilateral. Remaining muscle groups bilateral +5/5.    Non-reducible claw toe deformities 2-5 bilateral foot.    No pain with ROM or MMT bilateral lower extremity.    Tailors bunion noted to walter foot, left with increase erythema   Feet:   Right Foot:   Protective Sensation: 10 sites tested. 3 sites sensed.   Skin Integrity: Positive for dry skin. Negative for ulcer, blister, skin breakdown, erythema, warmth or callus.   Left Foot:   Protective Sensation: 10 sites tested. 6 sites sensed.   Skin Integrity: Positive for dry skin. Negative for ulcer, blister, skin breakdown, erythema, warmth or callus.   Neurological: He is alert and oriented to person, place, and time. A sensory deficit is present.   Vibratory sensation severely decreased bilateral foot along medial first met head with tuning fork.   Skin: Skin is dry and intact. Capillary refill takes less than 2 seconds. No ecchymosis noted. He is not diaphoretic.   Erythematous patches involving the legs bilateral.    Nails 1-5 right and 1,3,4,5 left foot are elongated 3-4 mm, thickened 1-2 mm with loosening, debris.    No open lesions or macerations bilateral lower extremity.    Hyperkeratotic skin plantar fifth met head and lateral third toe PIPJ left foot.             Assessment:       Encounter Diagnoses   Name Primary?    Idiopathic peripheral neuropathy Yes    Asymptomatic PVD (peripheral vascular disease)     Onychomycosis          Plan:       Darinel was seen today for callouses and nail care.    Diagnoses and all orders for this visit:    Idiopathic peripheral neuropathy  -     Foot Care    Asymptomatic PVD (peripheral vascular disease)  -     Foot Care    Onychomycosis  -     Foot Care      I counseled the patient on his conditions, their implications and medical management.    Shoe inspection. Diabetic Foot Education. Patient  reminded of the importance of good nutrition and blood sugar control to help prevent podiatric complications of diabetes. Patient instructed on proper foot hygeine. We discussed wearing proper shoe gear, daily foot inspections, never walking without protective shoe gear, never putting sharp instruments to feet, routine podiatric nail visits every 2-3 months.      Routine foot care per attached note.    Dior Trejo DPM PGY 2      I have personally taken the history and examined this patient and agree with the resident's note as stated as above.   Kleber BLANDM, FACFAS

## 2018-01-12 LAB
ACID FAST MOD KINY STN SPEC: NORMAL
MYCOBACTERIUM SPEC QL CULT: NORMAL

## 2018-01-23 ENCOUNTER — OFFICE VISIT (OUTPATIENT)
Dept: PULMONOLOGY | Facility: CLINIC | Age: 78
End: 2018-01-23
Payer: MEDICARE

## 2018-01-23 VITALS
SYSTOLIC BLOOD PRESSURE: 122 MMHG | WEIGHT: 166.69 LBS | HEART RATE: 85 BPM | OXYGEN SATURATION: 100 % | BODY MASS INDEX: 30.67 KG/M2 | HEIGHT: 62 IN | DIASTOLIC BLOOD PRESSURE: 68 MMHG

## 2018-01-23 DIAGNOSIS — R91.1 LUNG NODULE: Primary | ICD-10-CM

## 2018-01-23 PROCEDURE — 99999 PR PBB SHADOW E&M-EST. PATIENT-LVL III: CPT | Mod: PBBFAC,,, | Performed by: INTERNAL MEDICINE

## 2018-01-23 PROCEDURE — 99213 OFFICE O/P EST LOW 20 MIN: CPT | Mod: S$GLB,,, | Performed by: INTERNAL MEDICINE

## 2018-01-23 NOTE — PROGRESS NOTES
Subjective:       Patient ID: Darinel Majano is a 77 y.o. male.    Chief Complaint: Pulmonary Nodules    HPI   Darinel Majano 77 y.o. 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    has a past surgical history that includes Cataract extraction; Cholecystectomy (2012); Eye surgery; and Right Hand Surgery.   reports that he has quit smoking. He has a 20.00 pack-year smoking history. He has never used smokeless tobacco. He reports that he does not drink alcohol or use drugs.  Referred by: No ref. provider found  Who had concerns including Pulmonary Nodules.  The patient's last visit with me was on 10/17/2017.    Doing well, no issues, tolerated surgery well and now recovered  No fever chills, ns, wt changes, nausea, vomiting, diarrhea, constipation, chest pain, tightness, pressure    Review of Systems    Objective:      Physical Exam  Personal Diagnostic Review    No flowsheet data found.      Assessment:       No diagnosis found.    Outpatient Encounter Prescriptions as of 1/23/2018   Medication Sig Dispense Refill    alendronate (FOSAMAX) 70 MG tablet Take 1 tablet (70 mg total) by mouth every 7 days. 4 tablet 11    furosemide (LASIX) 20 MG tablet Take 1 tablet (20 mg total) by mouth once daily. 30 tablet 6     hydrocodone-acetaminophen 5-325mg (NORCO) 5-325 mg per tablet Take 1 tablet by mouth every 4 (four) hours as needed. 41 tablet 0    multivitamin with minerals tablet Take 1 tablet by mouth once daily.      spironolactone (ALDACTONE) 50 MG tablet Take 1 tablet (50 mg total) by mouth once daily. 30 tablet 6    triamcinolone acetonide 0.1% (KENALOG) 0.1 % ointment aaa qd to affected areas.  Avoid face and groin 454 g 3    [DISCONTINUED] spironolactone (ALDACTONE) 50 MG tablet Take 1 tablet (50 mg total) by mouth once daily. 30 tablet 6     No facility-administered encounter medications on file as of 1/23/2018.      No orders of the defined types were placed in this encounter.    Plan:           Assessment:  Darinel was seen today for pulmonary nodules.    Diagnoses and all orders for this visit:    Lung nodule        Plan:  Biopsy negative- granulomas  No post op issues  F/u prn  Follow-up if symptoms worsen or fail to improve.    There are no Patient Instructions on file for this visit.    Immunization History   Administered Date(s) Administered    Influenza - High Dose 09/21/2012, 11/07/2015    Pneumococcal Conjugate - 13 Valent 02/19/2016    Pneumococcal Polysaccharide - 23 Valent 09/21/2012

## 2018-02-05 ENCOUNTER — ANESTHESIA (OUTPATIENT)
Dept: ENDOSCOPY | Facility: HOSPITAL | Age: 78
End: 2018-02-05
Payer: MEDICARE

## 2018-02-05 ENCOUNTER — ANESTHESIA EVENT (OUTPATIENT)
Dept: ENDOSCOPY | Facility: HOSPITAL | Age: 78
End: 2018-02-05
Payer: MEDICARE

## 2018-02-05 ENCOUNTER — HOSPITAL ENCOUNTER (OUTPATIENT)
Facility: HOSPITAL | Age: 78
Discharge: HOME | End: 2018-02-05
Attending: INTERNAL MEDICINE | Admitting: INTERNAL MEDICINE
Payer: MEDICARE

## 2018-02-05 ENCOUNTER — SURGERY (OUTPATIENT)
Age: 78
End: 2018-02-05

## 2018-02-05 VITALS
SYSTOLIC BLOOD PRESSURE: 121 MMHG | RESPIRATION RATE: 18 BRPM | HEIGHT: 61 IN | DIASTOLIC BLOOD PRESSURE: 56 MMHG | BODY MASS INDEX: 30.58 KG/M2 | HEART RATE: 74 BPM | TEMPERATURE: 98 F | WEIGHT: 162 LBS | OXYGEN SATURATION: 100 %

## 2018-02-05 DIAGNOSIS — K63.5 HYPERPLASTIC POLYP OF TRANSVERSE COLON: Primary | ICD-10-CM

## 2018-02-05 PROCEDURE — 63600175 PHARM REV CODE 636 W HCPCS: Performed by: NURSE ANESTHETIST, CERTIFIED REGISTERED

## 2018-02-05 PROCEDURE — 88305 TISSUE EXAM BY PATHOLOGIST: CPT | Mod: 26,,, | Performed by: PATHOLOGY

## 2018-02-05 PROCEDURE — G0105 COLORECTAL SCRN; HI RISK IND: HCPCS | Mod: ,,, | Performed by: INTERNAL MEDICINE

## 2018-02-05 PROCEDURE — 43239 EGD BIOPSY SINGLE/MULTIPLE: CPT | Mod: 51,,, | Performed by: INTERNAL MEDICINE

## 2018-02-05 PROCEDURE — 88305 TISSUE EXAM BY PATHOLOGIST: CPT | Performed by: PATHOLOGY

## 2018-02-05 PROCEDURE — G0105 COLORECTAL SCRN; HI RISK IND: HCPCS | Performed by: INTERNAL MEDICINE

## 2018-02-05 PROCEDURE — 27201012 HC FORCEPS, HOT/COLD, DISP: Performed by: INTERNAL MEDICINE

## 2018-02-05 PROCEDURE — 43239 EGD BIOPSY SINGLE/MULTIPLE: CPT | Performed by: INTERNAL MEDICINE

## 2018-02-05 PROCEDURE — D9220A PRA ANESTHESIA: Mod: ANES,,, | Performed by: ANESTHESIOLOGY

## 2018-02-05 PROCEDURE — 37000008 HC ANESTHESIA 1ST 15 MINUTES: Performed by: INTERNAL MEDICINE

## 2018-02-05 PROCEDURE — 37000009 HC ANESTHESIA EA ADD 15 MINS: Performed by: INTERNAL MEDICINE

## 2018-02-05 PROCEDURE — D9220A PRA ANESTHESIA: Mod: CRNA,,, | Performed by: NURSE ANESTHETIST, CERTIFIED REGISTERED

## 2018-02-05 PROCEDURE — 25000003 PHARM REV CODE 250: Performed by: INTERNAL MEDICINE

## 2018-02-05 RX ORDER — LIDOCAINE HCL/PF 100 MG/5ML
SYRINGE (ML) INTRAVENOUS
Status: DISCONTINUED | OUTPATIENT
Start: 2018-02-05 | End: 2018-02-05

## 2018-02-05 RX ORDER — PROPOFOL 10 MG/ML
VIAL (ML) INTRAVENOUS
Status: DISCONTINUED | OUTPATIENT
Start: 2018-02-05 | End: 2018-02-05

## 2018-02-05 RX ORDER — SODIUM CHLORIDE 9 MG/ML
INJECTION, SOLUTION INTRAVENOUS CONTINUOUS
Status: DISCONTINUED | OUTPATIENT
Start: 2018-02-05 | End: 2018-02-05 | Stop reason: HOSPADM

## 2018-02-05 RX ADMIN — LIDOCAINE HYDROCHLORIDE 100 MG: 20 INJECTION, SOLUTION INTRAVENOUS at 07:02

## 2018-02-05 RX ADMIN — PROPOFOL 50 MG: 10 INJECTION, EMULSION INTRAVENOUS at 07:02

## 2018-02-05 RX ADMIN — PROPOFOL 100 MG: 10 INJECTION, EMULSION INTRAVENOUS at 07:02

## 2018-02-05 RX ADMIN — SODIUM CHLORIDE: 0.9 INJECTION, SOLUTION INTRAVENOUS at 07:02

## 2018-02-05 NOTE — PROVATION PATIENT INSTRUCTIONS
Discharge Summary/Instructions after an Endoscopic Procedure  Patient Name: Darinel Majano  Patient MRN: [id number]  Patient YOB: 1940 Monday, February 05, 2018  Marcial Paredes MD  RESTRICTIONS:  During your procedure today, you received medications for sedation.  These   medications may affect your judgment, balance and coordination.  Therefore,   for 24 hours, you have the following restrictions:   - DO NOT drive a car, operate machinery, make legal/financial decisions,   sign important papers or drink alcohol.    ACTIVITY:  The following day: return to full activity including work, except no heavy   lifting, straining or running for 3 days if polyps were removed.  DIET:  Eat and drink normally unless instructed otherwise.     TREATMENT FOR COMMON SIDE EFFECTS:  - Mild abdominal pain, belching, bloating or excessive gas: rest, eat   lightly and use a heating pad.  - Sore Throat: treat with throat lozenges and/or gargle with warm salt   water.  SYMPTOMS TO WATCH FOR AND REPORT TO YOUR PHYSICIAN:  1. Abdominal pain or bloating, other than gas cramps.  2. Chest pain.  3. Back pain.  4. Chills or fever occurring within 24 hours after the procedure.  5. Rectal bleeding, which would show as bright red, maroon, or black stools.   (A tablespoon of blood from the rectum is not serious, especially if   hemorrhoids are present.)  6. Vomiting.  7. Weakness or dizziness.  8. Because air was used during the procedure, expelling large amounts of air   from your rectum or belching is normal.  9. If a bowel prep was taken, you may not have a bowel movement for 1-3   days.  This is normal.  GO DIRECTLY TO THE NEAREST EMERGENCY ROOM IF YOU HAVE ANY OF THE FOLLOWING:      Difficulty breathing  Chills and/or fever over 101 F   Persistent vomiting and/or vomiting blood   Severe abdominal pain   Severe chest pain   Black, tarry stools   Bleeding- more than one tablespoon   Any other symptom or condition that you may feel needs  urgent attention  Your doctor recommends these additional instructions:  If any biopsies were taken, your doctor s clinic will contact you in 1 to 2   weeks with any results.  You have a contact number available for emergencies.  The signs and symptoms   of potential delayed complications were discussed with you.  You may return   to normal activities tomorrow.  Written discharge instructions were   provided to you.   You are being discharged to home.   Your physician has indicated that a repeat colonoscopy is not recommended   for surveillance.   The findings and recommendations were discussed with your designated   responsible adult.  For questions, problems or results please call your physician - Marcial Paredes MD at Work:  [phone].  [facility], EMERGENCY ROOM PHONE NUMBER: [phone]  IF A COMPLICATION OR EMERGENCY SITUATION ARISES AND YOU ARE UNABLE TO REACH   YOUR PHYSICIAN - GO DIRECTLY TO THE EMERGENCY ROOM.  Marcial Paredes MD  2/5/2018 8:00:49 AM  This report has been verified and signed electronically.

## 2018-02-05 NOTE — H&P
Short Stay Endoscopy History and Physical    PCP - Annabella Montiel MD    Procedure - EGD/Colonoscopy  ASA - per anesthesia  Mallampati - per anesthesia  History of Anesthesia problems - no  Family history Anesthesia problems -  no   Plan of anesthesia - MAC    HPI:  This is a 77 y.o. 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    Surgical History:  has a past surgical history that includes Cataract extraction; Cholecystectomy (2012); Eye surgery; and Right Hand Surgery.    Family History: family history includes Cancer in his father; Cataracts in his mother; Diabetes in his mother; Heart disease in his brother; Hypertension in his mother; No Known Problems in his sister; Stomach cancer in his father.. Otherwise no colon cancer, inflammatory bowel disease, or GI malignancies.    Social History:  reports that he has quit smoking. He has a 20.00 pack-year smoking history. He has never used smokeless tobacco. He reports that he does not drink alcohol or use  drugs.    Review of patient's allergies indicates:   Allergen Reactions    Humira [adalimumab] Other (See Comments)     Has lung issue and humira contraindicated       Medications:   Prescriptions Prior to Admission   Medication Sig Dispense Refill Last Dose    alendronate (FOSAMAX) 70 MG tablet Take 1 tablet (70 mg total) by mouth every 7 days. 4 tablet 11 Past Week at Unknown time    furosemide (LASIX) 20 MG tablet Take 1 tablet (20 mg total) by mouth once daily. 30 tablet 6 2/4/2018 at Unknown time    multivitamin with minerals tablet Take 1 tablet by mouth once daily.   2/4/2018 at Unknown time    spironolactone (ALDACTONE) 50 MG tablet Take 1 tablet (50 mg total) by mouth once daily. 30 tablet 6 2/4/2018 at Unknown time    hydrocodone-acetaminophen 5-325mg (NORCO) 5-325 mg per tablet Take 1 tablet by mouth every 4 (four) hours as needed. 41 tablet 0 More than a month at Unknown time    triamcinolone acetonide 0.1% (KENALOG) 0.1 % ointment aaa qd to affected areas.  Avoid face and groin 454 g 3 Unknown at Unknown time       Physical Exam:    Vital Signs:   Vitals:    02/05/18 0727   BP: (!) 146/69   Pulse: 82   Resp: 18   Temp: 97.7 °F (36.5 °C)       General Appearance: Well appearing in no acute distress  Eyes:    No scleral icterus  ENT: Neck supple, Lips, mucosa, and tongue normal; teeth and gums normal  Lungs: CTA anteriorly  Heart:  Regular rate, S1, S2 normal, no murmurs heard.  Abdomen: Soft, non tender, non distended with normal bowel sounds. No hepatosplenomegaly, ascites, or mass.  Extremities: No edema  Skin: No rash    Labs:  Lab Results   Component Value Date    WBC 8.29 11/21/2017    HGB 11.2 (L) 11/21/2017    HCT 34.1 (L) 11/21/2017     11/21/2017    CHOL 111 (L) 04/27/2017    TRIG 58 04/27/2017    HDL 71 04/27/2017    ALT 12 11/21/2017    AST 14 11/21/2017     11/21/2017    K 5.0 11/21/2017     11/21/2017    CREATININE 0.8 11/21/2017    BUN 22 11/21/2017    CO2 28  11/21/2017    TSH 2.425 04/27/2017    PSA 4.76 (H) 07/17/2013    INR 0.9 11/21/2017    HGBA1C 4.7 06/29/2011       I have explained the risks and benefits of endoscopy procedures to the patient including but not limited to bleeding, perforation, infection, and death.      Marcial Paredes MD

## 2018-02-05 NOTE — ANESTHESIA RELEASE NOTE
"Anesthesia Release from PACU Note    Patient: Darinel Maajno    Procedure(s) Performed: Procedure(s) (LRB):  ESOPHAGOGASTRODUODENOSCOPY (EGD) (N/A)  COLONOSCOPY (N/A)    Anesthesia type: general    Post pain: Adequate analgesia    Post assessment: no apparent anesthetic complications, tolerated procedure well and no evidence of recall    Last Vitals:   Visit Vitals  BP (!) 121/56 (BP Location: Left arm, Patient Position: Sitting)   Pulse 74   Temp 36.5 °C (97.7 °F) (Oral)   Resp 18   Ht 5' 1" (1.549 m)   Wt 73.5 kg (162 lb)   SpO2 100%   BMI 30.61 kg/m²       Post vital signs: stable    Level of consciousness: awake, alert  and oriented    Nausea/Vomiting: no nausea/no vomiting    Complications: none    Airway Patency: patent    Respiratory: unassisted, spontaneous ventilation, room air    Cardiovascular: stable and blood pressure at baseline    Hydration: euvolemic  "

## 2018-02-05 NOTE — PROVATION PATIENT INSTRUCTIONS
Discharge Summary/Instructions after an Endoscopic Procedure  Patient Name: Darinel Majano  Patient MRN: [id number]  Patient YOB: 1940 Monday, February 05, 2018  Marcial Paredes MD  RESTRICTIONS:  During your procedure today, you received medications for sedation.  These   medications may affect your judgment, balance and coordination.  Therefore,   for 24 hours, you have the following restrictions:   - DO NOT drive a car, operate machinery, make legal/financial decisions,   sign important papers or drink alcohol.    ACTIVITY:  The following day: return to full activity including work, except no heavy   lifting, straining or running for 3 days if polyps were removed.  DIET:  Eat and drink normally unless instructed otherwise.     TREATMENT FOR COMMON SIDE EFFECTS:  - Mild abdominal pain, belching, bloating or excessive gas: rest, eat   lightly and use a heating pad.  - Sore Throat: treat with throat lozenges and/or gargle with warm salt   water.  SYMPTOMS TO WATCH FOR AND REPORT TO YOUR PHYSICIAN:  1. Abdominal pain or bloating, other than gas cramps.  2. Chest pain.  3. Back pain.  4. Chills or fever occurring within 24 hours after the procedure.  5. Rectal bleeding, which would show as bright red, maroon, or black stools.   (A tablespoon of blood from the rectum is not serious, especially if   hemorrhoids are present.)  6. Vomiting.  7. Weakness or dizziness.  8. Because air was used during the procedure, expelling large amounts of air   from your rectum or belching is normal.  9. If a bowel prep was taken, you may not have a bowel movement for 1-3   days.  This is normal.  GO DIRECTLY TO THE NEAREST EMERGENCY ROOM IF YOU HAVE ANY OF THE FOLLOWING:      Difficulty breathing  Chills and/or fever over 101 F   Persistent vomiting and/or vomiting blood   Severe abdominal pain   Severe chest pain   Black, tarry stools   Bleeding- more than one tablespoon   Any other symptom or condition that you may feel needs  urgent attention  Your doctor recommends these additional instructions:  If any biopsies were taken, your doctor s clinic will contact you in 1 to 2   weeks with any results.  You are being discharged to home.   We are waiting for your pathology results.   Your physician has recommended a colonoscopy today.   The findings and recommendations were discussed with your designated   responsible adult.  For questions, problems or results please call your physician - Marcial Paredes MD at Work:  [phone].  [facility], EMERGENCY ROOM PHONE NUMBER: [phone]  IF A COMPLICATION OR EMERGENCY SITUATION ARISES AND YOU ARE UNABLE TO REACH   YOUR PHYSICIAN - GO DIRECTLY TO THE EMERGENCY ROOM.  Marcial Paredes MD  2/5/2018 7:47:32 AM  This report has been verified and signed electronically.

## 2018-02-05 NOTE — OR NURSING
Pt c/o severe pain to arm.  Bp cuff removed and repositioned to other arm.  Skin to right upper arm reddend.  Pt states he has severe psoriasis

## 2018-02-05 NOTE — ANESTHESIA POSTPROCEDURE EVALUATION
"Anesthesia Post Evaluation    Patient: Darinel Majano    Procedure(s) Performed: Procedure(s) (LRB):  ESOPHAGOGASTRODUODENOSCOPY (EGD) (N/A)  COLONOSCOPY (N/A)    Final Anesthesia Type: general  Patient location during evaluation: GI PACU  Patient participation: Yes- Able to Participate  Level of consciousness: awake and alert and oriented  Post-procedure vital signs: reviewed and stable  Pain management: adequate  Airway patency: patent  PONV status at discharge: No PONV  Anesthetic complications: no      Cardiovascular status: blood pressure returned to baseline and stable  Respiratory status: unassisted, spontaneous ventilation and room air  Hydration status: euvolemic  Follow-up not needed.        Visit Vitals  BP (!) 121/56 (BP Location: Left arm, Patient Position: Sitting)   Pulse 74   Temp 36.5 °C (97.7 °F) (Oral)   Resp 18   Ht 5' 1" (1.549 m)   Wt 73.5 kg (162 lb)   SpO2 100%   BMI 30.61 kg/m²       Pain/Enoch Score: Pain Assessment Performed: Yes (2/5/2018  8:32 AM)  Presence of Pain: denies (2/5/2018  8:32 AM)  Enoch Score: 10 (2/5/2018  8:32 AM)      "

## 2018-02-05 NOTE — TRANSFER OF CARE
"Anesthesia Transfer of Care Note    Patient: Darinel Majano    Procedure(s) Performed: Procedure(s) (LRB):  ESOPHAGOGASTRODUODENOSCOPY (EGD) (N/A)  COLONOSCOPY (N/A)    Patient location: PACU    Anesthesia Type: general    Transport from OR: Transported from OR on 2-3 L/min O2 by NC with adequate spontaneous ventilation    Post pain: adequate analgesia    Post assessment: no apparent anesthetic complications    Post vital signs: stable    Level of consciousness: awake, alert and oriented    Nausea/Vomiting: no nausea/vomiting    Complications: none    Transfer of care protocol was followed      Last vitals:   Visit Vitals  BP (!) 146/69 (BP Location: Left arm, Patient Position: Lying)   Pulse 82   Temp 36.5 °C (97.7 °F) (Temporal)   Resp 18   Ht 5' 1" (1.549 m)   Wt 73.5 kg (162 lb)   SpO2 100%   BMI 30.61 kg/m²     "

## 2018-02-05 NOTE — ANESTHESIA PREPROCEDURE EVALUATION
02/05/2018  Darinel Majano is a 77 y.o., male  with PMH of BPH, HTN, Psoriasis, thrombocytopenia, squamous cell carcinoma of right hand and history of EtOH abuse (quit drinking May 2017) s/p Right VATS wedge and diaphgram biopsy on 11/10/17.    Pre-op Assessment    I have reviewed the Patient Summary Reports.     I have reviewed the Nursing Notes.   I have reviewed the Medications.     Review of Systems  Anesthesia Hx:  No problems with previous Anesthesia    Social:  Alcohol Use, Non-Smoker Stopped drinking in 4/2017 was doing about 12+ BEERS PER DAY   Hematology/Oncology:         -- Anemia: --  Cancer in past history:    Cardiovascular:   Exercise tolerance: good Hypertension Denies CAD.     Denies Angina.   10/30-Nuclear stress test:    Impression: NORMAL MYOCARDIAL PERFUSION  1. The perfusion scan is free of evidence for myocardial ischemia or injury.   2. Resting wall motion is physiologic.   3. Visually estimated LV function is normal.   4. The ventricular volumes are normal at rest and stress.   5. The extracardiac distribution of radioactivity is normal.   6. There was no previous study available to compare. Functional Capacity unable to determine, Can you climb two flights of stairs? ==> Yes  Denies Coronary Artery Disease.  Hypertension    Pulmonary:   Denies Asthma.  Denies Recent URI.  Denies Sleep Apnea.  Possible Obstructive Sleep Apnea , (STOP/BANG) Symptoms S - Snoring (loud), A - Age > 50, N - Neck circumference > 40 cms and G - Gender (Male > Female)  Pulmonary Infection: (hx of + TB test when was taking humira.  TB test negative since off of humira.  Was evaluated by ID, never tx for TB. )    Renal/:   renal calculi BPH    Hepatic/GI:   Denies PUD. Denies Hiatal Hernia.  Denies GERD. Liver Disease, (wife reports some scarring of the liver is noted but no cirrhosis)  Denies Hepatitis.   Denies Esophageal / Stomach Disorders    Musculoskeletal:  Musculoskeletal General/Symptoms: Functional capacity is ambulatory with cane.  Joint Disease:  Gout    Neurological:   Denies CVA. Denies Seizures.    Endocrine:  Endocrine Normal Denies Diabetes. Denies Hypothyroidism.    Dermatological:   PSORIASIS- noted    Psych:  Psychiatric Normal           Physical Exam  General:  Obesity    Airway/Jaw/Neck:  Airway Findings: Mouth Opening: Normal Tongue: Normal  General Airway Assessment: Adult  Mallampati: I  TM Distance: Normal, at least 6 cm  Jaw/Neck Findings:     Neck ROM: Normal ROM  Neck Findings:     Eyes/Ears/Nose:  EYES/EARS/NOSE FINDINGS: Normal   Dental:  Dental Findings: In tact, Periodontal disease, Mild   Chest/Lungs:  Chest/Lungs Findings: Clear to auscultation, Normal Respiratory Rate     Heart/Vascular:  Heart Findings: Rate: Normal  Rhythm: Regular Rhythm  Sounds: Normal        Mental Status:  Mental Status Findings:  Cooperative, Alert and Oriented             Anesthesia Plan  Type of Anesthesia, risks & benefits discussed:  Anesthesia Type:  general  Patient's Preference: Proceed with anesthesia understanding that the risks are very small but could be serious or life threatening.  Intra-op Monitoring Plan: standard ASA monitors  Intra-op Monitoring Plan Comments:   Post Op Pain Control Plan:   Post Op Pain Control Plan Comments:   Induction:   IV  Beta Blocker:  Patient is not currently on a Beta-Blocker (No further documentation required).       Informed Consent: Patient understands risks and agrees with Anesthesia plan.  Questions answered. Anesthesia consent signed with patient.  ASA Score: 2     Day of Surgery Review of History & Physical: I have interviewed and examined the patient. I have reviewed the patient's H&P dated:    H&P update referred to the provider.         Ready For Surgery From Anesthesia Perspective.

## 2018-02-07 ENCOUNTER — TELEPHONE (OUTPATIENT)
Dept: GASTROENTEROLOGY | Facility: CLINIC | Age: 78
End: 2018-02-07

## 2018-02-07 ENCOUNTER — PATIENT MESSAGE (OUTPATIENT)
Dept: GASTROENTEROLOGY | Facility: CLINIC | Age: 78
End: 2018-02-07

## 2018-02-07 RX ORDER — CLARITHROMYCIN 500 MG/1
500 TABLET, FILM COATED ORAL EVERY 12 HOURS
Qty: 28 TABLET | Refills: 0 | Status: SHIPPED | OUTPATIENT
Start: 2018-02-07 | End: 2018-02-21

## 2018-02-07 RX ORDER — AMOXICILLIN 500 MG/1
1000 TABLET, FILM COATED ORAL EVERY 12 HOURS
Qty: 56 TABLET | Refills: 0 | Status: SHIPPED | OUTPATIENT
Start: 2018-02-07 | End: 2018-02-21

## 2018-02-07 RX ORDER — OMEPRAZOLE 20 MG/1
20 CAPSULE, DELAYED RELEASE ORAL 2 TIMES DAILY
Qty: 28 CAPSULE | Refills: 0 | Status: SHIPPED | OUTPATIENT
Start: 2018-02-07 | End: 2018-03-01

## 2018-02-08 ENCOUNTER — PATIENT MESSAGE (OUTPATIENT)
Dept: GASTROENTEROLOGY | Facility: CLINIC | Age: 78
End: 2018-02-08

## 2018-02-09 ENCOUNTER — TELEPHONE (OUTPATIENT)
Dept: GASTROENTEROLOGY | Facility: CLINIC | Age: 78
End: 2018-02-09

## 2018-02-11 ENCOUNTER — PATIENT MESSAGE (OUTPATIENT)
Dept: HEPATOLOGY | Facility: CLINIC | Age: 78
End: 2018-02-11

## 2018-02-12 ENCOUNTER — TELEPHONE (OUTPATIENT)
Dept: ENDOSCOPY | Facility: HOSPITAL | Age: 78
End: 2018-02-12

## 2018-03-01 ENCOUNTER — PATIENT MESSAGE (OUTPATIENT)
Dept: INTERNAL MEDICINE | Facility: CLINIC | Age: 78
End: 2018-03-01

## 2018-03-01 ENCOUNTER — OFFICE VISIT (OUTPATIENT)
Dept: INTERNAL MEDICINE | Facility: CLINIC | Age: 78
End: 2018-03-01
Payer: MEDICARE

## 2018-03-01 ENCOUNTER — LAB VISIT (OUTPATIENT)
Dept: LAB | Facility: HOSPITAL | Age: 78
End: 2018-03-01
Attending: INTERNAL MEDICINE
Payer: MEDICARE

## 2018-03-01 VITALS
DIASTOLIC BLOOD PRESSURE: 70 MMHG | BODY MASS INDEX: 32.39 KG/M2 | SYSTOLIC BLOOD PRESSURE: 120 MMHG | HEIGHT: 60 IN | WEIGHT: 165 LBS

## 2018-03-01 DIAGNOSIS — D69.6 THROMBOCYTOPENIA: ICD-10-CM

## 2018-03-01 DIAGNOSIS — I70.0 AORTIC ATHEROSCLEROSIS: ICD-10-CM

## 2018-03-01 DIAGNOSIS — R58 ECCHYMOSIS: ICD-10-CM

## 2018-03-01 DIAGNOSIS — K74.00 LIVER FIBROSIS: ICD-10-CM

## 2018-03-01 DIAGNOSIS — I10 ESSENTIAL HYPERTENSION: ICD-10-CM

## 2018-03-01 DIAGNOSIS — R11.2 NON-INTRACTABLE VOMITING WITH NAUSEA, UNSPECIFIED VOMITING TYPE: ICD-10-CM

## 2018-03-01 DIAGNOSIS — R29.818 NEUROLOGICAL DEFICIT PRESENT: ICD-10-CM

## 2018-03-01 DIAGNOSIS — R42 VERTIGO: Primary | ICD-10-CM

## 2018-03-01 DIAGNOSIS — L40.50 PSORIATIC ARTHRITIS: ICD-10-CM

## 2018-03-01 DIAGNOSIS — G62.1 ALCOHOL-INDUCED POLYNEUROPATHY: ICD-10-CM

## 2018-03-01 DIAGNOSIS — D84.9 IMMUNOSUPPRESSED STATUS: ICD-10-CM

## 2018-03-01 DIAGNOSIS — I77.1 TORTUOUS AORTA: ICD-10-CM

## 2018-03-01 LAB
ALBUMIN SERPL BCP-MCNC: 4.1 G/DL
ALP SERPL-CCNC: 67 U/L
ALT SERPL W/O P-5'-P-CCNC: 16 U/L
AMYLASE SERPL-CCNC: 89 U/L
ANION GAP SERPL CALC-SCNC: 9 MMOL/L
APTT BLDCRRT: 23.9 SEC
AST SERPL-CCNC: 17 U/L
BASOPHILS # BLD AUTO: 0.02 K/UL
BASOPHILS NFR BLD: 0.2 %
BILIRUB SERPL-MCNC: 0.7 MG/DL
BUN SERPL-MCNC: 19 MG/DL
CALCIUM SERPL-MCNC: 9.9 MG/DL
CHLORIDE SERPL-SCNC: 102 MMOL/L
CO2 SERPL-SCNC: 28 MMOL/L
CREAT SERPL-MCNC: 0.8 MG/DL
DIFFERENTIAL METHOD: NORMAL
EOSINOPHIL # BLD AUTO: 0.1 K/UL
EOSINOPHIL NFR BLD: 0.9 %
ERYTHROCYTE [DISTWIDTH] IN BLOOD BY AUTOMATED COUNT: 14 %
EST. GFR  (AFRICAN AMERICAN): >60 ML/MIN/1.73 M^2
EST. GFR  (NON AFRICAN AMERICAN): >60 ML/MIN/1.73 M^2
GLUCOSE SERPL-MCNC: 99 MG/DL
HCT VFR BLD AUTO: 42.9 %
HGB BLD-MCNC: 14.6 G/DL
INR PPP: 1
LIPASE SERPL-CCNC: 32 U/L
LYMPHOCYTES # BLD AUTO: 2.4 K/UL
LYMPHOCYTES NFR BLD: 22.5 %
MCH RBC QN AUTO: 29.8 PG
MCHC RBC AUTO-ENTMCNC: 34 G/DL
MCV RBC AUTO: 88 FL
MONOCYTES # BLD AUTO: 1 K/UL
MONOCYTES NFR BLD: 9.1 %
NEUTROPHILS # BLD AUTO: 7.1 K/UL
NEUTROPHILS NFR BLD: 67.1 %
PLATELET # BLD AUTO: 157 K/UL
PMV BLD AUTO: 9.6 FL
POTASSIUM SERPL-SCNC: 4.8 MMOL/L
PROT SERPL-MCNC: 7.7 G/DL
PROTHROMBIN TIME: 9.8 SEC
RBC # BLD AUTO: 4.9 M/UL
SODIUM SERPL-SCNC: 139 MMOL/L
WBC # BLD AUTO: 10.64 K/UL

## 2018-03-01 PROCEDURE — 36415 COLL VENOUS BLD VENIPUNCTURE: CPT

## 2018-03-01 PROCEDURE — 3074F SYST BP LT 130 MM HG: CPT | Mod: S$GLB,,, | Performed by: INTERNAL MEDICINE

## 2018-03-01 PROCEDURE — 99999 PR PBB SHADOW E&M-EST. PATIENT-LVL IV: CPT | Mod: PBBFAC,,, | Performed by: INTERNAL MEDICINE

## 2018-03-01 PROCEDURE — 99499 UNLISTED E&M SERVICE: CPT | Mod: S$GLB,,, | Performed by: INTERNAL MEDICINE

## 2018-03-01 PROCEDURE — 85610 PROTHROMBIN TIME: CPT

## 2018-03-01 PROCEDURE — 99215 OFFICE O/P EST HI 40 MIN: CPT | Mod: S$GLB,,, | Performed by: INTERNAL MEDICINE

## 2018-03-01 PROCEDURE — 83690 ASSAY OF LIPASE: CPT

## 2018-03-01 PROCEDURE — 85730 THROMBOPLASTIN TIME PARTIAL: CPT

## 2018-03-01 PROCEDURE — 80053 COMPREHEN METABOLIC PANEL: CPT

## 2018-03-01 PROCEDURE — 82150 ASSAY OF AMYLASE: CPT

## 2018-03-01 PROCEDURE — 3078F DIAST BP <80 MM HG: CPT | Mod: S$GLB,,, | Performed by: INTERNAL MEDICINE

## 2018-03-01 PROCEDURE — 85025 COMPLETE CBC W/AUTO DIFF WBC: CPT

## 2018-03-01 RX ORDER — MECLIZINE HYDROCHLORIDE 25 MG/1
25 TABLET ORAL 3 TIMES DAILY PRN
Qty: 30 TABLET | Refills: 0 | Status: SHIPPED | OUTPATIENT
Start: 2018-03-01 | End: 2020-09-01

## 2018-03-01 NOTE — PATIENT INSTRUCTIONS
Managing Dizziness (Vertigo) with Medicines    Although medicines can't cure your problem, they can help control symptoms. Your doctor may prescribe medicines for a few weeks and then taper them off. Always take your medicine as prescribed. Never share your medicine with others.  Contact your healthcare provider right away if you have side effects from your medicines.   How medicines can help  · Treat infection or inflammation. If you have an infection caused by bacteria, your doctor can prescribe antibiotics.  · Limit conflicting balance signals. These medicines are often in pill form.  · Ease nausea. Suppositories, pills, or shots can reduce vomiting.  · Reduce pressure in the canals. Diuretics can be used to treat Meniere's disease. These medicines help your body get rid of extra fluid.  · Ease other symptoms. Other medicines can help ease depression and anxiety caused by living with dizziness or fainting.  Date Last Reviewed: 11/1/2016  © 4625-5954 [employer]. [address] [place] [address]. All rights reserved. This information is not intended as a substitute for professional medical care. Always follow your healthcare professional's instructions.        Dizziness (Vertigo) and Balance Problems: Staying Safe     Replace burned-out lightbulbs to keep your home safe and well lit.   Falls or accidents can lead to pain, broken bones, and fear of future falls. Protect yourself and others by preparing for episodes. Simple steps can help you stay safe at home and wherever you go.  Lighting  Keep all areas well lit. This helps your eyes send the right signals to the brain. It also makes you less likely to trip and fall. If bright lights make symptoms worse, dim the lights or lie in a dark room until the dizziness passes. Then turn the lights back to their normal level.  Tips:  · Keep a flashlight by the bed.  · Place nightlights in bathrooms and hallways.  · Replace burned-out bulbs, or have  someone replace them for you.  Preventing falls  To reduce your risk of falling:  · Get out of bed or up from a chair slowly.  · Wear low-heeled shoes that fit properly and have slip-resistant soles.  · Remove throw rugs. Clear clutter from walkways.  · Use handrails on stairs. Have handrails installed or adjusted if needed.  · Install grab bars in the bathroom. Don't use towel racks for balance.  · Use a shower stool. Also put adhesive strips in the shower or on the tub floor.  Going out  With a little time and preparation, you can get around safely.  Tips:  · Bring a cane or walking aid if needed.  · Give yourself plenty of time in case you start to get dizzy.  · Ask your healthcare provider what type of exercise is safe for your condition.  · Be patient. If an activity such as walking through a crowded shop causes you stress, you may not be ready for it yet.  Driving  If you become dizzy or disoriented while driving, you could hurt yourself and others. That's why it's best to not drive until symptoms have gone away. In some cases, your license may be temporarily held until it's safe for you to drive again.  For safety:  · Ask a friend to drive for you.  · Take public transportation.  · Walk to stores and other places when you can.  Asking for help  Don't be afraid to ask for help running errands, cooking meals, and doing exercise. Whether it's a friend, loved one, neighbor, or stranger on the street, a little help can make a world of difference.   Date Last Reviewed: 11/1/2016  © 6008-3537 [employer]. [address] [place] [address], [place] [address]. All rights reserved. This information is not intended as a substitute for professional medical care. Always follow your healthcare professional's instructions.        Dizziness (Vertigo) and Balance Problems: Diagnostic Tests    An otolaryngologist (also called an ENT) is a doctor who specializes in disorders of the ear, nose, and throat. Your ENT can help  find clues to the cause of your dizziness. He or she will examine you and go over your health history. Your ENT may also order certain tests to help diagnose your problem.  Hearing testing  In most cases, you will be referred for hearing testing. This is because the nerve that sends balance signals also sends hearing signals. A problem that affects balance can also affect hearing.  Other tests  Your doctor may recommend more than one kind of test. The following tests are painless, but may cause dizziness in some cases.  · MRI creates images of the ear or head. A magnetic field and contrast medium are used to make the image.  · Electronystagmography (ENG) records eye movement. Small electrodes are put on the skin around your eyes. Then your ear is filled with warm or cold water.  · Rotation tests show the relationship between the inner ear and your eyes. You may be asked to wear special goggles or sit in a computerized chair.  · Posturography tests your standing balance under different conditions. You will stand on a platform that measures shifts in your body weight.   · Electrocochleography (ECoG) measures the fluid pressure in the inner ear. An abnormal ECoG may mean you have Meniere's disease or other conditions.  · Vestibular evoked myogenic potentials (VEMPs) may be used if your healthcare provider suspects a rare condition like superior semicircular canal dehiscence. Electrodes are placed on your neck, and you hear clicks in your ear.  Date Last Reviewed: 11/1/2016  © 2978-8439 [employer]. [address] [place] [address], [place] [address]. All rights reserved. This information is not intended as a substitute for professional medical care. Always follow your healthcare professional's instructions.        Dizziness (Uncertain Cause)  Dizziness is a common symptom. It may be described as lightheadedness, spinning, or feeling like you are going to faint. Dizziness can have many causes.  Be sure to tell the  healthcare provider about:  · All medicines you take, including prescription, over-the-counter, herbs, and supplements  · Any other symptoms you have  · Any health problems you are being treated for  · Anything that causes the dizziness to get worse or better  Today's exam did not show an exact cause for your dizziness. Other tests may be needed. Follow up with your healthcare provider.  Home care  · Dizziness that occurs with sudden standing may be a sign of mild dehydration. Drink extra fluids for the next few days.  · If you recently started a new medicine, stopped a medicine, or had the dose of a current medicine changed, talk with the prescribing healthcare provider. Your medicine plan may need adjustment.  · If dizziness lasts more than a few seconds, sit or lie down until it passes. This may help prevent injury in case you pass out.  · Do not drive or use power tools or dangerous equipment until you have had no dizziness for at least 48 hours.  Follow-up care  Follow up with your healthcare provider for further evaluation within the next 7 days or as advised.  When to seek medical advice  Call your healthcare provider for any of the following:  · Worsening of symptoms or new symptoms  · Passing out or seizure  · Repeated vomiting  · Headache  · Palpitations (the sense that your heart is fluttering or beating fast or hard)  · Shortness of breath  · Blood in vomit or stool (black or red color)  · Weakness of an arm or leg or one side of the face  · Vision or hearing changes  · Trouble walking or speaking  · Chest, arm, neck, back, or jaw pain  Date Last Reviewed: 8/23/2015  © 1892-8003 [employer]. [address] [place] [address], [place] [address]. All rights reserved. This information is not intended as a substitute for professional medical care. Always follow your healthcare professional's instructions.        Inner Ear Problems: Causes of Dizziness (Vertigo)       Benign positional vertigo (BPV)  This  is the most common cause of vertigo. BPV is also called benign positional paroxysmal vertigo (BPPV). It happens when crystals in the ear canals shift into the wrong place. Vertigo usually occurs when you move your head in a certain way. This can happen when turning in bed, bending, or looking up. Because BPV comes on quickly, you should think about if you are safe to drive or do other tasks that need your full attention.  BPV:  · Causes vertigo that last for seconds. Vertigo can occur several times a day, depending on body position.  · Doesnt cause hearing loss  · Often goes away on its own. But it but may go away sooner with treatment.  Infection or inflammation  Sometimes the semicircular canals swell and send incorrect balance signals. This problem may be caused by a viral infection. Depending on the cause, your hearing can be affected (labyrinthitis). Or your hearing can remain normal (neuronitis).  Infection or inflammation:  · Causes vertigo that lasts for hours or days. The first episode is usually the worst.  · Can cause hearing loss  · Often goes away on its own. But it may go away sooner with treatment.  You may need vestibular rehabilitation if you have balance problems that don't go away.  Menieres disease  This condition is uncommon. It happens when there is too much fluid in the ear canals. This causes increased pressure and swelling. It affects balance and hearing signals.  Menieres disease may:  · Cause vertigo that last for hours  · Cause hearing problems that come and go. The problems are usually in one ear and get worse over time.  · Cause buzzing or ringing in the ears (tinnitus)  · Cause a feeling of fullness or pressure in the ear  · Cause any of these symptoms: vertigo, hearing loss, tinnitus, or ear fullness to last a lifetime  Date Last Reviewed: 11/1/2016  © 0392-7995 [employer]. [address] [place] [address], [place] [address]. All rights reserved. This information is not  intended as a substitute for professional medical care. Always follow your healthcare professional's instructions.

## 2018-03-01 NOTE — PROGRESS NOTES
Subjective:       Patient ID: Darinel Majano is a 77 y.o. male.    Chief Complaint: Dizziness and Nausea    [facility] appt    Multiple issues, here with wife.    Went to a wedding Saturday but did not eat or drink anything, just had sprite. Flu like sx started Sunday.  Sick with vomiting, dizziness.  Also some spontaneous ecchymosis.    Surgery in November and hs had dizziness since that time but not as severe as this weekend.    Was given lasix and spironolactone per hepatology.    Cirrhosis.   Has not had a drink since May.       Lung mass-  biopsed and benign    EGD also H pylori- treated and finished last week.      Skin cancer on finger, following in [facility].     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     Psoriatic arthritis     Essential hypertension     Hyperplastic polyp of transverse colon: 2012 repeat 7 years     Immunosuppressed status     Tortuous aorta: see CXR May 2017     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 May 2017     Psoriasis vulgaris     Macrocytic anemia     Age-related osteoporosis without current pathological fracture: see DEXA 2017     Squamous cell cancer of skin of right hand     Aortic atherosclerosis: see CT scan 5/17     Alcohol-induced polyneuropathy     Right lower lobe lung mass     Liver fibrosis           Review of Systems   Constitutional: Negative for activity change and unexpected weight change.   HENT: Positive for hearing loss. Negative for rhinorrhea and trouble swallowing.    Eyes: Negative for discharge and visual disturbance.   Respiratory: Negative for chest tightness and wheezing.    Cardiovascular: Negative for chest pain and palpitations.   Gastrointestinal: Positive for nausea.  Negative for blood in stool, constipation, diarrhea and vomiting.   Endocrine: Negative for polydipsia and polyuria.   Genitourinary: Negative for difficulty urinating, hematuria and urgency.   Musculoskeletal: Negative for arthralgias, joint swelling and neck pain.   Neurological: Positive for dizziness. Negative for weakness and headaches.   Hematological: Negative for adenopathy. Bruises/bleeds easily.        No epistaxis or other bleeding   Psychiatric/Behavioral: Negative for confusion and dysphoric mood.       Objective:      Physical Exam   Constitutional: He is oriented to person, place, and time. He appears well-developed and well-nourished.   HENT:   Head: Normocephalic.   Right Ear: External ear normal.   Left Ear: External ear normal.   Mouth/Throat: Oropharynx is clear and moist.   Ecchymoses under both eyes   Eyes: Conjunctivae and EOM are normal.   Neck: Normal range of motion. Neck supple. No tracheal deviation present. No thyromegaly present.   Cardiovascular: Normal rate and regular rhythm.    No murmur heard.  Pulmonary/Chest: No respiratory distress. He has no wheezes. He has no rales.   Abdominal: Soft. Bowel sounds are normal. He exhibits no distension. There is no tenderness.   Musculoskeletal: He exhibits no edema.   Neurological: He is alert and oriented to person, place, and time. No cranial nerve deficit or sensory deficit. He exhibits normal muscle tone. Coordination normal.   Dizzy with changing head position   Skin: Skin is warm and dry. No rash noted. No erythema.   Psychiatric: He has a normal mood and affect. His behavior is normal. Judgment and thought content normal.       Assessment:       1. Vertigo    2. Ecchymosis    3. Aortic atherosclerosis: see CT scan 5/17    4. Essential hypertension    5. Tortuous aorta: see CXR May 2017    6. Alcohol-induced polyneuropathy    7. Psoriatic arthritis    8. Immunosuppressed status    9. Thrombocytopenia    10. Liver fibrosis    11.  Non-intractable vomiting with nausea, unspecified vomiting type    12. Neurological deficit present        Plan:         Ecchymosis  -     Protime-INR; Future; Expected date: 03/01/2018  -     APTT; Future; Expected date: 03/01/2018  -     Comprehensive metabolic panel; Future; Expected date: 03/01/2018    Aortic atherosclerosis: see CT scan 5/17: regimen reviewed    Essential hypertension: continue meds    Tortuous aorta: see CXR May 2017: strict BP control. Will follow - reviewed     Alcohol-induced polyneuropathy: improving    Psoriatic arthritis: keep follow up    Immunosuppressed status: being monitored closely    Thrombocytopenia: labs and review    Liver fibrosis: due for u/s and hepatology follow up- he is aware  -     Comprehensive metabolic panel; Future; Expected date: 03/01/2018    Non-intractable vomiting with nausea, unspecified vomiting type: improved in past 24-48 hours.  Vertigo vs virus  -     CBC auto differential; Future; Expected date: 03/01/2018  -     Amylase; Future; Expected date: 03/01/2018  -     Lipase; Future; Expected date: 03/01/2018  -     (pyxis) gi cocktail (mylanta 30 mL, lidocaine 2 % viscous 10 mL, dicyclomine 10 mL) 50 mL; Take by mouth one time.    Vertigo: natural history reviewed.  Hygienic measures and cautions discussed.  [facility]- will [facility] to move up    Neurological deficit present  -     CT Head W Wo Contrast; Future; Expected date: 03/01/2018    Other orders  -     meclizine (ANTIVERT) 25 mg tablet; Take 1 tablet (25 mg total) by mouth 3 (three) times daily as needed.  Dispense: 30 tablet; Refill: 0    I will review all studies and determine further tx depending on findings    Patient evaluated for over 40 minutes with this appoinment, including diagnostic testing and treatment.  All questions answered,  chart reviewed and care coordinated.

## 2018-03-08 ENCOUNTER — OFFICE VISIT (OUTPATIENT)
Dept: GASTROENTEROLOGY | Facility: CLINIC | Age: 78
End: 2018-03-08
Payer: MEDICARE

## 2018-03-08 VITALS
BODY MASS INDEX: 31 KG/M2 | WEIGHT: 168.44 LBS | SYSTOLIC BLOOD PRESSURE: 151 MMHG | HEIGHT: 62 IN | DIASTOLIC BLOOD PRESSURE: 66 MMHG | HEART RATE: 75 BPM

## 2018-03-08 DIAGNOSIS — Z86.19 HISTORY OF HELICOBACTER PYLORI INFECTION: Primary | ICD-10-CM

## 2018-03-08 PROCEDURE — 99999 PR PBB SHADOW E&M-EST. PATIENT-LVL III: CPT | Mod: PBBFAC,,, | Performed by: NURSE PRACTITIONER

## 2018-03-08 PROCEDURE — 3077F SYST BP >= 140 MM HG: CPT | Mod: S$GLB,,, | Performed by: NURSE PRACTITIONER

## 2018-03-08 PROCEDURE — 99499 UNLISTED E&M SERVICE: CPT | Mod: S$GLB,,, | Performed by: NURSE PRACTITIONER

## 2018-03-08 PROCEDURE — 99213 OFFICE O/P EST LOW 20 MIN: CPT | Mod: S$GLB,,, | Performed by: NURSE PRACTITIONER

## 2018-03-08 PROCEDURE — 3078F DIAST BP <80 MM HG: CPT | Mod: S$GLB,,, | Performed by: NURSE PRACTITIONER

## 2018-03-08 NOTE — PROGRESS NOTES
[facility] Note    Reason for Visit:  The encounter diagnosis was History of Helicobacter pylori infection.    PCP:   Annabella Montiel       Referring MD:  No referring provider defined for this encounter.    HPI:  This is a 77 y.o. male here for evaluation of H. Pylori. Mr. Majano is new to the clinic and here today with wife helping with medical information.     Hx of Liver fibrosis worked up with Hepatology. EGD done 2/5/18 for portal hypertension. Z-line irregular, at the gastroesophageal junction bx positive for h. Pylori. No Smith's. Completed triple therapy 2 weeks ago.     Currently, denies reflux symptoms, abdominal pain or discomfort, and dysphagia/odynophagia. NSAID usage- none.     Normal formed stool daily. Denies blood in stool and melena.     ROS:  Constitutional: No fevers, no chills, No unintentional weight loss, no fatigue   ENT: No allergies  CV: No chest pain, no palpitations, no perif. edema  Pulm: No cough, No shortness of breath, no wheezes, no sputum  Ophtho: No vision changes  GI: see HPI; also no nausea, no vomiting, no change in appetite  Derm: No rash  Heme: No lymphadenopathy, No bruising  MSK: No arthritis, no muscle pain, no muscle weakness  : No dysuria, No hematuria  Endo: No hot or cold intolerance  Neuro: No syncope, No seizure     Medical History:  has a past medical history of Age-related osteoporosis without current pathological fracture: see DEXA 2017 (6/9/2017); Alcohol-induced polyneuropathy (11/2/2017); Anemia (5/4/2017); Aortic atherosclerosis: see CT scan 5/17 (6/9/2017); BPH (benign prostatic hypertrophy); Chronic gout; Cortical senile cataract (5/15/2012); Degenerative disc disease; Diverticulosis of large intestine without hemorrhage: see CT scan 5/17 (6/9/2017); Essential hypertension (2/19/2016); Gout; Hyperplastic polyp of transverse colon: 2012 repeat 5 years (5/4/2017); Kidney stone on left side (6/9/2017); Osteoporosis; Psoriasis;  "Squamous cell cancer of skin of right hand (6/9/2017); Substance abuse; Thrombocytopenia; and Umbilical hernia: see CT 5/17 (6/9/2017).    Surgical History:  has a past surgical history that includes Cataract extraction; Cholecystectomy (2012); Eye surgery; Right Hand Surgery; and Colonoscopy (N/A, 2/5/2018).    Family History: family history includes Cancer in his father; Cataracts in his mother; Diabetes in his mother; Heart disease in his brother; Hypertension in his mother; No Known Problems in his sister; Stomach cancer in his father..     Social History:  reports that he has quit smoking. He has a 20.00 pack-year smoking history. He has never used smokeless tobacco. He reports that he does not drink alcohol or use drugs.    Review of patient's allergies indicates:   Allergen Reactions    Humira [adalimumab] Other (See Comments)     Has lung issue and humira contraindicated     Current Outpatient Prescriptions   Medication Sig    alendronate (FOSAMAX) 70 MG tablet Take 1 tablet (70 mg total) by mouth every 7 days.    furosemide (LASIX) 20 MG tablet Take 1 tablet (20 mg total) by mouth once daily.    meclizine (ANTIVERT) 25 mg tablet Take 1 tablet (25 mg total) by mouth 3 (three) times daily as needed.    multivitamin with minerals tablet Take 1 tablet by mouth once daily.    spironolactone (ALDACTONE) 50 MG tablet Take 1 tablet (50 mg total) by mouth once daily.    triamcinolone acetonide 0.1% (KENALOG) 0.1 % ointment aaa qd to affected areas.  Avoid face and groin     No current facility-administered medications for this visit.      Objective Findings:    Vital Signs:  BP (!) 151/66   Pulse 75   Ht 5' 2" (1.575 m)   Wt 76.4 kg (168 lb 6.9 oz)   BMI 30.81 kg/m²   Body mass index is 30.81 kg/m².    Physical Exam:  General Appearance: Well appearing in no acute distress  Head: Normocephalic, without obvious abnormality  Eyes: No scleral icterus, EOMI  ENT: Neck supple, Lips, mucosa, and tongue normal; " teeth and gums normal  Extremities: No clubbing, cyanosis or edema  Skin: No rash to exposed areas  Neurologic: AAOx4    Labs:  Lab Results   Component Value Date    WBC 10.64 03/01/2018    HGB 14.6 03/01/2018    HCT 42.9 03/01/2018     03/01/2018    CHOL 111 (L) 04/27/2017    TRIG 58 04/27/2017    HDL 71 04/27/2017    ALT 16 03/01/2018    AST 17 03/01/2018     03/01/2018    K 4.8 03/01/2018     03/01/2018    CREATININE 0.8 03/01/2018    BUN 19 03/01/2018    CO2 28 03/01/2018    TSH 2.425 04/27/2017    PSA 4.76 (H) 07/17/2013    INR 1.0 03/01/2018    HGBA1C 4.7 06/29/2011     Endoscopy:    EGD- 2/5/18- Z-line irregular, at the gastroesophageal junction bx positive for h. Pylori. No Smith's.     Colonoscopy- 2/5/18- Diverticulosis in the sigmoid colon and in the descending colon. Internal hemorrhoids. Otherwise normal.     Assessment:  1. History of Helicobacter pylori infection      Recommendations:  1. Ordered H. Pylori stool antigen to confirm eradication. If negative will follow up as needed. Pt understood.     Follow up pending H. Pylori stool antigen.     Order summary:  Orders Placed This Encounter    H. pylori antigen, stool     Thank you so much for allowing me to participate in the care of Darinel Knight, RUTH ANN, FNP-C

## 2018-03-16 ENCOUNTER — PATIENT MESSAGE (OUTPATIENT)
Dept: HEPATOLOGY | Facility: CLINIC | Age: 78
End: 2018-03-16

## 2018-03-19 ENCOUNTER — PATIENT MESSAGE (OUTPATIENT)
Dept: INTERNAL MEDICINE | Facility: CLINIC | Age: 78
End: 2018-03-19

## 2018-03-19 ENCOUNTER — OFFICE VISIT (OUTPATIENT)
Dept: CARDIOTHORACIC SURGERY | Facility: CLINIC | Age: 78
End: 2018-03-19
Payer: MEDICARE

## 2018-03-19 ENCOUNTER — HOSPITAL ENCOUNTER (OUTPATIENT)
Dept: RADIOLOGY | Facility: HOSPITAL | Age: 78
Discharge: HOME | End: 2018-03-19
Attending: INTERNAL MEDICINE
Payer: MEDICARE

## 2018-03-19 ENCOUNTER — HOSPITAL ENCOUNTER (OUTPATIENT)
Dept: RADIOLOGY | Facility: HOSPITAL | Age: 78
Discharge: HOME | End: 2018-03-19
Attending: THORACIC SURGERY (CARDIOTHORACIC VASCULAR SURGERY)
Payer: MEDICARE

## 2018-03-19 VITALS
OXYGEN SATURATION: 99 % | SYSTOLIC BLOOD PRESSURE: 141 MMHG | HEIGHT: 62 IN | DIASTOLIC BLOOD PRESSURE: 73 MMHG | BODY MASS INDEX: 32.05 KG/M2 | WEIGHT: 174.19 LBS | HEART RATE: 77 BPM

## 2018-03-19 DIAGNOSIS — R22.2 PLEURAL NODULE: ICD-10-CM

## 2018-03-19 DIAGNOSIS — R22.2 PLEURAL NODULE: Primary | ICD-10-CM

## 2018-03-19 DIAGNOSIS — R29.818 NEUROLOGICAL DEFICIT PRESENT: ICD-10-CM

## 2018-03-19 PROCEDURE — 71250 CT THORAX DX C-: CPT | Mod: 26,,, | Performed by: RADIOLOGY

## 2018-03-19 PROCEDURE — 3077F SYST BP >= 140 MM HG: CPT | Mod: CPTII,S$GLB,, | Performed by: THORACIC SURGERY (CARDIOTHORACIC VASCULAR SURGERY)

## 2018-03-19 PROCEDURE — 25500020 PHARM REV CODE 255: Performed by: INTERNAL MEDICINE

## 2018-03-19 PROCEDURE — 99999 PR PBB SHADOW E&M-EST. PATIENT-LVL IV: CPT | Mod: PBBFAC,,, | Performed by: THORACIC SURGERY (CARDIOTHORACIC VASCULAR SURGERY)

## 2018-03-19 PROCEDURE — 99213 OFFICE O/P EST LOW 20 MIN: CPT | Mod: S$GLB,,, | Performed by: THORACIC SURGERY (CARDIOTHORACIC VASCULAR SURGERY)

## 2018-03-19 PROCEDURE — 70470 CT HEAD/BRAIN W/O & W/DYE: CPT | Mod: TC

## 2018-03-19 PROCEDURE — 3078F DIAST BP <80 MM HG: CPT | Mod: CPTII,S$GLB,, | Performed by: THORACIC SURGERY (CARDIOTHORACIC VASCULAR SURGERY)

## 2018-03-19 PROCEDURE — 70470 CT HEAD/BRAIN W/O & W/DYE: CPT | Mod: 26,,, | Performed by: RADIOLOGY

## 2018-03-19 PROCEDURE — 71250 CT THORAX DX C-: CPT | Mod: TC

## 2018-03-19 RX ORDER — FUROSEMIDE 20 MG/1
20 TABLET ORAL DAILY
Qty: 30 TABLET | Refills: 6 | Status: SHIPPED | OUTPATIENT
Start: 2018-03-19 | End: 2018-10-08 | Stop reason: SDUPTHER

## 2018-03-19 RX ADMIN — IOHEXOL 75 ML: 350 INJECTION, SOLUTION INTRAVENOUS at 12:03

## 2018-03-19 NOTE — PROGRESS NOTES
"Subjective:       Patient ID: Darinel Majano is a 77 y.o. male.    Chief Complaint: Follow-up    Diagnosis:  Subpleural Right Hemidiaphragm Mass    Pre-operative therapy: NA    Procedure(s) and date(s): 11/10/17- Right Thoracoscopy with Biopsy of Diaphragm Mass    Pathology:   Granulation tissue with acute and chronic inflammation, foreign body giant cell reaction.  Benign lung tissue with focal vasuclar proliferation and reactive epithelial hyperplastic changes.      Post-operative therapy: NA    HPI   77 y.o. male presents with PMH of BPH, HTN, Psoriasis, thrombocytopenia, squamous cell carcinoma of right hand and history of EtOH abuse (quit drinking May 2017) here today for 3 month f/u of right VATS wedge and diaphgram biopsy on 11/10/17. Today he reports feeling well. Recovering from the flu about 2-3 weeks ago. Denies fever, chills, SOB, CP, syncope, N/V or changes in bowel or bladder functioning. Patient reports while having the flu he woke up with occular ecchymosis. Denies trauma or sleep walking history. His internal medicine physician ordered a head CT today.      Review of Systems   Constitutional: Negative for chills, fatigue and fever.   Respiratory: Negative for cough, chest tightness and shortness of breath.    Cardiovascular: Negative for chest pain and palpitations.   Gastrointestinal: Negative for abdominal pain, nausea and vomiting.   Genitourinary: Negative for difficulty urinating.   Skin: Positive for rash (Psoriasis ). Negative for color change.   Neurological: Positive for weakness.   Psychiatric/Behavioral: Negative for agitation. The patient is not nervous/anxious.          Objective:         Visit Vitals  BP (!) 141/73   Pulse 77   Ht 5' 2" (1.575 m)   Wt 79 kg (174 lb 2.6 oz)   SpO2 99%   BMI 31.85 kg/m²       Physical Exam   Constitutional: He is oriented to person, place, and time. He appears well-developed and well-nourished.   HENT:   Head: Normocephalic and atraumatic. "   Mouth/Throat: Oropharynx is clear and moist.   Eyes: Pupils are equal, round, and reactive to light.   Neck: Normal range of motion. Neck supple.   Cardiovascular: Normal rate, regular rhythm, normal heart sounds and intact distal pulses.    Pulmonary/Chest: Effort normal and breath sounds normal. No respiratory distress. He exhibits no tenderness.   Abdominal: Soft. Bowel sounds are normal.   Lymphadenopathy:     He has no cervical adenopathy.   Neurological: He is alert and oriented to person, place, and time.   Skin: Skin is warm and dry. Rash (Lower extremity psoriotic rash) noted.   Psychiatric: He has a normal mood and affect.   Vitals reviewed.        3/19/18- Chest CT-   Mass is again noted within the posterior segment of the right lower lobe measuring approximately 4.0 cm on today's examination (axial series 2, image 38).  Mass demonstrates stable size but we remain concerned for neoplasm in light of hypermetabolics activity seen on prior PET-CT (SUV max 4.7).  Newly identified, subacute fracture involving the lateral aspect of the right 6th rib (axial series 2, image 51).  Significant calcific atherosclerosis of the mitral valve, mitral annulus, thoracic aorta, and coronary arteries.    3/19/18- Head CT-   Age-appropriate cerebral volume loss.  Otherwise unremarkable CT head specifically without evidence for acute intracranial hemorrhage or abnormal parenchymal enhancement.  Further evaluation as warranted clinically.    Assessment:       77 y.o. male presents with PMH of BPH, HTN, Psoriasis, thrombocytopenia, squamous cell carcinoma of right hand and history of EtOH abuse (quit drinking May 2017) here today for 3 month f/u of Right VATS wedge and diaphgram biopsy on 11/10/17.    Plan:       RTC in 6 months with repeat noncontrast chest CT     Distress Screening Results: Psychosocial Distress screening score of Distress Score: 0 noted and reviewed. No intervention indicated.     ATTENDING  ATTESTATION:    I evaluated the patient and I agree with the assessment and plan.  Doing very well.  CT stable.  CT head ordered by PCP (Dr. Montiel) shows no concerning findings, reviewed with patient and his wife today.  RTC in 6 months with chest CT.

## 2018-04-04 ENCOUNTER — LAB VISIT (OUTPATIENT)
Dept: LAB | Facility: HOSPITAL | Age: 78
End: 2018-04-04
Attending: NURSE PRACTITIONER
Payer: MEDICARE

## 2018-04-04 ENCOUNTER — OFFICE VISIT (OUTPATIENT)
Dept: DERMATOLOGY | Facility: CLINIC | Age: 78
End: 2018-04-04
Payer: MEDICARE

## 2018-04-04 DIAGNOSIS — B07.8 COMMON WART: Primary | ICD-10-CM

## 2018-04-04 DIAGNOSIS — Z85.828 PERSONAL HISTORY OF SKIN CANCER: ICD-10-CM

## 2018-04-04 DIAGNOSIS — L90.5 SCAR: ICD-10-CM

## 2018-04-04 DIAGNOSIS — Z86.19 HISTORY OF HELICOBACTER PYLORI INFECTION: ICD-10-CM

## 2018-04-04 PROCEDURE — 99213 OFFICE O/P EST LOW 20 MIN: CPT | Mod: 25,S$GLB,, | Performed by: DERMATOLOGY

## 2018-04-04 PROCEDURE — 17110 DESTRUCTION B9 LES UP TO 14: CPT | Mod: S$GLB,,, | Performed by: DERMATOLOGY

## 2018-04-04 PROCEDURE — 99999 PR PBB SHADOW E&M-EST. PATIENT-LVL II: CPT | Mod: PBBFAC,,, | Performed by: DERMATOLOGY

## 2018-04-04 PROCEDURE — 87338 HPYLORI STOOL AG IA: CPT

## 2018-04-04 RX ORDER — FLUOROURACIL 50 MG/G
CREAM TOPICAL
Qty: 40 G | Refills: 1 | Status: SHIPPED | OUTPATIENT
Start: 2018-04-04 | End: 2020-09-01

## 2018-04-04 NOTE — PROGRESS NOTES
Subjective:       Patient ID:  Darinel Majano is a 77 y.o. male who presents for   Chief Complaint   Patient presents with    Lesion     hihg risk pt with hx recent SCC here for new lesion that looks similar to that lesion.  X several months.  Fell off one week ago.  Denies pain or bleeding. No tx.          Review of Systems   Skin: Negative for tendency to form keloidal scars.   Hematologic/Lymphatic: Bruises/bleeds easily.        Objective:    Physical Exam   Constitutional: He appears well-developed and well-nourished. No distress.   Neurological: He is alert and oriented to person, place, and time. He is not disoriented.   Psychiatric: He has a normal mood and affect.   Skin:   Areas Examined (abnormalities noted in diagram):   Nails and Digits Inspection Performed             Diagram Legend     Erythematous scaling macule/papule c/w actinic keratosis       Vascular papule c/w angioma      Pigmented verrucoid papule/plaque c/w seborrheic keratosis      Yellow umbilicated papule c/w sebaceous hyperplasia      Irregularly shaped tan macule c/w lentigo     1-2 mm smooth white papules consistent with Milia      Movable subcutaneous cyst with punctum c/w epidermal inclusion cyst      Subcutaneous movable cyst c/w pilar cyst      Firm pink to brown papule c/w dermatofibroma      Pedunculated fleshy papule(s) c/w skin tag(s)      Evenly pigmented macule c/w junctional nevus     Mildly variegated pigmented, slightly irregular-bordered macule c/w mildly atypical nevus      Flesh colored to evenly pigmented papule c/w intradermal nevus       Pink pearly papule/plaque c/w basal cell carcinoma      Erythematous hyperkeratotic cursted plaque c/w SCC      Surgical scar with no sign of skin cancer recurrence      Open and closed comedones      Inflammatory papules and pustules      Verrucoid papule consistent consistent with wart     Erythematous eczematous patches and plaques     Dystrophic onycholytic nail with subungual  debris c/w onychomycosis     Umbilicated papule    Erythematous-base heme-crusted tan verrucoid plaque consistent with inflamed seborrheic keratosis     Erythematous Silvery Scaling Plaque c/w Psoriasis     See annotation      Assessment / Plan:        Common wart v other  Cryosurgery procedure note:    Verbal consent from the patient is obtained. Liquid nitrogen cryosurgery is applied to 1 verruca, as detailed in the physical exam, to produce a freeze injury. 1 consecutive freeze thaw cycles are applied to each verruca. The patient is aware that blisters (possibly blood blisters) may form.    -     fluorouracil (EFUDEX) 5 % cream; Apply thin film to right 5th digit 2times per day for 3 weeks; d/c if area bleeding or ulcerated; avoid eyes or mouth  Dispense: 40 g; Refill: 1    F/u sooner if does not resolve    Personal history of skin cancer  Scar  S/p mohs right 3rd digit .  Healed well. No recurrence      PV resolved after pt d.c alcohol         Follow-up in about 6 months (around 10/4/2018).

## 2018-04-07 LAB — H PYLORI AG STL QL: NOT DETECTED

## 2018-04-10 ENCOUNTER — PATIENT MESSAGE (OUTPATIENT)
Dept: GASTROENTEROLOGY | Facility: CLINIC | Age: 78
End: 2018-04-10

## 2018-04-30 ENCOUNTER — OFFICE VISIT (OUTPATIENT)
Dept: PODIATRY | Facility: CLINIC | Age: 78
End: 2018-04-30
Payer: MEDICARE

## 2018-04-30 VITALS
BODY MASS INDEX: 33.13 KG/M2 | DIASTOLIC BLOOD PRESSURE: 74 MMHG | HEART RATE: 73 BPM | SYSTOLIC BLOOD PRESSURE: 125 MMHG | WEIGHT: 180 LBS | HEIGHT: 62 IN

## 2018-04-30 DIAGNOSIS — L60.9 DISEASE OF NAIL: ICD-10-CM

## 2018-04-30 DIAGNOSIS — G62.1 ALCOHOL-INDUCED POLYNEUROPATHY: Primary | ICD-10-CM

## 2018-04-30 PROCEDURE — 11721 DEBRIDE NAIL 6 OR MORE: CPT | Mod: Q9,S$GLB,, | Performed by: PODIATRIST

## 2018-04-30 PROCEDURE — 3078F DIAST BP <80 MM HG: CPT | Mod: CPTII,S$GLB,, | Performed by: PODIATRIST

## 2018-04-30 PROCEDURE — 99214 OFFICE O/P EST MOD 30 MIN: CPT | Mod: 25,S$GLB,, | Performed by: PODIATRIST

## 2018-04-30 PROCEDURE — 3074F SYST BP LT 130 MM HG: CPT | Mod: CPTII,S$GLB,, | Performed by: PODIATRIST

## 2018-04-30 PROCEDURE — 3008F BODY MASS INDEX DOCD: CPT | Mod: CPTII,S$GLB,, | Performed by: PODIATRIST

## 2018-04-30 PROCEDURE — 99999 PR PBB SHADOW E&M-EST. PATIENT-LVL III: CPT | Mod: PBBFAC,,, | Performed by: PODIATRIST

## 2018-05-07 NOTE — PROGRESS NOTES
Subjective:      Patient ID: Darinel Majano is a 77 y.o. male.    Chief Complaint: Foot Pain (dr serna 03/01/2018) and Nail Care    Darinel is a 77 y.o. male who presents to the clinic for evaluation and treatment of high risk feet. 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 The patient's chief complaint is long, thick toenails. This patient has documented high risk feet requiring routine maintenance secondary to peripheral neuropathy.    PCP: Annabella eSrna MD    Date Last Seen by PCP: 3/01/2018    Current shoe gear:  Affected Foot: Tennis shoes     Unaffected Foot: Tennis shoes    Last encounter in this department: Visit date not found    Hemoglobin A1C   Date Value Ref Range Status   06/29/2011 4.7 4.0 - 6.2 % Final   01/05/2011 4.6 4.0 - 6.2 % Final       Review of Systems   Constitution: Negative for chills, fever and malaise/fatigue.   HENT: Negative for hearing loss.    Cardiovascular: Negative for claudication and leg swelling.   Respiratory: Negative for shortness of breath.    Skin: Positive for color change, nail changes, rash and unusual hair distribution. Negative for flushing.   Musculoskeletal: Negative for joint pain and myalgias.   Neurological: Positive for numbness, paresthesias and sensory change.  Negative for loss of balance.   Psychiatric/Behavioral: Negative for altered mental status.           Objective:      Physical Exam   Constitutional: He is oriented to person, place, and time. He appears well-developed and well-nourished.   Cardiovascular:   Pulses:       Dorsalis pedis pulses are 1+ on the right side, and 1+ on the left side.        Posterior tibial pulses are 1+ on the right side, and 1+ on the left side.   Non pitting  edema noted to b/L LEs   Musculoskeletal:        Right knee: He exhibits no swelling and no ecchymosis.        Left knee: He exhibits no swelling and no ecchymosis.        Right ankle: He exhibits normal range of motion, no swelling, no ecchymosis and normal pulse. No lateral malleolus, no medial malleolus and no head of 5th metatarsal tenderness found. Achilles tendon exhibits no pain, no defect and normal Cheema's test results.        Left ankle: He exhibits normal range of motion, no swelling, no ecchymosis and normal pulse. No lateral malleolus, no medial malleolus and no head of 5th metatarsal tenderness found. Achilles tendon exhibits no pain and normal Cheema's test results.        Right lower leg: He exhibits no tenderness, no bony tenderness, no swelling, no edema and no deformity.        Left lower leg: He exhibits no tenderness, no swelling and no edema.        Right foot: There is normal range of motion and no deformity.        Left foot: There is normal range of motion and no deformity.   Decreased height of medial arches noted with loading of the foot b/L  Hammertoes noted, digits 4 and 5b/L with adductovarus rotation of b/L fifth digit.    Adequate joint ROM noted to all lower extremity muscle groups with no pain or crepitation noted. Muscle strength is 5/5 in all groups bilaterally.     Feet:   Right Foot:   Protective Sensation: 10 sites tested. 6 sites sensed.   Left Foot:   Protective Sensation: 10 sites tested. 6 sites sensed.   Neurological: He is alert and  oriented to person, place, and time.   Diminished protective sensation noted b/L     Skin: Skin is warm. Capillary refill takes more than 3 seconds. No abrasion, no bruising, no burn and no ecchymosis noted.   Skin temp is cool to cool from proximal to distal b/L.  Nails x10 are elongated by  2-4mm's, thickened by 1-3 mm's, dystrophic, and are yellowish in  coloration . Xerosis Bilaterally. No open lesions noted.        Psychiatric: He has a normal mood and affect. His speech is normal and behavior is normal. He is attentive.             Assessment:       Encounter Diagnoses   Name Primary?    Alcohol-induced polyneuropathy Yes    Disease of nail          Plan:       Darinel was seen today for foot pain and nail care.    Diagnoses and all orders for this visit:    Alcohol-induced polyneuropathy    Disease of nail      I counseled the patient on his conditions, their implications and medical management.        - Shoe inspection. Patient instructed on proper foot hygeine. We discussed wearing proper shoe gear, daily foot inspections, never walking without protective shoe gear, never putting sharp instruments to feet, routine podiatric nail visits every 2-3 months.      - With patient's permission, nails were aggressively reduced and debrided x 10 to their soft tissue attachment mechanically and with electric , removing all offending nail and debris. Patient relates relief following the procedure. He will continue to monitor the areas daily, inspect his feet, wear protective shoe gear when ambulatory, moisturizer to maintain skin integrity and follow in this office in approximately 2-3 months, sooner p.r.n.

## 2018-06-26 ENCOUNTER — PATIENT MESSAGE (OUTPATIENT)
Dept: HEPATOLOGY | Facility: CLINIC | Age: 78
End: 2018-06-26

## 2018-06-26 RX ORDER — SPIRONOLACTONE 25 MG/1
50 TABLET ORAL DAILY
Qty: 60 TABLET | Refills: 11 | Status: SHIPPED | OUTPATIENT
Start: 2018-06-26 | End: 2019-06-14 | Stop reason: SDUPTHER

## 2018-06-29 ENCOUNTER — PATIENT MESSAGE (OUTPATIENT)
Dept: HEPATOLOGY | Facility: CLINIC | Age: 78
End: 2018-06-29

## 2018-07-13 DIAGNOSIS — M81.0 AGE-RELATED OSTEOPOROSIS WITHOUT CURRENT PATHOLOGICAL FRACTURE: ICD-10-CM

## 2018-07-13 RX ORDER — ALENDRONATE SODIUM 70 MG/1
70 TABLET ORAL
Qty: 4 TABLET | Refills: 11 | Status: SHIPPED | OUTPATIENT
Start: 2018-07-13 | End: 2019-06-13 | Stop reason: SDUPTHER

## 2018-08-09 DIAGNOSIS — L40.0 PSORIASIS VULGARIS: ICD-10-CM

## 2018-08-09 RX ORDER — TRIAMCINOLONE ACETONIDE 1 MG/G
OINTMENT TOPICAL
Qty: 454 G | Refills: 3 | Status: SHIPPED | OUTPATIENT
Start: 2018-08-09 | End: 2021-12-09

## 2018-09-20 ENCOUNTER — OFFICE VISIT (OUTPATIENT)
Dept: CARDIOTHORACIC SURGERY | Facility: CLINIC | Age: 78
End: 2018-09-20
Attending: THORACIC SURGERY (CARDIOTHORACIC VASCULAR SURGERY)
Payer: MEDICARE

## 2018-09-20 ENCOUNTER — HOSPITAL ENCOUNTER (OUTPATIENT)
Dept: RADIOLOGY | Facility: HOSPITAL | Age: 78
Discharge: HOME | End: 2018-09-20
Attending: THORACIC SURGERY (CARDIOTHORACIC VASCULAR SURGERY)
Payer: MEDICARE

## 2018-09-20 VITALS
WEIGHT: 184.94 LBS | DIASTOLIC BLOOD PRESSURE: 72 MMHG | BODY MASS INDEX: 34.03 KG/M2 | SYSTOLIC BLOOD PRESSURE: 125 MMHG | OXYGEN SATURATION: 98 % | HEIGHT: 62 IN | HEART RATE: 77 BPM

## 2018-09-20 DIAGNOSIS — R22.2 PLEURAL NODULE: ICD-10-CM

## 2018-09-20 DIAGNOSIS — R91.1 PULMONARY NODULE: Primary | ICD-10-CM

## 2018-09-20 PROCEDURE — 1101F PT FALLS ASSESS-DOCD LE1/YR: CPT | Mod: CPTII,,, | Performed by: THORACIC SURGERY (CARDIOTHORACIC VASCULAR SURGERY)

## 2018-09-20 PROCEDURE — 3078F DIAST BP <80 MM HG: CPT | Mod: CPTII,,, | Performed by: THORACIC SURGERY (CARDIOTHORACIC VASCULAR SURGERY)

## 2018-09-20 PROCEDURE — 71250 CT THORAX DX C-: CPT | Mod: 26,,, | Performed by: RADIOLOGY

## 2018-09-20 PROCEDURE — 99213 OFFICE O/P EST LOW 20 MIN: CPT | Mod: S$PBB,,, | Performed by: THORACIC SURGERY (CARDIOTHORACIC VASCULAR SURGERY)

## 2018-09-20 PROCEDURE — 3074F SYST BP LT 130 MM HG: CPT | Mod: CPTII,,, | Performed by: THORACIC SURGERY (CARDIOTHORACIC VASCULAR SURGERY)

## 2018-09-20 PROCEDURE — 99213 OFFICE O/P EST LOW 20 MIN: CPT | Mod: PBBFAC,25 | Performed by: THORACIC SURGERY (CARDIOTHORACIC VASCULAR SURGERY)

## 2018-09-20 PROCEDURE — 99999 PR PBB SHADOW E&M-EST. PATIENT-LVL III: CPT | Mod: PBBFAC,,, | Performed by: THORACIC SURGERY (CARDIOTHORACIC VASCULAR SURGERY)

## 2018-09-20 PROCEDURE — 71250 CT THORAX DX C-: CPT | Mod: TC

## 2018-09-20 NOTE — PROGRESS NOTES
"Subjective:       Patient ID: Darinel Majano is a 78 y.o. male.    Chief Complaint: Follow-up    Diagnosis:  Subpleural Right Hemidiaphragm Mass    Pre-operative therapy: NA    Procedure(s) and date(s): 11/10/17- Right Thoracoscopy with Biopsy of Diaphragm Mass    Pathology: Granulation tissue with acute and chronic inflammation, foreign body giant cell reaction.  Benign lung tissue with focal vasuclar proliferation and reactive epithelial hyperplastic changes.      Post-operative therapy: NA    HPI   77 y.o. male presents with PMH of BPH, HTN, Psoriasis, thrombocytopenia, squamous cell carcinoma of right hand x 2 and history of EtOH abuse (quit drinking May 2017) here today for 10 month f/u of right VATS wedge and diaphgram biopsy on 11/10/17. We have also been following a RLL mass measuring 4cm. Today he reports feeling well. SCC removed from hand. Recently got hearing aids. Denies fever, chills, SOB, CP, syncope, N/V or changes in bowel or bladder functioning.     Review of Systems   Constitutional: Negative for chills, fatigue and fever.   Respiratory: Negative for cough, chest tightness and shortness of breath.    Cardiovascular: Negative for chest pain and palpitations.   Gastrointestinal: Negative for abdominal pain, nausea and vomiting.   Genitourinary: Negative for difficulty urinating.   Musculoskeletal: Positive for arthralgias (hip pain).   Skin: Negative for color change and rash.   Neurological: Negative for syncope.   Psychiatric/Behavioral: Negative for agitation. The patient is not nervous/anxious.          Objective:         Visit Vitals  /72   Pulse 77   Ht 5' 2" (1.575 m)   Wt 83.9 kg (184 lb 15.5 oz)   SpO2 98%   BMI 33.83 kg/m²       Physical Exam   Constitutional: He is oriented to person, place, and time. He appears well-developed and well-nourished.   HENT:   Head: Normocephalic and atraumatic.   Mouth/Throat: Oropharynx is clear and moist.   Eyes: Pupils are equal, round, and " reactive to light.   Neck: Normal range of motion. Neck supple.   Cardiovascular: Normal rate, regular rhythm, normal heart sounds and intact distal pulses.   Pulmonary/Chest: Effort normal and breath sounds normal. No respiratory distress. He exhibits no tenderness.   Abdominal: Soft. Bowel sounds are normal.   Lymphadenopathy:     He has no cervical adenopathy.   Neurological: He is alert and oriented to person, place, and time.   Skin: Skin is warm and dry. Rash (Lower extremity psoriotic rash) noted.   Psychiatric: He has a normal mood and affect. Thought content normal.   Vitals reviewed.        3/19/18- Chest CT-   Mass is again noted within the posterior segment of the right lower lobe measuring approximately 4.0 cm on today's examination (axial series 2, image 38).  Mass demonstrates stable size but we remain concerned for neoplasm in light of hypermetabolics activity seen on prior PET-CT (SUV max 4.7).  Newly identified, subacute fracture involving the lateral aspect of the right 6th rib (axial series 2, image 51).  Significant calcific atherosclerosis of the mitral valve, mitral annulus, thoracic aorta, and coronary arteries.    3/19/18- Head CT-   Age-appropriate cerebral volume loss.  Otherwise unremarkable CT head specifically without evidence for acute intracranial hemorrhage or abnormal parenchymal enhancement.  Further evaluation as warranted clinically.    Chest CT 9/20/18: reviewed. Official read pending. RLL opacity continues to improve.     Assessment:       77 y.o. male presents with PMH of BPH, HTN, Psoriasis, thrombocytopenia, squamous cell carcinoma of right hand and history of EtOH abuse (quit drinking May 2017) here today for 3 month f/u of Right VATS wedge and diaphgram biopsy on 11/10/17. Benign pathology. Continued improvement.     Plan:       RTC prn    ATTENDING ATTESTATION:    I evaluated the patient and I agree with the assessment and plan.  Doing well.  CT stable to improved.  RTC  PRN.

## 2018-10-09 ENCOUNTER — TELEPHONE (OUTPATIENT)
Dept: HEPATOLOGY | Facility: CLINIC | Age: 78
End: 2018-10-09

## 2018-10-09 DIAGNOSIS — K74.00 LIVER FIBROSIS: Primary | ICD-10-CM

## 2018-10-09 RX ORDER — FUROSEMIDE 20 MG/1
20 TABLET ORAL DAILY
Qty: 30 TABLET | Refills: 6 | Status: SHIPPED | OUTPATIENT
Start: 2018-10-09 | End: 2019-05-05 | Stop reason: SDUPTHER

## 2018-10-20 ENCOUNTER — HOSPITAL ENCOUNTER (OUTPATIENT)
Dept: RADIOLOGY | Facility: HOSPITAL | Age: 78
Discharge: HOME | End: 2018-10-20
Attending: PHYSICIAN ASSISTANT
Payer: MEDICARE

## 2018-10-20 DIAGNOSIS — K74.00 LIVER FIBROSIS: ICD-10-CM

## 2018-10-20 PROCEDURE — 76700 US EXAM ABDOM COMPLETE: CPT | Mod: TC

## 2018-10-20 PROCEDURE — 76700 US EXAM ABDOM COMPLETE: CPT | Mod: 26,,, | Performed by: RADIOLOGY

## 2018-10-24 ENCOUNTER — PATIENT MESSAGE (OUTPATIENT)
Dept: INTERNAL MEDICINE | Facility: CLINIC | Age: 78
End: 2018-10-24

## 2018-10-25 ENCOUNTER — TELEPHONE (OUTPATIENT)
Dept: INTERNAL MEDICINE | Facility: CLINIC | Age: 78
End: 2018-10-25

## 2018-10-25 ENCOUNTER — PATIENT MESSAGE (OUTPATIENT)
Dept: INTERNAL MEDICINE | Facility: CLINIC | Age: 78
End: 2018-10-25

## 2018-10-25 NOTE — TELEPHONE ENCOUNTER
----- Message from Laura Strickland sent at 10/25/2018  9:01 AM CDT -----  Contact: PT wife [phone]  Patient is returning a phone call.  Who left a message for the patient: Gisella  Does patient know what this is regarding:    Comments:

## 2018-10-30 ENCOUNTER — PATIENT MESSAGE (OUTPATIENT)
Dept: HEPATOLOGY | Facility: CLINIC | Age: 78
End: 2018-10-30

## 2018-10-31 ENCOUNTER — OFFICE VISIT (OUTPATIENT)
Dept: HEPATOLOGY | Facility: CLINIC | Age: 78
End: 2018-10-31
Payer: MEDICARE

## 2018-10-31 ENCOUNTER — OFFICE VISIT (OUTPATIENT)
Dept: INTERNAL MEDICINE | Facility: CLINIC | Age: 78
End: 2018-10-31
Payer: MEDICARE

## 2018-10-31 ENCOUNTER — LAB VISIT (OUTPATIENT)
Dept: LAB | Facility: HOSPITAL | Age: 78
End: 2018-10-31
Payer: MEDICARE

## 2018-10-31 VITALS
BODY MASS INDEX: 35.09 KG/M2 | HEIGHT: 61 IN | RESPIRATION RATE: 18 BRPM | OXYGEN SATURATION: 99 % | HEART RATE: 73 BPM | DIASTOLIC BLOOD PRESSURE: 60 MMHG | TEMPERATURE: 97 F | WEIGHT: 185.88 LBS | SYSTOLIC BLOOD PRESSURE: 123 MMHG

## 2018-10-31 VITALS
BODY MASS INDEX: 34.08 KG/M2 | DIASTOLIC BLOOD PRESSURE: 65 MMHG | HEIGHT: 62 IN | SYSTOLIC BLOOD PRESSURE: 125 MMHG | WEIGHT: 185.19 LBS

## 2018-10-31 DIAGNOSIS — K21.9 GASTROESOPHAGEAL REFLUX DISEASE WITHOUT ESOPHAGITIS: ICD-10-CM

## 2018-10-31 DIAGNOSIS — D69.6 THROMBOCYTOPENIA: ICD-10-CM

## 2018-10-31 DIAGNOSIS — R76.8 HELICOBACTER PYLORI AB+: ICD-10-CM

## 2018-10-31 DIAGNOSIS — K74.00 LIVER FIBROSIS: ICD-10-CM

## 2018-10-31 DIAGNOSIS — M25.473 ANKLE EDEMA: ICD-10-CM

## 2018-10-31 DIAGNOSIS — R05.9 COUGH: Primary | ICD-10-CM

## 2018-10-31 DIAGNOSIS — R91.8 RIGHT LOWER LOBE LUNG MASS: ICD-10-CM

## 2018-10-31 DIAGNOSIS — I10 ESSENTIAL HYPERTENSION: ICD-10-CM

## 2018-10-31 DIAGNOSIS — K74.00 LIVER FIBROSIS: Primary | ICD-10-CM

## 2018-10-31 LAB
AFP SERPL-MCNC: 4.1 NG/ML
ALBUMIN SERPL BCP-MCNC: 3.4 G/DL
ALP SERPL-CCNC: 63 U/L
ALT SERPL W/O P-5'-P-CCNC: 15 U/L
ANION GAP SERPL CALC-SCNC: 6 MMOL/L
AST SERPL-CCNC: 15 U/L
BASOPHILS # BLD AUTO: 0.04 K/UL
BASOPHILS NFR BLD: 0.4 %
BILIRUB SERPL-MCNC: 0.6 MG/DL
BUN SERPL-MCNC: 19 MG/DL
CALCIUM SERPL-MCNC: 9.6 MG/DL
CHLORIDE SERPL-SCNC: 105 MMOL/L
CO2 SERPL-SCNC: 27 MMOL/L
CREAT SERPL-MCNC: 0.8 MG/DL
DIFFERENTIAL METHOD: ABNORMAL
EOSINOPHIL # BLD AUTO: 0.1 K/UL
EOSINOPHIL NFR BLD: 1.4 %
ERYTHROCYTE [DISTWIDTH] IN BLOOD BY AUTOMATED COUNT: 14.1 %
EST. GFR  (AFRICAN AMERICAN): >60 ML/MIN/1.73 M^2
EST. GFR  (NON AFRICAN AMERICAN): >60 ML/MIN/1.73 M^2
GLUCOSE SERPL-MCNC: 97 MG/DL
HCT VFR BLD AUTO: 38.4 %
HGB BLD-MCNC: 12.6 G/DL
IMM GRANULOCYTES # BLD AUTO: 0.05 K/UL
IMM GRANULOCYTES NFR BLD AUTO: 0.5 %
INR PPP: 0.9
LYMPHOCYTES # BLD AUTO: 2.5 K/UL
LYMPHOCYTES NFR BLD: 24.7 %
MCH RBC QN AUTO: 30.9 PG
MCHC RBC AUTO-ENTMCNC: 32.8 G/DL
MCV RBC AUTO: 94 FL
MONOCYTES # BLD AUTO: 0.8 K/UL
MONOCYTES NFR BLD: 8.1 %
NEUTROPHILS # BLD AUTO: 6.7 K/UL
NEUTROPHILS NFR BLD: 64.9 %
NRBC BLD-RTO: 0 /100 WBC
PLATELET # BLD AUTO: 137 K/UL
PMV BLD AUTO: 9.9 FL
POTASSIUM SERPL-SCNC: 4.2 MMOL/L
PROT SERPL-MCNC: 6.7 G/DL
PROTHROMBIN TIME: 10 SEC
RBC # BLD AUTO: 4.08 M/UL
SODIUM SERPL-SCNC: 138 MMOL/L
WBC # BLD AUTO: 10.27 K/UL

## 2018-10-31 PROCEDURE — 1101F PT FALLS ASSESS-DOCD LE1/YR: CPT | Mod: CPTII,,, | Performed by: PHYSICIAN ASSISTANT

## 2018-10-31 PROCEDURE — 1101F PT FALLS ASSESS-DOCD LE1/YR: CPT | Mod: CPTII,,, | Performed by: INTERNAL MEDICINE

## 2018-10-31 PROCEDURE — 85025 COMPLETE CBC W/AUTO DIFF WBC: CPT

## 2018-10-31 PROCEDURE — 82105 ALPHA-FETOPROTEIN SERUM: CPT

## 2018-10-31 PROCEDURE — 3074F SYST BP LT 130 MM HG: CPT | Mod: CPTII,,, | Performed by: INTERNAL MEDICINE

## 2018-10-31 PROCEDURE — 80053 COMPREHEN METABOLIC PANEL: CPT

## 2018-10-31 PROCEDURE — 3078F DIAST BP <80 MM HG: CPT | Mod: CPTII,,, | Performed by: PHYSICIAN ASSISTANT

## 2018-10-31 PROCEDURE — 99213 OFFICE O/P EST LOW 20 MIN: CPT | Mod: PBBFAC,27 | Performed by: INTERNAL MEDICINE

## 2018-10-31 PROCEDURE — 99999 PR PBB SHADOW E&M-EST. PATIENT-LVL IV: CPT | Mod: PBBFAC,,, | Performed by: PHYSICIAN ASSISTANT

## 2018-10-31 PROCEDURE — 3074F SYST BP LT 130 MM HG: CPT | Mod: CPTII,,, | Performed by: PHYSICIAN ASSISTANT

## 2018-10-31 PROCEDURE — 99214 OFFICE O/P EST MOD 30 MIN: CPT | Mod: S$PBB,,, | Performed by: INTERNAL MEDICINE

## 2018-10-31 PROCEDURE — 36415 COLL VENOUS BLD VENIPUNCTURE: CPT

## 2018-10-31 PROCEDURE — 3078F DIAST BP <80 MM HG: CPT | Mod: CPTII,,, | Performed by: INTERNAL MEDICINE

## 2018-10-31 PROCEDURE — 85610 PROTHROMBIN TIME: CPT

## 2018-10-31 PROCEDURE — 99214 OFFICE O/P EST MOD 30 MIN: CPT | Mod: S$PBB,,, | Performed by: PHYSICIAN ASSISTANT

## 2018-10-31 PROCEDURE — 99999 PR PBB SHADOW E&M-EST. PATIENT-LVL III: CPT | Mod: PBBFAC,,, | Performed by: INTERNAL MEDICINE

## 2018-10-31 PROCEDURE — 99214 OFFICE O/P EST MOD 30 MIN: CPT | Mod: PBBFAC | Performed by: PHYSICIAN ASSISTANT

## 2018-10-31 RX ORDER — PHENOL/SODIUM PHENOLATE
20 AEROSOL, SPRAY (ML) MUCOUS MEMBRANE DAILY
Qty: 30 EACH | Refills: 3 | Status: SHIPPED | OUTPATIENT
Start: 2018-10-31 | End: 2020-09-01

## 2018-10-31 NOTE — PROGRESS NOTES
HEPATOLOGY CLINIC VISIT NOTE     REFERRING PROVIDER: Dr. Annabella Montiel     REASON FOR VISIT: follow up.     HISTORY: This is a 78 y.o. White male here for follow up as well as to discuss lab and U/S results. At first visit, he presented for evaluation of elevated liver enzymes in the setting of heavy alcohol use. His work up was negative for A1AT, AIH, viral hepatitis, Magdiel's.  His work up for fibrosis has been unclear. He had a liver biopsy in 2011 showing no advanced fibrosis. Labs revealed mildly elevated transaminases. He has hypoalbuminemia, hyperbilirubinemia, and thrombocytopenia noted. At first visit, he had LLE edema which has been greatly improved with lasix and spironolactone. His fibroscan did not reveal advanced fibrosis. Given his laboratory findings and edema, I have continued q6 month HCC screening. Discussed only definitive fibrosis staging would be repeating biopsy.     His U/S was unremarkable. His labs from today are pending.    Today he notes 2 month h/o cough. He has tried d/cing medications. He attempted to d/c lasix and spironolactone, edema returned to LL so he will restart.     PMH significant for severe psoriasis, only currently on topical agents. He has had imaging for a lung mass, pathology reassuring.     Liver staging:  Reports biopsy 5 years ago; no advanced fibrosis  Fibroscan F0-F1  Transaminases WNL  LFTs WNL  PLTs low end of normal     Risk Factors:  AI:  Biliary:   ETOH: >case per day for many years- difficult to quantify how long   Hereditary:   Medications:  NAFLD/CLAY:  Other:   Viral hepatitis:    Denies jaundice, dark urine, abdominal distention, hematemesis, melena, slowed mentation.   No abnormal skin rashes. No generalized joint pain.                   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  Orders placed for recheck in 1 yr.   see CT scan 5/17 6/9/2017    BPH (benign prostatic hypertrophy)     Chronic gout     Cortical senile cataract 5/15/2012    Degenerative disc disease     Diverticulosis of large intestine without hemorrhage: see CT scan 5/17 6/9/2017    Essential hypertension 2/19/2016    Gout     Hyperplastic polyp of transverse colon: 2012 repeat 5 years 5/4/2017    Kidney stone on left side 6/9/2017    Osteoporosis     Psoriasis     Squamous cell cancer of skin of right hand 6/9/2017    right 3rd digit    Substance abuse     Thrombocytopenia     Umbilical hernia: see CT 5/17 6/9/2017     Past Surgical History:   Procedure Laterality Date    BIOPSY-BONE MARROW Left 5/22/2017    Performed by Janet Munguia MD at [facility] OR [address] [place] [address]    BIOPSY-DIAPHRAGM  11/10/2017    Performed by Oswaldo Chandra MD at [facility] OR [address] [place] [address]    DDDJTH-[id number]-QS N/A 6/28/2017    Performed by [facility] Provider at [facility] OR [address] [place] [address]    CATARACT EXTRACTION      CHOLECYSTECTOMY  2012    COLONOSCOPY N/A 2/5/2018    Procedure: COLONOSCOPY;  Surgeon: Marcial Paredes MD;  Location: [facility] ([address] [place] [address]);  Service: Endoscopy;  Laterality: N/A;    COLONOSCOPY N/A 2/5/2018    Performed by Marcial Paredes MD at [facility] ([address] [place] [address])    ESOPHAGOGASTRODUODENOSCOPY (EGD) N/A 2/5/2018    Performed by Marcial Paredes MD at [facility] ([address] [place] [address])    EYE SURGERY      Right Hand Surgery      THORACOSCOPY-VIDEO ASSISTED (VATS)  11/10/2017    Performed by Oswaldo Chandra MD at [facility] OR [address] [place] [address]    UPPER GASTROINTESTINAL ENDOSCOPY       FAMILY HISTORY: Negative for liver disease    SOCIAL HISTORY:   Social History     Tobacco Use   Smoking Status Former Smoker    Packs/day: 1.00    Years: 20.00    Pack years: 20.00   Smokeless Tobacco Never Used   Tobacco Comment    quit 40 yrs. ago       Social History     Substance and Sexual Activity   Alcohol Use No    Comment: Patient no longer drinks ETOHHistory-84 beers a week   d/c month ago, 2 beers per hour in 24 hours- many  years     Social History     Substance and Sexual Activity   Drug Use No       ROS:   No fever, chills  No chest pain, dyspnea, (+) cough  No abdominal pain, nausea, vomiting  No skin rashes   No lower extremity edema  No depression or anxiety      PHYSICAL EXAM:  Friendly White male, in no acute distress; alert and oriented to person, place and time  VITALS: reviewed  HEENT: Sclerae anicteric.   NECK: Supple  LUNGS: Normal respiratory effort.  ABDOMEN: Flat, soft, nontender.   SKIN: Warm and dry. No jaundice  EXTREMITIES: mild LL edema bilaterally  NEURO/PSYCH: Normal gate. Memory intact. Thought and speech pattern appropriate. Behavior normal. No depression or anxiety noted.    RECENT LABS:  Lab Results   Component Value Date    WBC 10.64 03/01/2018    HGB 14.6 03/01/2018     03/01/2018     Lab Results   Component Value Date    INR 1.0 03/01/2018     Lab Results   Component Value Date    AST 17 03/01/2018    ALT 16 03/01/2018    BILITOT 0.7 03/01/2018    ALBUMIN 4.1 03/01/2018    ALKPHOS 67 03/01/2018    CREATININE 0.8 03/01/2018    BUN 19 03/01/2018     03/01/2018    K 4.8 03/01/2018    AFP 4.1 03/01/2018     RECENT IMAGING:  US Abdomen Complete   Order: [id number]   Status:  Final result   Visible to patient:  Yes (Patient Portal) Next appt:  None Dx:  Liver fibrosis   Details     Reading Physician Reading Date Result Priority   Clayton Burks MD 10/20/2018    Jasmina Evans MD 10/20/2018       Narrative     EXAMINATION:  US ABDOMEN COMPLETE    CLINICAL HISTORY:  LIVER FIBROSIS; Hepatic fibrosis    TECHNIQUE:  Complete abdominal ultrasound (including pancreas, aorta, liver, gallbladder, common bile duct, IVC, kidneys, and spleen) was performed.    COMPARISON:  Limited abdominal ultrasound 11/21/2017, CT chest without contrast 09/20/2018    FINDINGS:  Pancreas: The visualized portions of pancreas appear normal.    Aorta: No aneurysm.    Liver: 15.8 cm, normal in size. Hepatic parenchyma is  mildly heterogeneous, nonspecific, but may suggest fibrosis.  No focal lesions.    Gallbladder: Surgically absent.    Biliary system: 3 mm common bile duct.  No intrahepatic ductal dilatation.    Inferior vena cava: Normal in appearance.    Right kidney: 11.9 cm. No hydronephrosis.    Left kidney: 10.8 cm. No hydronephrosis.    Spleen: 13.1 x 4.8 cm.  Mildly enlarged.    Miscellaneous: No ascites.      Impression       No liver masses.    Mild splenomegaly.    Prior cholecystectomy.               ASSESSMENT  78 y.o. White male with:  1. ? LIVER FIBROSIS  --Reports biopsy 5 years ago; no advanced fibrosis  --Fibroscan F0-F1  --  hypoalbuminemia, hyperbilirubinemia, and thrombocytopenia improved with alcohol cessation  -- splenomegaly and recanalized umbilical vein  -- Will continue q6 month HCC screening  - HCC screening:               - U/S: next due 03/2019               - AFP: WNL     (-)Portal HTN:               - EGD: done 02/2018, no EV       2. ANKLE EDEMA  -- Lasix 20mg, Spironolactone 50mg, stable on current dosing; recommended resuming       PLAN:  1. HCC screening in 6 months, will review by phone  2. F/u with HCC screening in 1 year    Thank you for allowing me to participate in the care of Darinel Nair PA-C

## 2018-10-31 NOTE — PROGRESS NOTES
"Subjective:       Patient ID: Darinel Majano is a 78 y.o. male.    Chief Complaint: Cough    Cough and multiple issues.    Cough x 3 weeks, worse in past week.  No fever.  Dry cough.    Labs OK other than slight anemia and low platelets.    Seen today in Hepatology.      Bone marrow biopsy done 5/17, seen Oncology 6/17: "Darinel presents with his wife for evaluation of a macrocytic anemia. Macrocytosis and thrombocytopenia date back to at least 2004 by [facility] lab review. Most pertinent to the patient's CBC changes is a history of alcohol use, 1 case of beer daily that the patient stopped 2 weeks ago. He also has liver enzyme changes and splenomegaly. His appetite and nutrition were low while drinking alcohol. Since starting new therapy for psoriasis he stopped all ETOH use and his skin is improving significantly."    Seen by Dr. Chandra CTS 9/20/18: "77 y.o. male presents with PMH of BPH, HTN, Psoriasis, thrombocytopenia, squamous cell carcinoma of right hand x 2 and history of EtOH abuse (quit drinking May 2017) here today for 10 month f/u of right VATS wedge and diaphgram biopsy on 11/10/17. We have also been following a RLL mass measuring 4cm. Today he reports feeling well. SCC removed from hand. Recently got hearing aids. Denies fever, chills, SOB, CP, syncope, N/V or changes in bowel or bladder functioning."  Told to follow up prn.    Weight gain, about 20 #.  Some minimal GERD.    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   HENT: Positive for postnasal drip. Negative for congestion and ear pain.    Eyes: Negative.    Respiratory: Positive for cough. Negative for chest tightness, shortness of breath and wheezing.    Cardiovascular: Negative.    Gastrointestinal: Negative.         GERD, slight  Drinking lots of Sprite  Spicy food, weight gain       Objective:      Physical Exam   Constitutional: He is oriented to person, place, and time. He appears well-developed and well-nourished.   HENT:   Head: Normocephalic and atraumatic.   Right Ear: External ear normal.   Left Ear: External ear normal.   Mouth/Throat: Oropharynx is clear and moist.   Neck: Normal range of motion. Neck supple. No thyromegaly present.   Cardiovascular: Normal rate and regular rhythm.   Pulmonary/Chest: No respiratory distress. He has wheezes. He has no rales.   Abdominal: Soft. Bowel sounds are normal. He exhibits no distension. There is no tenderness.   Musculoskeletal: He exhibits no edema.   Neurological: He is alert and oriented to person, place, and time.   Skin: Skin is warm and dry. No rash noted. No erythema.   Psychiatric: He has a normal mood and affect.       Assessment:       1. Cough    2. Right lower lobe lung mass    3. Essential hypertension    4. Thrombocytopenia    5. Liver fibrosis    6. Helicobacter pylori ab+: treated 2/18 resolved; stool negative 4/18    7. Gastroesophageal reflux disease with gastritis: see EGD 2018        Plan:         Cough: likely multifactorial.  Does not sound infectious.  ? GERD- issues  discussed    Right lower lobe lung mass: keep CTS follow up    Essential hypertension: continue regimen    Thrombocytopenia: chart to Hematology    Liver fibrosis: keep Hepatology follow up    Helicobacter pylori ab+: treated 2/18 resolved; stool negative 4/18    Gastroesophageal reflux disease with gastritis: see EGD 2018    Other orders  -     omeprazole 20 mg TbEC; Take 20 mg by mouth once daily.  Dispense: 30 each; Refill: 3    Weight loss  Food diary  GERD cautions reviewed  Recent CT reviewed; consider repeat CXR or CT if sx persist- fu/u 1 week, sooner with problems prior  Tdap recommended today

## 2018-10-31 NOTE — PATIENT INSTRUCTIONS
"  GERD (Adult)    The esophagus is a tube that carries food from the mouth to the stomach. A valve at the lower end of the esophagus prevents stomach acid from flowing upward. When this valve doesn't work properly, stomach contents may repeatedly flow back up (reflux) into the esophagus. This is called gastroesophageal reflux disease (GERD). GERD can irritate the esophagus. It can cause problems with swallowing or breathing. In severe cases, GERD can cause recurrent pneumonia or other serious problems.  Symptoms of reflux include burning, pressure or sharp pain in the upper abdomen or mid to lower chest. The pain can spread to the neck, back, or shoulder. There may be belching, an acid taste in the back of the throat, chronic cough, or sore throat or hoarseness. GERD symptoms often occur during the day after a big meal. They can also occur at night when lying down.   Home care  Lifestyle changes can help reduce symptoms. If needed, medicines may be prescribed. Symptoms often improve with treatment, but if treatment is stopped, the symptoms often return after a few months. So most persons with GERD will need to continue treatment.  Lifestyle changes  · Limit or avoid fatty, fried, and spicy foods, as well as coffee, chocolate, mint, and foods with high acid content such as tomatoes and citrus fruit and juices (orange, grapefruit, lemon).  · Dont eat large meals, especially at night. Frequent, smaller meals are best. Do not lie down right after eating. And dont eat anything 3 hours before going to bed.  · Avoid drinking alcohol and smoking. As much as possible, stay away from second hand smoke.  · If you are overweight, losing weight will reduce symptoms.   · Avoid wearing tight clothing around your stomach area.  · If your symptoms occur during sleep, use a foam wedge to elevate your upper body (not just your head.) Or, place 4" blocks under the head of your bed.  Medicines  If needed, medicines can help relieve " the symptoms of GERD and prevent damage to the esophagus. Discuss a medicine plan with your healthcare provider. This may include one or more of the following medicines:  · Antacids to help neutralize the normal acids in your stomach.  · Acid blockers (H2 blockers) to decrease acid production.  · Acid inhibitors (PPIs) to decrease acid production in a different way than the blockers. They may work better, but can take a little longer to take effect.  Take an antacid 30-60 minutes after eating and at bedtime, but not at the same time as an acid blocker.  Try not to take medicines such as ibuprofen and aspirin. If you are taking aspirin for your heart or other medical reasons, talk to your healthcare provider about stopping it.  Follow-up care  Follow up with your healthcare provider or as advised by our staff.  When to seek medical advice  Call your healthcare provider if any of the following occur:  · Stomach pain gets worse or moves to the lower right abdomen (appendix area)  · Chest pain appears or gets worse, or spreads to the back, neck, shoulder, or arm  · Frequent vomiting (cant keep down liquids)  · Blood in the stool or vomit (red or black in color)  · Feeling weak or dizzy  · Fever of 100.4ºF (38ºC) or higher, or as directed by your healthcare provider  Date Last Reviewed: 6/23/2015 © 2000-2017 [employer]. [address] [place] [address], [place] [address]. All rights reserved. This information is not intended as a substitute for professional medical care. Always follow your healthcare professional's instructions.          Lifestyle Changes for Controlling GERD  When you have GERD, stomach acid feels as if its backing up toward your mouth. Whether or not you take medicine to control your GERD, your symptoms can often be improved with lifestyle changes. Talk to your healthcare provider about the following suggestions. These suggestions may help you get relief from your symptoms.      Raise your  head  Reflux is more likely to strike when youre lying down flat, because stomach fluid can flow backward more easily. Raising the head of your bed 4 to 6 inches can help. To do this:  · Slide blocks or books under the legs at the head of your bed. Or, place a wedge under the mattress. Many foam stores can make a suitable wedge for you. The wedge should run from your waist to the top of your head.  · Dont just prop your head on several pillows. This increases pressure on your stomach. It can make GERD worse.  Watch your eating habits  Certain foods may increase the acid in your stomach or relax the lower esophageal sphincter. This makes GERD more likely. Its best to avoid the following if they cause you symptoms:  · Coffee, tea, and carbonated drinks (with and without caffeine)  · Fatty, fried, or spicy food  · Mint, chocolate, onions, and tomatoes  · Peppermint  · Any other foods that seem to irritate your stomach or cause you pain  Relieve the pressure  Tips include the following:  · Eat smaller meals, even if you have to eat more often.  · Dont lie down right after you eat. Wait a few hours for your stomach to empty.  · Avoid tight belts and tight-fitting clothes.  · Lose excess weight.  Tobacco and alcohol  Avoid smoking tobacco and drinking alcohol. They can make GERD symptoms worse.  Date Last Reviewed: 7/1/2016  © 1044-7319 [employer]. [address] [place] [address], [place] [address]. All rights reserved. This information is not intended as a substitute for professional medical care. Always follow your healthcare professional's instructions.

## 2018-10-31 NOTE — Clinical Note
Ean Gonzales:[place] patient.  Platelets slightly lower- multiple issues.  Any recommendations?Annabella Montiel

## 2018-10-31 NOTE — Clinical Note
CBC, CMP, PT/INR, AFP and U/S in 6 months, will review by phonePlease recall for remaining labs and U/S with clinic visit in 1 yearthanks

## 2018-11-05 ENCOUNTER — TELEPHONE (OUTPATIENT)
Dept: INTERNAL MEDICINE | Facility: CLINIC | Age: 78
End: 2018-11-05

## 2018-11-05 DIAGNOSIS — D69.6 THROMBOCYTOPENIA: Primary | ICD-10-CM

## 2018-11-05 NOTE — TELEPHONE ENCOUNTER
----- Message from Janet Munguia MD sent at 11/5/2018  7:27 AM CST -----  Hi Annabella,    CBC pattern is overall stable.   I would repeat in about 3-4 months, expecting it to be same or better.  If significantly worse, send him back to see me.    Thank you,  Janet    ----- Message -----  From: Annabella Montiel MD  Sent: 11/1/2018   9:43 PM  To: MD Ean Robbins:  [place] patient.  Platelets slightly lower- multiple issues.  Any recommendations?    Annabella Montiel

## 2018-11-05 NOTE — TELEPHONE ENCOUNTER
Please let him know that I heard from his hematologist to thought he should have follow-up lab work in 3 months, orders in.  Thank you

## 2018-11-08 NOTE — ASSESSMENT & PLAN NOTE
Neuropathy is secondary to ETOH. Check B12, folate, thiamine   asymptomatic bradycardia on coreg-decreased to 12.5mg BID  Appreciate EP eval: No advanced AV blocks or persistent significant chelsea <40 bpm seen on telemetry, per EP if symptomatic chelsea or advanced AV block occurs, then would reduce Carvedilol dosage    -No acute pacing indication at present time  [id number] score of 6   NO AC given hx of multiple falls and positive FOBT.

## 2019-02-06 ENCOUNTER — TELEPHONE (OUTPATIENT)
Dept: INTERNAL MEDICINE | Facility: CLINIC | Age: 79
End: 2019-02-06

## 2019-02-06 ENCOUNTER — LAB VISIT (OUTPATIENT)
Dept: LAB | Facility: HOSPITAL | Age: 79
End: 2019-02-06
Attending: INTERNAL MEDICINE
Payer: MEDICARE

## 2019-02-06 ENCOUNTER — PATIENT MESSAGE (OUTPATIENT)
Dept: INTERNAL MEDICINE | Facility: CLINIC | Age: 79
End: 2019-02-06

## 2019-02-06 DIAGNOSIS — D69.6 THROMBOCYTOPENIA: ICD-10-CM

## 2019-02-06 DIAGNOSIS — E55.9 MILD VITAMIN D DEFICIENCY: ICD-10-CM

## 2019-02-06 DIAGNOSIS — M1A.0720 CHRONIC IDIOPATHIC GOUT INVOLVING TOE OF LEFT FOOT WITHOUT TOPHUS: ICD-10-CM

## 2019-02-06 DIAGNOSIS — Z12.5 ENCOUNTER FOR PROSTATE CANCER SCREENING: ICD-10-CM

## 2019-02-06 DIAGNOSIS — D53.9 MACROCYTIC ANEMIA: ICD-10-CM

## 2019-02-06 DIAGNOSIS — I70.0 AORTIC ATHEROSCLEROSIS: ICD-10-CM

## 2019-02-06 DIAGNOSIS — I10 ESSENTIAL HYPERTENSION: Primary | ICD-10-CM

## 2019-02-06 LAB
BASOPHILS # BLD AUTO: 0.02 K/UL
BASOPHILS NFR BLD: 0.2 %
DIFFERENTIAL METHOD: ABNORMAL
EOSINOPHIL # BLD AUTO: 0.2 K/UL
EOSINOPHIL NFR BLD: 1.6 %
ERYTHROCYTE [DISTWIDTH] IN BLOOD BY AUTOMATED COUNT: 14.5 %
HCT VFR BLD AUTO: 38.6 %
HGB BLD-MCNC: 12.6 G/DL
LYMPHOCYTES # BLD AUTO: 2.5 K/UL
LYMPHOCYTES NFR BLD: 25.5 %
MCH RBC QN AUTO: 30.1 PG
MCHC RBC AUTO-ENTMCNC: 32.6 G/DL
MCV RBC AUTO: 92 FL
MONOCYTES # BLD AUTO: 0.8 K/UL
MONOCYTES NFR BLD: 7.6 %
NEUTROPHILS # BLD AUTO: 6.5 K/UL
NEUTROPHILS NFR BLD: 64.8 %
PLATELET # BLD AUTO: 157 K/UL
PMV BLD AUTO: 10.5 FL
RBC # BLD AUTO: 4.19 M/UL
WBC # BLD AUTO: 9.97 K/UL

## 2019-02-06 PROCEDURE — 85025 COMPLETE CBC W/AUTO DIFF WBC: CPT

## 2019-02-06 PROCEDURE — 36415 COLL VENOUS BLD VENIPUNCTURE: CPT

## 2019-02-13 ENCOUNTER — OFFICE VISIT (OUTPATIENT)
Dept: PODIATRY | Facility: CLINIC | Age: 79
End: 2019-02-13
Payer: MEDICARE

## 2019-02-13 VITALS
BODY MASS INDEX: 34.04 KG/M2 | SYSTOLIC BLOOD PRESSURE: 137 MMHG | WEIGHT: 185 LBS | HEIGHT: 62 IN | HEART RATE: 85 BPM | DIASTOLIC BLOOD PRESSURE: 73 MMHG

## 2019-02-13 DIAGNOSIS — G62.1 ALCOHOL-INDUCED POLYNEUROPATHY: Primary | ICD-10-CM

## 2019-02-13 DIAGNOSIS — L60.9 DISEASE OF NAIL: ICD-10-CM

## 2019-02-13 PROCEDURE — 99499 UNLISTED E&M SERVICE: CPT | Mod: S$GLB,,, | Performed by: PODIATRIST

## 2019-02-13 PROCEDURE — 99999 PR PBB SHADOW E&M-EST. PATIENT-LVL III: ICD-10-PCS | Mod: PBBFAC,,, | Performed by: PODIATRIST

## 2019-02-13 PROCEDURE — 11721 PR DEBRIDEMENT OF NAILS, 6 OR MORE: ICD-10-PCS | Mod: Q9,S$GLB,, | Performed by: PODIATRIST

## 2019-02-13 PROCEDURE — 11721 DEBRIDE NAIL 6 OR MORE: CPT | Mod: Q9,S$GLB,, | Performed by: PODIATRIST

## 2019-02-13 PROCEDURE — 99999 PR PBB SHADOW E&M-EST. PATIENT-LVL III: CPT | Mod: PBBFAC,,, | Performed by: PODIATRIST

## 2019-02-13 PROCEDURE — 99499 RISK ADDL DX/OHS AUDIT: ICD-10-PCS | Mod: S$GLB,,, | Performed by: PODIATRIST

## 2019-02-13 NOTE — PROGRESS NOTES
Subjective:      Patient ID: Darinel Majano is a 78 y.o. male.    Chief Complaint: Foot Pain (Annabella Montiel 10/31/2018)    Darinel is a 78 y.o. male who presents to the clinic for evaluation and treatment of high risk feet. 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), Anemia (5/4/2017), Aortic atherosclerosis: see CT scan 5/17 (6/9/2017), BPH (benign prostatic hypertrophy), Chronic gout, Cortical senile cataract (5/15/2012), Degenerative disc disease, Diverticulosis of large intestine without hemorrhage: see CT scan 5/17 (6/9/2017), Essential hypertension (2/19/2016), Gastroesophageal reflux disease with gastritis: see EGD 2018 (10/31/2018), Gout, Hyperplastic polyp of transverse colon: 2012 repeat 5 years (5/4/2017), Kidney stone on left side (6/9/2017), Osteoporosis, Psoriasis, 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 The patient's chief complaint is long, thick toenails. This patient has documented high risk feet requiring routine maintenance secondary to peripheral neuropathy.    PCP: Annabella Montiel MD    Date Last Seen by PCP: 3/01/2018    Current shoe gear:  Affected Foot: Tennis shoes     Unaffected Foot: Tennis shoes    Last encounter in this department: Visit date not found    Hemoglobin A1C   Date Value Ref Range Status   06/29/2011 4.7 4.0 - 6.2 % Final   01/05/2011 4.6 4.0 - 6.2 % Final       Review of Systems   Constitution: Negative for chills, fever and malaise/fatigue.   HENT: Negative for hearing loss.    Cardiovascular: Negative for claudication and leg swelling.   Respiratory: Negative for shortness of breath.    Skin: Positive for color change, nail changes, rash and unusual hair distribution. Negative for flushing.   Musculoskeletal: Negative for joint pain and myalgias.    Neurological: Positive for numbness, paresthesias and sensory change. Negative for loss of balance.   Psychiatric/Behavioral: Negative for altered mental status.           Objective:      Physical Exam   Constitutional: He is oriented to person, place, and time. He appears well-developed and well-nourished.   Cardiovascular:   Pulses:       Dorsalis pedis pulses are 1+ on the right side, and 1+ on the left side.        Posterior tibial pulses are 1+ on the right side, and 1+ on the left side.   Non pitting  edema noted to b/L LEs   Musculoskeletal:        Right knee: He exhibits no swelling and no ecchymosis.        Left knee: He exhibits no swelling and no ecchymosis.        Right ankle: He exhibits normal range of motion, no swelling, no ecchymosis and normal pulse. No lateral malleolus, no medial malleolus and no head of 5th metatarsal tenderness found. Achilles tendon exhibits no pain, no defect and normal Cheema's test results.        Left ankle: He exhibits normal range of motion, no swelling, no ecchymosis and normal pulse. No lateral malleolus, no medial malleolus and no head of 5th metatarsal tenderness found. Achilles tendon exhibits no pain and normal Cheema's test results.        Right lower leg: He exhibits no tenderness, no bony tenderness, no swelling, no edema and no deformity.        Left lower leg: He exhibits no tenderness, no swelling and no edema.        Right foot: There is normal range of motion and no deformity.        Left foot: There is normal range of motion and no deformity.   Decreased height of medial arches noted with loading of the foot b/L  Hammertoes noted, digits 4 and 5b/L with adductovarus rotation of b/L fifth digit.    Adequate joint ROM noted to all lower extremity muscle groups with no pain or crepitation noted. Muscle strength is 5/5 in all groups bilaterally.     Feet:   Right Foot:   Protective Sensation: 10 sites tested. 6 sites sensed.   Left Foot:   Protective  Sensation: 10 sites tested. 6 sites sensed.   Neurological: He is alert and oriented to person, place, and time.   Diminished protective sensation noted b/L     Skin: Skin is warm. Capillary refill takes more than 3 seconds. No abrasion, no bruising, no burn and no ecchymosis noted.   Skin temp is cool to cool from proximal to distal b/L.  Nails x10 are elongated by  4-6mm's, thickened by 1-3 mm's, dystrophic, and are yellowish in  coloration . Xerosis Bilaterally. No open lesions noted.        Psychiatric: He has a normal mood and affect. His speech is normal and behavior is normal. He is attentive.             Assessment:       Encounter Diagnoses   Name Primary?    Alcohol-induced polyneuropathy Yes    Disease of nail          Plan:       Darinel was seen today for foot pain.    Diagnoses and all orders for this visit:    Alcohol-induced polyneuropathy    Disease of nail      I counseled the patient on his conditions, their implications and medical management.        - Shoe inspection. Patient instructed on proper foot hygeine. We discussed wearing proper shoe gear, daily foot inspections, never walking without protective shoe gear, never putting sharp instruments to feet, routine podiatric nail visits every 2-3 months.      - With patient's permission, nails were aggressively reduced and debrided x 10 to their soft tissue attachment mechanically and with electric , removing all offending nail and debris. Patient relates relief following the procedure. He will continue to monitor the areas daily, inspect his feet, wear protective shoe gear when ambulatory, moisturizer to maintain skin integrity and follow in this office in approximately 2-3 months, sooner p.r.n.

## 2019-04-30 ENCOUNTER — LAB VISIT (OUTPATIENT)
Dept: LAB | Facility: HOSPITAL | Age: 79
End: 2019-04-30
Attending: INTERNAL MEDICINE
Payer: MEDICARE

## 2019-04-30 DIAGNOSIS — Z12.5 ENCOUNTER FOR PROSTATE CANCER SCREENING: ICD-10-CM

## 2019-04-30 DIAGNOSIS — I10 ESSENTIAL HYPERTENSION: ICD-10-CM

## 2019-04-30 DIAGNOSIS — E55.9 MILD VITAMIN D DEFICIENCY: ICD-10-CM

## 2019-04-30 DIAGNOSIS — I70.0 AORTIC ATHEROSCLEROSIS: ICD-10-CM

## 2019-04-30 DIAGNOSIS — M1A.0720 CHRONIC IDIOPATHIC GOUT INVOLVING TOE OF LEFT FOOT WITHOUT TOPHUS: ICD-10-CM

## 2019-04-30 LAB
25(OH)D3+25(OH)D2 SERPL-MCNC: 57 NG/ML (ref 30–96)
ALBUMIN SERPL BCP-MCNC: 3.9 G/DL (ref 3.5–5.2)
ALP SERPL-CCNC: 71 U/L (ref 55–135)
ALT SERPL W/O P-5'-P-CCNC: 19 U/L (ref 10–44)
ANION GAP SERPL CALC-SCNC: 11 MMOL/L (ref 8–16)
AST SERPL-CCNC: 18 U/L (ref 10–40)
BASOPHILS # BLD AUTO: 0.02 K/UL (ref 0–0.2)
BASOPHILS NFR BLD: 0.2 % (ref 0–1.9)
BILIRUB SERPL-MCNC: 0.5 MG/DL (ref 0.1–1)
BUN SERPL-MCNC: 20 MG/DL (ref 8–23)
CALCIUM SERPL-MCNC: 9.5 MG/DL (ref 8.7–10.5)
CHLORIDE SERPL-SCNC: 101 MMOL/L (ref 95–110)
CHOLEST SERPL-MCNC: 144 MG/DL (ref 120–199)
CHOLEST/HDLC SERPL: 4.1 {RATIO} (ref 2–5)
CO2 SERPL-SCNC: 27 MMOL/L (ref 23–29)
COMPLEXED PSA SERPL-MCNC: 1.3 NG/ML (ref 0–4)
CREAT SERPL-MCNC: 0.9 MG/DL (ref 0.5–1.4)
DIFFERENTIAL METHOD: ABNORMAL
EOSINOPHIL # BLD AUTO: 0.2 K/UL (ref 0–0.5)
EOSINOPHIL NFR BLD: 1.5 % (ref 0–8)
ERYTHROCYTE [DISTWIDTH] IN BLOOD BY AUTOMATED COUNT: 14 % (ref 11.5–14.5)
EST. GFR  (AFRICAN AMERICAN): >60 ML/MIN/1.73 M^2
EST. GFR  (NON AFRICAN AMERICAN): >60 ML/MIN/1.73 M^2
GLUCOSE SERPL-MCNC: 112 MG/DL (ref 70–110)
HCT VFR BLD AUTO: 40.1 % (ref 40–54)
HDLC SERPL-MCNC: 35 MG/DL (ref 40–75)
HDLC SERPL: 24.3 % (ref 20–50)
HGB BLD-MCNC: 13.4 G/DL (ref 14–18)
LDLC SERPL CALC-MCNC: 93.6 MG/DL (ref 63–159)
LYMPHOCYTES # BLD AUTO: 2.4 K/UL (ref 1–4.8)
LYMPHOCYTES NFR BLD: 24.7 % (ref 18–48)
MCH RBC QN AUTO: 30.7 PG (ref 27–31)
MCHC RBC AUTO-ENTMCNC: 33.4 G/DL (ref 32–36)
MCV RBC AUTO: 92 FL (ref 82–98)
MONOCYTES # BLD AUTO: 0.7 K/UL (ref 0.3–1)
MONOCYTES NFR BLD: 7.5 % (ref 4–15)
NEUTROPHILS # BLD AUTO: 6.4 K/UL (ref 1.8–7.7)
NEUTROPHILS NFR BLD: 65.9 % (ref 38–73)
NONHDLC SERPL-MCNC: 109 MG/DL
PLATELET # BLD AUTO: 146 K/UL (ref 150–350)
PMV BLD AUTO: 9.9 FL (ref 9.2–12.9)
POTASSIUM SERPL-SCNC: 4.2 MMOL/L (ref 3.5–5.1)
PROT SERPL-MCNC: 7.3 G/DL (ref 6–8.4)
RBC # BLD AUTO: 4.37 M/UL (ref 4.6–6.2)
SODIUM SERPL-SCNC: 139 MMOL/L (ref 136–145)
TRIGL SERPL-MCNC: 77 MG/DL (ref 30–150)
URATE SERPL-MCNC: 8.4 MG/DL (ref 3.4–7)
WBC # BLD AUTO: 9.75 K/UL (ref 3.9–12.7)

## 2019-04-30 PROCEDURE — 85025 COMPLETE CBC W/AUTO DIFF WBC: CPT

## 2019-04-30 PROCEDURE — 84550 ASSAY OF BLOOD/URIC ACID: CPT

## 2019-04-30 PROCEDURE — 36415 COLL VENOUS BLD VENIPUNCTURE: CPT

## 2019-04-30 PROCEDURE — 84153 ASSAY OF PSA TOTAL: CPT

## 2019-04-30 PROCEDURE — 80061 LIPID PANEL: CPT

## 2019-04-30 PROCEDURE — 80053 COMPREHEN METABOLIC PANEL: CPT

## 2019-04-30 PROCEDURE — 82306 VITAMIN D 25 HYDROXY: CPT

## 2019-05-06 RX ORDER — FUROSEMIDE 20 MG/1
TABLET ORAL
Qty: 30 TABLET | Refills: 6 | Status: SHIPPED | OUTPATIENT
Start: 2019-05-06 | End: 2019-09-30 | Stop reason: SDUPTHER

## 2019-05-07 ENCOUNTER — IMMUNIZATION (OUTPATIENT)
Dept: PHARMACY | Facility: CLINIC | Age: 79
End: 2019-05-07
Payer: MEDICARE

## 2019-05-07 ENCOUNTER — LAB VISIT (OUTPATIENT)
Dept: LAB | Facility: HOSPITAL | Age: 79
End: 2019-05-07
Payer: MEDICARE

## 2019-05-07 ENCOUNTER — OFFICE VISIT (OUTPATIENT)
Dept: INTERNAL MEDICINE | Facility: CLINIC | Age: 79
End: 2019-05-07
Payer: MEDICARE

## 2019-05-07 VITALS
HEIGHT: 62 IN | WEIGHT: 185 LBS | SYSTOLIC BLOOD PRESSURE: 125 MMHG | DIASTOLIC BLOOD PRESSURE: 70 MMHG | BODY MASS INDEX: 34.04 KG/M2

## 2019-05-07 DIAGNOSIS — D69.6 THROMBOCYTOPENIA: ICD-10-CM

## 2019-05-07 DIAGNOSIS — I77.1 TORTUOUS AORTA: ICD-10-CM

## 2019-05-07 DIAGNOSIS — M25.473 ANKLE EDEMA: ICD-10-CM

## 2019-05-07 DIAGNOSIS — L40.0 PSORIASIS VULGARIS: ICD-10-CM

## 2019-05-07 DIAGNOSIS — K74.00 LIVER FIBROSIS: ICD-10-CM

## 2019-05-07 DIAGNOSIS — E66.09 CLASS 1 OBESITY DUE TO EXCESS CALORIES WITH SERIOUS COMORBIDITY AND BODY MASS INDEX (BMI) OF 33.0 TO 33.9 IN ADULT: ICD-10-CM

## 2019-05-07 DIAGNOSIS — L40.50 PSORIATIC ARTHRITIS: ICD-10-CM

## 2019-05-07 DIAGNOSIS — K74.00 LIVER FIBROSIS: Primary | ICD-10-CM

## 2019-05-07 DIAGNOSIS — I70.0 AORTIC ATHEROSCLEROSIS: ICD-10-CM

## 2019-05-07 DIAGNOSIS — M81.0 AGE-RELATED OSTEOPOROSIS WITHOUT CURRENT PATHOLOGICAL FRACTURE: ICD-10-CM

## 2019-05-07 DIAGNOSIS — R73.01 IFG (IMPAIRED FASTING GLUCOSE): ICD-10-CM

## 2019-05-07 DIAGNOSIS — M1A.0720 CHRONIC IDIOPATHIC GOUT INVOLVING TOE OF LEFT FOOT WITHOUT TOPHUS: ICD-10-CM

## 2019-05-07 DIAGNOSIS — D84.9 IMMUNOSUPPRESSED STATUS: ICD-10-CM

## 2019-05-07 DIAGNOSIS — G62.1 ALCOHOL-INDUCED POLYNEUROPATHY: ICD-10-CM

## 2019-05-07 DIAGNOSIS — I10 ESSENTIAL HYPERTENSION: ICD-10-CM

## 2019-05-07 DIAGNOSIS — C44.622 SQUAMOUS CELL CANCER OF SKIN OF RIGHT HAND: ICD-10-CM

## 2019-05-07 DIAGNOSIS — R91.8 RIGHT LOWER LOBE LUNG MASS: ICD-10-CM

## 2019-05-07 LAB
AFP SERPL-MCNC: 5.7 NG/ML (ref 0–8.4)
ALBUMIN SERPL BCP-MCNC: 4 G/DL (ref 3.5–5.2)
ALP SERPL-CCNC: 67 U/L (ref 55–135)
ALT SERPL W/O P-5'-P-CCNC: 20 U/L (ref 10–44)
ANION GAP SERPL CALC-SCNC: 8 MMOL/L (ref 8–16)
AST SERPL-CCNC: 18 U/L (ref 10–40)
BASOPHILS # BLD AUTO: 0.02 K/UL (ref 0–0.2)
BASOPHILS NFR BLD: 0.2 % (ref 0–1.9)
BILIRUB SERPL-MCNC: 0.6 MG/DL (ref 0.1–1)
BUN SERPL-MCNC: 17 MG/DL (ref 8–23)
CALCIUM SERPL-MCNC: 10.3 MG/DL (ref 8.7–10.5)
CHLORIDE SERPL-SCNC: 102 MMOL/L (ref 95–110)
CO2 SERPL-SCNC: 28 MMOL/L (ref 23–29)
CREAT SERPL-MCNC: 0.8 MG/DL (ref 0.5–1.4)
DIFFERENTIAL METHOD: ABNORMAL
EOSINOPHIL # BLD AUTO: 0.2 K/UL (ref 0–0.5)
EOSINOPHIL NFR BLD: 1.6 % (ref 0–8)
ERYTHROCYTE [DISTWIDTH] IN BLOOD BY AUTOMATED COUNT: 14.2 % (ref 11.5–14.5)
EST. GFR  (AFRICAN AMERICAN): >60 ML/MIN/1.73 M^2
EST. GFR  (NON AFRICAN AMERICAN): >60 ML/MIN/1.73 M^2
GLUCOSE SERPL-MCNC: 94 MG/DL (ref 70–110)
HCT VFR BLD AUTO: 41.4 % (ref 40–54)
HGB BLD-MCNC: 13.7 G/DL (ref 14–18)
INR PPP: 0.9 (ref 0.8–1.2)
LYMPHOCYTES # BLD AUTO: 2.4 K/UL (ref 1–4.8)
LYMPHOCYTES NFR BLD: 22.8 % (ref 18–48)
MCH RBC QN AUTO: 30.6 PG (ref 27–31)
MCHC RBC AUTO-ENTMCNC: 33.1 G/DL (ref 32–36)
MCV RBC AUTO: 93 FL (ref 82–98)
MONOCYTES # BLD AUTO: 0.9 K/UL (ref 0.3–1)
MONOCYTES NFR BLD: 8.2 % (ref 4–15)
NEUTROPHILS # BLD AUTO: 7 K/UL (ref 1.8–7.7)
NEUTROPHILS NFR BLD: 66.8 % (ref 38–73)
PLATELET # BLD AUTO: 161 K/UL (ref 150–350)
PMV BLD AUTO: 9.8 FL (ref 9.2–12.9)
POTASSIUM SERPL-SCNC: 4.8 MMOL/L (ref 3.5–5.1)
PROT SERPL-MCNC: 7.5 G/DL (ref 6–8.4)
PROTHROMBIN TIME: 9.5 SEC (ref 9–12.5)
RBC # BLD AUTO: 4.47 M/UL (ref 4.6–6.2)
SODIUM SERPL-SCNC: 138 MMOL/L (ref 136–145)
WBC # BLD AUTO: 10.48 K/UL (ref 3.9–12.7)

## 2019-05-07 PROCEDURE — 85025 COMPLETE CBC W/AUTO DIFF WBC: CPT

## 2019-05-07 PROCEDURE — 1101F PT FALLS ASSESS-DOCD LE1/YR: CPT | Mod: CPTII,S$GLB,, | Performed by: INTERNAL MEDICINE

## 2019-05-07 PROCEDURE — 80053 COMPREHEN METABOLIC PANEL: CPT

## 2019-05-07 PROCEDURE — 3078F DIAST BP <80 MM HG: CPT | Mod: CPTII,S$GLB,, | Performed by: INTERNAL MEDICINE

## 2019-05-07 PROCEDURE — 82105 ALPHA-FETOPROTEIN SERUM: CPT

## 2019-05-07 PROCEDURE — 3074F PR MOST RECENT SYSTOLIC BLOOD PRESSURE < 130 MM HG: ICD-10-PCS | Mod: CPTII,S$GLB,, | Performed by: INTERNAL MEDICINE

## 2019-05-07 PROCEDURE — 99999 PR PBB SHADOW E&M-EST. PATIENT-LVL V: ICD-10-PCS | Mod: PBBFAC,,, | Performed by: INTERNAL MEDICINE

## 2019-05-07 PROCEDURE — 3078F PR MOST RECENT DIASTOLIC BLOOD PRESSURE < 80 MM HG: ICD-10-PCS | Mod: CPTII,S$GLB,, | Performed by: INTERNAL MEDICINE

## 2019-05-07 PROCEDURE — 99215 PR OFFICE/OUTPT VISIT, EST, LEVL V, 40-54 MIN: ICD-10-PCS | Mod: S$GLB,,, | Performed by: INTERNAL MEDICINE

## 2019-05-07 PROCEDURE — 99499 UNLISTED E&M SERVICE: CPT | Mod: S$GLB,,, | Performed by: INTERNAL MEDICINE

## 2019-05-07 PROCEDURE — 36415 COLL VENOUS BLD VENIPUNCTURE: CPT

## 2019-05-07 PROCEDURE — 99999 PR PBB SHADOW E&M-EST. PATIENT-LVL V: CPT | Mod: PBBFAC,,, | Performed by: INTERNAL MEDICINE

## 2019-05-07 PROCEDURE — 99499 RISK ADDL DX/OHS AUDIT: ICD-10-PCS | Mod: S$GLB,,, | Performed by: INTERNAL MEDICINE

## 2019-05-07 PROCEDURE — 99215 OFFICE O/P EST HI 40 MIN: CPT | Mod: S$GLB,,, | Performed by: INTERNAL MEDICINE

## 2019-05-07 PROCEDURE — 1101F PR PT FALLS ASSESS DOC 0-1 FALLS W/OUT INJ PAST YR: ICD-10-PCS | Mod: CPTII,S$GLB,, | Performed by: INTERNAL MEDICINE

## 2019-05-07 PROCEDURE — 85610 PROTHROMBIN TIME: CPT

## 2019-05-07 PROCEDURE — 3074F SYST BP LT 130 MM HG: CPT | Mod: CPTII,S$GLB,, | Performed by: INTERNAL MEDICINE

## 2019-05-07 NOTE — PROGRESS NOTES
Subjective:       Patient ID: Darinel Majano is a 78 y.o. male.    Chief Complaint: Follow-up    Here for follow-up of multiple medical issues.    Recall he presented for evaluation of elevated liver enzymes in the setting of heavy alcohol use. His work up was negative for A1AT, AIH, viral hepatitis, Magdiel's.  His work up for fibrosis has been unclear. He had a liver biopsy in 2011 showing no advanced fibrosis. Labs revealed mildly elevated transaminases. He has hypoalbuminemia, hyperbilirubinemia, and thrombocytopenia noted. At first visit, he had LLE edema which has been greatly improved with lasix and spironolactone. His fibroscan did not reveal advanced fibrosis. Given his laboratory findings and edema,Hepatology has recommended continued q6 month HCC screening, which is currently due.    He was seen in CTS September of 2018 s/p right VATS wedge and diaphgram biopsy on 11/10/17. We have also been following a RLL mass measuring 4cm.  Pathology was benign.    He has followed in Podiatry for alcoholic polyneuropathy.  Last seen February 2019.    He has had a squamous cell cancer of the hand.  He was last seen in Dermatology in April of 2018.    He was seen in Neurology in December of 2017 and told to follow up in several months but this did not transpire.  He does not feel that this is necessary.    He has not been seen in Hematology for quite some time.    Uric acid up.  No sx.    20 # weight gain.  Admits to soft drinks and dietary indiscretion.  Does some exercise.  No snoring or stopping breathing per wife.    Psoriasis stable.    EGD- 2/5/18- Z-line irregular, at the gastroesophageal junction bx positive for h. Pylori. No Smith's.  Stool negative 2018.  ? Portal HTN, no sx.    ASCVD on CT scan.  However ETT negative 2017.  Lipids reviewed- almost to target range on no meds.  No syncope, chest pain, pressure, tightness or SOB.  Not interested in cholesterol meds currently- risks reviewed.     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 May 2017     Psoriasis vulgaris     Macrocytic anemia     Age-related osteoporosis without current pathological fracture: see DEXA 2017     Squamous cell cancer of skin of right hand     Aortic atherosclerosis: see CT scan 5/17 also CT scan 9/18     Alcohol-induced polyneuropathy     Right lower lobe lung mass     Liver fibrosis     Helicobacter pylori ab+: treated 2/18 resolved; stool negative 4/18     Gastroesophageal reflux disease with gastritis: see EGD 2018     IFG (impaired fasting glucose)                    Review of Systems   Constitutional: Negative for activity change and unexpected weight change.   HENT: Negative for hearing loss, rhinorrhea and trouble swallowing.    Eyes: Negative for discharge and visual disturbance.   Respiratory: Negative for chest tightness and wheezing.         Denies snoring or stopping breathing   Cardiovascular: Negative for chest pain and palpitations.   Gastrointestinal: Negative for blood in stool, constipation, diarrhea and vomiting.   Endocrine: Negative for polydipsia and polyuria.   Genitourinary: Negative for difficulty urinating, hematuria and urgency.   Musculoskeletal: Negative for arthralgias, joint swelling and neck pain.   Skin: Positive for rash.        Occasional psoriasis flares   Neurological: Positive for headaches. Negative for weakness.        Rare headaches, not daily  Chronic neuropathy sx, stable   Psychiatric/Behavioral:  Negative for confusion and dysphoric mood.       Objective:      Physical Exam   Constitutional: He is oriented to person, place, and time. He appears well-developed and well-nourished.   HENT:   Head: Normocephalic and atraumatic.   Right Ear: External ear normal.   Left Ear: External ear normal.   Eyes: Pupils are equal, round, and reactive to light. Conjunctivae and EOM are normal.   Neck: Normal range of motion. Neck supple. No thyromegaly present.   Cardiovascular: Normal rate and regular rhythm.   No murmur heard.  Pulmonary/Chest: Effort normal and breath sounds normal. No respiratory distress. He has no wheezes.   Abdominal: Soft. He exhibits no distension. There is no tenderness.   Musculoskeletal: He exhibits no edema or tenderness.   Lymphadenopathy:     He has no cervical adenopathy.   Neurological: He is alert and oriented to person, place, and time. No cranial nerve deficit.   Skin: Skin is warm and dry.   Psoriasis on legs, abdomen, back  Nevus on nose   Psychiatric: He has a normal mood and affect. His behavior is normal.       Assessment:       1. Alcohol-induced polyneuropathy    2. Psoriasis vulgaris    3. Aortic atherosclerosis: see CT scan 5/17, also CT 9/18    4. Right lower lobe lung mass    5. Essential hypertension    6. Tortuous aorta: see CXR May 2017    7. Immunosuppressed status    8. Thrombocytopenia    9. Squamous cell cancer of skin of right hand    10. Liver fibrosis    11. Age-related osteoporosis without current pathological fracture: see DEXA 2017    12. Chronic idiopathic gout involving toe of left foot without tophus    13. IFG (impaired fasting glucose)    14. Class 1 obesity due to excess calories with serious comorbidity and body mass index (BMI) of 33.0 to 33.9 in adult    15. Psoriatic arthritis        Plan:         Darinel was seen today for follow-up.    Diagnoses and all orders for this visit:    Liver fibrosis:  Due for labs and ultrasound, he will schedule.  -      Ambulatory Referral to Hepatology    Alcohol-induced polyneuropathy:  Stable, no longer drinking.  Keep Podiatry follow-up    Psoriasis vulgaris:  Schedule Dermatology follow-up    Aortic atherosclerosis: see CT scan 5/17, also CT 9/18:  Discussed options for treatment.  He declines.  Cholesterol is not terribly high.  To work on exercise, lifestyle modification will continue to monitor.  ETT 2017 acceptable, all reviewed with patient and wife.  Alarm symptoms reviewed.    Right lower lobe lung mass:  See CT scan from 2018, stable to improved, no symptoms, discharged from C[facility] clinic    Essential hypertension:  Stable on regimen.  Exercise, low-salt diet, weight loss.  Call if BP greater than 130/80 on a regular basis    Tortuous aorta: see CXR May 2017:  Reviewed.  Stable    Immunosuppressed status:  Stable    Thrombocytopenia:  Stable    Squamous cell cancer of skin of right hand  -     Ambulatory referral to Dermatology    Age-related osteoporosis without current pathological fracture: see DEXA 2017:  Continue regimen  -     DXA Bone Density Spine And Hip; Future    Chronic idiopathic gout involving toe of left foot without tophus:  He does not want to take any additional medication.  In the past has taken allopurinol.  He will work on gout diet, hydration and we will follow up labs in the next few months  -     Uric acid; Future    IFG (impaired fasting glucose):  Continue to monitor.  Diet issues discussed at length with patient and wife.  There is significant room for improvement.    Class 1 obesity due to excess calories with serious comorbidity and body mass index (BMI) of 33.0 to 33.9 in adult:  Portion control, reduce sugars and sugary drinks.  Exercise.  Attempt to 25 lb weight loss in the next year    Psoriatic arthritis: stable.  Keep Dermatology follow-up    Tetanus booster today  I will review all studies and determine further tx depending on findings    Patient evaluated for over 40 minutes with this  appoinment, including diagnostic testing and treatment.  All questions answered,  chart reviewed and care coordinated.

## 2019-05-07 NOTE — PATIENT INSTRUCTIONS
Gout Diet  Gout is a painful condition caused by an excess of uric acid, a waste product made by the body. Uric acid forms crystals that collect in the joints. The immune response to these crystals brings on symptoms of joint pain and swelling. This is called a gout attack. Often, medications and diet changes are combined to manage gout. Below are some guidelines for changing your diet to help you manage gout and prevent attacks. Your health care provider will help you determine the best eating plan for you.     Eating to manage gout  Weight loss for those who are overweight may help reduce gout attacks.  Eat less of these foods  Eating too many foods containing purines may raise the levels of uric acid in your body. This raises your risk for a gout attack. Try to limit these foods and drinks:  · Alcohol, such as beer and red wine. You may be told to avoid alcohol completely.  · Soft drinks that contain sugar or high fructose corn syrup  · Certain fish, including anchovies, sardines, fish eggs, and herring  · Shellfish  · Certain meats, such as red meat, hot dogs, luncheon meats, and turkey  · Organ meats, such as liver, kidneys, and sweetbreads  · Legumes, such as dried beans and peas  · Other high fat foods such as gravy, whole milk, and high fat cheeses  · Vegetables such as asparagus, cauliflower, spinach, and mushrooms used to be thought to contribute to an increased risk for a gout attack, but recent studies show that high purine vegetables don't increase the risk for a gout attack.  Eat more of these foods  Other foods may be helpful for people with gout. Add some of these foods to your diet:  · Cherries contain chemicals that may lower uric acid.  · Omega fatty acids. These are found in some fatty fish such as salmon, certain oils (flax, olive, or nut), and nuts themselves. Omega fatty acids may help prevent inflammation due to gout.  · Dairy products that are low-fat or fat-free, such as cheese and  yogurt  · Complex carbohydrate foods, including whole grains, brown rice, oats, and beans  · Coffee, in moderation  · Water, approximately 64 ounces per day  Follow-up care  Follow up with your healthcare provider as advised.  When to seek medical advice  Call your healthcare provider right away if any of these occur:  · Return of gout symptoms, usually at night:  · Severe pain, swelling, and heat in a joint, especially the base of the big toe  · Affected joint is hard to move  · Skin of the affected joint is purple or red  · Fever of 100.4°F (38°C) or higher  · Pain that doesn't get better even with prescribed medicine   Date Last Reviewed: 1/12/2016  © 8537-0610 [employer]. [address] [place] [address], [place] [address]. All rights reserved. This information is not intended as a substitute for professional medical care. Always follow your healthcare professional's instructions.        Eating to Prevent Gout  Gout is a painful form of arthritis caused by an excess of uric acid. This is a waste product made by the body. It builds up in the body and forms crystals that collect in the joints, bringing on a gout attack. Alcohol and certain foods can trigger a gout attack. Below are some guidelines for changing your diet to help you manage gout. Your healthcare provider can work with you to determine the best eating plan for you. Know that diet is only one part of managing gout. Take your medicines as prescribed and follow the other guidelines your healthcare provider has given you.  Foods to limit  Eating too many foods containing purines may increase the levels of uric acid in your body and increase your risk for a gout attack. It may be best to limit these high-purine foods:  · Alcohol (beer, red wine). You may be told to avoid alcohol completely.  · Certain fish (anchovies, sardines, fish roes, herring, tuna, mussels, codfish, scallops, trout, and kayleen)  · Certain meats (red meat, processed meat, diggs,  turkey, wild game, and goose)  · Sauces and gravies made with meat  · Organ meats (such as liver, kidneys, sweetbreads, and tripe)  · Legumes (such as dried beans, peas)  · Mushrooms, spinach, asparagus, and cauliflower  · Yeast and yeast extract supplements  Foods to try  Some foods may be helpful for people with gout. You may want to try adding some of the following foods to your diet:  · Dark berries: These include blueberries, blackberries, and cherries. These berries contain chemicals that may lower uric acid.  · Tofu: Tofu, which is made from soy, is a good source of protein. Studies have shown that it may be a better choice than meat for people with gout.  · Omega fatty acids: These acids are found in fatty fish (such as salmon), certain oils (such as flax, olive, or nut oils), or nuts. They may help prevent inflammation due to gout.  The following guidelines are recommended by the American Medical Association for people with gout. Your diet should be:  · High in fiber, whole grains, fruits, and vegetables.  · Low in protein (15% of calories should come from protein. Choose lean sources such as soy, lean meats, and poultry).  · Low in fat (no more than 30% of calories should come from fat, with only 10% coming from animal fat).   Date Last Reviewed: 6/17/2015  © 1229-7029 [employer]. [address] [place] [address], [place] [address]. All rights reserved. This information is not intended as a substitute for professional medical care. Always follow your healthcare professional's instructions.

## 2019-05-10 ENCOUNTER — HOSPITAL ENCOUNTER (OUTPATIENT)
Dept: RADIOLOGY | Facility: HOSPITAL | Age: 79
Discharge: HOME | End: 2019-05-10
Attending: PHYSICIAN ASSISTANT
Payer: MEDICARE

## 2019-05-10 DIAGNOSIS — K74.00 LIVER FIBROSIS: ICD-10-CM

## 2019-05-10 DIAGNOSIS — M25.473 ANKLE EDEMA: ICD-10-CM

## 2019-05-10 PROCEDURE — 76705 ECHO EXAM OF ABDOMEN: CPT | Mod: 26,,, | Performed by: RADIOLOGY

## 2019-05-10 PROCEDURE — 76705 ECHO EXAM OF ABDOMEN: CPT | Mod: TC

## 2019-05-10 PROCEDURE — 76705 US ABDOMEN LIMITED: ICD-10-PCS | Mod: 26,,, | Performed by: RADIOLOGY

## 2019-05-13 ENCOUNTER — TELEPHONE (OUTPATIENT)
Dept: HEPATOLOGY | Facility: CLINIC | Age: 79
End: 2019-05-13

## 2019-05-13 NOTE — TELEPHONE ENCOUNTER
----- Message from Raymond Nair PA-C sent at 5/13/2019  2:28 PM CDT -----  Please schedule HCC screening day of f/u visit in 11/2019  Thanks

## 2019-05-17 ENCOUNTER — PATIENT MESSAGE (OUTPATIENT)
Dept: HEPATOLOGY | Facility: CLINIC | Age: 79
End: 2019-05-17

## 2019-05-27 ENCOUNTER — HOSPITAL ENCOUNTER (OUTPATIENT)
Dept: RADIOLOGY | Facility: CLINIC | Age: 79
Discharge: HOME | End: 2019-05-27
Attending: INTERNAL MEDICINE
Payer: MEDICARE

## 2019-05-27 DIAGNOSIS — M81.0 AGE-RELATED OSTEOPOROSIS WITHOUT CURRENT PATHOLOGICAL FRACTURE: ICD-10-CM

## 2019-05-27 PROCEDURE — 77080 DEXA BONE DENSITY SPINE HIP: ICD-10-PCS | Mod: 26,,, | Performed by: INTERNAL MEDICINE

## 2019-05-27 PROCEDURE — 77080 DXA BONE DENSITY AXIAL: CPT | Mod: 26,,, | Performed by: INTERNAL MEDICINE

## 2019-05-27 PROCEDURE — 77080 DXA BONE DENSITY AXIAL: CPT | Mod: TC

## 2019-05-29 ENCOUNTER — PATIENT MESSAGE (OUTPATIENT)
Dept: DERMATOLOGY | Facility: CLINIC | Age: 79
End: 2019-05-29

## 2019-05-30 ENCOUNTER — PATIENT MESSAGE (OUTPATIENT)
Dept: DERMATOLOGY | Facility: CLINIC | Age: 79
End: 2019-05-30

## 2019-06-03 ENCOUNTER — PATIENT MESSAGE (OUTPATIENT)
Dept: DERMATOLOGY | Facility: CLINIC | Age: 79
End: 2019-06-03

## 2019-06-05 ENCOUNTER — PATIENT MESSAGE (OUTPATIENT)
Dept: INTERNAL MEDICINE | Facility: CLINIC | Age: 79
End: 2019-06-05

## 2019-06-05 NOTE — TELEPHONE ENCOUNTER
Spoke to the Bone Density Department and the test wasn't read , meg will send a message to the physician to read it Today

## 2019-06-06 ENCOUNTER — PATIENT MESSAGE (OUTPATIENT)
Dept: INTERNAL MEDICINE | Facility: CLINIC | Age: 79
End: 2019-06-06

## 2019-06-13 DIAGNOSIS — M81.0 AGE-RELATED OSTEOPOROSIS WITHOUT CURRENT PATHOLOGICAL FRACTURE: ICD-10-CM

## 2019-06-13 RX ORDER — ALENDRONATE SODIUM 70 MG/1
70 TABLET ORAL
Qty: 4 TABLET | Refills: 11 | Status: SHIPPED | OUTPATIENT
Start: 2019-06-13 | End: 2020-03-12

## 2019-06-14 RX ORDER — SPIRONOLACTONE 25 MG/1
50 TABLET ORAL DAILY
Qty: 60 TABLET | Refills: 11 | Status: SHIPPED | OUTPATIENT
Start: 2019-06-14 | End: 2020-02-03

## 2019-06-26 ENCOUNTER — OFFICE VISIT (OUTPATIENT)
Dept: PODIATRY | Facility: CLINIC | Age: 79
End: 2019-06-26
Payer: MEDICARE

## 2019-06-26 VITALS
SYSTOLIC BLOOD PRESSURE: 126 MMHG | HEIGHT: 62 IN | DIASTOLIC BLOOD PRESSURE: 75 MMHG | WEIGHT: 189 LBS | HEART RATE: 75 BPM | BODY MASS INDEX: 34.78 KG/M2

## 2019-06-26 DIAGNOSIS — G62.1 ALCOHOL-INDUCED POLYNEUROPATHY: Primary | ICD-10-CM

## 2019-06-26 DIAGNOSIS — L60.9 DISEASE OF NAIL: ICD-10-CM

## 2019-06-26 PROCEDURE — 11721 DEBRIDE NAIL 6 OR MORE: CPT | Mod: Q9,S$GLB,, | Performed by: PODIATRIST

## 2019-06-26 PROCEDURE — 99999 PR PBB SHADOW E&M-EST. PATIENT-LVL III: CPT | Mod: PBBFAC,,, | Performed by: PODIATRIST

## 2019-06-26 PROCEDURE — 99499 UNLISTED E&M SERVICE: CPT | Mod: S$GLB,,, | Performed by: PODIATRIST

## 2019-06-26 PROCEDURE — 11721 PR DEBRIDEMENT OF NAILS, 6 OR MORE: ICD-10-PCS | Mod: Q9,S$GLB,, | Performed by: PODIATRIST

## 2019-06-26 PROCEDURE — 99499 NO LOS: ICD-10-PCS | Mod: S$GLB,,, | Performed by: PODIATRIST

## 2019-06-26 PROCEDURE — 99999 PR PBB SHADOW E&M-EST. PATIENT-LVL III: ICD-10-PCS | Mod: PBBFAC,,, | Performed by: PODIATRIST

## 2019-07-03 NOTE — PROGRESS NOTES
Subjective:      Patient ID: Darinel Majano is a 78 y.o. male.    Chief Complaint: Idiopathic peripheral neuropathy (bilateral pcp Dr Montiel  5/7/19) and Nail Care    Darinel is a 78 y.o. male who presents to the clinic for evaluation and treatment of high risk feet. 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), Anemia (5/4/2017), Aortic atherosclerosis: see CT scan 5/17 (6/9/2017), BPH (benign prostatic hypertrophy), Chronic gout, Cortical senile cataract (5/15/2012), Degenerative disc disease, Diverticulosis of large intestine without hemorrhage: see CT scan 5/17 (6/9/2017), Essential hypertension (2/19/2016), Gastroesophageal reflux disease with gastritis: see EGD 2018 (10/31/2018), Gout, Hyperplastic polyp of transverse colon: 2012 repeat 5 years (5/4/2017), IFG (impaired fasting glucose) (5/7/2019), Kidney stone on left side (6/9/2017), Osteoporosis, Psoriasis, 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 The patient's chief complaint is long, thick toenails. This patient has documented high risk feet requiring routine maintenance secondary to peripheral neuropathy.    PCP: Annabella Montiel MD    Date Last Seen by PCP: 3/01/2018    Current shoe gear:  Affected Foot: Tennis shoes     Unaffected Foot: Tennis shoes    Last encounter in this department: Visit date not found    Hemoglobin A1C   Date Value Ref Range Status   06/29/2011 4.7 4.0 - 6.2 % Final   01/05/2011 4.6 4.0 - 6.2 % Final       Review of Systems   Constitution: Negative for chills, fever and malaise/fatigue.   HENT: Negative for hearing loss.    Cardiovascular: Negative for claudication and leg swelling.   Respiratory: Negative for shortness of breath.    Skin: Positive for color change, nail changes, rash and unusual hair distribution.  Negative for flushing.   Musculoskeletal: Negative for joint pain and myalgias.   Neurological: Positive for numbness, paresthesias and sensory change. Negative for loss of balance.   Psychiatric/Behavioral: Negative for altered mental status.           Objective:      Physical Exam   Constitutional: He is oriented to person, place, and time. He appears well-developed and well-nourished.   Cardiovascular:   Pulses:       Dorsalis pedis pulses are 1+ on the right side, and 1+ on the left side.        Posterior tibial pulses are 1+ on the right side, and 1+ on the left side.   Non pitting  edema noted to b/L LEs   Musculoskeletal:        Right knee: He exhibits no swelling and no ecchymosis.        Left knee: He exhibits no swelling and no ecchymosis.        Right ankle: He exhibits normal range of motion, no swelling, no ecchymosis and normal pulse. No lateral malleolus, no medial malleolus and no head of 5th metatarsal tenderness found. Achilles tendon exhibits no pain, no defect and normal Cheema's test results.        Left ankle: He exhibits normal range of motion, no swelling, no ecchymosis and normal pulse. No lateral malleolus, no medial malleolus and no head of 5th metatarsal tenderness found. Achilles tendon exhibits no pain and normal Cheema's test results.        Right lower leg: He exhibits no tenderness, no bony tenderness, no swelling, no edema and no deformity.        Left lower leg: He exhibits no tenderness, no swelling and no edema.        Right foot: There is normal range of motion and no deformity.        Left foot: There is normal range of motion and no deformity.   Decreased height of medial arches noted with loading of the foot b/L  Hammertoes noted, digits 4 and 5b/L with adductovarus rotation of b/L fifth digit.    Adequate joint ROM noted to all lower extremity muscle groups with no pain or crepitation noted. Muscle strength is 5/5 in all groups bilaterally.     Feet:   Right Foot:    Protective Sensation: 10 sites tested. 6 sites sensed.   Left Foot:   Protective Sensation: 10 sites tested. 6 sites sensed.   Neurological: He is alert and oriented to person, place, and time.   Diminished protective sensation noted b/L     Skin: Skin is warm. Capillary refill takes more than 3 seconds. No abrasion, no bruising, no burn and no ecchymosis noted.   Skin temp is cool to cool from proximal to distal b/L.  Nails x10 are elongated by  4-5mm's, thickened by 1-3 mm's, dystrophic, and are yellowish in  coloration . Xerosis Bilaterally. No open lesions noted.        Psychiatric: He has a normal mood and affect. His speech is normal and behavior is normal. He is attentive.             Assessment:       Encounter Diagnoses   Name Primary?    Alcohol-induced polyneuropathy Yes    Disease of nail          Plan:       Darinel was seen today for idiopathic peripheral neuropathy and nail care.    Diagnoses and all orders for this visit:    Alcohol-induced polyneuropathy    Disease of nail      I counseled the patient on his conditions, their implications and medical management.        - Shoe inspection. Patient instructed on proper foot hygeine. We discussed wearing proper shoe gear, daily foot inspections, never walking without protective shoe gear, never putting sharp instruments to feet, routine podiatric nail visits every 2-3 months.      - With patient's permission, nails were aggressively reduced and debrided x 10 to their soft tissue attachment mechanically and with electric , removing all offending nail and debris. Patient relates relief following the procedure. He will continue to monitor the areas daily, inspect his feet, wear protective shoe gear when ambulatory, moisturizer to maintain skin integrity and follow in this office in approximately 2-3 months, sooner p.r.n.

## 2019-07-29 ENCOUNTER — OFFICE VISIT (OUTPATIENT)
Dept: DERMATOLOGY | Facility: CLINIC | Age: 79
End: 2019-07-29
Payer: MEDICARE

## 2019-07-29 DIAGNOSIS — D18.00 ANGIOMA: ICD-10-CM

## 2019-07-29 DIAGNOSIS — L57.0 AK (ACTINIC KERATOSIS): Primary | ICD-10-CM

## 2019-07-29 PROCEDURE — 99999 PR PBB SHADOW E&M-EST. PATIENT-LVL II: CPT | Mod: PBBFAC,,, | Performed by: DERMATOLOGY

## 2019-07-29 PROCEDURE — 17000 PR DESTRUCTION(LASER SURGERY,CRYOSURGERY,CHEMOSURGERY),PREMALIGNANT LESIONS,FIRST LESION: ICD-10-PCS | Mod: S$GLB,,, | Performed by: DERMATOLOGY

## 2019-07-29 PROCEDURE — 99213 PR OFFICE/OUTPT VISIT, EST, LEVL III, 20-29 MIN: ICD-10-PCS | Mod: 25,S$GLB,, | Performed by: DERMATOLOGY

## 2019-07-29 PROCEDURE — 1101F PT FALLS ASSESS-DOCD LE1/YR: CPT | Mod: CPTII,S$GLB,, | Performed by: DERMATOLOGY

## 2019-07-29 PROCEDURE — 17003 DESTRUCTION, PREMALIGNANT LESIONS; SECOND THROUGH 14 LESIONS: ICD-10-PCS | Mod: S$GLB,,, | Performed by: DERMATOLOGY

## 2019-07-29 PROCEDURE — 1101F PR PT FALLS ASSESS DOC 0-1 FALLS W/OUT INJ PAST YR: ICD-10-PCS | Mod: CPTII,S$GLB,, | Performed by: DERMATOLOGY

## 2019-07-29 PROCEDURE — 17003 DESTRUCT PREMALG LES 2-14: CPT | Mod: S$GLB,,, | Performed by: DERMATOLOGY

## 2019-07-29 PROCEDURE — 99999 PR PBB SHADOW E&M-EST. PATIENT-LVL II: ICD-10-PCS | Mod: PBBFAC,,, | Performed by: DERMATOLOGY

## 2019-07-29 PROCEDURE — 17000 DESTRUCT PREMALG LESION: CPT | Mod: S$GLB,,, | Performed by: DERMATOLOGY

## 2019-07-29 PROCEDURE — 99213 OFFICE O/P EST LOW 20 MIN: CPT | Mod: 25,S$GLB,, | Performed by: DERMATOLOGY

## 2019-07-29 NOTE — PROGRESS NOTES
Subjective:       Patient ID:  Darinel Majano is a 78 y.o. male who presents for   Chief Complaint   Patient presents with    Lesion     Pt presnets for new lesion to left 3rd digit scaly x 1 month.  Denies pain or bleeding no tx.   Also had lesion on nose.  Was getting larger and fell off but now has a new lesion.  Not bleeding.  Present for 1 day.  No tx.       Review of Systems   Constitutional: Negative for fever, chills, fatigue and malaise.   Skin: Negative for daily sunscreen use and activity-related sunscreen use.   Hematologic/Lymphatic: Bruises/bleeds easily.        Objective:    Physical Exam   Skin:   Areas Examined (abnormalities noted in diagram):   Scalp / Hair Palpated and Inspected  Head / Face Inspection Performed  Nails and Digits Inspection Performed                  Diagram Legend     Erythematous scaling macule/papule c/w actinic keratosis       Vascular papule c/w angioma      Pigmented verrucoid papule/plaque c/w seborrheic keratosis      Yellow umbilicated papule c/w sebaceous hyperplasia      Irregularly shaped tan macule c/w lentigo     1-2 mm smooth white papules consistent with Milia      Movable subcutaneous cyst with punctum c/w epidermal inclusion cyst      Subcutaneous movable cyst c/w pilar cyst      Firm pink to brown papule c/w dermatofibroma      Pedunculated fleshy papule(s) c/w skin tag(s)      Evenly pigmented macule c/w junctional nevus     Mildly variegated pigmented, slightly irregular-bordered macule c/w mildly atypical nevus      Flesh colored to evenly pigmented papule c/w intradermal nevus       Pink pearly papule/plaque c/w basal cell carcinoma      Erythematous hyperkeratotic cursted plaque c/w SCC      Surgical scar with no sign of skin cancer recurrence      Open and closed comedones      Inflammatory papules and pustules      Verrucoid papule consistent consistent with wart     Erythematous eczematous patches and plaques     Dystrophic onycholytic nail with  subungual debris c/w onychomycosis     Umbilicated papule    Erythematous-base heme-crusted tan verrucoid plaque consistent with inflamed seborrheic keratosis     Erythematous Silvery Scaling Plaque c/w Psoriasis     See annotation      Assessment / Plan:        AK (actinic keratosis)  Cryosurgery Procedure Note    Verbal consent from the patient is obtained including, but not limited to, risk of hypopigmentation/hyperpigmentation, scar, recurrence of lesion. The patient is aware of the precancerous quality and need for treatment of these lesions. Liquid nitrogen cryosurgery is applied to the 3 actinic keratoses, as detailed in the physical exam, to produce a freeze injury. The patient is aware that blisters may form and is instructed on wound care with gentle cleansing and use of vaseline ointment to keep moist until healed. The patient is supplied a handout on cryosurgery and is instructed to call if lesions do not completely resolve.    Angioma  These are benign vascular lesions that are inherited.  Treatment is not necessary.             Follow up in about 1 year (around 7/29/2020).

## 2019-08-07 ENCOUNTER — LAB VISIT (OUTPATIENT)
Dept: LAB | Facility: HOSPITAL | Age: 79
End: 2019-08-07
Attending: INTERNAL MEDICINE
Payer: MEDICARE

## 2019-08-07 DIAGNOSIS — M1A.0720 CHRONIC IDIOPATHIC GOUT INVOLVING TOE OF LEFT FOOT WITHOUT TOPHUS: ICD-10-CM

## 2019-08-07 LAB — URATE SERPL-MCNC: 7 MG/DL (ref 3.4–7)

## 2019-08-07 PROCEDURE — 84550 ASSAY OF BLOOD/URIC ACID: CPT

## 2019-08-07 PROCEDURE — 36415 COLL VENOUS BLD VENIPUNCTURE: CPT

## 2019-08-08 ENCOUNTER — PATIENT MESSAGE (OUTPATIENT)
Dept: INTERNAL MEDICINE | Facility: CLINIC | Age: 79
End: 2019-08-08

## 2019-09-25 ENCOUNTER — OFFICE VISIT (OUTPATIENT)
Dept: PODIATRY | Facility: CLINIC | Age: 79
End: 2019-09-25
Payer: MEDICARE

## 2019-09-25 VITALS
HEIGHT: 62 IN | DIASTOLIC BLOOD PRESSURE: 58 MMHG | BODY MASS INDEX: 34.78 KG/M2 | HEART RATE: 76 BPM | SYSTOLIC BLOOD PRESSURE: 130 MMHG | WEIGHT: 189 LBS

## 2019-09-25 DIAGNOSIS — G60.9 IDIOPATHIC PERIPHERAL NEUROPATHY: Primary | ICD-10-CM

## 2019-09-25 DIAGNOSIS — B35.1 ONYCHOMYCOSIS: ICD-10-CM

## 2019-09-25 PROCEDURE — 99999 PR PBB SHADOW E&M-EST. PATIENT-LVL III: ICD-10-PCS | Mod: PBBFAC,,, | Performed by: PODIATRIST

## 2019-09-25 PROCEDURE — 11721 DEBRIDE NAIL 6 OR MORE: CPT | Mod: Q9,S$GLB,, | Performed by: PODIATRIST

## 2019-09-25 PROCEDURE — 11721 PR DEBRIDEMENT OF NAILS, 6 OR MORE: ICD-10-PCS | Mod: Q9,S$GLB,, | Performed by: PODIATRIST

## 2019-09-25 PROCEDURE — 99499 UNLISTED E&M SERVICE: CPT | Mod: S$GLB,,, | Performed by: PODIATRIST

## 2019-09-25 PROCEDURE — 99499 RISK ADDL DX/OHS AUDIT: ICD-10-PCS | Mod: S$GLB,,, | Performed by: PODIATRIST

## 2019-09-25 PROCEDURE — 99999 PR PBB SHADOW E&M-EST. PATIENT-LVL III: CPT | Mod: PBBFAC,,, | Performed by: PODIATRIST

## 2019-09-30 RX ORDER — FUROSEMIDE 20 MG/1
TABLET ORAL
Qty: 90 TABLET | Refills: 2 | Status: SHIPPED | OUTPATIENT
Start: 2019-09-30 | End: 2020-09-15 | Stop reason: SDUPTHER

## 2019-10-01 NOTE — PROGRESS NOTES
Subjective:      Patient ID: Darinel Majano is a 79 y.o. male.    Chief Complaint: Follow-up and Foot Pain    Darinel is a 79 y.o. male who presents to the clinic for evaluation and treatment of high risk feet. 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), Anemia (5/4/2017), Aortic atherosclerosis: see CT scan 5/17 (6/9/2017), BPH (benign prostatic hypertrophy), Chronic gout, Cortical senile cataract (5/15/2012), Degenerative disc disease, Diverticulosis of large intestine without hemorrhage: see CT scan 5/17 (6/9/2017), Essential hypertension (2/19/2016), Gastroesophageal reflux disease with gastritis: see EGD 2018 (10/31/2018), Gout, Hyperplastic polyp of transverse colon: 2012 repeat 5 years (5/4/2017), IFG (impaired fasting glucose) (5/7/2019), Kidney stone on left side (6/9/2017), Osteoporosis, Psoriasis, 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 The patient's chief complaint is long, thick toenails. This patient has documented high risk feet requiring routine maintenance secondary to peripheral neuropathy.    PCP: Annabella Montiel MD    Date Last Seen by PCP: 3/01/2018    Current shoe gear:  Affected Foot: Tennis shoes     Unaffected Foot: Tennis shoes    Last encounter in this department: Visit date not found    Hemoglobin A1C   Date Value Ref Range Status   06/29/2011 4.7 4.0 - 6.2 % Final   01/05/2011 4.6 4.0 - 6.2 % Final       Review of Systems   Constitution: Negative for chills, fever and malaise/fatigue.   HENT: Negative for hearing loss.    Cardiovascular: Negative for claudication and leg swelling.   Respiratory: Negative for shortness of breath.    Skin: Positive for color change, nail changes, rash and unusual hair distribution. Negative for flushing.   Musculoskeletal: Negative for  joint pain and myalgias.   Neurological: Positive for numbness, paresthesias and sensory change. Negative for loss of balance.   Psychiatric/Behavioral: Negative for altered mental status.           Objective:      Physical Exam   Constitutional: He is oriented to person, place, and time. He appears well-developed and well-nourished.   Cardiovascular:   Pulses:       Dorsalis pedis pulses are 1+ on the right side, and 1+ on the left side.        Posterior tibial pulses are 1+ on the right side, and 1+ on the left side.   Non pitting  edema noted to b/L LEs   Musculoskeletal:        Right knee: He exhibits no swelling and no ecchymosis.        Left knee: He exhibits no swelling and no ecchymosis.        Right ankle: He exhibits normal range of motion, no swelling, no ecchymosis and normal pulse. No lateral malleolus, no medial malleolus and no head of 5th metatarsal tenderness found. Achilles tendon exhibits no pain, no defect and normal Cheema's test results.        Left ankle: He exhibits normal range of motion, no swelling, no ecchymosis and normal pulse. No lateral malleolus, no medial malleolus and no head of 5th metatarsal tenderness found. Achilles tendon exhibits no pain and normal Cheema's test results.        Right lower leg: He exhibits no tenderness, no bony tenderness, no swelling, no edema and no deformity.        Left lower leg: He exhibits no tenderness, no swelling and no edema.        Right foot: There is normal range of motion and no deformity.        Left foot: There is normal range of motion and no deformity.   Decreased height of medial arches noted with loading of the foot b/L  Hammertoes noted, digits 4 and 5b/L with adductovarus rotation of b/L fifth digit.    Adequate joint ROM noted to all lower extremity muscle groups with no pain or crepitation noted. Muscle strength is 5/5 in all groups bilaterally.     Feet:   Right Foot:   Protective Sensation: 10 sites tested. 6 sites sensed.    Left Foot:   Protective Sensation: 10 sites tested. 6 sites sensed.   Neurological: He is alert and oriented to person, place, and time.   Diminished protective sensation noted b/L     Skin: Skin is warm. Capillary refill takes more than 3 seconds. No abrasion, no bruising, no burn and no ecchymosis noted.   Skin temp is cool to cool from proximal to distal b/L.  Nails x10 are elongated by  4-6mm's, thickened by 1-3 mm's, dystrophic, and are yellowish in  coloration . Xerosis Bilaterally. No open lesions noted.        Psychiatric: He has a normal mood and affect. His speech is normal and behavior is normal. He is attentive.             Assessment:       Encounter Diagnoses   Name Primary?    Idiopathic peripheral neuropathy Yes    Onychomycosis          Plan:       Darinel was seen today for follow-up and foot pain.    Diagnoses and all orders for this visit:    Idiopathic peripheral neuropathy    Onychomycosis      I counseled the patient on his conditions, their implications and medical management.        - Shoe inspection. Patient instructed on proper foot hygeine. We discussed wearing proper shoe gear, daily foot inspections, never walking without protective shoe gear, never putting sharp instruments to feet, routine podiatric nail visits every 2-3 months.      - With patient's permission, nails were aggressively reduced and debrided x 10 to their soft tissue attachment mechanically and with electric , removing all offending nail and debris. Patient relates relief following the procedure. He will continue to monitor the areas daily, inspect his feet, wear protective shoe gear when ambulatory, moisturizer to maintain skin integrity and follow in this office in approximately 2-3 months, sooner p.r.n.

## 2019-11-11 ENCOUNTER — HOSPITAL ENCOUNTER (OUTPATIENT)
Dept: RADIOLOGY | Facility: HOSPITAL | Age: 79
Discharge: HOME | End: 2019-11-11
Attending: PHYSICIAN ASSISTANT
Payer: MEDICARE

## 2019-11-11 ENCOUNTER — OFFICE VISIT (OUTPATIENT)
Dept: HEPATOLOGY | Facility: CLINIC | Age: 79
End: 2019-11-11
Payer: MEDICARE

## 2019-11-11 VITALS
BODY MASS INDEX: 32.99 KG/M2 | HEIGHT: 62 IN | WEIGHT: 179.25 LBS | DIASTOLIC BLOOD PRESSURE: 60 MMHG | HEART RATE: 76 BPM | SYSTOLIC BLOOD PRESSURE: 129 MMHG | OXYGEN SATURATION: 97 %

## 2019-11-11 DIAGNOSIS — M25.473 ANKLE EDEMA: ICD-10-CM

## 2019-11-11 DIAGNOSIS — M25.472 ANKLE EDEMA, BILATERAL: ICD-10-CM

## 2019-11-11 DIAGNOSIS — M25.471 ANKLE EDEMA, BILATERAL: ICD-10-CM

## 2019-11-11 DIAGNOSIS — K74.00 LIVER FIBROSIS: Primary | ICD-10-CM

## 2019-11-11 DIAGNOSIS — K74.00 LIVER FIBROSIS: ICD-10-CM

## 2019-11-11 PROCEDURE — 3078F DIAST BP <80 MM HG: CPT | Mod: CPTII,S$GLB,, | Performed by: PHYSICIAN ASSISTANT

## 2019-11-11 PROCEDURE — 3074F PR MOST RECENT SYSTOLIC BLOOD PRESSURE < 130 MM HG: ICD-10-PCS | Mod: CPTII,S$GLB,, | Performed by: PHYSICIAN ASSISTANT

## 2019-11-11 PROCEDURE — 76705 ECHO EXAM OF ABDOMEN: CPT | Mod: 26,,, | Performed by: RADIOLOGY

## 2019-11-11 PROCEDURE — 99999 PR PBB SHADOW E&M-EST. PATIENT-LVL IV: ICD-10-PCS | Mod: PBBFAC,,, | Performed by: PHYSICIAN ASSISTANT

## 2019-11-11 PROCEDURE — 76705 US ABDOMEN LIMITED: ICD-10-PCS | Mod: 26,,, | Performed by: RADIOLOGY

## 2019-11-11 PROCEDURE — 99214 OFFICE O/P EST MOD 30 MIN: CPT | Mod: S$GLB,,, | Performed by: PHYSICIAN ASSISTANT

## 2019-11-11 PROCEDURE — 99499 UNLISTED E&M SERVICE: CPT | Mod: S$GLB,,, | Performed by: PHYSICIAN ASSISTANT

## 2019-11-11 PROCEDURE — 99999 PR PBB SHADOW E&M-EST. PATIENT-LVL IV: CPT | Mod: PBBFAC,,, | Performed by: PHYSICIAN ASSISTANT

## 2019-11-11 PROCEDURE — 99214 PR OFFICE/OUTPT VISIT, EST, LEVL IV, 30-39 MIN: ICD-10-PCS | Mod: S$GLB,,, | Performed by: PHYSICIAN ASSISTANT

## 2019-11-11 PROCEDURE — 1101F PT FALLS ASSESS-DOCD LE1/YR: CPT | Mod: CPTII,S$GLB,, | Performed by: PHYSICIAN ASSISTANT

## 2019-11-11 PROCEDURE — 3074F SYST BP LT 130 MM HG: CPT | Mod: CPTII,S$GLB,, | Performed by: PHYSICIAN ASSISTANT

## 2019-11-11 PROCEDURE — 3078F PR MOST RECENT DIASTOLIC BLOOD PRESSURE < 80 MM HG: ICD-10-PCS | Mod: CPTII,S$GLB,, | Performed by: PHYSICIAN ASSISTANT

## 2019-11-11 PROCEDURE — 99499 RISK ADDL DX/OHS AUDIT: ICD-10-PCS | Mod: S$GLB,,, | Performed by: PHYSICIAN ASSISTANT

## 2019-11-11 PROCEDURE — 76705 ECHO EXAM OF ABDOMEN: CPT | Mod: TC

## 2019-11-11 PROCEDURE — 1101F PR PT FALLS ASSESS DOC 0-1 FALLS W/OUT INJ PAST YR: ICD-10-PCS | Mod: CPTII,S$GLB,, | Performed by: PHYSICIAN ASSISTANT

## 2019-11-11 NOTE — PROGRESS NOTES
HEPATOLOGY CLINIC VISIT NOTE     REFERRING PROVIDER: Dr. Annabella Montiel     REASON FOR VISIT: follow up.     HISTORY: This is a 79 y.o. White male here for follow up as well as to discuss lab and U/S results. At first visit in 05/2017, he presented for evaluation of elevated liver enzymes in the setting of heavy alcohol use. His work up was negative for A1AT, AIH, viral hepatitis, Magdiel's.  His work up for fibrosis has been unclear. He had a liver biopsy in 2011 showing no advanced fibrosis. Labs revealed mildly elevated transaminases. He has hypoalbuminemia, hyperbilirubinemia, and thrombocytopenia noted. At first visit, he had LLE edema which has been greatly improved with lasix and spironolactone. His fibroscan did not reveal advanced fibrosis. Given his laboratory findings and edema, I have continued q6 month HCC screening. Discussed only definitive fibrosis staging would be repeating biopsy.     His U/S was unremarkable. His labs from today are pending.    His psoriasis has essentially resolved since first visit. He continues to report no ETOH use.     Liver staging:  Reports biopsy 5 years ago; no advanced fibrosis  Fibroscan F0-F1  Transaminases WNL  LFTs WNL  PLTs low end of normal     Risk Factors:  AI:  Biliary:   ETOH: >case per day for many years- difficult to quantify how long   Hereditary:   Medications:  NAFLD/CLAY:  Other:   Viral hepatitis:    Denies jaundice, dark urine, abdominal distention, hematemesis, melena, slowed mentation.   No abnormal skin rashes. No generalized joint pain.                   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    Anemia 5/4/2017    Aortic atherosclerosis: see CT scan 5/17 6/9/2017    BPH (benign prostatic hypertrophy)     Chronic gout     Cortical senile cataract 5/15/2012    Degenerative disc disease     Diverticulosis of large  intestine without hemorrhage: see CT scan 5/17 6/9/2017    Essential hypertension 2/19/2016    Gastroesophageal reflux disease with gastritis: see EGD 2018 10/31/2018    Gout     Hyperplastic polyp of transverse colon: 2012 repeat 5 years 5/4/2017    IFG (impaired fasting glucose) 5/7/2019    Kidney stone on left side 6/9/2017    Osteoporosis     Psoriasis     Squamous cell cancer of skin of right hand 6/9/2017    right 3rd digit    Substance abuse     Thrombocytopenia     Umbilical hernia: see CT 5/17 6/9/2017     Past Surgical History:   Procedure Laterality Date    CATARACT EXTRACTION      CHOLECYSTECTOMY  2012    COLONOSCOPY N/A 2/5/2018    Procedure: COLONOSCOPY;  Surgeon: Marcial Paredes MD;  Location: [address]);  Service: Endoscopy;  Laterality: N/A;    EYE SURGERY      Right Hand Surgery      UPPER GASTROINTESTINAL ENDOSCOPY       FAMILY HISTORY: Negative for liver disease    SOCIAL HISTORY:   Social History     Tobacco Use   Smoking Status Former Smoker    Packs/day: 1.00    Years: 20.00    Pack years: 20.00   Smokeless Tobacco Never Used   Tobacco Comment    quit 40 yrs. ago       Social History     Substance and Sexual Activity   Alcohol Use No    Frequency: Never    Binge frequency: Never    Comment: Patient no longer drinks ETOHHistory-84 beers a week   d/c month ago, 2 beers per hour in 24 hours- many years     Social History     Substance and Sexual Activity   Drug Use No     ROS:   No fever, chills  No chest pain, dyspnea, (+) cough  No abdominal pain, nausea, vomiting  No skin rashes   No lower extremity edema  No depression or anxiety    PHYSICAL EXAM:  Friendly White male, in no acute distress; alert and oriented to person, place and time  VITALS: reviewed  HEENT: Sclerae anicteric.   NECK: Supple  LUNGS: Normal respiratory effort.  ABDOMEN: Flat, soft, nontender.   SKIN: Warm and dry. No jaundice  EXTREMITIES: mild LL edema bilaterally  NEURO/PSYCH: Normal gate. Memory  intact. Thought and speech pattern appropriate. Behavior normal. No depression or anxiety noted.    RECENT LABS:  Lab Results   Component Value Date    WBC 10.48 05/07/2019    HGB 13.7 (L) 05/07/2019     05/07/2019     Lab Results   Component Value Date    INR 0.9 05/07/2019     Lab Results   Component Value Date    AST 18 05/07/2019    ALT 20 05/07/2019    BILITOT 0.6 05/07/2019    ALBUMIN 4.0 05/07/2019    ALKPHOS 67 05/07/2019    CREATININE 0.8 05/07/2019    BUN 17 05/07/2019     05/07/2019    K 4.8 05/07/2019    AFP 5.7 05/07/2019     RECENT IMAGING:  US Abdomen Limited   Order: [id number]   Status:  Final result   Visible to patient:  No (Not Released)   Next appt:  01/22/2020 at 09:30 AM in Podiatry (Berry Little DPM)   Dx:  Liver fibrosis; Ankle edema   Details     Reading Physician Reading Date Result Priority   Fco Romero,  11/11/2019 Routine      Narrative     EXAMINATION:  US ABDOMEN LIMITED    CLINICAL HISTORY:  HCC screening; Hepatic fibrosis    TECHNIQUE:  Limited ultrasound of the right upper quadrant of the abdomen (including pancreas, liver, gallbladder, common bile duct, and spleen) was performed.    COMPARISON:  05/10/2019.    FINDINGS:  Liver: Normal in size, measuring 16.2 cm. The hepatic parenchyma is mildly heterogeneous, nonspecific but may suggest fibrosis.  No focal hepatic lesions.    Gallbladder: The gallbladder is surgically absent.    Biliary system: The common duct is not dilated, measuring 4 mm.  No intrahepatic ductal dilatation.    Spleen: Normal in size and echotexture, measuring 12.2 x 5.7 cm.    Miscellaneous: No upper abdominal ascites.    Right kidney: There is cortical thinning of the right kidney, suggestive of medical renal disease.      Impression       1. No focal hepatic lesions.  2. Heterogeneous hepatic parenchyma, nonspecific but suggestive of fibrosis.  3. Cortical thinning of the right kidney, suggestive of medical renal disease.                  ASSESSMENT  79 y.o. White male with:  1. ? LIVER FIBROSIS  --Reports biopsy 5 years ago; no advanced fibrosis  --Fibroscan F0-F1  --  hypoalbuminemia, hyperbilirubinemia, and thrombocytopenia improved with alcohol cessation  -- splenomegaly and recanalized umbilical vein  -- Will continue q6 month HCC screening  - HCC screening:               - U/S: next due 05/2020               - AFP: WNL     (-)Portal HTN:               - EGD: done 02/2018, no EV       2. ANKLE EDEMA  -- Lasix 20mg, Spironolactone 50mg, stable on current dosing; recommended resuming       PLAN:  1. HCC screening in 6 months, will review by phone  2. F/u with HCC screening in 1 year    Thank you for allowing me to participate in the care of Darinel Nair PA-C     Arm swelling Delirium

## 2020-01-22 ENCOUNTER — OFFICE VISIT (OUTPATIENT)
Dept: PODIATRY | Facility: CLINIC | Age: 80
End: 2020-01-22
Payer: MEDICARE

## 2020-01-22 VITALS
WEIGHT: 179 LBS | HEIGHT: 62 IN | DIASTOLIC BLOOD PRESSURE: 69 MMHG | SYSTOLIC BLOOD PRESSURE: 132 MMHG | HEART RATE: 81 BPM | BODY MASS INDEX: 32.94 KG/M2

## 2020-01-22 DIAGNOSIS — B35.1 ONYCHOMYCOSIS: ICD-10-CM

## 2020-01-22 DIAGNOSIS — G60.9 IDIOPATHIC PERIPHERAL NEUROPATHY: Primary | ICD-10-CM

## 2020-01-22 PROCEDURE — 11721 PR DEBRIDEMENT OF NAILS, 6 OR MORE: ICD-10-PCS | Mod: Q9,S$GLB,, | Performed by: PODIATRIST

## 2020-01-22 PROCEDURE — 11721 DEBRIDE NAIL 6 OR MORE: CPT | Mod: Q9,S$GLB,, | Performed by: PODIATRIST

## 2020-01-22 PROCEDURE — 99999 PR PBB SHADOW E&M-EST. PATIENT-LVL III: ICD-10-PCS | Mod: PBBFAC,,, | Performed by: PODIATRIST

## 2020-01-22 PROCEDURE — 99999 PR PBB SHADOW E&M-EST. PATIENT-LVL III: CPT | Mod: PBBFAC,,, | Performed by: PODIATRIST

## 2020-01-22 PROCEDURE — 99499 NO LOS: ICD-10-PCS | Mod: S$GLB,,, | Performed by: PODIATRIST

## 2020-01-22 PROCEDURE — 99499 UNLISTED E&M SERVICE: CPT | Mod: S$GLB,,, | Performed by: PODIATRIST

## 2020-01-28 NOTE — PROGRESS NOTES
Subjective:      Patient ID: Darinel Majano is a 79 y.o. male.    Chief Complaint: Nail Care (dr brenna serna 08/08/2019) and Follow-up    Darinel is a 79 y.o. male who presents to the clinic for evaluation and treatment of high risk feet. 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), Anemia (5/4/2017), Aortic atherosclerosis: see CT scan 5/17 (6/9/2017), BPH (benign prostatic hypertrophy), Chronic gout, Cortical senile cataract (5/15/2012), Degenerative disc disease, Diverticulosis of large intestine without hemorrhage: see CT scan 5/17 (6/9/2017), Essential hypertension (2/19/2016), Gastroesophageal reflux disease with gastritis: see EGD 2018 (10/31/2018), Gout, Hyperplastic polyp of transverse colon: 2012 repeat 5 years (5/4/2017), IFG (impaired fasting glucose) (5/7/2019), Kidney stone on left side (6/9/2017), Osteoporosis, Psoriasis, 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 The patient's chief complaint is long, thick toenails. This patient has documented high risk feet requiring routine maintenance secondary to peripheral neuropathy.    PCP: Brenna Serna MD    Date Last Seen by PCP: dr brenna serna 08/08/2019      Current shoe gear:  Affected Foot: Tennis shoes     Unaffected Foot: Tennis shoes    Last encounter in this department: Visit date not found    Hemoglobin A1C   Date Value Ref Range Status   06/29/2011 4.7 4.0 - 6.2 % Final   01/05/2011 4.6 4.0 - 6.2 % Final       Review of Systems   Constitution: Negative for chills, fever and malaise/fatigue.   HENT: Negative for hearing loss.    Cardiovascular: Negative for claudication and leg swelling.   Respiratory: Negative for shortness of breath.    Skin: Positive for color change, nail changes, rash and unusual hair distribution. Negative  for flushing.   Musculoskeletal: Negative for joint pain and myalgias.   Neurological: Positive for numbness, paresthesias and sensory change. Negative for loss of balance.   Psychiatric/Behavioral: Negative for altered mental status.           Objective:      Physical Exam   Constitutional: He is oriented to person, place, and time. He appears well-developed and well-nourished.   Cardiovascular:   Pulses:       Dorsalis pedis pulses are 1+ on the right side, and 1+ on the left side.        Posterior tibial pulses are 1+ on the right side, and 1+ on the left side.   Non pitting  edema noted to b/L LEs   Musculoskeletal:        Right knee: He exhibits no swelling and no ecchymosis.        Left knee: He exhibits no swelling and no ecchymosis.        Right ankle: He exhibits normal range of motion, no swelling, no ecchymosis and normal pulse. No lateral malleolus, no medial malleolus and no head of 5th metatarsal tenderness found. Achilles tendon exhibits no pain, no defect and normal Cheema's test results.        Left ankle: He exhibits normal range of motion, no swelling, no ecchymosis and normal pulse. No lateral malleolus, no medial malleolus and no head of 5th metatarsal tenderness found. Achilles tendon exhibits no pain and normal Cheema's test results.        Right lower leg: He exhibits no tenderness, no bony tenderness, no swelling, no edema and no deformity.        Left lower leg: He exhibits no tenderness, no swelling and no edema.        Right foot: There is normal range of motion and no deformity.        Left foot: There is normal range of motion and no deformity.   Decreased height of medial arches noted with loading of the foot b/L  Hammertoes noted, digits 4 and 5b/L with adductovarus rotation of b/L fifth digit.    Adequate joint ROM noted to all lower extremity muscle groups with no pain or crepitation noted. Muscle strength is 5/5 in all groups bilaterally.     Feet:   Right Foot:   Protective  Sensation: 10 sites tested. 6 sites sensed.   Left Foot:   Protective Sensation: 10 sites tested. 6 sites sensed.   Neurological: He is alert and oriented to person, place, and time.   Diminished protective sensation noted b/L     Skin: Skin is warm. Capillary refill takes more than 3 seconds. No abrasion, no bruising, no burn and no ecchymosis noted.   Skin temp is cool to cool from proximal to distal b/L.  Nails x10 are elongated by  4-8mm's, thickened by 1-3 mm's, dystrophic, and are yellowish in  coloration . Xerosis Bilaterally. No open lesions noted.        Psychiatric: He has a normal mood and affect. His speech is normal and behavior is normal. He is attentive.             Assessment:       Encounter Diagnoses   Name Primary?    Idiopathic peripheral neuropathy Yes    Onychomycosis          Plan:       Darinel was seen today for nail care and follow-up.    Diagnoses and all orders for this visit:    Idiopathic peripheral neuropathy    Onychomycosis      I counseled the patient on his conditions, their implications and medical management.        - Shoe inspection. Patient instructed on proper foot hygeine. We discussed wearing proper shoe gear, daily foot inspections, never walking without protective shoe gear, never putting sharp instruments to feet, routine podiatric nail visits every 2-3 months.      - With patient's permission, nails were aggressively reduced and debrided x 10 to their soft tissue attachment mechanically and with electric , removing all offending nail and debris. Patient relates relief following the procedure. He will continue to monitor the areas daily, inspect his feet, wear protective shoe gear when ambulatory, moisturizer to maintain skin integrity and follow in this office in approximately 2-3 months, sooner p.r.n.

## 2020-02-03 RX ORDER — SPIRONOLACTONE 25 MG/1
50 TABLET ORAL DAILY
Qty: 180 TABLET | Refills: 3 | Status: SHIPPED | OUTPATIENT
Start: 2020-02-03 | End: 2021-12-09 | Stop reason: SDUPTHER

## 2020-03-12 DIAGNOSIS — M81.0 AGE-RELATED OSTEOPOROSIS WITHOUT CURRENT PATHOLOGICAL FRACTURE: ICD-10-CM

## 2020-03-12 RX ORDER — ALENDRONATE SODIUM 70 MG/1
70 TABLET ORAL
Qty: 12 TABLET | Refills: 3 | Status: SHIPPED | OUTPATIENT
Start: 2020-03-12 | End: 2020-09-01

## 2020-04-28 ENCOUNTER — TELEPHONE (OUTPATIENT)
Dept: HEPATOLOGY | Facility: CLINIC | Age: 80
End: 2020-04-28

## 2020-04-28 NOTE — TELEPHONE ENCOUNTER
Called Natividad back and rescheduled the ultrasound and labs to June. Patient wanted to push appointments because of the COVID-19 concern. Also scheduled his 1 year follow up in November.

## 2020-04-28 NOTE — TELEPHONE ENCOUNTER
----- Message from Gisselle Almanzar sent at 4/28/2020  2:06 PM CDT -----  Contact: Natividad Majano  Calling to schedule appt       Call Back : [phone]

## 2020-05-22 ENCOUNTER — OFFICE VISIT (OUTPATIENT)
Dept: PODIATRY | Facility: CLINIC | Age: 80
End: 2020-05-22
Payer: MEDICARE

## 2020-05-22 VITALS
DIASTOLIC BLOOD PRESSURE: 65 MMHG | SYSTOLIC BLOOD PRESSURE: 145 MMHG | HEART RATE: 71 BPM | HEIGHT: 62 IN | WEIGHT: 179 LBS | BODY MASS INDEX: 32.94 KG/M2

## 2020-05-22 DIAGNOSIS — G60.9 IDIOPATHIC PERIPHERAL NEUROPATHY: Primary | ICD-10-CM

## 2020-05-22 DIAGNOSIS — B35.1 ONYCHOMYCOSIS DUE TO DERMATOPHYTE: ICD-10-CM

## 2020-05-22 PROCEDURE — 1126F AMNT PAIN NOTED NONE PRSNT: CPT | Mod: S$GLB,,, | Performed by: PODIATRIST

## 2020-05-22 PROCEDURE — 99213 OFFICE O/P EST LOW 20 MIN: CPT | Mod: S$GLB,,, | Performed by: PODIATRIST

## 2020-05-22 PROCEDURE — 99499 RISK ADDL DX/OHS AUDIT: ICD-10-PCS | Mod: S$GLB,,, | Performed by: PODIATRIST

## 2020-05-22 PROCEDURE — 1126F PR PAIN SEVERITY QUANTIFIED, NO PAIN PRESENT: ICD-10-PCS | Mod: S$GLB,,, | Performed by: PODIATRIST

## 2020-05-22 PROCEDURE — 1101F PR PT FALLS ASSESS DOC 0-1 FALLS W/OUT INJ PAST YR: ICD-10-PCS | Mod: CPTII,S$GLB,, | Performed by: PODIATRIST

## 2020-05-22 PROCEDURE — 3078F PR MOST RECENT DIASTOLIC BLOOD PRESSURE < 80 MM HG: ICD-10-PCS | Mod: CPTII,S$GLB,, | Performed by: PODIATRIST

## 2020-05-22 PROCEDURE — 99999 PR PBB SHADOW E&M-EST. PATIENT-LVL III: ICD-10-PCS | Mod: PBBFAC,,, | Performed by: PODIATRIST

## 2020-05-22 PROCEDURE — 99499 UNLISTED E&M SERVICE: CPT | Mod: S$GLB,,, | Performed by: PODIATRIST

## 2020-05-22 PROCEDURE — 3077F SYST BP >= 140 MM HG: CPT | Mod: CPTII,S$GLB,, | Performed by: PODIATRIST

## 2020-05-22 PROCEDURE — 99213 PR OFFICE/OUTPT VISIT, EST, LEVL III, 20-29 MIN: ICD-10-PCS | Mod: S$GLB,,, | Performed by: PODIATRIST

## 2020-05-22 PROCEDURE — 3078F DIAST BP <80 MM HG: CPT | Mod: CPTII,S$GLB,, | Performed by: PODIATRIST

## 2020-05-22 PROCEDURE — 99999 PR PBB SHADOW E&M-EST. PATIENT-LVL III: CPT | Mod: PBBFAC,,, | Performed by: PODIATRIST

## 2020-05-22 PROCEDURE — 1159F MED LIST DOCD IN RCRD: CPT | Mod: S$GLB,,, | Performed by: PODIATRIST

## 2020-05-22 PROCEDURE — 3077F PR MOST RECENT SYSTOLIC BLOOD PRESSURE >= 140 MM HG: ICD-10-PCS | Mod: CPTII,S$GLB,, | Performed by: PODIATRIST

## 2020-05-22 PROCEDURE — 1159F PR MEDICATION LIST DOCUMENTED IN MEDICAL RECORD: ICD-10-PCS | Mod: S$GLB,,, | Performed by: PODIATRIST

## 2020-05-22 PROCEDURE — 1101F PT FALLS ASSESS-DOCD LE1/YR: CPT | Mod: CPTII,S$GLB,, | Performed by: PODIATRIST

## 2020-05-22 NOTE — PROGRESS NOTES
Subjective:      Patient ID: Darinel Majano is a 79 y.o. male.    Chief Complaint: Follow-up; Foot Pain; and Nail Care    Darinel is a 79 y.o. male who presents to the clinic for evaluation and treatment of high risk feet. 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), Anemia (5/4/2017), Aortic atherosclerosis: see CT scan 5/17 (6/9/2017), BPH (benign prostatic hypertrophy), Chronic gout, Cortical senile cataract (5/15/2012), Degenerative disc disease, Diverticulosis of large intestine without hemorrhage: see CT scan 5/17 (6/9/2017), Essential hypertension (2/19/2016), Gastroesophageal reflux disease with gastritis: see EGD 2018 (10/31/2018), Gout, Hyperplastic polyp of transverse colon: 2012 repeat 5 years (5/4/2017), IFG (impaired fasting glucose) (5/7/2019), Kidney stone on left side (6/9/2017), Osteoporosis, Psoriasis, 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 The patient's chief complaint is long, thick toenails. This patient has documented high risk feet requiring routine maintenance secondary to peripheral neuropathy.    PCP: Annabella Serna MD    Date Last Seen by PCP: dr annabella serna 08/08/2019      Current shoe gear:  Affected Foot: Tennis shoes     Unaffected Foot: Tennis shoes    Last encounter in this department: Visit date not found    Hemoglobin A1C   Date Value Ref Range Status   06/29/2011 4.7 4.0 - 6.2 % Final   01/05/2011 4.6 4.0 - 6.2 % Final       Review of Systems   Constitution: Negative for chills, fever and malaise/fatigue.   HENT: Negative for hearing loss.    Cardiovascular: Negative for claudication and leg swelling.   Respiratory: Negative for shortness of breath.    Skin: Positive for color change, nail changes, rash and unusual hair distribution. Negative for flushing.    Musculoskeletal: Negative for joint pain and myalgias.   Neurological: Positive for numbness, paresthesias and sensory change. Negative for loss of balance.   Psychiatric/Behavioral: Negative for altered mental status.           Objective:      Physical Exam   Constitutional: He is oriented to person, place, and time. He appears well-developed and well-nourished.   Cardiovascular:   Pulses:       Dorsalis pedis pulses are 1+ on the right side, and 1+ on the left side.        Posterior tibial pulses are 1+ on the right side, and 1+ on the left side.   Non pitting  edema noted to b/L LEs   Musculoskeletal:        Right knee: He exhibits no swelling and no ecchymosis.        Left knee: He exhibits no swelling and no ecchymosis.        Right ankle: He exhibits normal range of motion, no swelling, no ecchymosis and normal pulse. No lateral malleolus, no medial malleolus and no head of 5th metatarsal tenderness found. Achilles tendon exhibits no pain, no defect and normal Cheema's test results.        Left ankle: He exhibits normal range of motion, no swelling, no ecchymosis and normal pulse. No lateral malleolus, no medial malleolus and no head of 5th metatarsal tenderness found. Achilles tendon exhibits no pain and normal Cheema's test results.        Right lower leg: He exhibits no tenderness, no bony tenderness, no swelling, no edema and no deformity.        Left lower leg: He exhibits no tenderness, no swelling and no edema.        Right foot: There is normal range of motion and no deformity.        Left foot: There is normal range of motion and no deformity.   Decreased height of medial arches noted with loading of the foot b/L  Hammertoes noted, digits 4 and 5b/L with adductovarus rotation of b/L fifth digit.    Adequate joint ROM noted to all lower extremity muscle groups with no pain or crepitation noted. Muscle strength is 5/5 in all groups bilaterally.     Feet:   Right Foot:   Protective Sensation: 10  sites tested. 6 sites sensed.   Left Foot:   Protective Sensation: 10 sites tested. 6 sites sensed.   Neurological: He is alert and oriented to person, place, and time.   Diminished protective sensation noted b/L     Skin: Skin is warm. Capillary refill takes more than 3 seconds. No abrasion, no bruising, no burn and no ecchymosis noted.   Skin temp is cool to cool from proximal to distal b/L.  Nails x10 are elongated by  5-8mm's, thickened by 1-3 mm's, dystrophic, and are yellowish in  coloration . Xerosis Bilaterally. No open lesions noted.        Psychiatric: He has a normal mood and affect. His speech is normal and behavior is normal. He is attentive.             Assessment:       Encounter Diagnoses   Name Primary?    Idiopathic peripheral neuropathy Yes    Onychomycosis due to dermatophyte          Plan:       Darinel was seen today for follow-up, foot pain and nail care.    Diagnoses and all orders for this visit:    Idiopathic peripheral neuropathy    Onychomycosis due to dermatophyte      I counseled the patient on his conditions, their implications and medical management.        - Shoe inspection. Patient instructed on proper foot hygeine. We discussed wearing proper shoe gear, daily foot inspections, never walking without protective shoe gear, never putting sharp instruments to feet, routine podiatric nail visits every 2-3 months.    Non-billable due to not seeing PCP  - With patient's permission, nails were aggressively reduced and debrided x 10 to their soft tissue attachment mechanically and with electric , removing all offending nail and debris. Patient relates relief following the procedure. He will continue to monitor the areas daily, inspect his feet, wear protective shoe gear when ambulatory, moisturizer to maintain skin integrity and follow in this office in approximately 2-3 months, sooner p.r.n.

## 2020-06-17 ENCOUNTER — HOSPITAL ENCOUNTER (OUTPATIENT)
Dept: RADIOLOGY | Facility: HOSPITAL | Age: 80
Discharge: HOME | End: 2020-06-17
Attending: PHYSICIAN ASSISTANT
Payer: MEDICARE

## 2020-06-17 DIAGNOSIS — K74.00 LIVER FIBROSIS: ICD-10-CM

## 2020-06-17 PROCEDURE — 76700 US EXAM ABDOM COMPLETE: CPT | Mod: 26,,, | Performed by: RADIOLOGY

## 2020-06-17 PROCEDURE — 76700 US EXAM ABDOM COMPLETE: CPT | Mod: TC

## 2020-06-17 PROCEDURE — 76700 US ABDOMEN COMPLETE: ICD-10-PCS | Mod: 26,,, | Performed by: RADIOLOGY

## 2020-06-23 ENCOUNTER — TELEPHONE (OUTPATIENT)
Dept: HEPATOLOGY | Facility: CLINIC | Age: 80
End: 2020-06-23

## 2020-06-23 DIAGNOSIS — K74.00 LIVER FIBROSIS: Primary | ICD-10-CM

## 2020-06-23 NOTE — TELEPHONE ENCOUNTER
----- Message from Guillermina Narvaez NP sent at 6/23/2020  8:03 AM CDT -----  Please schedule labs (CBC, CMP, INR, AFP) and US in 6 months. Can push back follow-up visit to be around same time as next testing. Previously followed by Raymond, can see Enrique or Omaira too. Thanks!

## 2020-08-18 ENCOUNTER — PATIENT OUTREACH (OUTPATIENT)
Dept: ADMINISTRATIVE | Facility: HOSPITAL | Age: 80
End: 2020-08-18

## 2020-08-18 DIAGNOSIS — Z12.5 PROSTATE CANCER SCREENING: Primary | ICD-10-CM

## 2020-08-18 NOTE — PROGRESS NOTES
Health Maintenance Due   Topic Date Due    Shingles Vaccine (2 of 3) 07/06/2016    PROSTATE-SPECIFIC ANTIGEN  04/30/2020     Order placed for prostate specific antigen.  Portal message has been sent to patient regarding overdue PSA.  Chart review completed.

## 2020-08-21 ENCOUNTER — OFFICE VISIT (OUTPATIENT)
Dept: PODIATRY | Facility: CLINIC | Age: 80
End: 2020-08-21
Payer: MEDICARE

## 2020-08-21 VITALS
BODY MASS INDEX: 33.64 KG/M2 | WEIGHT: 186.31 LBS | SYSTOLIC BLOOD PRESSURE: 133 MMHG | HEART RATE: 84 BPM | DIASTOLIC BLOOD PRESSURE: 70 MMHG

## 2020-08-21 DIAGNOSIS — G60.9 IDIOPATHIC PERIPHERAL NEUROPATHY: Primary | ICD-10-CM

## 2020-08-21 DIAGNOSIS — B35.1 ONYCHOMYCOSIS DUE TO DERMATOPHYTE: ICD-10-CM

## 2020-08-21 PROCEDURE — 11721 PR DEBRIDEMENT OF NAILS, 6 OR MORE: ICD-10-PCS | Mod: Q9,S$GLB,, | Performed by: PODIATRIST

## 2020-08-21 PROCEDURE — 99999 PR PBB SHADOW E&M-EST. PATIENT-LVL III: CPT | Mod: PBBFAC,,, | Performed by: PODIATRIST

## 2020-08-21 PROCEDURE — 99499 UNLISTED E&M SERVICE: CPT | Mod: S$GLB,,, | Performed by: PODIATRIST

## 2020-08-21 PROCEDURE — 99499 NO LOS: ICD-10-PCS | Mod: S$GLB,,, | Performed by: PODIATRIST

## 2020-08-21 PROCEDURE — 11721 DEBRIDE NAIL 6 OR MORE: CPT | Mod: Q9,S$GLB,, | Performed by: PODIATRIST

## 2020-08-21 PROCEDURE — 99999 PR PBB SHADOW E&M-EST. PATIENT-LVL III: ICD-10-PCS | Mod: PBBFAC,,, | Performed by: PODIATRIST

## 2020-09-01 ENCOUNTER — OFFICE VISIT (OUTPATIENT)
Dept: INTERNAL MEDICINE | Facility: CLINIC | Age: 80
End: 2020-09-01
Payer: MEDICARE

## 2020-09-01 ENCOUNTER — LAB VISIT (OUTPATIENT)
Dept: LAB | Facility: HOSPITAL | Age: 80
End: 2020-09-01
Attending: INTERNAL MEDICINE
Payer: MEDICARE

## 2020-09-01 ENCOUNTER — IMMUNIZATION (OUTPATIENT)
Dept: PHARMACY | Facility: CLINIC | Age: 80
End: 2020-09-01
Payer: MEDICARE

## 2020-09-01 VITALS
HEIGHT: 62 IN | DIASTOLIC BLOOD PRESSURE: 65 MMHG | BODY MASS INDEX: 34.41 KG/M2 | SYSTOLIC BLOOD PRESSURE: 120 MMHG | WEIGHT: 187 LBS

## 2020-09-01 DIAGNOSIS — Z00.00 ANNUAL PHYSICAL EXAM: Primary | ICD-10-CM

## 2020-09-01 DIAGNOSIS — K21.9 GASTROESOPHAGEAL REFLUX DISEASE WITHOUT ESOPHAGITIS: ICD-10-CM

## 2020-09-01 DIAGNOSIS — I10 ESSENTIAL HYPERTENSION: ICD-10-CM

## 2020-09-01 DIAGNOSIS — E55.9 VITAMIN D DEFICIENCY DISEASE: ICD-10-CM

## 2020-09-01 DIAGNOSIS — E78.2 MIXED HYPERLIPIDEMIA: ICD-10-CM

## 2020-09-01 DIAGNOSIS — L40.50 PSORIATIC ARTHRITIS: ICD-10-CM

## 2020-09-01 DIAGNOSIS — F10.20 ALCOHOL DEPENDENCE, UNCOMPLICATED: ICD-10-CM

## 2020-09-01 DIAGNOSIS — D84.9 IMMUNOSUPPRESSED STATUS: ICD-10-CM

## 2020-09-01 DIAGNOSIS — R73.01 IFG (IMPAIRED FASTING GLUCOSE): ICD-10-CM

## 2020-09-01 DIAGNOSIS — G62.1 ALCOHOL-INDUCED POLYNEUROPATHY: ICD-10-CM

## 2020-09-01 DIAGNOSIS — D69.6 THROMBOCYTOPENIA, UNSPECIFIED: ICD-10-CM

## 2020-09-01 DIAGNOSIS — Z12.5 ENCOUNTER FOR SCREENING FOR MALIGNANT NEOPLASM OF PROSTATE: ICD-10-CM

## 2020-09-01 DIAGNOSIS — I70.0 AORTIC ATHEROSCLEROSIS: ICD-10-CM

## 2020-09-01 DIAGNOSIS — L40.0 PSORIASIS VULGARIS: ICD-10-CM

## 2020-09-01 DIAGNOSIS — D69.6 THROMBOCYTOPENIA: ICD-10-CM

## 2020-09-01 DIAGNOSIS — K57.90 DIVERTICULOSIS: ICD-10-CM

## 2020-09-01 DIAGNOSIS — E53.8 LOW SERUM VITAMIN B12: ICD-10-CM

## 2020-09-01 DIAGNOSIS — K76.6 PORTAL HYPERTENSION: ICD-10-CM

## 2020-09-01 DIAGNOSIS — R91.8 RIGHT LOWER LOBE LUNG MASS: ICD-10-CM

## 2020-09-01 DIAGNOSIS — E66.09 CLASS 1 OBESITY DUE TO EXCESS CALORIES WITH SERIOUS COMORBIDITY AND BODY MASS INDEX (BMI) OF 33.0 TO 33.9 IN ADULT: ICD-10-CM

## 2020-09-01 LAB
BASOPHILS # BLD AUTO: 0.04 K/UL (ref 0–0.2)
BASOPHILS NFR BLD: 0.4 % (ref 0–1.9)
DIFFERENTIAL METHOD: ABNORMAL
EOSINOPHIL # BLD AUTO: 0.1 K/UL (ref 0–0.5)
EOSINOPHIL NFR BLD: 0.9 % (ref 0–8)
ERYTHROCYTE [DISTWIDTH] IN BLOOD BY AUTOMATED COUNT: 14.2 % (ref 11.5–14.5)
HCT VFR BLD AUTO: 44.8 % (ref 40–54)
HGB BLD-MCNC: 14.2 G/DL (ref 14–18)
IMM GRANULOCYTES # BLD AUTO: 0.03 K/UL (ref 0–0.04)
IMM GRANULOCYTES NFR BLD AUTO: 0.3 % (ref 0–0.5)
LYMPHOCYTES # BLD AUTO: 2.2 K/UL (ref 1–4.8)
LYMPHOCYTES NFR BLD: 22.9 % (ref 18–48)
MCH RBC QN AUTO: 30.5 PG (ref 27–31)
MCHC RBC AUTO-ENTMCNC: 31.7 G/DL (ref 32–36)
MCV RBC AUTO: 96 FL (ref 82–98)
MONOCYTES # BLD AUTO: 0.7 K/UL (ref 0.3–1)
MONOCYTES NFR BLD: 7.3 % (ref 4–15)
NEUTROPHILS # BLD AUTO: 6.7 K/UL (ref 1.8–7.7)
NEUTROPHILS NFR BLD: 68.2 % (ref 38–73)
NRBC BLD-RTO: 0 /100 WBC
PLATELET # BLD AUTO: 148 K/UL (ref 150–350)
PMV BLD AUTO: 10.3 FL (ref 9.2–12.9)
RBC # BLD AUTO: 4.65 M/UL (ref 4.6–6.2)
WBC # BLD AUTO: 9.8 K/UL (ref 3.9–12.7)

## 2020-09-01 PROCEDURE — 85025 COMPLETE CBC W/AUTO DIFF WBC: CPT

## 2020-09-01 PROCEDURE — 3074F PR MOST RECENT SYSTOLIC BLOOD PRESSURE < 130 MM HG: ICD-10-PCS | Mod: CPTII,S$GLB,, | Performed by: INTERNAL MEDICINE

## 2020-09-01 PROCEDURE — 84153 ASSAY OF PSA TOTAL: CPT

## 2020-09-01 PROCEDURE — 99999 PR PBB SHADOW E&M-EST. PATIENT-LVL III: ICD-10-PCS | Mod: PBBFAC,,, | Performed by: INTERNAL MEDICINE

## 2020-09-01 PROCEDURE — 99999 PR PBB SHADOW E&M-EST. PATIENT-LVL III: CPT | Mod: PBBFAC,,, | Performed by: INTERNAL MEDICINE

## 2020-09-01 PROCEDURE — 99214 OFFICE O/P EST MOD 30 MIN: CPT | Mod: S$GLB,,, | Performed by: INTERNAL MEDICINE

## 2020-09-01 PROCEDURE — 99214 PR OFFICE/OUTPT VISIT, EST, LEVL IV, 30-39 MIN: ICD-10-PCS | Mod: S$GLB,,, | Performed by: INTERNAL MEDICINE

## 2020-09-01 PROCEDURE — 36415 COLL VENOUS BLD VENIPUNCTURE: CPT

## 2020-09-01 PROCEDURE — 83036 HEMOGLOBIN GLYCOSYLATED A1C: CPT

## 2020-09-01 PROCEDURE — 82607 VITAMIN B-12: CPT

## 2020-09-01 PROCEDURE — 99499 UNLISTED E&M SERVICE: CPT | Mod: S$GLB,,, | Performed by: INTERNAL MEDICINE

## 2020-09-01 PROCEDURE — 99499 RISK ADDL DX/OHS AUDIT: ICD-10-PCS | Mod: S$GLB,,, | Performed by: INTERNAL MEDICINE

## 2020-09-01 PROCEDURE — 3074F SYST BP LT 130 MM HG: CPT | Mod: CPTII,S$GLB,, | Performed by: INTERNAL MEDICINE

## 2020-09-01 PROCEDURE — 82306 VITAMIN D 25 HYDROXY: CPT

## 2020-09-01 PROCEDURE — 3078F DIAST BP <80 MM HG: CPT | Mod: CPTII,S$GLB,, | Performed by: INTERNAL MEDICINE

## 2020-09-01 PROCEDURE — 3078F PR MOST RECENT DIASTOLIC BLOOD PRESSURE < 80 MM HG: ICD-10-PCS | Mod: CPTII,S$GLB,, | Performed by: INTERNAL MEDICINE

## 2020-09-01 RX ORDER — ALENDRONATE SODIUM 70 MG/1
TABLET ORAL
COMMUNITY
Start: 2020-06-01 | End: 2021-02-15

## 2020-09-01 RX ORDER — ATORVASTATIN CALCIUM 20 MG/1
20 TABLET, FILM COATED ORAL DAILY
Qty: 90 TABLET | Refills: 3 | Status: SHIPPED | OUTPATIENT
Start: 2020-09-01 | End: 2021-10-28

## 2020-09-01 NOTE — PROGRESS NOTES
The 10-year ASCVD risk score (Gilmar MCGOWAN Jr. et al., 2013) is: 31%    Values used to calculate the score:      Age: 80 years      Sex: Male      Is Non- : No      Diabetic: No      Tobacco smoker: No      Systolic Blood Pressure: 120 mmHg      Is BP treated: Yes      HDL Cholesterol: 35 mg/dL      Total Cholesterol: 144 mg/dL  Answers for HPI/ROS submitted by the patient on 8/31/2020   activity change: No  unexpected weight change: No  neck pain: No  hearing loss: No  rhinorrhea: No  trouble swallowing: No  eye discharge: No  visual disturbance: No  chest tightness: No  wheezing: No  chest pain: No  palpitations: No  blood in stool: No  constipation: No  vomiting: No  diarrhea: No  polydipsia: No  polyuria: No  difficulty urinating: No  urgency: No  hematuria: No  joint swelling: No  arthralgias: No  headaches: No  weakness: No  confusion: No  dysphoric mood: No

## 2020-09-01 NOTE — PATIENT INSTRUCTIONS
Prevention Guidelines, Men Ages 65 and Older  Screening tests and vaccines are an important part of managing your health. Health counseling is essential, too. Below are guidelines for these, for men ages 65 and older. Talk with your healthcare provider to make sure youre up-to-date on what you need.  Screening Who needs it How often   Abdominal aortic aneurysm Men ages 65 to 75 who have ever smoked 1 ultrasound   Alcohol misuse All men in this age group At routine exams   Blood pressure All men in this age group Every 2 years if your blood pressure is less than 120/80 mm Hg; yearly if your systolic blood pressure is 120 to 139 mm Hg, or your diastolic blood pressure reading is 80 to 89 mm Hg   Colorectal cancer All men in this age group Flexible sigmoidoscopy every 5 years, or colonoscopy every 10 years, or double-contrast barium enema every 5 years; yearly fecal occult blood test or fecal immunochemical test; or a stool DNA test as often as your healthcare provider advises; talk with your healthcare provider about which tests are best for you and when you no longer need colonoscopies (generally after age 75)   Depression All men in this age group At routine exams   Type 2 diabetes or prediabetes All adults beginning at age 45 and adults without symptoms at any age who are overweight or obese and have 1 or more other risk factors for diabetes At least every 3 years (yearly if your blood sugar has already begun to rise)   Hepatitis C Men at increased risk for infection - talk with your healthcare provider At routine exams   High cholesterol or triglycerides All men in this age group At least every 5 years   HIV Men at increased risk for infection - talk with your healthcare provider At routine exams   Lung cancer Adults ages 55 to 80 who have smoked Yearly screening in smokers with 30 pack-year history of smoking or who quit within 15 years   Obesity All men in this age group At routine exams   Prostate cancer All  men in this age group, talk to healthcare provider about risks and benefits of digital rectal exam (KELBY) and prostate-specific antigen (PSA) screening1 At routine exams   Syphilis Men at increased risk for infection - talk with your healthcare provider At routine exams   Tuberculosis Men at increased risk for infection - talk with your healthcare provider Ask your healthcare provider   Vision All men in this age group Every 1 to 2 years; if you have a chronic health condition, ask your healthcare provider if you needs exams more often   Vaccine Who needs it How often   Chickenpox (varicella) All men in this age group who have no record of this infection or vaccine 2 doses; second dose should be given at least 4 weeks after the first dose   Hepatitis A Men at increased risk for infection - talk with your healthcare provider 2 doses given at least 6 months apart   Hepatitis B Men at increased risk for infection - talk with your healthcare provider 3 doses over 6 months; second dose should be given 1 month after the first dose; the third dose should be given at least 2 months after the second dose and at least 4 months after the first dose   Haemophilus influenzae Type B (HIB) Men at increased risk for infection - talk with your healthcare provider 1 to 3 doses   Influenza (flu) All men in this age group  Once a year   Meningococcal Men at increased risk for infection - talk with your healthcare provider 1 or more doses   Pneumococcal conjugate vaccine (PCV13) and pneumococcal polysaccharide vaccine (PPSV23) All men in this age group 1 dose of each vaccine   Tetanus/diphtheria/  pertussis (Td/Tdap) booster All men in this age group Td every 10 years, or Tdap if you will have contact with a child younger than 12 months old   Zoster All men in this age group 1 dose   Counseling Who needs it How often   Diet and exercise Men who are overweight or obese When diagnosed, and then at routine exams   Fall prevention (exercise,  vitamin D supplements) All men in this age group At routine exams   Sexually transmitted infection Men at increased risk for infection - talk with your healthcare provider At routine exams   Use of daily aspirin Men ages 45 to 79 at risk for cardiovascular health problems At routine exams   Use of tobacco and the health effects it can cause All men in this age group Every visit   [address] [place] [address] [facility]   Date Last Reviewed: 2/1/2017  © 3128-6769 [employer]. [address] [place] [address], [place] [address]. All rights reserved. This information is not intended as a substitute for professional medical care. Always follow your healthcare professional's instructions.          Atorvastatin tablets  What is this medicine?  ATORVASTATIN (a TORE va sta tin) is known as a HMG-CoA reductase inhibitor or 'statin'. It lowers the level of cholesterol and triglycerides in the blood. This drug may also reduce the risk of heart attack, stroke, or other health problems in patients with risk factors for heart disease. Diet and lifestyle changes are often used with this drug.  How should I use this medicine?  Take this medicine by mouth with a glass of water. Follow the directions on the prescription label. You can take this medicine with or without food. Take your doses at regular intervals. Do not take your medicine more often than directed.  Talk to your pediatrician regarding the use of this medicine in children. While this drug may be prescribed for children as young as 10 years old for selected conditions, precautions do apply.  What side effects may I notice from receiving this medicine?  Side effects that you should report to your doctor or health care professional as soon as possible:  · allergic reactions like skin rash, itching or hives, swelling of the face, lips, or tongue  · dark urine  · fever  · joint pain  · muscle cramps, pain  · redness, blistering, peeling or loosening of the skin, including  inside the mouth  · trouble passing urine or change in the amount of urine  · unusually weak or tired  · yellowing of eyes or skin  Side effects that usually do not require medical attention (report to your doctor or health care professional if they continue or are bothersome):  · constipation  · heartburn  · stomach gas, pain, upset  What may interact with this medicine?  Do not take this medicine with any of the following medications:  · red yeast rice  · telaprevir  · telithromycin  · voriconazole  This medicine may also interact with the following medications:  · alcohol  · antiviral medicines for HIV or AIDS  · boceprevir  · certain antibiotics like clarithromycin, erythromycin, troleandomycin  · certain medicines for cholesterol like fenofibrate or gemfibrozil  · cimetidine  · clarithromycin  · colchicine  · cyclosporine  · digoxin  · female hormones, like estrogens or progestins and birth control pills  · grapefruit juice  · medicines for fungal infections like fluconazole, itraconazole, ketoconazole  · niacin  · rifampin  · spironolactone  What if I miss a dose?  If you miss a dose, take it as soon as you can. If it is almost time for your next dose, take only that dose. Do not take double or extra doses.  Where should I keep my medicine?  Keep out of the reach of children.  Store at room temperature between 20 to 25 degrees C (68 to 77 degrees F). Throw away any unused medicine after the expiration date.  What should I tell my health care provider before I take this medicine?  They need to know if you have any of these conditions:  · frequently drink alcoholic beverages  · history of stroke, TIA  · kidney disease  · liver disease  · muscle aches or weakness  · other medical condition  · an unusual or allergic reaction to atorvastatin, other medicines, foods, dyes, or preservatives  · pregnant or trying to get pregnant  · breast-feeding  What should I watch for while using this medicine?  Visit your doctor  or health care professional for regular check-ups. You may need regular tests to make sure your liver is working properly.  Tell your doctor or health care professional right away if you get any unexplained muscle pain, tenderness, or weakness, especially if you also have a fever and tiredness. Your doctor or health care professional may tell you to stop taking this medicine if you develop muscle problems. If your muscle problems do not go away after stopping this medicine, contact your health care professional.  This drug is only part of a total heart-health program. Your doctor or a dietician can suggest a low-cholesterol and low-fat diet to help. Avoid alcohol and smoking, and keep a proper exercise schedule.  Do not use this drug if you are pregnant or breast-feeding. Serious side effects to an unborn child or to an infant are possible. Talk to your doctor or pharmacist for more information.  This medicine may affect blood sugar levels. If you have diabetes, check with your doctor or health care professional before you change your diet or the dose of your diabetic medicine.  If you are going to have surgery tell your health care professional that you are taking this drug.  NOTE:This sheet is a summary. It may not cover all possible information. If you have questions about this medicine, talk to your doctor, pharmacist, or health care provider. Copyright© 2017 [employer]

## 2020-09-01 NOTE — PROGRESS NOTES
Subjective:   Patient ID: Darinel Majano is a 80 y.o. male.  Chief Complaint:  Annual Exam    HPI     Mr. Majano is a 81 yo male with a PMHx of HTN, GERD, Alcohol Abuse with polyneuropathy, osteoporosis, psoriasis, nephrolithiasis, gout and BPH. He presents to clinic for annual exam. Pt has been doing good with no significant complaints. Pt is compliant with all medications and denies any new side effects. Pt has a significant history of alcohol abuse and admits to being sober for 3 years. Pt was former smoker and has not smoked for 50 years. Pt had an abdominal ultrasound back in June 2020 which was unremarkable, no liver abnormalities noted.     Pt has been sleeping well. For exercise pt walks half a mile daily and has been eating a healthy diet. [facility]     Podiatry August 2020  Abdominal ultrasound July 2020  Hepatology November 2019  Dermatology July 2019  DEXA scan May 2019  Colonoscopy/EGD 2018  CT chest 2018    Patient Active Problem List   Diagnosis    Class 1 obesity due to excess calories with serious comorbidity and body mass index (BMI) of 33.0 to 33.9 in adult    Benign non-nodular prostatic hyperplasia without lower urinary tract symptoms    Thrombocytopenia    Chronic idiopathic gout involving toe of left foot without tophus    Psoriatic arthritis    Alcohol dependence, uncomplicated: quit 2017    Essential hypertension    Hyperplastic polyp of transverse colon: 2012 repeat 7 years    Immunosuppressed status    Tortuous aorta: see CXR May 2017    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 May 2017    Psoriasis vulgaris    Macrocytic anemia    Age-related osteoporosis without current pathological fracture: see DEXA 2017    Squamous cell cancer of skin of right hand    Aortic atherosclerosis: see CT scan 5/17 also CT scan 9/18    Alcohol-induced polyneuropathy    Right lower lobe lung mass    Liver fibrosis    Helicobacter pylori  "ab+: treated 2/18 resolved; stool negative 4/18    Gastroesophageal reflux disease with gastritis: see EGD 2018    IFG (impaired fasting glucose)    Portal hypertension               Current Outpatient Medications:     furosemide (LASIX) 20 MG tablet, TAKE 1 TABLET BY MOUTH EVERY DAY, Disp: 90 tablet, Rfl: 2    multivitamin with minerals tablet, Take 1 tablet by mouth once daily., Disp: , Rfl:     spironolactone (ALDACTONE) 25 MG tablet, TAKE 2 TABLETS (50 MG TOTAL) BY MOUTH ONCE DAILY., Disp: 180 tablet, Rfl: 3    triamcinolone acetonide 0.1% (KENALOG) 0.1 % ointment, aaa qd to affected areas.  Avoid face and groin, Disp: 454 g, Rfl: 3    atorvastatin (LIPITOR) 20 MG tablet, Take 1 tablet (20 mg total) by mouth once daily., Disp: 90 tablet, Rfl: 3    FOSAMAX 70 mg tablet, , Disp: , Rfl:     varicella-zoster gE-AS01B, PF, (SHINGRIX, PF,) 50 mcg/0.5 mL injection, Inject 0.5 mLs into the muscle once. For one dose. for 1 dose, Disp: 1 each, Rfl: 1    Review of Systems   Constitutional: Negative for activity change and unexpected weight change.   HENT: Negative for hearing loss, rhinorrhea and trouble swallowing.    Eyes: Negative for discharge and visual disturbance.   Respiratory: Negative for chest tightness and wheezing.    Cardiovascular: Negative for chest pain and palpitations.   Gastrointestinal: Negative for blood in stool, constipation, diarrhea and vomiting.   Endocrine: Negative for polydipsia and polyuria.   Genitourinary: Negative for difficulty urinating, hematuria and urgency.   Musculoskeletal: Negative for arthralgias, joint swelling and neck pain.   Neurological: Negative for weakness and headaches.   Psychiatric/Behavioral: Negative for confusion and dysphoric mood.     Objective:   /65   Ht 5' 2" (1.575 m)   Wt 84.8 kg (187 lb)   BMI 34.20 kg/m²     Physical Exam  Constitutional:       Appearance: Normal appearance. He is not ill-appearing.   HENT:      Head: Normocephalic and " atraumatic.      Right Ear: Tympanic membrane and ear canal normal. There is no impacted cerumen.      Left Ear: Tympanic membrane and ear canal normal. There is no impacted cerumen.   Eyes:      Extraocular Movements: Extraocular movements intact.      Conjunctiva/sclera: Conjunctivae normal.      Pupils: Pupils are equal, round, and reactive to light.   Cardiovascular:      Rate and Rhythm: Normal rate and regular rhythm.      Pulses: Normal pulses.      Heart sounds: Normal heart sounds. No murmur.   Pulmonary:      Effort: Pulmonary effort is normal. No respiratory distress.      Breath sounds: No wheezing or rales.   Abdominal:      General: Bowel sounds are normal. There is no distension.      Palpations: Abdomen is soft.      Tenderness: There is no abdominal tenderness.   Skin:     Coloration: Skin is not jaundiced.      Findings: No bruising, erythema or rash.   Neurological:      General: No focal deficit present.      Mental Status: He is oriented to person, place, and time. Mental status is at baseline.      Motor: No weakness.      Gait: Gait normal.       Assessment:       ICD-10-CM ICD-9-CM   1. Annual physical exam  Z00.00 V70.0   2. Portal hypertension  K76.6 572.3   3. Psoriatic arthritis  L40.50 696.0   4. Immunosuppressed status  D89.9 279.9   5. Thrombocytopenia, unspecified  D69.6 287.5   6. Alcohol dependence, uncomplicated: quit 2017  F10.20 303.90   7. Alcohol-induced polyneuropathy  G62.1 357.5   8. Aortic atherosclerosis  I70.0 440.0   9. Essential hypertension  I10 401.9   10. Psoriasis vulgaris  L40.0 696.1   11. Encounter for screening for malignant neoplasm of prostate  Z12.5 V76.44   12. Right lower lobe lung mass  R91.8 786.6   13. Mixed hyperlipidemia  E78.2 272.2   14. Vitamin D deficiency disease  E55.9 268.9   15. Low serum vitamin B12  E53.8 266.2   16. Class 1 obesity due to excess calories with serious comorbidity and body mass index (BMI) of 33.0 to 33.9 in adult  E66.09  278.00    Z68.33 V85.33   17. IFG (impaired fasting glucose)  R73.01 790.21   18. Thrombocytopenia  D69.6 287.5   19. Gastroesophageal reflux disease with gastritis: see EGD 2018  K21.9 530.81   20. Diverticulosis: seen on colonoscopy 2018  K57.90 562.10     Plan:   1. Annual physical exam  - No new complaints , pt compliant with medications   - Lifestyle modifications discussed with pt   - Pt ASCVD score = 31%, pt will be put on atorvastatin (LIPITOR) 20 MG tablet; Take 1 tablet (20 mg total) by mouth once daily.   - Lipid panel will be ordered at follow up visit  - PSA lab ordered   - DEXA scan and Coloscopy scheduled for next year  - Labs ordered - CBC, CMP, Vitamin D, Vitamin B12    PSA, Screening; Future; Expected date: 09/01/2020  CBC auto differential; Future; Expected date: 09/01/2020    2. Alcohol dependence, uncomplicated: quit 2017  Alcohol-induced polyneuropathy  Portal hypertension  - Pt sober for 3 years  - Abdominal Ultrasound in June was unremarkable  - Pt to follow with GI for further evaluation      3. Psoriatic arthritis  Psoriasis vulgaris  - No complaints or new lesions present at this visit  - Pt to follow with Dermatology for further management   -     Ambulatory referral/consult to Dermatology; Future; Expected date: 09/08/2020      4. Right lower lobe lung mass  -     Pt ordered CT Chest Without Contrast  - Will review results at follow up visit     5. Mixed hyperlipidemia  -     Lipid Panel; Future; Expected date: 09/01/2020    6. Vitamin D deficiency disease  -     Vitamin D; Future; Expected date: 09/01/2020    7. Low serum vitamin B12  -     Vitamin B12; Future; Expected date: 09/01/2020    8. Class 1 obesity due to excess calories with serious comorbidity and body mass index (BMI) of 33.0 to 33.9 in adult    9. IFG (impaired fasting glucose)  -     Hemoglobin A1C; Future; Expected date: 09/01/2020    10. Thrombocytopenia  CBC auto differential; Future; Expected date: 09/01/2020    Doing  well.  Patient seen and examined with medical student.  I agree with assessment plan as above.  Will obtain CT scan for follow-up.  Needs hepatology follow-up  Flu shot  Shingles vaccine today    I will review all studies and determine further tx depending on findings

## 2020-09-02 LAB
25(OH)D3+25(OH)D2 SERPL-MCNC: 67 NG/ML (ref 30–96)
COMPLEXED PSA SERPL-MCNC: 1.5 NG/ML (ref 0–4)
ESTIMATED AVG GLUCOSE: 103 MG/DL (ref 68–131)
HBA1C MFR BLD HPLC: 5.2 % (ref 4–5.6)
VIT B12 SERPL-MCNC: 477 PG/ML (ref 210–950)

## 2020-09-04 NOTE — PROGRESS NOTES
Subjective:      Patient ID: Darinel Majano is a 80 y.o. male.    Chief Complaint: Nail Care (patient is not a DM)    Darinel is a 80 y.o. male who presents to the clinic for evaluation and treatment of high risk feet. 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), Anemia (5/4/2017), Aortic atherosclerosis: see CT scan 5/17 (6/9/2017), BPH (benign prostatic hypertrophy), Chronic gout, Cortical senile cataract (5/15/2012), Degenerative disc disease, Diverticulosis of large intestine without hemorrhage: see CT scan 5/17 (6/9/2017), Essential hypertension (2/19/2016), Gastroesophageal reflux disease with gastritis: see EGD 2018 (10/31/2018), Gout, Hyperplastic polyp of transverse colon: 2012 repeat 5 years (5/4/2017), IFG (impaired fasting glucose) (5/7/2019), Kidney stone on left side (6/9/2017), Osteoporosis, Psoriasis, 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 The patient's chief complaint is long, thick toenails. This patient has documented high risk feet requiring routine maintenance secondary to peripheral neuropathy.    PCP: Annabella Montiel MD    Date Last Seen by PCP:9/1/2020    Current shoe gear:  Affected Foot: Tennis shoes     Unaffected Foot: Tennis shoes    Last encounter in this department: Visit date not found    Hemoglobin A1C   Date Value Ref Range Status   09/01/2020 5.2 4.0 - 5.6 % Final     Comment:     ADA Screening Guidelines:  5.7-6.4%  Consistent with prediabetes  >or=6.5%  Consistent with diabetes  High levels of fetal hemoglobin interfere with the HbA1C  assay. Heterozygous hemoglobin variants (HbS, HgC, etc)do  not significantly interfere with this assay.   However, presence of multiple variants may affect accuracy.     06/29/2011 4.7 4.0 - 6.2 % Final   01/05/2011 4.6 4.0 - 6.2  % Final       Review of Systems   Constitution: Negative for chills, fever and malaise/fatigue.   HENT: Negative for hearing loss.    Cardiovascular: Negative for claudication and leg swelling.   Respiratory: Negative for shortness of breath.    Skin: Positive for color change, nail changes, rash and unusual hair distribution. Negative for flushing.   Musculoskeletal: Negative for joint pain and myalgias.   Neurological: Positive for numbness, paresthesias and sensory change. Negative for loss of balance.   Psychiatric/Behavioral: Negative for altered mental status.           Objective:      Physical Exam  Vitals signs reviewed.   Constitutional:       Appearance: He is well-developed.   Cardiovascular:      Pulses:           Dorsalis pedis pulses are 1+ on the right side and 1+ on the left side.        Posterior tibial pulses are 1+ on the right side and 1+ on the left side.      Comments: Non pitting  edema noted to b/L LEs  Musculoskeletal:      Right knee: He exhibits no swelling and no ecchymosis.      Left knee: He exhibits no swelling and no ecchymosis.      Right ankle: He exhibits normal range of motion, no swelling, no ecchymosis and normal pulse. No lateral malleolus, no medial malleolus and no head of 5th metatarsal tenderness found. Achilles tendon exhibits no pain, no defect and normal Cheema's test results.      Left ankle: He exhibits normal range of motion, no swelling, no ecchymosis and normal pulse. No lateral malleolus, no medial malleolus and no head of 5th metatarsal tenderness found. Achilles tendon exhibits no pain and normal Cheema's test results.      Right lower leg: He exhibits no tenderness, no bony tenderness, no swelling and no deformity. No edema.      Left lower leg: He exhibits no tenderness and no swelling. No edema.      Right foot: Normal range of motion. No deformity.      Left foot: Normal range of motion. No deformity.      Comments: Decreased height of medial arches noted  with loading of the foot b/L  Hammertoes noted, digits 4 and 5b/L with adductovarus rotation of b/L fifth digit.    Adequate joint ROM noted to all lower extremity muscle groups with no pain or crepitation noted. Muscle strength is 5/5 in all groups bilaterally.     Feet:      Right foot:      Protective Sensation: 10 sites tested. 6 sites sensed.      Left foot:      Protective Sensation: 10 sites tested. 6 sites sensed.   Skin:     General: Skin is warm.      Capillary Refill: Capillary refill takes more than 3 seconds.      Findings: No abrasion, bruising, burn, ecchymosis or wound.      Comments: Skin temp is cool to cool from proximal to distal b/L.  Nails x10 are elongated by  5-6mm's, thickened by 1-3 mm's, dystrophic, and are yellowish in  coloration . Xerosis Bilaterally. No open lesions noted.        Neurological:      Mental Status: He is alert and oriented to person, place, and time.      Comments: Diminished protective sensation noted b/L     Psychiatric:         Attention and Perception: He is attentive.         Speech: Speech normal.         Behavior: Behavior normal.               Assessment:       Encounter Diagnoses   Name Primary?    Idiopathic peripheral neuropathy Yes    Onychomycosis due to dermatophyte          Plan:       Darinel was seen today for nail care.    Diagnoses and all orders for this visit:    Idiopathic peripheral neuropathy    Onychomycosis due to dermatophyte      I counseled the patient on his conditions, their implications and medical management.        - Shoe inspection. Patient instructed on proper foot hygeine. We discussed wearing proper shoe gear, daily foot inspections, never walking without protective shoe gear, never putting sharp instruments to feet, routine podiatric nail visits every 2-3 months.    - With patient's permission, nails were aggressively reduced and debrided x 10 to their soft tissue attachment mechanically and with electric , removing all  offending nail and debris. Patient relates relief following the procedure. He will continue to monitor the areas daily, inspect his feet, wear protective shoe gear when ambulatory, moisturizer to maintain skin integrity and follow in this office in approximately 2-3 months, sooner p.r.n.

## 2020-09-15 RX ORDER — FUROSEMIDE 20 MG/1
20 TABLET ORAL DAILY
Qty: 90 TABLET | Refills: 2 | Status: SHIPPED | OUTPATIENT
Start: 2020-09-15 | End: 2021-07-29

## 2020-11-20 ENCOUNTER — OFFICE VISIT (OUTPATIENT)
Dept: PODIATRY | Facility: CLINIC | Age: 80
End: 2020-11-20
Payer: MEDICARE

## 2020-11-20 ENCOUNTER — PATIENT MESSAGE (OUTPATIENT)
Dept: INTERNAL MEDICINE | Facility: CLINIC | Age: 80
End: 2020-11-20

## 2020-11-20 VITALS
HEART RATE: 83 BPM | DIASTOLIC BLOOD PRESSURE: 63 MMHG | SYSTOLIC BLOOD PRESSURE: 131 MMHG | BODY MASS INDEX: 34.4 KG/M2 | WEIGHT: 186.94 LBS | HEIGHT: 62 IN

## 2020-11-20 DIAGNOSIS — B35.1 ONYCHOMYCOSIS: ICD-10-CM

## 2020-11-20 DIAGNOSIS — G60.9 IDIOPATHIC PERIPHERAL NEUROPATHY: Primary | ICD-10-CM

## 2020-11-20 PROCEDURE — 99999 PR PBB SHADOW E&M-EST. PATIENT-LVL III: CPT | Mod: PBBFAC,,, | Performed by: PODIATRIST

## 2020-11-20 PROCEDURE — 1126F AMNT PAIN NOTED NONE PRSNT: CPT | Mod: S$GLB,,, | Performed by: PODIATRIST

## 2020-11-20 PROCEDURE — 1126F PR PAIN SEVERITY QUANTIFIED, NO PAIN PRESENT: ICD-10-PCS | Mod: S$GLB,,, | Performed by: PODIATRIST

## 2020-11-20 PROCEDURE — 99999 PR PBB SHADOW E&M-EST. PATIENT-LVL III: ICD-10-PCS | Mod: PBBFAC,,, | Performed by: PODIATRIST

## 2020-11-20 PROCEDURE — 11721 PR DEBRIDEMENT OF NAILS, 6 OR MORE: ICD-10-PCS | Mod: Q9,S$GLB,, | Performed by: PODIATRIST

## 2020-11-20 PROCEDURE — 11721 DEBRIDE NAIL 6 OR MORE: CPT | Mod: Q9,S$GLB,, | Performed by: PODIATRIST

## 2020-11-20 PROCEDURE — 99499 UNLISTED E&M SERVICE: CPT | Mod: S$GLB,,, | Performed by: PODIATRIST

## 2020-11-20 PROCEDURE — 99499 NO LOS: ICD-10-PCS | Mod: S$GLB,,, | Performed by: PODIATRIST

## 2020-11-20 RX ORDER — INFLUENZA A VIRUS A/MICHIGAN/45/2015 X-275 (H1N1) ANTIGEN (FORMALDEHYDE INACTIVATED), INFLUENZA A VIRUS A/SINGAPORE/INFIMH-16-0019/2016 IVR-186 (H3N2) ANTIGEN (FORMALDEHYDE INACTIVATED), INFLUENZA B VIRUS B/PHUKET/3073/2013 ANTIGEN (FORMALDEHYDE INACTIVATED), AND INFLUENZA B VIRUS B/MARYLAND/15/2016 BX-69A ANTIGEN (FORMALDEHYDE INACTIVATED) 60; 60; 60; 60 UG/.7ML; UG/.7ML; UG/.7ML; UG/.7ML
INJECTION, SUSPENSION INTRAMUSCULAR
COMMUNITY
Start: 2020-09-10 | End: 2021-03-25

## 2020-11-20 NOTE — LETTER
November 20, 2020      Annabella Montiel MD  [address]  [facility] [address]           [facility] [address]  [address]  [facility] [address]  Phone: [phone]          Patient: Darinel Majano   MR Number: [id number]   YOB: 1940   Date of Visit: 11/20/2020       Dear Dr. Annabella Montiel:    Thank you for referring Darinel Majano to me for evaluation. Attached you will find relevant portions of my assessment and plan of care.    If you have questions, please do not hesitate to call me. I look forward to following Darinel Majano along with you.    Sincerely,    Briana Little, MILENA    Enclosure  CC:  No Recipients    If you would like to receive this communication electronically, please contact [email or web address] or [phone] to request more information on [employer] access.    For providers and/or their staff who would like to refer a patient to [facility], please contact us through our one-stop-shop provider referral line, [facility] , at [phone].    If you feel you have received this communication in error or would no longer like to receive these types of communications, please e-mail [email or web address]

## 2020-11-20 NOTE — PROGRESS NOTES
Subjective:      Patient ID: Darinel Majano is a 80 y.o. male.    Chief Complaint: Nail Care    Darinel is a 80 y.o. male who presents to the clinic for evaluation and treatment of high risk feet. 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), Anemia (5/4/2017), Aortic atherosclerosis: see CT scan 5/17 (6/9/2017), BPH (benign prostatic hypertrophy), Chronic gout, Cortical senile cataract (5/15/2012), Degenerative disc disease, Diverticulosis of large intestine without hemorrhage: see CT scan 5/17 (6/9/2017), Essential hypertension (2/19/2016), Gastroesophageal reflux disease with gastritis: see EGD 2018 (10/31/2018), Gout, Hyperplastic polyp of transverse colon: 2012 repeat 5 years (5/4/2017), IFG (impaired fasting glucose) (5/7/2019), Kidney stone on left side (6/9/2017), Osteoporosis, Psoriasis, 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 The patient's chief complaint is long, thick toenails. This patient has documented high risk feet requiring routine maintenance secondary to peripheral neuropathy.    PCP: Annabella Montiel MD    Date Last Seen by PCP:9/1/2020    Current shoe gear:  Affected Foot: Tennis shoes     Unaffected Foot: Tennis shoes    Last encounter in this department: Visit date not found    Hemoglobin A1C   Date Value Ref Range Status   09/01/2020 5.2 4.0 - 5.6 % Final     Comment:     ADA Screening Guidelines:  5.7-6.4%  Consistent with prediabetes  >or=6.5%  Consistent with diabetes  High levels of fetal hemoglobin interfere with the HbA1C  assay. Heterozygous hemoglobin variants (HbS, HgC, etc)do  not significantly interfere with this assay.   However, presence of multiple variants may affect accuracy.     06/29/2011 4.7 4.0 - 6.2 % Final   01/05/2011 4.6 4.0 - 6.2 % Final       Review  of Systems   Constitution: Negative for chills, fever and malaise/fatigue.   HENT: Negative for hearing loss.    Cardiovascular: Negative for claudication and leg swelling.   Respiratory: Negative for shortness of breath.    Skin: Positive for color change, nail changes, rash and unusual hair distribution. Negative for flushing.   Musculoskeletal: Negative for joint pain and myalgias.   Neurological: Positive for numbness, paresthesias and sensory change. Negative for loss of balance.   Psychiatric/Behavioral: Negative for altered mental status.           Objective:      Physical Exam  Vitals signs reviewed.   Constitutional:       Appearance: He is well-developed.   Cardiovascular:      Pulses:           Dorsalis pedis pulses are 1+ on the right side and 1+ on the left side.        Posterior tibial pulses are 1+ on the right side and 1+ on the left side.      Comments: Non pitting  edema noted to b/L LEs  Musculoskeletal:      Right knee: He exhibits no swelling and no ecchymosis.      Left knee: He exhibits no swelling and no ecchymosis.      Right ankle: He exhibits normal range of motion, no swelling, no ecchymosis and normal pulse. No lateral malleolus, no medial malleolus and no head of 5th metatarsal tenderness found. Achilles tendon exhibits no pain, no defect and normal Cheema's test results.      Left ankle: He exhibits normal range of motion, no swelling, no ecchymosis and normal pulse. No lateral malleolus, no medial malleolus and no head of 5th metatarsal tenderness found. Achilles tendon exhibits no pain and normal Cheema's test results.      Right lower leg: He exhibits no tenderness, no bony tenderness, no swelling and no deformity. No edema.      Left lower leg: He exhibits no tenderness and no swelling. No edema.      Right foot: Normal range of motion. No deformity.      Left foot: Normal range of motion. No deformity.      Comments: Decreased height of medial arches noted with loading of the  foot b/L  Hammertoes noted, digits 4 and 5b/L with adductovarus rotation of b/L fifth digit.    Adequate joint ROM noted to all lower extremity muscle groups with no pain or crepitation noted. Muscle strength is 5/5 in all groups bilaterally.     Feet:      Right foot:      Protective Sensation: 10 sites tested. 6 sites sensed.      Left foot:      Protective Sensation: 10 sites tested. 6 sites sensed.   Skin:     General: Skin is warm.      Capillary Refill: Capillary refill takes more than 3 seconds.      Findings: No abrasion, bruising, burn, ecchymosis or wound.      Comments: Skin temp is cool to cool from proximal to distal b/L.  Nails x10 are elongated by  5-7mm's, thickened by 1-3 mm's, dystrophic, and are yellowish in  coloration . Xerosis Bilaterally. No open lesions noted.        Neurological:      Mental Status: He is alert and oriented to person, place, and time.      Comments: Diminished protective sensation noted b/L     Psychiatric:         Attention and Perception: He is attentive.         Speech: Speech normal.         Behavior: Behavior normal.               Assessment:       Encounter Diagnoses   Name Primary?    Onychomycosis     Idiopathic peripheral neuropathy Yes         Plan:       Darinel was seen today for nail care.    Diagnoses and all orders for this visit:    Idiopathic peripheral neuropathy    Onychomycosis  -     Ambulatory referral/consult to Dermatology      I counseled the patient on his conditions, their implications and medical management.        - Shoe inspection. Patient instructed on proper foot hygeine. We discussed wearing proper shoe gear, daily foot inspections, never walking without protective shoe gear, never putting sharp instruments to feet, routine podiatric nail visits every 2-3 months.    - With patient's permission, nails were aggressively reduced and debrided x 10 to their soft tissue attachment mechanically and with electric , removing all offending nail  and debris. Patient relates relief following the procedure. He will continue to monitor the areas daily, inspect his feet, wear protective shoe gear when ambulatory, moisturizer to maintain skin integrity and follow in this office in approximately 2-3 months, sooner p.r.n.

## 2020-11-30 ENCOUNTER — TELEPHONE (OUTPATIENT)
Dept: INTERNAL MEDICINE | Facility: CLINIC | Age: 80
End: 2020-11-30

## 2020-11-30 NOTE — TELEPHONE ENCOUNTER
Please call to remind him to get his second shingles vaccine, maybe next week when he comes in?  Thanks    (He has not read his Rebecca message)

## 2020-12-07 ENCOUNTER — OFFICE VISIT (OUTPATIENT)
Dept: HEPATOLOGY | Facility: CLINIC | Age: 80
End: 2020-12-07
Payer: MEDICARE

## 2020-12-07 ENCOUNTER — HOSPITAL ENCOUNTER (OUTPATIENT)
Dept: RADIOLOGY | Facility: HOSPITAL | Age: 80
Discharge: HOME | End: 2020-12-07
Attending: NURSE PRACTITIONER
Payer: MEDICARE

## 2020-12-07 ENCOUNTER — PATIENT MESSAGE (OUTPATIENT)
Dept: INTERNAL MEDICINE | Facility: CLINIC | Age: 80
End: 2020-12-07

## 2020-12-07 ENCOUNTER — PROCEDURE VISIT (OUTPATIENT)
Dept: HEPATOLOGY | Facility: CLINIC | Age: 80
End: 2020-12-07
Payer: MEDICARE

## 2020-12-07 VITALS
OXYGEN SATURATION: 100 % | HEART RATE: 72 BPM | WEIGHT: 180.31 LBS | SYSTOLIC BLOOD PRESSURE: 143 MMHG | DIASTOLIC BLOOD PRESSURE: 63 MMHG | HEIGHT: 63 IN | BODY MASS INDEX: 31.95 KG/M2 | RESPIRATION RATE: 20 BRPM | TEMPERATURE: 98 F

## 2020-12-07 DIAGNOSIS — E66.9 OBESITY (BMI 30-39.9): ICD-10-CM

## 2020-12-07 DIAGNOSIS — K70.30 ALCOHOLIC CIRRHOSIS OF LIVER WITHOUT ASCITES: ICD-10-CM

## 2020-12-07 DIAGNOSIS — K76.0 FATTY LIVER: ICD-10-CM

## 2020-12-07 DIAGNOSIS — K76.89 HEPATIC CYST: ICD-10-CM

## 2020-12-07 DIAGNOSIS — K74.00 LIVER FIBROSIS: ICD-10-CM

## 2020-12-07 DIAGNOSIS — K76.6 PORTAL HYPERTENSION: ICD-10-CM

## 2020-12-07 DIAGNOSIS — D69.6 THROMBOCYTOPENIA: ICD-10-CM

## 2020-12-07 DIAGNOSIS — K76.6 PORTAL HYPERTENSION: Primary | ICD-10-CM

## 2020-12-07 PROCEDURE — 99214 OFFICE O/P EST MOD 30 MIN: CPT | Mod: S$GLB,,, | Performed by: PHYSICIAN ASSISTANT

## 2020-12-07 PROCEDURE — 3078F PR MOST RECENT DIASTOLIC BLOOD PRESSURE < 80 MM HG: ICD-10-PCS | Mod: CPTII,S$GLB,, | Performed by: PHYSICIAN ASSISTANT

## 2020-12-07 PROCEDURE — 1101F PR PT FALLS ASSESS DOC 0-1 FALLS W/OUT INJ PAST YR: ICD-10-PCS | Mod: CPTII,S$GLB,, | Performed by: PHYSICIAN ASSISTANT

## 2020-12-07 PROCEDURE — 1101F PT FALLS ASSESS-DOCD LE1/YR: CPT | Mod: CPTII,S$GLB,, | Performed by: PHYSICIAN ASSISTANT

## 2020-12-07 PROCEDURE — 99499 RISK ADDL DX/OHS AUDIT: ICD-10-PCS | Mod: S$GLB,,, | Performed by: PHYSICIAN ASSISTANT

## 2020-12-07 PROCEDURE — 76705 ECHO EXAM OF ABDOMEN: CPT | Mod: 26,,, | Performed by: RADIOLOGY

## 2020-12-07 PROCEDURE — 1126F AMNT PAIN NOTED NONE PRSNT: CPT | Mod: S$GLB,,, | Performed by: PHYSICIAN ASSISTANT

## 2020-12-07 PROCEDURE — 76705 US ABDOMEN LIMITED: ICD-10-PCS | Mod: 26,,, | Performed by: RADIOLOGY

## 2020-12-07 PROCEDURE — 99999 PR PBB SHADOW E&M-EST. PATIENT-LVL V: ICD-10-PCS | Mod: PBBFAC,,, | Performed by: PHYSICIAN ASSISTANT

## 2020-12-07 PROCEDURE — 3078F DIAST BP <80 MM HG: CPT | Mod: CPTII,S$GLB,, | Performed by: PHYSICIAN ASSISTANT

## 2020-12-07 PROCEDURE — 3077F SYST BP >= 140 MM HG: CPT | Mod: CPTII,S$GLB,, | Performed by: PHYSICIAN ASSISTANT

## 2020-12-07 PROCEDURE — 3288F FALL RISK ASSESSMENT DOCD: CPT | Mod: CPTII,S$GLB,, | Performed by: PHYSICIAN ASSISTANT

## 2020-12-07 PROCEDURE — 99214 PR OFFICE/OUTPT VISIT, EST, LEVL IV, 30-39 MIN: ICD-10-PCS | Mod: S$GLB,,, | Performed by: PHYSICIAN ASSISTANT

## 2020-12-07 PROCEDURE — 99999 PR PBB SHADOW E&M-EST. PATIENT-LVL V: CPT | Mod: PBBFAC,,, | Performed by: PHYSICIAN ASSISTANT

## 2020-12-07 PROCEDURE — 76705 ECHO EXAM OF ABDOMEN: CPT | Mod: TC

## 2020-12-07 PROCEDURE — 3288F PR FALLS RISK ASSESSMENT DOCUMENTED: ICD-10-PCS | Mod: CPTII,S$GLB,, | Performed by: PHYSICIAN ASSISTANT

## 2020-12-07 PROCEDURE — 3077F PR MOST RECENT SYSTOLIC BLOOD PRESSURE >= 140 MM HG: ICD-10-PCS | Mod: CPTII,S$GLB,, | Performed by: PHYSICIAN ASSISTANT

## 2020-12-07 PROCEDURE — 1159F MED LIST DOCD IN RCRD: CPT | Mod: S$GLB,,, | Performed by: PHYSICIAN ASSISTANT

## 2020-12-07 PROCEDURE — 1126F PR PAIN SEVERITY QUANTIFIED, NO PAIN PRESENT: ICD-10-PCS | Mod: S$GLB,,, | Performed by: PHYSICIAN ASSISTANT

## 2020-12-07 PROCEDURE — 99499 UNLISTED E&M SERVICE: CPT | Mod: S$GLB,,, | Performed by: PHYSICIAN ASSISTANT

## 2020-12-07 PROCEDURE — 1159F PR MEDICATION LIST DOCUMENTED IN MEDICAL RECORD: ICD-10-PCS | Mod: S$GLB,,, | Performed by: PHYSICIAN ASSISTANT

## 2020-12-07 RX ORDER — CHOLECALCIFEROL (VITAMIN D3) 25 MCG
1000 TABLET ORAL DAILY
COMMUNITY

## 2020-12-07 RX ORDER — ASCORBIC ACID 1000 MG
175 TABLET ORAL DAILY
COMMUNITY

## 2020-12-07 NOTE — PATIENT INSTRUCTIONS
1. Recommend Fibroscan today.  2. Ultrasound looks fine, liver is working well. Labs pending  3. Follow-up in 6 months with ultrasound and labs prior to continue monitoring.  4. Recommend no alcohol use whatsoever.         Because you have possibility of cirrhosis, it is important to attend clinic visits every 6 months with an Ultrasound and blood tests every 6 months to screen for liver cancer (you are at risk of developing liver cancer due to scar tissue in the liver)    Signs and symptoms of worsening liver disease include jaundice, fluid in the belly (ascites), and confusion/disorientation/slowed thought processes due to hepatic encephalopathy (toxins building up because of liver problems).   You should seek medical attention if any of these things occur.    Also, possible bleeding from esophageal varices (blood vessels in the stomach and foodpipe can burst and cause fatal bleeding).  Therefore, if you have symptoms of vomiting blood, blood in your stool, dark or black stools or vomiting coffee ground vomit, YOU SHOULD GO TO THE EMERGENCY ROOM IMMEDIATELY.     Cirrhosis can increase the risk of liver cancer, liver failure, and death. However, we will watch your liver function score (MELD score) closely with each clinic visit. A normal MELD score is 6, highest is 40. Your last one was a 6. We will check this with every clinic visit. A MELD 15 or higher is when we start to consider transplant because MELD 15 or higher indicates that the liver is not functioning as well       Cirrhosis Counseling  - strict abstinence of alcohol use (includes beer, wine, and/or liquor)  - avoid non-steroidal anti-inflammatory drugs (NSAIDs) such as ibuprofen, Motrin, naprosyn, Aleve due to the risk of kidney damage  - can take acetaminophen (Tylenol), no more than 2000 mg per day  - low sodium (salt) 2 gram per day diet  - high protein diet to prevent muscle mass loss. Recommended at least 1 protein shake daily using Premier Protein  shakes    - resistance exercises for muscle strength  - avoid raw seafoods due to the risk of fatal Vibrio vulnificus infection  - ultrasound of the liver every 6 months for liver cancer screening  - Upper endoscopy every few years to screen for varices in the stomach and esophagus. Consider next year 2021

## 2020-12-07 NOTE — PROGRESS NOTES
[facility] - Established Patient     Last Clinic Visit: 11/11/2019  with Raymond Nair PA-C    CHIEF COMPLAINT: Presumed liver fibrosis due to alcohol use     Background: This is a 80 y.o. White male with PMH psoriasis, osteoporosis (on fosamax), alcohol dependence (stopped in 2017), right hemidiaphragm mass s/p VATS, SCC of R hand, s/p cholecystectomy 2012, with initial presentation to hepatology clinic for thrombocytopenia w/ elevated enzymes and hypoalbuminemia in 2017, returning for presumed liver fibrosis based on clinical findings such as thrombocytopenia, fluid overload, + splenomegaly in the initial context of alcohol use. The patient was previously followed by Raymond Nair PA-C, is new to me today.      The patient is being followed for [id number] HCC screening due to aforementioned findings; no history of tissue evidence of parenchymal hepatic fibrosis, however. He has persistently low platelet count. Last EGD 2018 without varioces. Other than fluid overload (well controlled at present moment), no other symptoms/signs of hepatic decompensation.      Interval history:   Patient accompanied by wife, Natividad who works PRN in the ED at [facility].     He reports he is occasionally drinking 6oz bottle of wine with crushed ice, started about 1 month ago, about 1 per week. He is otherwise abstinent from beer and other alcohol.    He was put on spironolactone and lasix for BLE edema when he initially saw Raymond in 2017. Still taking this, no worsening of fluid issues and no increase in dosages. We refilled these medications for him this year.    Otherwise, he is doing well. The patient feels well, denies symptoms of hepatic decompensation including jaundice, ascites, cognitive problems to suggest hepatic encephalopathy, or GI bleeding. No RUQ pain, changes in stool color. No fatigue or fever,chills.     Fibrosis staging: remote history of biopsy without reported fibrosis in 2011, last Fibroscan 2017  showed kPA of 4.1 thus F0-1 fibrosis      Ultrasound Abd 12/7/2020:  Liver: Normal in size, measuring 14.0 cm in length.  Homogeneous echotexture. Subcentimeter simple cyst within the right hepatic lobe measuring 0.8 x 0.8 x 0.8 cm.  Gallbladder: The gallbladder is surgically absent.  Biliary system: The common duct is not dilated, measuring 2 mm.  No intrahepatic ductal dilatation.  Spleen: Upper limit of normal in size with homogeneous parenchymal echotexture.  Spleen measures 10.6 x 5.2 cm.  Pancreas: The visualized portions of pancreas appear normal.  Miscellaneous: No ascites.       Prior serologic workup:   Lab Results   Component Value Date    SMOOTHMUSCAB Positive 1:40 (A) 05/24/2017    AMAIFA Negative 1:40 05/24/2017    ANASCREEN Negative <1:160 05/24/2017    FERRITIN 409 (H) 05/24/2017    FESATURATED 28 05/24/2017    PETH 149 (A) 05/24/2017    [id number] MM 05/24/2017    [id number] 196 (H) 05/24/2017    HEPBSAG Negative 05/24/2017    HEPCAB Negative 05/24/2017    HEPAIGM Negative 09/20/2011         Risk factors for fatty liver include obesity, pre-diabetes and alcohol use     Immunization status of Hep A & Hep B: S/p Twinrix 2011            Review of patient's allergies indicates:   Allergen Reactions    Humira [adalimumab] Other (See Comments)     Has lung issue and humira contraindicated        Current Outpatient Medications on File Prior to Visit   Medication Sig Dispense Refill    atorvastatin (LIPITOR) 20 MG tablet Take 1 tablet (20 mg total) by mouth once daily. 90 tablet 3    FLUZONE HIGHDOSE QUAD 20-21  mcg/0.7 mL Syrg PHARMACY ADMINISTERED      FOSAMAX 70 mg tablet       furosemide (LASIX) 20 MG tablet Take 1 tablet (20 mg total) by mouth once daily. 90 tablet 2    multivitamin with minerals tablet Take 1 tablet by mouth once daily.      spironolactone (ALDACTONE) 25 MG tablet TAKE 2 TABLETS (50 MG TOTAL) BY MOUTH ONCE DAILY. 180 tablet 3    triamcinolone acetonide 0.1% (KENALOG)  0.1 % ointment aaa qd to affected areas.  Avoid face and groin 454 g 3     No current facility-administered medications on file prior to visit.        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), Anemia (5/4/2017), Aortic atherosclerosis: see CT scan 5/17 (6/9/2017), BPH (benign prostatic hypertrophy), Chronic gout, Cortical senile cataract (5/15/2012), Degenerative disc disease, Diverticulosis of large intestine without hemorrhage: see CT scan 5/17 (6/9/2017), Essential hypertension (2/19/2016), Gastroesophageal reflux disease with gastritis: see EGD 2018 (10/31/2018), Gout, Hyperplastic polyp of transverse colon: 2012 repeat 5 years (5/4/2017), IFG (impaired fasting glucose) (5/7/2019), Kidney stone on left side (6/9/2017), Osteoporosis, Psoriasis, 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  PSHX:  has a past surgical history that includes Cataract extraction; Cholecystectomy (2012); Eye surgery; Right Hand Surgery; Colonoscopy (N/A, 2/5/2018); and Upper gastrointestinal endoscopy.  FAMILY HISTORY:   Family History   Problem Relation Age of Onset    Cataracts Mother     Diabetes Mother     Hypertension Mother     Cancer Father         stomach- smoker    Stomach cancer Father     No Known Problems Sister     Heart disease Brother         PPM    Amblyopia Neg Hx     Blindness Neg Hx     Glaucoma Neg Hx     Retinal detachment Neg Hx     Strabismus Neg Hx     Stroke Neg Hx     Thyroid disease Neg Hx     Macular degeneration Neg Hx     Lupus Neg Hx     Rheum arthritis Neg Hx     Psoriasis Neg Hx     Melanoma Neg Hx     Anesthesia problems Neg Hx      SOCIAL HISTORY:   Social History     Substance and Sexual Activity   Alcohol Use No    Frequency: Never    Binge frequency: Never     Comment: Patient no longer drinks ETOHHistory-84 beers a week      Social History     Substance and Sexual Activity   Drug Use No       Review of Systems   Constitutional: Negative for appetite change, chills, fatigue, fever and unexpected weight change.   Eyes: Negative for visual disturbance.   Respiratory: Negative for cough and shortness of breath.    Cardiovascular: Negative for chest pain, palpitations and leg swelling.   Gastrointestinal: Negative for abdominal distention, abdominal pain, blood in stool, constipation, diarrhea, nausea and vomiting. No change in stool color.  Genitourinary: Negative for dysuria and hematuria. Denies dark urine.   Musculoskeletal: Negative for arthralgias, gait problem, joint swelling and myalgias.   Skin: Negative for color change, rash and wound. Denies itching.   Neurological: Negative for dizziness, tremors, speech difficulty, and headaches.   Hematological: Does not bruise/bleed easily.   Psychiatric/Behavioral: Negative for confusion, decreased concentration and sleep disturbance. Denies memory loss. Denies depression.        DATA   Physical Exam   Constitutional: Friendly white male , well-nourished. No distress. Alert and oriented to person, place, and time.  Eyes: No scleral icterus.   Cardiovascular: Normal rate, regular rhythm and normal heart sounds. No murmur heard.  Pulmonary/Chest: Respiratory effort and breath sounds normal. No respiratory distress.   Abdominal: Obese. Soft, non-tender. No distension; no ascites appreciated. There is no palpable hepatosplenomegaly. No hernia or mass.   Musculoskeletal: No edema. Arthritic PIP joints noted on R hand.  Neurological: No tremor. Coordination and gait normal. No asterixis.    Skin: Skin is warm and dry. No rash or erythema. No jaundice. No telangiectasias or palmar erythema noted.  Psychiatric: Normal mood and affect. Speech, behavior, and thought content normal. No depression or anxiety noted.     BP (!) 143/63 (BP  "Location: Right arm, Patient Position: Sitting, BP Method: Medium (Automatic))   Pulse 72   Temp 97.5 °F (36.4 °C)   Resp 20   Ht 5' 3" (1.6 m)   Wt 81.8 kg (180 lb 5.4 oz)   SpO2 100%   BMI 31.95 kg/m²       LABS:    Lab Results   Component Value Date    WBC 9.80 09/01/2020    HGB 14.2 09/01/2020    HCT 44.8 09/01/2020     (L) 09/01/2020     06/17/2020    K 4.6 06/17/2020    CREATININE 0.9 06/17/2020    ALT 19 06/17/2020    AST 17 06/17/2020    ALKPHOS 65 06/17/2020    BILITOT 0.6 06/17/2020    BILIDIR 0.4 (H) 07/12/2012    ALBUMIN 3.7 06/17/2020    INR 1.0 06/17/2020    AFP 4.9 06/17/2020    CHOL 144 04/30/2019    TRIG 77 04/30/2019    LDLCALC 93.6 04/30/2019    HGBA1C 5.2 09/01/2020         Prior serologic workup:   Lab Results   Component Value Date    SMOOTHMUSCAB Positive 1:40 (A) 05/24/2017    AMAIFA Negative 1:40 05/24/2017    ANASCREEN Negative <1:160 05/24/2017    FERRITIN 409 (H) 05/24/2017    FESATURATED 28 05/24/2017    PETH 149 (A) 05/24/2017    [id number] MM 05/24/2017    [id number] 196 (H) 05/24/2017       Hepatitis A Antibody IgG   Date Value Ref Range Status   05/24/2017 Positive (A)  Final       Hepatitis B Surface Ag   Date Value Ref Range Status   05/24/2017 Negative  Final     Hep B Core Total Ab   Date Value Ref Range Status   05/24/2017 Negative  Final     Hep B S Ab   Date Value Ref Range Status   05/24/2017 Negative  Final     Hepatitis C Ab   Date Value Ref Range Status   05/24/2017 Negative  Final            DIAGNOSTIC STUDIES:    Abdominal Ultrasound -   Results for orders placed during the hospital encounter of 06/17/20   US Abdomen Complete    Narrative EXAMINATION:  US ABDOMEN COMPLETE    CLINICAL HISTORY:  Hepatic fibrosis    TECHNIQUE:  Complete abdominal ultrasound (including pancreas, aorta, liver, gallbladder, common bile duct, IVC, kidneys, and spleen) was performed.    COMPARISON:  Ultrasound 11/11/2019, 05/10/2019, 10/20/2018    FINDINGS:  Pancreas: The " visualized portions of pancreas appear normal.    Aorta: No aneurysm.  Proximal segment obscured by overlying bowel gas.    Liver: 16.4 cm, normal in size. Homogeneous parenchymal echotexture. No focal lesions.    Gallbladder: The gallbladder is surgically absent.    Biliary system: 4 mm common bile duct.  No intrahepatic ductal dilatation.    Inferior vena cava: Normal in appearance.    Right kidney: 12.4 cm. No hydronephrosis.    Left kidney: 11.0 cm. No hydronephrosis.    Spleen: 11.2 x 4.7 cm.  Mildly enlarged with homogeneous echotexture.    Miscellaneous: No ascites.      Impression No suspicious hepatic lesions.    Mild splenomegaly.    Postoperative changes of cholecystectomy.    Electronically signed by resident: Sterling Zuniga  Date:    06/17/2020  Time:    08:29    Electronically signed by: Joaquin Beasley MD  Date:    06/17/2020  Time:    09:21     Results for orders placed during the hospital encounter of 12/07/20   US Abdomen Limited    Narrative EXAMINATION:  US ABDOMEN LIMITED    CLINICAL HISTORY:  Hepatic fibrosis, unspecified    TECHNIQUE:  Limited ultrasound of the right upper quadrant of the abdomen including pancreas, liver, gallbladder, common bile duct was performed.    COMPARISON:  Abdominal ultrasound 06/17/2020, 11/11/2019, 05/10/2020, CT chest 09/20/2018    FINDINGS:  Liver: Normal in size, measuring 14.0 cm in length.  Homogeneous echotexture. Subcentimeter simple cyst within the right hepatic lobe measuring 0.8 x 0.8 x 0.8 cm.    Gallbladder: The gallbladder is surgically absent.    Biliary system: The common duct is not dilated, measuring 2 mm.  No intrahepatic ductal dilatation.    Spleen: Upper limit of normal in size with homogeneous parenchymal echotexture.  Spleen measures 10.6 x 5.2 cm.    Pancreas: The visualized portions of pancreas appear normal.    Miscellaneous: No ascites.      Impression No detrimental change.    Status post cholecystectomy.    Subcentimeter cyst within  the right hepatic lobe.    Borderline splenomegaly.    Electronically signed by resident: Brandon Miller  Date:    12/07/2020  Time:    09:02    Electronically signed by: Terrell Marqeus MD  Date:    12/07/2020  Time:    09:37         ASSESSMENT & PLAN     80 y.o. White male with:    1. Liver fibrosis (?) w/ Portal hypertension & Thrombocytopenia  - we are following out of abundance of caution   - recommend re-staging Fibrosis  - US Abdomen Limited; Future  - AFP Tumor Marker; Standing  - CBC Auto Differential; Standing  - Comprehensive Metabolic Panel; Standing  - Protime-INR; Standing  - FibroScan (Vibration Controlled Transient Elastography); Future  MELD-Na score: 6 at 12/7/2020 10:36 AM  MELD score: 6 at 12/7/2020 10:36 AM  Calculated from:  Serum Creatinine: 0.9 mg/dL (Rounded to 1 mg/dL) at 12/7/2020 10:36 AM  Serum Sodium: 140 mmol/L (Rounded to 137 mmol/L) at 12/7/2020 10:36 AM  Total Bilirubin: 0.9 mg/dL (Rounded to 1 mg/dL) at 12/7/2020 10:36 AM  INR(ratio): 1.0 at 12/7/2020 10:36 AM  Age: 80 years 3 months      2. 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  -- continue HCC screening q6months  -- Cirrhosis Health Maintenance:  -- HCC screening:  U/S with no lesions, next due 6/2021   AFP WNL, next due 6/2021  -- Variceal screening: Last EGD 2018  Varices NO   -- Hepatitis A & B vaccination: vaccinated 2011      3. Hepatic cyst  - as seen on ultrasound today, subcentimeter, will follow with repeat in 6months    4. LE edema  - continue shruthi 50mg PO QD & lasix 20mg PO QD for swelling  - no swelling on exam today     5. Fatty liver  - RF include obesity & prior alcohol use        Orders Placed This Encounter   Procedures    FibroScan (Vibration Controlled Transient Elastography)    US Abdomen Limited    AFP Tumor Marker    CBC Auto Differential    Comprehensive Metabolic Panel    Protime-INR       Follow up in about 6  months (around 6/7/2021).  Consider EGD next year.      ADDENDUM:   We attempted Fibroscan today, but technician reported difficulty with scan. Will discuss further investigation (MRI elasto vs US elasto vs biopsy).    Thank you for allowing me to participate in the care of Darinel Salcido PA-C  Hepatology

## 2020-12-08 ENCOUNTER — IMMUNIZATION (OUTPATIENT)
Dept: PHARMACY | Facility: CLINIC | Age: 80
End: 2020-12-08
Payer: MEDICARE

## 2020-12-08 ENCOUNTER — TELEPHONE (OUTPATIENT)
Dept: HEPATOLOGY | Facility: CLINIC | Age: 80
End: 2020-12-08

## 2020-12-08 DIAGNOSIS — K70.30 ALCOHOLIC CIRRHOSIS OF LIVER WITHOUT ASCITES: Primary | ICD-10-CM

## 2020-12-08 NOTE — TELEPHONE ENCOUNTER
Called patient to discuss: FIbroscan yesterday is of very poor quality.   Recommend US elastography in 6 months or sooner if they'd like.  Will not change our management at this time.

## 2020-12-09 ENCOUNTER — PATIENT MESSAGE (OUTPATIENT)
Dept: HEPATOLOGY | Facility: CLINIC | Age: 80
End: 2020-12-09

## 2021-03-25 ENCOUNTER — OFFICE VISIT (OUTPATIENT)
Dept: OPTOMETRY | Facility: CLINIC | Age: 81
End: 2021-03-25
Payer: MEDICARE

## 2021-03-25 DIAGNOSIS — Z98.42 S/P BILATERAL CATARACT EXTRACTION: ICD-10-CM

## 2021-03-25 DIAGNOSIS — Z98.41 S/P BILATERAL CATARACT EXTRACTION: ICD-10-CM

## 2021-03-25 DIAGNOSIS — H26.491 POSTERIOR CAPSULAR OPACIFICATION VISUALLY SIGNIFICANT OF RIGHT EYE: Primary | ICD-10-CM

## 2021-03-25 DIAGNOSIS — H53.8 BLURRED VISION, RIGHT EYE: ICD-10-CM

## 2021-03-25 DIAGNOSIS — Z96.1 PSEUDOPHAKIA OF BOTH EYES: ICD-10-CM

## 2021-03-25 PROCEDURE — 99999 PR PBB SHADOW E&M-EST. PATIENT-LVL III: ICD-10-PCS | Mod: PBBFAC,,, | Performed by: OPTOMETRIST

## 2021-03-25 PROCEDURE — 1126F PR PAIN SEVERITY QUANTIFIED, NO PAIN PRESENT: ICD-10-PCS | Mod: S$GLB,,, | Performed by: OPTOMETRIST

## 2021-03-25 PROCEDURE — 3288F PR FALLS RISK ASSESSMENT DOCUMENTED: ICD-10-PCS | Mod: CPTII,S$GLB,, | Performed by: OPTOMETRIST

## 2021-03-25 PROCEDURE — 99999 PR PBB SHADOW E&M-EST. PATIENT-LVL III: CPT | Mod: PBBFAC,,, | Performed by: OPTOMETRIST

## 2021-03-25 PROCEDURE — 99499 UNLISTED E&M SERVICE: CPT | Mod: S$GLB,,, | Performed by: OPTOMETRIST

## 2021-03-25 PROCEDURE — 3288F FALL RISK ASSESSMENT DOCD: CPT | Mod: CPTII,S$GLB,, | Performed by: OPTOMETRIST

## 2021-03-25 PROCEDURE — 99499 NO LOS: ICD-10-PCS | Mod: S$GLB,,, | Performed by: OPTOMETRIST

## 2021-03-25 PROCEDURE — 1126F AMNT PAIN NOTED NONE PRSNT: CPT | Mod: S$GLB,,, | Performed by: OPTOMETRIST

## 2021-03-25 PROCEDURE — 1101F PR PT FALLS ASSESS DOC 0-1 FALLS W/OUT INJ PAST YR: ICD-10-PCS | Mod: CPTII,S$GLB,, | Performed by: OPTOMETRIST

## 2021-03-25 PROCEDURE — 1101F PT FALLS ASSESS-DOCD LE1/YR: CPT | Mod: CPTII,S$GLB,, | Performed by: OPTOMETRIST

## 2021-03-31 ENCOUNTER — OFFICE VISIT (OUTPATIENT)
Dept: PODIATRY | Facility: CLINIC | Age: 81
End: 2021-03-31
Payer: MEDICARE

## 2021-03-31 VITALS
HEIGHT: 63 IN | SYSTOLIC BLOOD PRESSURE: 129 MMHG | HEART RATE: 76 BPM | BODY MASS INDEX: 31.95 KG/M2 | WEIGHT: 180.31 LBS | DIASTOLIC BLOOD PRESSURE: 72 MMHG

## 2021-03-31 DIAGNOSIS — G60.9 IDIOPATHIC PERIPHERAL NEUROPATHY: ICD-10-CM

## 2021-03-31 DIAGNOSIS — B35.1 ONYCHOMYCOSIS: Primary | ICD-10-CM

## 2021-03-31 PROCEDURE — 99999 PR PBB SHADOW E&M-EST. PATIENT-LVL III: ICD-10-PCS | Mod: PBBFAC,,, | Performed by: PODIATRIST

## 2021-03-31 PROCEDURE — 11721 DEBRIDE NAIL 6 OR MORE: CPT | Mod: Q9,S$GLB,, | Performed by: PODIATRIST

## 2021-03-31 PROCEDURE — 1126F PR PAIN SEVERITY QUANTIFIED, NO PAIN PRESENT: ICD-10-PCS | Mod: S$GLB,,, | Performed by: PODIATRIST

## 2021-03-31 PROCEDURE — 99999 PR PBB SHADOW E&M-EST. PATIENT-LVL III: CPT | Mod: PBBFAC,,, | Performed by: PODIATRIST

## 2021-03-31 PROCEDURE — 11721 PR DEBRIDEMENT OF NAILS, 6 OR MORE: ICD-10-PCS | Mod: Q9,S$GLB,, | Performed by: PODIATRIST

## 2021-03-31 PROCEDURE — 99499 NO LOS: ICD-10-PCS | Mod: S$GLB,,, | Performed by: PODIATRIST

## 2021-03-31 PROCEDURE — 1126F AMNT PAIN NOTED NONE PRSNT: CPT | Mod: S$GLB,,, | Performed by: PODIATRIST

## 2021-03-31 PROCEDURE — 99499 UNLISTED E&M SERVICE: CPT | Mod: S$GLB,,, | Performed by: PODIATRIST

## 2021-04-14 ENCOUNTER — OFFICE VISIT (OUTPATIENT)
Dept: OPHTHALMOLOGY | Facility: CLINIC | Age: 81
End: 2021-04-14
Payer: MEDICARE

## 2021-04-14 DIAGNOSIS — H26.491 PCO (POSTERIOR CAPSULAR OPACIFICATION), RIGHT: Primary | ICD-10-CM

## 2021-04-14 PROCEDURE — 92002 INTRM OPH EXAM NEW PATIENT: CPT | Mod: S$GLB,,, | Performed by: OPHTHALMOLOGY

## 2021-04-14 PROCEDURE — 1126F AMNT PAIN NOTED NONE PRSNT: CPT | Mod: S$GLB,,, | Performed by: OPHTHALMOLOGY

## 2021-04-14 PROCEDURE — 99999 PR PBB SHADOW E&M-EST. PATIENT-LVL III: ICD-10-PCS | Mod: PBBFAC,,, | Performed by: OPHTHALMOLOGY

## 2021-04-14 PROCEDURE — 92002 PR EYE EXAM, NEW PATIENT,INTERMED: ICD-10-PCS | Mod: S$GLB,,, | Performed by: OPHTHALMOLOGY

## 2021-04-14 PROCEDURE — 99999 PR PBB SHADOW E&M-EST. PATIENT-LVL III: CPT | Mod: PBBFAC,,, | Performed by: OPHTHALMOLOGY

## 2021-04-14 PROCEDURE — 1126F PR PAIN SEVERITY QUANTIFIED, NO PAIN PRESENT: ICD-10-PCS | Mod: S$GLB,,, | Performed by: OPHTHALMOLOGY

## 2021-05-12 DIAGNOSIS — I10 ESSENTIAL HYPERTENSION: Primary | ICD-10-CM

## 2021-05-12 DIAGNOSIS — R53.83 FATIGUE, UNSPECIFIED TYPE: ICD-10-CM

## 2021-05-12 DIAGNOSIS — E78.2 MIXED HYPERLIPIDEMIA: ICD-10-CM

## 2021-05-12 DIAGNOSIS — Z12.5 ENCOUNTER FOR SCREENING FOR MALIGNANT NEOPLASM OF PROSTATE: ICD-10-CM

## 2021-05-12 DIAGNOSIS — M1A.0720 CHRONIC IDIOPATHIC GOUT INVOLVING TOE OF LEFT FOOT WITHOUT TOPHUS: ICD-10-CM

## 2021-05-12 DIAGNOSIS — E55.9 VITAMIN D DEFICIENCY DISEASE: ICD-10-CM

## 2021-05-12 DIAGNOSIS — R73.01 IFG (IMPAIRED FASTING GLUCOSE): ICD-10-CM

## 2021-05-12 DIAGNOSIS — D53.9 MACROCYTIC ANEMIA: ICD-10-CM

## 2021-05-12 DIAGNOSIS — M81.0 AGE-RELATED OSTEOPOROSIS WITHOUT CURRENT PATHOLOGICAL FRACTURE: ICD-10-CM

## 2021-05-12 RX ORDER — ALENDRONATE SODIUM 70 MG/1
TABLET ORAL
Qty: 12 TABLET | Refills: 0 | Status: SHIPPED | OUTPATIENT
Start: 2021-05-12 | End: 2021-08-08

## 2021-06-02 ENCOUNTER — HOSPITAL ENCOUNTER (OUTPATIENT)
Dept: RADIOLOGY | Facility: CLINIC | Age: 81
Discharge: HOME | End: 2021-06-02
Attending: INTERNAL MEDICINE
Payer: MEDICARE

## 2021-06-02 DIAGNOSIS — M81.0 AGE-RELATED OSTEOPOROSIS WITHOUT CURRENT PATHOLOGICAL FRACTURE: ICD-10-CM

## 2021-06-02 PROCEDURE — 77080 DEXA BONE DENSITY SPINE HIP: ICD-10-PCS | Mod: 26,,, | Performed by: INTERNAL MEDICINE

## 2021-06-02 PROCEDURE — 77080 DXA BONE DENSITY AXIAL: CPT | Mod: 26,,, | Performed by: INTERNAL MEDICINE

## 2021-06-02 PROCEDURE — 77080 DXA BONE DENSITY AXIAL: CPT | Mod: TC

## 2021-06-07 ENCOUNTER — HOSPITAL ENCOUNTER (OUTPATIENT)
Dept: RADIOLOGY | Facility: HOSPITAL | Age: 81
Discharge: HOME | End: 2021-06-07
Attending: PHYSICIAN ASSISTANT
Payer: MEDICARE

## 2021-06-07 DIAGNOSIS — K70.30 ALCOHOLIC CIRRHOSIS OF LIVER WITHOUT ASCITES: ICD-10-CM

## 2021-06-07 DIAGNOSIS — K76.6 PORTAL HYPERTENSION: ICD-10-CM

## 2021-06-07 PROCEDURE — 76981 USE PARENCHYMA: CPT | Mod: 26,,, | Performed by: INTERNAL MEDICINE

## 2021-06-07 PROCEDURE — 76705 ECHO EXAM OF ABDOMEN: CPT | Mod: TC

## 2021-06-07 PROCEDURE — 76981 USE PARENCHYMA: CPT | Mod: TC

## 2021-06-07 PROCEDURE — 76705 ECHO EXAM OF ABDOMEN: CPT | Mod: 26,59,, | Performed by: INTERNAL MEDICINE

## 2021-06-07 PROCEDURE — 76981 US ELASTOGRAPHY PARENCHYMA (ORGAN): ICD-10-PCS | Mod: 26,,, | Performed by: INTERNAL MEDICINE

## 2021-06-07 PROCEDURE — 76705 US ABDOMEN LIMITED: ICD-10-PCS | Mod: 26,59,, | Performed by: INTERNAL MEDICINE

## 2021-06-09 ENCOUNTER — OFFICE VISIT (OUTPATIENT)
Dept: HEPATOLOGY | Facility: CLINIC | Age: 81
End: 2021-06-09
Payer: MEDICARE

## 2021-06-09 VITALS
BODY MASS INDEX: 32.23 KG/M2 | TEMPERATURE: 98 F | WEIGHT: 181.88 LBS | RESPIRATION RATE: 18 BRPM | HEART RATE: 84 BPM | DIASTOLIC BLOOD PRESSURE: 67 MMHG | OXYGEN SATURATION: 96 % | SYSTOLIC BLOOD PRESSURE: 152 MMHG | HEIGHT: 63 IN

## 2021-06-09 DIAGNOSIS — K76.6 PORTAL HYPERTENSION: ICD-10-CM

## 2021-06-09 DIAGNOSIS — K70.30 ALCOHOLIC CIRRHOSIS OF LIVER WITHOUT ASCITES: ICD-10-CM

## 2021-06-09 DIAGNOSIS — D69.6 THROMBOCYTOPENIA: Primary | ICD-10-CM

## 2021-06-09 PROCEDURE — 99499 UNLISTED E&M SERVICE: CPT | Mod: S$GLB,,, | Performed by: PHYSICIAN ASSISTANT

## 2021-06-09 PROCEDURE — 1101F PR PT FALLS ASSESS DOC 0-1 FALLS W/OUT INJ PAST YR: ICD-10-PCS | Mod: CPTII,S$GLB,, | Performed by: PHYSICIAN ASSISTANT

## 2021-06-09 PROCEDURE — 99999 PR PBB SHADOW E&M-EST. PATIENT-LVL IV: ICD-10-PCS | Mod: PBBFAC,,, | Performed by: PHYSICIAN ASSISTANT

## 2021-06-09 PROCEDURE — 99499 RISK ADDL DX/OHS AUDIT: ICD-10-PCS | Mod: S$GLB,,, | Performed by: PHYSICIAN ASSISTANT

## 2021-06-09 PROCEDURE — 3288F FALL RISK ASSESSMENT DOCD: CPT | Mod: CPTII,S$GLB,, | Performed by: PHYSICIAN ASSISTANT

## 2021-06-09 PROCEDURE — 1101F PT FALLS ASSESS-DOCD LE1/YR: CPT | Mod: CPTII,S$GLB,, | Performed by: PHYSICIAN ASSISTANT

## 2021-06-09 PROCEDURE — 3288F PR FALLS RISK ASSESSMENT DOCUMENTED: ICD-10-PCS | Mod: CPTII,S$GLB,, | Performed by: PHYSICIAN ASSISTANT

## 2021-06-09 PROCEDURE — 99214 PR OFFICE/OUTPT VISIT, EST, LEVL IV, 30-39 MIN: ICD-10-PCS | Mod: S$GLB,,, | Performed by: PHYSICIAN ASSISTANT

## 2021-06-09 PROCEDURE — 99214 OFFICE O/P EST MOD 30 MIN: CPT | Mod: S$GLB,,, | Performed by: PHYSICIAN ASSISTANT

## 2021-06-09 PROCEDURE — 1159F PR MEDICATION LIST DOCUMENTED IN MEDICAL RECORD: ICD-10-PCS | Mod: S$GLB,,, | Performed by: PHYSICIAN ASSISTANT

## 2021-06-09 PROCEDURE — 1126F AMNT PAIN NOTED NONE PRSNT: CPT | Mod: S$GLB,,, | Performed by: PHYSICIAN ASSISTANT

## 2021-06-09 PROCEDURE — 1159F MED LIST DOCD IN RCRD: CPT | Mod: S$GLB,,, | Performed by: PHYSICIAN ASSISTANT

## 2021-06-09 PROCEDURE — 99999 PR PBB SHADOW E&M-EST. PATIENT-LVL IV: CPT | Mod: PBBFAC,,, | Performed by: PHYSICIAN ASSISTANT

## 2021-06-09 PROCEDURE — 1126F PR PAIN SEVERITY QUANTIFIED, NO PAIN PRESENT: ICD-10-PCS | Mod: S$GLB,,, | Performed by: PHYSICIAN ASSISTANT

## 2021-06-23 ENCOUNTER — PATIENT MESSAGE (OUTPATIENT)
Dept: INTERNAL MEDICINE | Facility: CLINIC | Age: 81
End: 2021-06-23

## 2021-07-13 ENCOUNTER — OFFICE VISIT (OUTPATIENT)
Dept: PODIATRY | Facility: CLINIC | Age: 81
End: 2021-07-13
Payer: MEDICARE

## 2021-07-13 VITALS
DIASTOLIC BLOOD PRESSURE: 63 MMHG | HEART RATE: 76 BPM | BODY MASS INDEX: 32.97 KG/M2 | WEIGHT: 186.06 LBS | HEIGHT: 63 IN | SYSTOLIC BLOOD PRESSURE: 129 MMHG

## 2021-07-13 DIAGNOSIS — G60.9 IDIOPATHIC PERIPHERAL NEUROPATHY: Primary | ICD-10-CM

## 2021-07-13 DIAGNOSIS — B35.1 ONYCHOMYCOSIS: ICD-10-CM

## 2021-07-13 PROCEDURE — 11721 PR DEBRIDEMENT OF NAILS, 6 OR MORE: ICD-10-PCS | Mod: Q9,S$GLB,, | Performed by: PODIATRIST

## 2021-07-13 PROCEDURE — 99499 UNLISTED E&M SERVICE: CPT | Mod: S$GLB,,, | Performed by: PODIATRIST

## 2021-07-13 PROCEDURE — 99999 PR PBB SHADOW E&M-EST. PATIENT-LVL III: ICD-10-PCS | Mod: PBBFAC,,, | Performed by: PODIATRIST

## 2021-07-13 PROCEDURE — 1125F AMNT PAIN NOTED PAIN PRSNT: CPT | Mod: S$GLB,,, | Performed by: PODIATRIST

## 2021-07-13 PROCEDURE — 99999 PR PBB SHADOW E&M-EST. PATIENT-LVL III: CPT | Mod: PBBFAC,,, | Performed by: PODIATRIST

## 2021-07-13 PROCEDURE — 99499 RISK ADDL DX/OHS AUDIT: ICD-10-PCS | Mod: S$GLB,,, | Performed by: PODIATRIST

## 2021-07-13 PROCEDURE — 11721 DEBRIDE NAIL 6 OR MORE: CPT | Mod: Q9,S$GLB,, | Performed by: PODIATRIST

## 2021-07-13 PROCEDURE — 1125F PR PAIN SEVERITY QUANTIFIED, PAIN PRESENT: ICD-10-PCS | Mod: S$GLB,,, | Performed by: PODIATRIST

## 2021-09-20 ENCOUNTER — LAB VISIT (OUTPATIENT)
Dept: LAB | Facility: HOSPITAL | Age: 81
End: 2021-09-20
Attending: INTERNAL MEDICINE
Payer: MEDICARE

## 2021-09-20 DIAGNOSIS — D69.6 THROMBOCYTOPENIA: ICD-10-CM

## 2021-09-20 DIAGNOSIS — E55.9 VITAMIN D DEFICIENCY DISEASE: ICD-10-CM

## 2021-09-20 DIAGNOSIS — Z12.5 ENCOUNTER FOR SCREENING FOR MALIGNANT NEOPLASM OF PROSTATE: ICD-10-CM

## 2021-09-20 DIAGNOSIS — R53.83 FATIGUE, UNSPECIFIED TYPE: ICD-10-CM

## 2021-09-20 DIAGNOSIS — R73.01 IFG (IMPAIRED FASTING GLUCOSE): ICD-10-CM

## 2021-09-20 DIAGNOSIS — M1A.0720 CHRONIC IDIOPATHIC GOUT INVOLVING TOE OF LEFT FOOT WITHOUT TOPHUS: ICD-10-CM

## 2021-09-20 DIAGNOSIS — K70.30 ALCOHOLIC CIRRHOSIS OF LIVER WITHOUT ASCITES: ICD-10-CM

## 2021-09-20 DIAGNOSIS — D53.9 MACROCYTIC ANEMIA: ICD-10-CM

## 2021-09-20 LAB
25(OH)D3+25(OH)D2 SERPL-MCNC: 87 NG/ML (ref 30–96)
AFP SERPL-MCNC: 4.6 NG/ML (ref 0–8.4)
ALBUMIN SERPL BCP-MCNC: 3.8 G/DL (ref 3.5–5.2)
ALBUMIN SERPL BCP-MCNC: 3.8 G/DL (ref 3.5–5.2)
ALP SERPL-CCNC: 72 U/L (ref 55–135)
ALP SERPL-CCNC: 72 U/L (ref 55–135)
ALT SERPL W/O P-5'-P-CCNC: 22 U/L (ref 10–44)
ALT SERPL W/O P-5'-P-CCNC: 22 U/L (ref 10–44)
ANION GAP SERPL CALC-SCNC: 14 MMOL/L (ref 8–16)
ANION GAP SERPL CALC-SCNC: 14 MMOL/L (ref 8–16)
AST SERPL-CCNC: 19 U/L (ref 10–40)
AST SERPL-CCNC: 19 U/L (ref 10–40)
BASOPHILS # BLD AUTO: 0.03 K/UL (ref 0–0.2)
BASOPHILS # BLD AUTO: 0.03 K/UL (ref 0–0.2)
BASOPHILS NFR BLD: 0.4 % (ref 0–1.9)
BASOPHILS NFR BLD: 0.4 % (ref 0–1.9)
BILIRUB SERPL-MCNC: 1 MG/DL (ref 0.1–1)
BILIRUB SERPL-MCNC: 1 MG/DL (ref 0.1–1)
BUN SERPL-MCNC: 13 MG/DL (ref 8–23)
BUN SERPL-MCNC: 13 MG/DL (ref 8–23)
CALCIUM SERPL-MCNC: 9.7 MG/DL (ref 8.7–10.5)
CALCIUM SERPL-MCNC: 9.7 MG/DL (ref 8.7–10.5)
CHLORIDE SERPL-SCNC: 103 MMOL/L (ref 95–110)
CHLORIDE SERPL-SCNC: 103 MMOL/L (ref 95–110)
CO2 SERPL-SCNC: 22 MMOL/L (ref 23–29)
CO2 SERPL-SCNC: 22 MMOL/L (ref 23–29)
COMPLEXED PSA SERPL-MCNC: 2.1 NG/ML (ref 0–4)
CREAT SERPL-MCNC: 0.7 MG/DL (ref 0.5–1.4)
CREAT SERPL-MCNC: 0.7 MG/DL (ref 0.5–1.4)
DIFFERENTIAL METHOD: ABNORMAL
DIFFERENTIAL METHOD: ABNORMAL
EOSINOPHIL # BLD AUTO: 0.1 K/UL (ref 0–0.5)
EOSINOPHIL # BLD AUTO: 0.1 K/UL (ref 0–0.5)
EOSINOPHIL NFR BLD: 1.1 % (ref 0–8)
EOSINOPHIL NFR BLD: 1.1 % (ref 0–8)
ERYTHROCYTE [DISTWIDTH] IN BLOOD BY AUTOMATED COUNT: 14 % (ref 11.5–14.5)
ERYTHROCYTE [DISTWIDTH] IN BLOOD BY AUTOMATED COUNT: 14 % (ref 11.5–14.5)
EST. GFR  (AFRICAN AMERICAN): >60 ML/MIN/1.73 M^2
EST. GFR  (AFRICAN AMERICAN): >60 ML/MIN/1.73 M^2
EST. GFR  (NON AFRICAN AMERICAN): >60 ML/MIN/1.73 M^2
EST. GFR  (NON AFRICAN AMERICAN): >60 ML/MIN/1.73 M^2
GLUCOSE SERPL-MCNC: 92 MG/DL (ref 70–110)
GLUCOSE SERPL-MCNC: 92 MG/DL (ref 70–110)
HCT VFR BLD AUTO: 40.4 % (ref 40–54)
HCT VFR BLD AUTO: 40.4 % (ref 40–54)
HGB BLD-MCNC: 13.2 G/DL (ref 14–18)
HGB BLD-MCNC: 13.2 G/DL (ref 14–18)
IMM GRANULOCYTES # BLD AUTO: 0.03 K/UL (ref 0–0.04)
IMM GRANULOCYTES # BLD AUTO: 0.03 K/UL (ref 0–0.04)
IMM GRANULOCYTES NFR BLD AUTO: 0.4 % (ref 0–0.5)
IMM GRANULOCYTES NFR BLD AUTO: 0.4 % (ref 0–0.5)
INR PPP: 1 (ref 0.8–1.2)
LYMPHOCYTES # BLD AUTO: 2.1 K/UL (ref 1–4.8)
LYMPHOCYTES # BLD AUTO: 2.1 K/UL (ref 1–4.8)
LYMPHOCYTES NFR BLD: 24.4 % (ref 18–48)
LYMPHOCYTES NFR BLD: 24.4 % (ref 18–48)
MCH RBC QN AUTO: 30.3 PG (ref 27–31)
MCH RBC QN AUTO: 30.3 PG (ref 27–31)
MCHC RBC AUTO-ENTMCNC: 32.7 G/DL (ref 32–36)
MCHC RBC AUTO-ENTMCNC: 32.7 G/DL (ref 32–36)
MCV RBC AUTO: 93 FL (ref 82–98)
MCV RBC AUTO: 93 FL (ref 82–98)
MONOCYTES # BLD AUTO: 0.7 K/UL (ref 0.3–1)
MONOCYTES # BLD AUTO: 0.7 K/UL (ref 0.3–1)
MONOCYTES NFR BLD: 7.8 % (ref 4–15)
MONOCYTES NFR BLD: 7.8 % (ref 4–15)
NEUTROPHILS # BLD AUTO: 5.7 K/UL (ref 1.8–7.7)
NEUTROPHILS # BLD AUTO: 5.7 K/UL (ref 1.8–7.7)
NEUTROPHILS NFR BLD: 65.9 % (ref 38–73)
NEUTROPHILS NFR BLD: 65.9 % (ref 38–73)
NRBC BLD-RTO: 0 /100 WBC
NRBC BLD-RTO: 0 /100 WBC
PLATELET # BLD AUTO: 153 K/UL (ref 150–450)
PLATELET # BLD AUTO: 153 K/UL (ref 150–450)
PMV BLD AUTO: 10.1 FL (ref 9.2–12.9)
PMV BLD AUTO: 10.1 FL (ref 9.2–12.9)
POTASSIUM SERPL-SCNC: 3.9 MMOL/L (ref 3.5–5.1)
POTASSIUM SERPL-SCNC: 3.9 MMOL/L (ref 3.5–5.1)
PROT SERPL-MCNC: 6.9 G/DL (ref 6–8.4)
PROT SERPL-MCNC: 6.9 G/DL (ref 6–8.4)
PROTHROMBIN TIME: 10.6 SEC (ref 9–12.5)
RBC # BLD AUTO: 4.35 M/UL (ref 4.6–6.2)
RBC # BLD AUTO: 4.35 M/UL (ref 4.6–6.2)
SODIUM SERPL-SCNC: 139 MMOL/L (ref 136–145)
SODIUM SERPL-SCNC: 139 MMOL/L (ref 136–145)
TSH SERPL DL<=0.005 MIU/L-ACNC: 1.66 UIU/ML (ref 0.4–4)
URATE SERPL-MCNC: 5.6 MG/DL (ref 3.4–7)
WBC # BLD AUTO: 8.57 K/UL (ref 3.9–12.7)
WBC # BLD AUTO: 8.57 K/UL (ref 3.9–12.7)

## 2021-09-20 PROCEDURE — 82306 VITAMIN D 25 HYDROXY: CPT | Performed by: INTERNAL MEDICINE

## 2021-09-20 PROCEDURE — 36415 COLL VENOUS BLD VENIPUNCTURE: CPT | Performed by: PHYSICIAN ASSISTANT

## 2021-09-20 PROCEDURE — 85025 COMPLETE CBC W/AUTO DIFF WBC: CPT | Performed by: INTERNAL MEDICINE

## 2021-09-20 PROCEDURE — 84443 ASSAY THYROID STIM HORMONE: CPT | Performed by: INTERNAL MEDICINE

## 2021-09-20 PROCEDURE — 80053 COMPREHEN METABOLIC PANEL: CPT | Performed by: INTERNAL MEDICINE

## 2021-09-20 PROCEDURE — 85610 PROTHROMBIN TIME: CPT | Performed by: PHYSICIAN ASSISTANT

## 2021-09-20 PROCEDURE — 82105 ALPHA-FETOPROTEIN SERUM: CPT | Performed by: PHYSICIAN ASSISTANT

## 2021-09-20 PROCEDURE — 84550 ASSAY OF BLOOD/URIC ACID: CPT | Performed by: INTERNAL MEDICINE

## 2021-09-20 PROCEDURE — 84153 ASSAY OF PSA TOTAL: CPT | Performed by: INTERNAL MEDICINE

## 2021-10-01 ENCOUNTER — IMMUNIZATION (OUTPATIENT)
Dept: INTERNAL MEDICINE | Facility: CLINIC | Age: 81
End: 2021-10-01
Payer: MEDICARE

## 2021-10-01 DIAGNOSIS — Z23 NEED FOR VACCINATION: Primary | ICD-10-CM

## 2021-10-01 PROCEDURE — 0003A COVID-19, MRNA, LNP-S, PF, 30 MCG/0.3 ML DOSE VACCINE: CPT | Mod: CV19,PBBFAC | Performed by: INTERNAL MEDICINE

## 2021-10-01 PROCEDURE — 91300 COVID-19, MRNA, LNP-S, PF, 30 MCG/0.3 ML DOSE VACCINE: CPT | Mod: PBBFAC | Performed by: INTERNAL MEDICINE

## 2021-10-12 ENCOUNTER — OFFICE VISIT (OUTPATIENT)
Dept: INTERNAL MEDICINE | Facility: CLINIC | Age: 81
End: 2021-10-12
Payer: MEDICARE

## 2021-10-12 ENCOUNTER — IMMUNIZATION (OUTPATIENT)
Dept: INTERNAL MEDICINE | Facility: CLINIC | Age: 81
End: 2021-10-12
Payer: MEDICARE

## 2021-10-12 VITALS
WEIGHT: 178 LBS | DIASTOLIC BLOOD PRESSURE: 65 MMHG | BODY MASS INDEX: 31.54 KG/M2 | SYSTOLIC BLOOD PRESSURE: 125 MMHG | HEIGHT: 63 IN

## 2021-10-12 DIAGNOSIS — E78.2 MIXED HYPERLIPIDEMIA: ICD-10-CM

## 2021-10-12 DIAGNOSIS — L40.50 PSORIATIC ARTHRITIS: ICD-10-CM

## 2021-10-12 DIAGNOSIS — K21.9 GASTROESOPHAGEAL REFLUX DISEASE WITHOUT ESOPHAGITIS: ICD-10-CM

## 2021-10-12 DIAGNOSIS — K76.6 PORTAL HYPERTENSION: ICD-10-CM

## 2021-10-12 DIAGNOSIS — M81.0 AGE-RELATED OSTEOPOROSIS WITHOUT CURRENT PATHOLOGICAL FRACTURE: ICD-10-CM

## 2021-10-12 DIAGNOSIS — E66.09 CLASS 1 OBESITY DUE TO EXCESS CALORIES WITH SERIOUS COMORBIDITY AND BODY MASS INDEX (BMI) OF 31.0 TO 31.9 IN ADULT: ICD-10-CM

## 2021-10-12 DIAGNOSIS — C44.622 SQUAMOUS CELL CANCER OF SKIN OF RIGHT HAND: ICD-10-CM

## 2021-10-12 DIAGNOSIS — M1A.0720 CHRONIC IDIOPATHIC GOUT INVOLVING TOE OF LEFT FOOT WITHOUT TOPHUS: ICD-10-CM

## 2021-10-12 DIAGNOSIS — D69.6 THROMBOCYTOPENIA: ICD-10-CM

## 2021-10-12 DIAGNOSIS — R76.11 POSITIVE TB TEST: ICD-10-CM

## 2021-10-12 DIAGNOSIS — I10 ESSENTIAL HYPERTENSION: ICD-10-CM

## 2021-10-12 DIAGNOSIS — R76.8 HELICOBACTER PYLORI AB+: ICD-10-CM

## 2021-10-12 DIAGNOSIS — K74.00 LIVER FIBROSIS: ICD-10-CM

## 2021-10-12 DIAGNOSIS — E55.9 VITAMIN D DEFICIENCY DISEASE: ICD-10-CM

## 2021-10-12 DIAGNOSIS — R91.8 RIGHT LOWER LOBE LUNG MASS: ICD-10-CM

## 2021-10-12 DIAGNOSIS — R73.01 IFG (IMPAIRED FASTING GLUCOSE): ICD-10-CM

## 2021-10-12 DIAGNOSIS — D53.9 MACROCYTIC ANEMIA: ICD-10-CM

## 2021-10-12 DIAGNOSIS — Z00.00 ANNUAL PHYSICAL EXAM: Primary | ICD-10-CM

## 2021-10-12 DIAGNOSIS — I77.1 TORTUOUS AORTA: ICD-10-CM

## 2021-10-12 PROCEDURE — 99214 PR OFFICE/OUTPT VISIT, EST, LEVL IV, 30-39 MIN: ICD-10-PCS | Mod: S$GLB,,, | Performed by: INTERNAL MEDICINE

## 2021-10-12 PROCEDURE — 99214 OFFICE O/P EST MOD 30 MIN: CPT | Mod: S$GLB,,, | Performed by: INTERNAL MEDICINE

## 2021-10-12 PROCEDURE — 1126F AMNT PAIN NOTED NONE PRSNT: CPT | Mod: CPTII,S$GLB,, | Performed by: INTERNAL MEDICINE

## 2021-10-12 PROCEDURE — G0008 FLU VACCINE - QUADRIVALENT - ADJUVANTED: ICD-10-PCS | Mod: S$GLB,,, | Performed by: INTERNAL MEDICINE

## 2021-10-12 PROCEDURE — 3078F DIAST BP <80 MM HG: CPT | Mod: CPTII,S$GLB,, | Performed by: INTERNAL MEDICINE

## 2021-10-12 PROCEDURE — 1159F MED LIST DOCD IN RCRD: CPT | Mod: CPTII,S$GLB,, | Performed by: INTERNAL MEDICINE

## 2021-10-12 PROCEDURE — 1101F PR PT FALLS ASSESS DOC 0-1 FALLS W/OUT INJ PAST YR: ICD-10-PCS | Mod: CPTII,S$GLB,, | Performed by: INTERNAL MEDICINE

## 2021-10-12 PROCEDURE — 3288F PR FALLS RISK ASSESSMENT DOCUMENTED: ICD-10-PCS | Mod: CPTII,S$GLB,, | Performed by: INTERNAL MEDICINE

## 2021-10-12 PROCEDURE — 90694 VACC AIIV4 NO PRSRV 0.5ML IM: CPT | Mod: S$GLB,,, | Performed by: INTERNAL MEDICINE

## 2021-10-12 PROCEDURE — 99499 RISK ADDL DX/OHS AUDIT: ICD-10-PCS | Mod: S$GLB,,, | Performed by: INTERNAL MEDICINE

## 2021-10-12 PROCEDURE — 90694 FLU VACCINE - QUADRIVALENT - ADJUVANTED: ICD-10-PCS | Mod: S$GLB,,, | Performed by: INTERNAL MEDICINE

## 2021-10-12 PROCEDURE — G0008 ADMIN INFLUENZA VIRUS VAC: HCPCS | Mod: S$GLB,,, | Performed by: INTERNAL MEDICINE

## 2021-10-12 PROCEDURE — 1101F PT FALLS ASSESS-DOCD LE1/YR: CPT | Mod: CPTII,S$GLB,, | Performed by: INTERNAL MEDICINE

## 2021-10-12 PROCEDURE — 99999 PR PBB SHADOW E&M-EST. PATIENT-LVL IV: CPT | Mod: PBBFAC,,, | Performed by: INTERNAL MEDICINE

## 2021-10-12 PROCEDURE — 99999 PR PBB SHADOW E&M-EST. PATIENT-LVL IV: ICD-10-PCS | Mod: PBBFAC,,, | Performed by: INTERNAL MEDICINE

## 2021-10-12 PROCEDURE — 3074F SYST BP LT 130 MM HG: CPT | Mod: CPTII,S$GLB,, | Performed by: INTERNAL MEDICINE

## 2021-10-12 PROCEDURE — 1126F PR PAIN SEVERITY QUANTIFIED, NO PAIN PRESENT: ICD-10-PCS | Mod: CPTII,S$GLB,, | Performed by: INTERNAL MEDICINE

## 2021-10-12 PROCEDURE — 3078F PR MOST RECENT DIASTOLIC BLOOD PRESSURE < 80 MM HG: ICD-10-PCS | Mod: CPTII,S$GLB,, | Performed by: INTERNAL MEDICINE

## 2021-10-12 PROCEDURE — 99499 UNLISTED E&M SERVICE: CPT | Mod: S$GLB,,, | Performed by: INTERNAL MEDICINE

## 2021-10-12 PROCEDURE — 3074F PR MOST RECENT SYSTOLIC BLOOD PRESSURE < 130 MM HG: ICD-10-PCS | Mod: CPTII,S$GLB,, | Performed by: INTERNAL MEDICINE

## 2021-10-12 PROCEDURE — 1159F PR MEDICATION LIST DOCUMENTED IN MEDICAL RECORD: ICD-10-PCS | Mod: CPTII,S$GLB,, | Performed by: INTERNAL MEDICINE

## 2021-10-12 PROCEDURE — 3288F FALL RISK ASSESSMENT DOCD: CPT | Mod: CPTII,S$GLB,, | Performed by: INTERNAL MEDICINE

## 2021-10-13 ENCOUNTER — OFFICE VISIT (OUTPATIENT)
Dept: PODIATRY | Facility: CLINIC | Age: 81
End: 2021-10-13
Payer: MEDICARE

## 2021-10-13 VITALS
SYSTOLIC BLOOD PRESSURE: 140 MMHG | WEIGHT: 182.13 LBS | BODY MASS INDEX: 32.26 KG/M2 | DIASTOLIC BLOOD PRESSURE: 72 MMHG | HEART RATE: 84 BPM

## 2021-10-13 DIAGNOSIS — B35.1 ONYCHOMYCOSIS: ICD-10-CM

## 2021-10-13 DIAGNOSIS — G60.9 IDIOPATHIC PERIPHERAL NEUROPATHY: Primary | ICD-10-CM

## 2021-10-13 PROCEDURE — 99499 NO LOS: ICD-10-PCS | Mod: S$GLB,,, | Performed by: PODIATRIST

## 2021-10-13 PROCEDURE — 3077F PR MOST RECENT SYSTOLIC BLOOD PRESSURE >= 140 MM HG: ICD-10-PCS | Mod: CPTII,S$GLB,, | Performed by: PODIATRIST

## 2021-10-13 PROCEDURE — 11721 PR DEBRIDEMENT OF NAILS, 6 OR MORE: ICD-10-PCS | Mod: Q9,S$GLB,, | Performed by: PODIATRIST

## 2021-10-13 PROCEDURE — 1125F AMNT PAIN NOTED PAIN PRSNT: CPT | Mod: CPTII,S$GLB,, | Performed by: PODIATRIST

## 2021-10-13 PROCEDURE — 1159F PR MEDICATION LIST DOCUMENTED IN MEDICAL RECORD: ICD-10-PCS | Mod: CPTII,S$GLB,, | Performed by: PODIATRIST

## 2021-10-13 PROCEDURE — 1159F MED LIST DOCD IN RCRD: CPT | Mod: CPTII,S$GLB,, | Performed by: PODIATRIST

## 2021-10-13 PROCEDURE — 1125F PR PAIN SEVERITY QUANTIFIED, PAIN PRESENT: ICD-10-PCS | Mod: CPTII,S$GLB,, | Performed by: PODIATRIST

## 2021-10-13 PROCEDURE — 3077F SYST BP >= 140 MM HG: CPT | Mod: CPTII,S$GLB,, | Performed by: PODIATRIST

## 2021-10-13 PROCEDURE — 3078F DIAST BP <80 MM HG: CPT | Mod: CPTII,S$GLB,, | Performed by: PODIATRIST

## 2021-10-13 PROCEDURE — 99999 PR PBB SHADOW E&M-EST. PATIENT-LVL III: ICD-10-PCS | Mod: PBBFAC,,, | Performed by: PODIATRIST

## 2021-10-13 PROCEDURE — 3078F PR MOST RECENT DIASTOLIC BLOOD PRESSURE < 80 MM HG: ICD-10-PCS | Mod: CPTII,S$GLB,, | Performed by: PODIATRIST

## 2021-10-13 PROCEDURE — 99499 UNLISTED E&M SERVICE: CPT | Mod: S$GLB,,, | Performed by: PODIATRIST

## 2021-10-13 PROCEDURE — 11721 DEBRIDE NAIL 6 OR MORE: CPT | Mod: Q9,S$GLB,, | Performed by: PODIATRIST

## 2021-10-13 PROCEDURE — 99999 PR PBB SHADOW E&M-EST. PATIENT-LVL III: CPT | Mod: PBBFAC,,, | Performed by: PODIATRIST

## 2021-12-09 ENCOUNTER — HOSPITAL ENCOUNTER (OUTPATIENT)
Dept: RADIOLOGY | Facility: HOSPITAL | Age: 81
Discharge: HOME | End: 2021-12-09
Attending: PHYSICIAN ASSISTANT
Payer: MEDICARE

## 2021-12-09 ENCOUNTER — OFFICE VISIT (OUTPATIENT)
Dept: HEPATOLOGY | Facility: CLINIC | Age: 81
End: 2021-12-09
Payer: MEDICARE

## 2021-12-09 VITALS
HEIGHT: 63 IN | BODY MASS INDEX: 31.95 KG/M2 | HEART RATE: 71 BPM | WEIGHT: 180.31 LBS | SYSTOLIC BLOOD PRESSURE: 137 MMHG | RESPIRATION RATE: 18 BRPM | DIASTOLIC BLOOD PRESSURE: 65 MMHG | OXYGEN SATURATION: 98 % | TEMPERATURE: 97 F

## 2021-12-09 DIAGNOSIS — K76.89 HEPATIC CYST: ICD-10-CM

## 2021-12-09 DIAGNOSIS — K76.6 PORTAL HYPERTENSION: ICD-10-CM

## 2021-12-09 DIAGNOSIS — R60.9 SWELLING: ICD-10-CM

## 2021-12-09 DIAGNOSIS — E66.9 OBESITY (BMI 30-39.9): ICD-10-CM

## 2021-12-09 DIAGNOSIS — K74.00 LIVER FIBROSIS: ICD-10-CM

## 2021-12-09 DIAGNOSIS — D69.6 THROMBOCYTOPENIA: Primary | ICD-10-CM

## 2021-12-09 DIAGNOSIS — K70.30 ALCOHOLIC CIRRHOSIS OF LIVER WITHOUT ASCITES: ICD-10-CM

## 2021-12-09 DIAGNOSIS — D69.6 THROMBOCYTOPENIA: ICD-10-CM

## 2021-12-09 DIAGNOSIS — K76.0 FATTY LIVER: ICD-10-CM

## 2021-12-09 PROCEDURE — 76705 ECHO EXAM OF ABDOMEN: CPT | Mod: TC

## 2021-12-09 PROCEDURE — 76705 ECHO EXAM OF ABDOMEN: CPT | Mod: 26,,, | Performed by: RADIOLOGY

## 2021-12-09 PROCEDURE — 76705 US ABDOMEN LIMITED: ICD-10-PCS | Mod: 26,,, | Performed by: RADIOLOGY

## 2021-12-09 PROCEDURE — 99999 PR PBB SHADOW E&M-EST. PATIENT-LVL IV: ICD-10-PCS | Mod: PBBFAC,,, | Performed by: PHYSICIAN ASSISTANT

## 2021-12-09 PROCEDURE — 99214 PR OFFICE/OUTPT VISIT, EST, LEVL IV, 30-39 MIN: ICD-10-PCS | Mod: S$GLB,,, | Performed by: PHYSICIAN ASSISTANT

## 2021-12-09 PROCEDURE — 99214 OFFICE O/P EST MOD 30 MIN: CPT | Mod: S$GLB,,, | Performed by: PHYSICIAN ASSISTANT

## 2021-12-09 PROCEDURE — 99999 PR PBB SHADOW E&M-EST. PATIENT-LVL IV: CPT | Mod: PBBFAC,,, | Performed by: PHYSICIAN ASSISTANT

## 2021-12-09 RX ORDER — SPIRONOLACTONE 25 MG/1
50 TABLET ORAL DAILY
Qty: 180 TABLET | Refills: 3 | Status: SHIPPED | OUTPATIENT
Start: 2021-12-09 | End: 2022-12-13

## 2021-12-21 ENCOUNTER — TELEPHONE (OUTPATIENT)
Dept: HEPATOLOGY | Facility: CLINIC | Age: 81
End: 2021-12-21
Payer: MEDICARE

## 2021-12-30 ENCOUNTER — TELEPHONE (OUTPATIENT)
Dept: PODIATRY | Facility: CLINIC | Age: 81
End: 2021-12-30
Payer: MEDICARE

## 2022-01-18 ENCOUNTER — PATIENT MESSAGE (OUTPATIENT)
Dept: ENDOSCOPY | Facility: HOSPITAL | Age: 82
End: 2022-01-18

## 2022-01-18 ENCOUNTER — PATIENT MESSAGE (OUTPATIENT)
Dept: PODIATRY | Facility: CLINIC | Age: 82
End: 2022-01-18

## 2022-01-18 DIAGNOSIS — Z01.818 PRE-OP TESTING: ICD-10-CM

## 2022-01-18 DIAGNOSIS — K74.69 OTHER CIRRHOSIS OF LIVER: Primary | ICD-10-CM

## 2022-01-23 DIAGNOSIS — M81.0 AGE-RELATED OSTEOPOROSIS WITHOUT CURRENT PATHOLOGICAL FRACTURE: ICD-10-CM

## 2022-01-24 RX ORDER — ALENDRONATE SODIUM 70 MG/1
TABLET ORAL
Qty: 12 TABLET | Refills: 0 | Status: SHIPPED | OUTPATIENT
Start: 2022-01-24 | End: 2022-04-18

## 2022-01-26 ENCOUNTER — OFFICE VISIT (OUTPATIENT)
Dept: PODIATRY | Facility: CLINIC | Age: 82
End: 2022-01-26
Payer: MEDICARE

## 2022-01-26 VITALS
HEART RATE: 86 BPM | HEIGHT: 63 IN | SYSTOLIC BLOOD PRESSURE: 120 MMHG | BODY MASS INDEX: 31.95 KG/M2 | WEIGHT: 180.31 LBS | DIASTOLIC BLOOD PRESSURE: 68 MMHG

## 2022-01-26 DIAGNOSIS — G60.9 IDIOPATHIC PERIPHERAL NEUROPATHY: Primary | ICD-10-CM

## 2022-01-26 DIAGNOSIS — B35.1 ONYCHOMYCOSIS: ICD-10-CM

## 2022-01-26 PROCEDURE — 99499 UNLISTED E&M SERVICE: CPT | Mod: S$GLB,,, | Performed by: PODIATRIST

## 2022-01-26 PROCEDURE — 99213 OFFICE O/P EST LOW 20 MIN: CPT | Mod: PBBFAC | Performed by: PODIATRIST

## 2022-01-26 PROCEDURE — 11721 PR DEBRIDEMENT OF NAILS, 6 OR MORE: ICD-10-PCS | Mod: Q9,S$GLB,, | Performed by: PODIATRIST

## 2022-01-26 PROCEDURE — 11721 DEBRIDE NAIL 6 OR MORE: CPT | Mod: Q9,S$GLB,, | Performed by: PODIATRIST

## 2022-01-26 PROCEDURE — 99999 PR PBB SHADOW E&M-EST. PATIENT-LVL III: CPT | Mod: PBBFAC,,, | Performed by: PODIATRIST

## 2022-01-26 PROCEDURE — 99499 NO LOS: ICD-10-PCS | Mod: S$GLB,,, | Performed by: PODIATRIST

## 2022-01-26 PROCEDURE — 99999 PR PBB SHADOW E&M-EST. PATIENT-LVL III: ICD-10-PCS | Mod: PBBFAC,,, | Performed by: PODIATRIST

## 2022-01-26 PROCEDURE — 11721 DEBRIDE NAIL 6 OR MORE: CPT | Mod: Q9,PBBFAC | Performed by: PODIATRIST

## 2022-01-26 NOTE — PROGRESS NOTES
Subjective:      Patient ID: Darinel Majano is a 81 y.o. male.    Chief Complaint: Nail Care    Darinel is a 81 y.o. male who presents to the clinic for evaluation and treatment of high risk feet. 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), Anemia (5/4/2017), Aortic atherosclerosis: see CT scan 5/17 (6/9/2017), BPH (benign prostatic hypertrophy), Chronic gout, Cortical senile cataract (5/15/2012), Degenerative disc disease, Diverticulosis of large intestine without hemorrhage: see CT scan 5/17 (6/9/2017), Essential hypertension (2/19/2016), Gastroesophageal reflux disease with gastritis: see EGD 2018 (10/31/2018), Gout, Hyperplastic polyp of transverse colon: 2012 repeat 5 years (5/4/2017), IFG (impaired fasting glucose) (5/7/2019), Kidney stone on left side (6/9/2017), Osteoporosis, Psoriasis, 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 The patient's chief complaint is long, thick toenails. This patient has documented high risk feet requiring routine maintenance secondary to peripheral neuropathy.    PCP: Annabella Montiel MD    Date Last Seen by PCP:  Chief Complaint   Patient presents with    Nail Care       Current shoe gear:  Affected Foot: Tennis shoes     Unaffected Foot: Tennis shoes    Last encounter in this department: Visit date not found    Hemoglobin A1C   Date Value Ref Range Status   09/01/2020 5.2 4.0 - 5.6 % Final     Comment:     ADA Screening Guidelines:  5.7-6.4%  Consistent with prediabetes  >or=6.5%  Consistent with diabetes  High levels of fetal hemoglobin interfere with the HbA1C  assay. Heterozygous hemoglobin variants (HbS, HgC, etc)do  not significantly interfere with this assay.   However, presence of multiple variants may affect accuracy.     06/29/2011 4.7 4.0 - 6.2 % Final    01/05/2011 4.6 4.0 - 6.2 % Final       Review of Systems   Constitutional: Negative for chills, fever and malaise/fatigue.   HENT: Negative for hearing loss.    Cardiovascular: Negative for claudication and leg swelling.   Respiratory: Negative for shortness of breath.    Skin: Positive for color change, nail changes and unusual hair distribution. Negative for flushing and rash.   Musculoskeletal: Negative for joint pain and myalgias.   Neurological: Positive for numbness, paresthesias and sensory change. Negative for loss of balance.   Psychiatric/Behavioral: Negative for altered mental status.           Objective:      Physical Exam  Vitals reviewed.   Constitutional:       Appearance: He is well-developed.   Cardiovascular:      Pulses:           Dorsalis pedis pulses are 1+ on the right side and 1+ on the left side.        Posterior tibial pulses are 1+ on the right side and 1+ on the left side.      Comments: Non pitting  edema noted to b/L LEs  Musculoskeletal:      Right knee: No swelling or ecchymosis.      Left knee: No swelling or ecchymosis.      Right lower leg: No swelling, deformity, tenderness or bony tenderness. No edema.      Left lower leg: No swelling or tenderness. No edema.      Right ankle: No swelling or ecchymosis. No lateral malleolus or medial malleolus tenderness. Normal range of motion. Normal pulse.      Right Achilles Tendon: No defects. Cheema's test negative.      Left ankle: No swelling or ecchymosis. No lateral malleolus or medial malleolus tenderness. Normal range of motion. Normal pulse.      Left Achilles Tendon: Cheema's test negative.      Right foot: Normal range of motion. No deformity.      Left foot: Normal range of motion. No deformity.      Comments: Decreased height of medial arches noted with loading of the foot b/L  Hammertoes noted, digits 4 and 5b/L with adductovarus rotation of b/L fifth digit.    Adequate joint ROM noted to all lower extremity muscle groups  with no pain or crepitation noted. Muscle strength is 5/5 in all groups bilaterally.     Feet:      Right foot:      Protective Sensation: 10 sites tested. 6 sites sensed.      Left foot:      Protective Sensation: 10 sites tested. 6 sites sensed.   Skin:     General: Skin is warm.      Capillary Refill: Capillary refill takes more than 3 seconds.      Findings: No abrasion, bruising, burn, ecchymosis or wound.      Comments: Skin temp is cool to cool from proximal to distal b/L.  Nails x10 are elongated by  6-7mm's, thickened by 1-3 mm's, dystrophic, and are yellowish in  coloration . Xerosis Bilaterally. No open lesions noted.        Neurological:      Mental Status: He is alert and oriented to person, place, and time.      Comments: Diminished protective sensation noted b/L     Psychiatric:         Attention and Perception: He is attentive.         Speech: Speech normal.         Behavior: Behavior normal.               Assessment:       Encounter Diagnoses   Name Primary?    Idiopathic peripheral neuropathy Yes    Onychomycosis          Plan:       Darinel was seen today for nail care.    Diagnoses and all orders for this visit:    Idiopathic peripheral neuropathy    Onychomycosis      I counseled the patient on his conditions, their implications and medical management.        - Shoe inspection. Patient instructed on proper foot hygeine. We discussed wearing proper shoe gear, daily foot inspections, never walking without protective shoe gear, never putting sharp instruments to feet, routine podiatric nail visits every 2-3 months.    - With patient's permission, nails were aggressively reduced and debrided x 10 to their soft tissue attachment mechanically and with electric , removing all offending nail and debris. Patient relates relief following the procedure. He will continue to monitor the areas daily, inspect his feet, wear protective shoe gear when ambulatory, moisturizer to maintain skin integrity and  follow in this office in approximately 2-3 months, sooner p.r.n.

## 2022-02-21 ENCOUNTER — OFFICE VISIT (OUTPATIENT)
Dept: INTERNAL MEDICINE | Facility: CLINIC | Age: 82
End: 2022-02-21
Payer: MEDICARE

## 2022-02-21 VITALS
HEIGHT: 63 IN | SYSTOLIC BLOOD PRESSURE: 120 MMHG | WEIGHT: 166.44 LBS | BODY MASS INDEX: 29.49 KG/M2 | HEART RATE: 71 BPM | DIASTOLIC BLOOD PRESSURE: 72 MMHG | OXYGEN SATURATION: 100 %

## 2022-02-21 DIAGNOSIS — F10.20 ALCOHOL DEPENDENCE, UNCOMPLICATED: ICD-10-CM

## 2022-02-21 DIAGNOSIS — K21.9 GASTROESOPHAGEAL REFLUX DISEASE WITHOUT ESOPHAGITIS: ICD-10-CM

## 2022-02-21 DIAGNOSIS — I77.1 TORTUOUS AORTA: ICD-10-CM

## 2022-02-21 DIAGNOSIS — D84.9 IMMUNOSUPPRESSED STATUS: ICD-10-CM

## 2022-02-21 DIAGNOSIS — I70.0 AORTIC ATHEROSCLEROSIS: ICD-10-CM

## 2022-02-21 DIAGNOSIS — R73.01 IFG (IMPAIRED FASTING GLUCOSE): ICD-10-CM

## 2022-02-21 DIAGNOSIS — E66.09 CLASS 1 OBESITY DUE TO EXCESS CALORIES WITH SERIOUS COMORBIDITY AND BODY MASS INDEX (BMI) OF 31.0 TO 31.9 IN ADULT: ICD-10-CM

## 2022-02-21 DIAGNOSIS — K74.00 LIVER FIBROSIS: ICD-10-CM

## 2022-02-21 DIAGNOSIS — D69.6 THROMBOCYTOPENIA: ICD-10-CM

## 2022-02-21 DIAGNOSIS — Z74.09 OTHER REDUCED MOBILITY: ICD-10-CM

## 2022-02-21 DIAGNOSIS — Z00.00 ENCOUNTER FOR PREVENTIVE HEALTH EXAMINATION: Primary | ICD-10-CM

## 2022-02-21 DIAGNOSIS — I10 ESSENTIAL HYPERTENSION: ICD-10-CM

## 2022-02-21 DIAGNOSIS — K76.6 PORTAL HYPERTENSION: ICD-10-CM

## 2022-02-21 DIAGNOSIS — G62.1 ALCOHOL-INDUCED POLYNEUROPATHY: ICD-10-CM

## 2022-02-21 DIAGNOSIS — M81.0 AGE-RELATED OSTEOPOROSIS WITHOUT CURRENT PATHOLOGICAL FRACTURE: ICD-10-CM

## 2022-02-21 DIAGNOSIS — L40.50 PSORIATIC ARTHRITIS: ICD-10-CM

## 2022-02-21 DIAGNOSIS — M1A.0720 CHRONIC IDIOPATHIC GOUT INVOLVING TOE OF LEFT FOOT WITHOUT TOPHUS: ICD-10-CM

## 2022-02-21 PROBLEM — D53.9 MACROCYTIC ANEMIA: Status: RESOLVED | Noted: 2017-05-22 | Resolved: 2022-02-21

## 2022-02-21 PROCEDURE — 99499 RISK ADDL DX/OHS AUDIT: ICD-10-PCS | Mod: S$GLB,,, | Performed by: NURSE PRACTITIONER

## 2022-02-21 PROCEDURE — G0439 PPPS, SUBSEQ VISIT: HCPCS | Mod: S$GLB,,, | Performed by: NURSE PRACTITIONER

## 2022-02-21 PROCEDURE — 99499 UNLISTED E&M SERVICE: CPT | Mod: S$GLB,,, | Performed by: NURSE PRACTITIONER

## 2022-02-21 PROCEDURE — 99999 PR PBB SHADOW E&M-EST. PATIENT-LVL IV: ICD-10-PCS | Mod: PBBFAC,,, | Performed by: NURSE PRACTITIONER

## 2022-02-21 PROCEDURE — G0439 PR MEDICARE ANNUAL WELLNESS SUBSEQUENT VISIT: ICD-10-PCS | Mod: S$GLB,,, | Performed by: NURSE PRACTITIONER

## 2022-02-21 PROCEDURE — 99999 PR PBB SHADOW E&M-EST. PATIENT-LVL IV: CPT | Mod: PBBFAC,,, | Performed by: NURSE PRACTITIONER

## 2022-02-21 NOTE — PATIENT INSTRUCTIONS
Counseling and Referral of Other Preventative  (Italic type indicates deductible and co-insurance are waived)    Patient Name: Darinel Majano  Today's Date: 2/21/2022    Health Maintenance       Date Due Completion Date    PROSTATE-SPECIFIC ANTIGEN 09/20/2022 9/20/2021    Lipid Panel 12/09/2022 12/9/2021    DEXA SCAN 06/02/2023 6/2/2021    Colonoscopy 02/05/2025 2/5/2018    TETANUS VACCINE 11/07/2029 11/7/2019        No orders of the defined types were placed in this encounter.    The following information is provided to all patients.  This information is to help you find resources for any of the problems found today that may be affecting your health:                Living healthy guide: www.[facility].[place].gov      Understanding Diabetes: www.diabetes.org      Eating healthy: www.cdc.gov/healthyweight      [facility] home safety checklist: www.cdc.gov/steadi/patient.html      Agency on Aging: www.goea.[place].[facility]      Alcoholics anonymous (AA): www.aa.org      Physical Activity: www.[place].nih.gov/mb6bcjm      Tobacco use: www.quitwithusla.org

## 2022-02-21 NOTE — H&P (VIEW-ONLY)
"  Darinel Majano presented for a  Medicare AWV and comprehensive Health Risk Assessment today. The following components were reviewed and updated:    · Medical history  · Family History  · Social history  · Allergies and Current Medications  · Health Risk Assessment  · Health Maintenance  · Care Team         ** See Completed Assessments for Annual Wellness Visit within the encounter summary.**         The following assessments were completed:  · Living Situation  · CAGE  · Depression Screening  · Timed Get Up and Go  · Whisper Test  · Cognitive Function Screening      ·   · Nutrition Screening  · ADL Screening  · PAQ Screening        Vitals:    02/21/22 0945 02/21/22 0952   BP:  120/72   BP Location:  Right arm   Patient Position:  Sitting   Pulse:  71   SpO2:  100%   Weight: 75.5 kg (166 lb 7.2 oz)    Height: 5' 3" (1.6 m)      Body mass index is 29.48 kg/m².  Physical Exam  Vitals and nursing note reviewed.   Constitutional:       Appearance: He is well-developed.   HENT:      Head: Normocephalic.   Cardiovascular:      Rate and Rhythm: Normal rate and regular rhythm.   Pulmonary:      Effort: Pulmonary effort is normal.      Breath sounds: Normal breath sounds.   Abdominal:      General: Bowel sounds are normal.      Palpations: Abdomen is soft.   Musculoskeletal:      Comments: Gait antalgic   Skin:     General: Skin is warm and dry.   Neurological:      Mental Status: He is alert and oriented to person, place, and time.      Motor: No abnormal muscle tone.   Psychiatric:         Mood and Affect: Mood normal.               Diagnoses and health risks identified today and associated recommendations/orders:    1. Encounter for preventive health examination  Here for Health Risk Assessment/Annual Wellness Visit.  Health maintenance reviewed and updated. Follow up in one year.    2. Essential hypertension  Chronic, stable. Followed by PCP.    3. Aortic atherosclerosis: see CT scan 5/17 also CT scan 9/18  Chronic, " stable on current medications. Followed by PCP.    4. Tortuous aorta: see CXR May 2017  Chronic, stable on current medications. Followed by PCP.    5. Alcohol-induced polyneuropathy  Chronic, stable. Followed by PCP.    6. Alcohol dependence, uncomplicated: quit 2017  Stable. Followed by PCP    7. Liver fibrosis  Chronic, stable. LFT WNL. Followed by PCP, Hepatology.    8. Portal hypertension  Chronic, stable. Followed by PCP, Hepatology.    9. Gastroesophageal reflux disease with gastritis: see EGD 2018  Chronic, stable. Followed by PCP.    10. Class 1 obesity due to excess calories with serious comorbidity and body mass index (BMI) of 31.0 to 31.9 in adult  Chronic, weight decreased 12 pounds from PCP visit 10/2021. Reports he has decreased intake since moving in with family after [place]. Reports he is exercising daily. Followed by PCP.    11. IFG (impaired fasting glucose)  Chronic, stable with diet. Last A1c WNL. Followed by PCP.    12. Age-related osteoporosis without current pathological fracture: see DEXA 2017; IMPROVED 2021  Chronic, stable on current medications. Followed by PCP.    13. Thrombocytopenia  Chronic, stable. Followed by PCP, Hepatology    14. Immunosuppressed status  Chronic, stable. Followed by PCP.    15. Psoriatic arthritis  Chronic, stable. Followed by PCP.    16. Chronic idiopathic gout involving toe of left foot without tophus  Chronic, stable. Followed by PCP.    17. Other reduced mobility  Chronic, no reported falls, no assistive device for ambulation.      Provided Darinel with a 5-10 year written screening schedule and personal prevention plan. Recommendations were developed using the USPSTF age appropriate recommendations. Education, counseling, and referrals were provided as needed. After Visit Summary printed and given to patient which includes a list of additional screenings\tests needed.    Follow up in about 8 months (around 10/13/2022).with PCP    Janet Panchal,  NP

## 2022-02-21 NOTE — PROGRESS NOTES
I offered to discuss advanced care planning, including how to pick a person who would make decisions for you if you were unable to make them for yourself, called a health care power of , and what kind of decisions you might make such as use of life sustaining treatments such as ventilators and tube feeding when faced with a life limiting illness recorded on a living will that they will need to know. (How you want to be cared for as you near the end of your natural life)     X  Patient is unwilling to engage in a discussion regarding advance directives at this time.

## 2022-03-06 ENCOUNTER — LAB VISIT (OUTPATIENT)
Dept: PRIMARY CARE CLINIC | Facility: CLINIC | Age: 82
End: 2022-03-06
Payer: MEDICARE

## 2022-03-06 DIAGNOSIS — Z01.818 PRE-OP TESTING: ICD-10-CM

## 2022-03-06 PROCEDURE — U0003 INFECTIOUS AGENT DETECTION BY NUCLEIC ACID (DNA OR RNA); SEVERE ACUTE RESPIRATORY SYNDROME CORONAVIRUS 2 (SARS-COV-2) (CORONAVIRUS DISEASE [COVID-19]), AMPLIFIED PROBE TECHNIQUE, MAKING USE OF HIGH THROUGHPUT TECHNOLOGIES AS DESCRIBED BY CMS-2020-01-R: HCPCS | Performed by: FAMILY MEDICINE

## 2022-03-06 PROCEDURE — U0005 INFEC AGEN DETEC AMPLI PROBE: HCPCS | Performed by: FAMILY MEDICINE

## 2022-03-07 LAB
SARS-COV-2 RNA RESP QL NAA+PROBE: NOT DETECTED
SARS-COV-2- CYCLE NUMBER: NORMAL

## 2022-03-09 ENCOUNTER — ANESTHESIA (OUTPATIENT)
Dept: ENDOSCOPY | Facility: HOSPITAL | Age: 82
End: 2022-03-09
Payer: MEDICARE

## 2022-03-09 ENCOUNTER — HOSPITAL ENCOUNTER (OUTPATIENT)
Facility: HOSPITAL | Age: 82
Discharge: HOME | End: 2022-03-09
Attending: INTERNAL MEDICINE | Admitting: INTERNAL MEDICINE
Payer: MEDICARE

## 2022-03-09 ENCOUNTER — ANESTHESIA EVENT (OUTPATIENT)
Dept: ENDOSCOPY | Facility: HOSPITAL | Age: 82
End: 2022-03-09
Payer: MEDICARE

## 2022-03-09 VITALS
HEART RATE: 73 BPM | TEMPERATURE: 98 F | WEIGHT: 161 LBS | BODY MASS INDEX: 29.63 KG/M2 | DIASTOLIC BLOOD PRESSURE: 57 MMHG | RESPIRATION RATE: 18 BRPM | OXYGEN SATURATION: 100 % | HEIGHT: 62 IN | SYSTOLIC BLOOD PRESSURE: 123 MMHG

## 2022-03-09 DIAGNOSIS — K76.6 PORTAL HYPERTENSION: ICD-10-CM

## 2022-03-09 PROCEDURE — 88342 IMHCHEM/IMCYTCHM 1ST ANTB: CPT | Mod: 26,,, | Performed by: PATHOLOGY

## 2022-03-09 PROCEDURE — E9220 PRA ENDO ANESTHESIA: HCPCS | Mod: ,,, | Performed by: NURSE ANESTHETIST, CERTIFIED REGISTERED

## 2022-03-09 PROCEDURE — 88342 CHG IMMUNOCYTOCHEMISTRY: ICD-10-PCS | Mod: 26,,, | Performed by: PATHOLOGY

## 2022-03-09 PROCEDURE — 43239 EGD BIOPSY SINGLE/MULTIPLE: CPT | Performed by: INTERNAL MEDICINE

## 2022-03-09 PROCEDURE — 63600175 PHARM REV CODE 636 W HCPCS: Performed by: NURSE ANESTHETIST, CERTIFIED REGISTERED

## 2022-03-09 PROCEDURE — 37000008 HC ANESTHESIA 1ST 15 MINUTES: Performed by: INTERNAL MEDICINE

## 2022-03-09 PROCEDURE — 43239 PR EGD, FLEX, W/BIOPSY, SGL/MULTI: ICD-10-PCS | Mod: ,,, | Performed by: INTERNAL MEDICINE

## 2022-03-09 PROCEDURE — 88305 TISSUE EXAM BY PATHOLOGIST: CPT | Mod: 26,,, | Performed by: PATHOLOGY

## 2022-03-09 PROCEDURE — 88342 IMHCHEM/IMCYTCHM 1ST ANTB: CPT | Performed by: PATHOLOGY

## 2022-03-09 PROCEDURE — 88305 TISSUE EXAM BY PATHOLOGIST: CPT | Performed by: PATHOLOGY

## 2022-03-09 PROCEDURE — 27201012 HC FORCEPS, HOT/COLD, DISP: Performed by: INTERNAL MEDICINE

## 2022-03-09 PROCEDURE — 37000009 HC ANESTHESIA EA ADD 15 MINS: Performed by: INTERNAL MEDICINE

## 2022-03-09 PROCEDURE — E9220 PRA ENDO ANESTHESIA: ICD-10-PCS | Mod: ,,, | Performed by: NURSE ANESTHETIST, CERTIFIED REGISTERED

## 2022-03-09 PROCEDURE — 25000003 PHARM REV CODE 250: Performed by: NURSE ANESTHETIST, CERTIFIED REGISTERED

## 2022-03-09 PROCEDURE — 43239 EGD BIOPSY SINGLE/MULTIPLE: CPT | Mod: ,,, | Performed by: INTERNAL MEDICINE

## 2022-03-09 PROCEDURE — 88305 TISSUE EXAM BY PATHOLOGIST: ICD-10-PCS | Mod: 26,,, | Performed by: PATHOLOGY

## 2022-03-09 PROCEDURE — 25000003 PHARM REV CODE 250: Performed by: INTERNAL MEDICINE

## 2022-03-09 RX ORDER — PROPOFOL 10 MG/ML
VIAL (ML) INTRAVENOUS CONTINUOUS PRN
Status: DISCONTINUED | OUTPATIENT
Start: 2022-03-09 | End: 2022-03-09

## 2022-03-09 RX ORDER — LIDOCAINE HYDROCHLORIDE 20 MG/ML
INJECTION INTRAVENOUS
Status: DISCONTINUED | OUTPATIENT
Start: 2022-03-09 | End: 2022-03-09

## 2022-03-09 RX ORDER — PROPOFOL 10 MG/ML
VIAL (ML) INTRAVENOUS
Status: DISCONTINUED | OUTPATIENT
Start: 2022-03-09 | End: 2022-03-09

## 2022-03-09 RX ORDER — SODIUM CHLORIDE 9 MG/ML
INJECTION, SOLUTION INTRAVENOUS CONTINUOUS
Status: DISCONTINUED | OUTPATIENT
Start: 2022-03-09 | End: 2022-03-09 | Stop reason: HOSPADM

## 2022-03-09 RX ADMIN — Medication 150 MCG/KG/MIN: at 10:03

## 2022-03-09 RX ADMIN — PROPOFOL 70 MG: 10 INJECTION, EMULSION INTRAVENOUS at 10:03

## 2022-03-09 RX ADMIN — LIDOCAINE HYDROCHLORIDE 80 MG: 20 INJECTION, SOLUTION INTRAVENOUS at 10:03

## 2022-03-09 RX ADMIN — SODIUM CHLORIDE: 0.9 INJECTION, SOLUTION INTRAVENOUS at 09:03

## 2022-03-09 NOTE — TRANSFER OF CARE
"Anesthesia Transfer of Care Note    Patient: Darinel Majano    Procedure(s) Performed: Procedure(s) (LRB):  EGD (ESOPHAGOGASTRODUODENOSCOPY) (N/A)    Patient location: GI    Anesthesia Type: general    Transport from OR: Transported from OR on 6-10 L/min O2 by face mask with adequate spontaneous ventilation    Post pain: adequate analgesia    Post assessment: no apparent anesthetic complications and tolerated procedure well    Post vital signs: stable    Level of consciousness: awake and alert    Nausea/Vomiting: no nausea/vomiting    Complications: none    Transfer of care protocol was followed      Last vitals:   Visit Vitals  BP (!) 144/69 (BP Location: Left arm, Patient Position: Lying)   Pulse 69   Temp 36.4 °C (97.5 °F) (Temporal)   Resp 16   Ht 5' 2" (1.575 m)   Wt 73 kg (161 lb)   SpO2 99%   BMI 29.45 kg/m²     "

## 2022-03-09 NOTE — ANESTHESIA POSTPROCEDURE EVALUATION
Anesthesia Post Evaluation    Patient: Darinel Majano    Procedure(s) Performed: Procedure(s) (LRB):  EGD (ESOPHAGOGASTRODUODENOSCOPY) (N/A)    Final Anesthesia Type: general      Patient location during evaluation: PACU  Patient participation: Yes- Able to Participate  Level of consciousness: awake and alert and oriented  Post-procedure vital signs: reviewed and stable  Pain management: adequate  Airway patency: patent    PONV status at discharge: No PONV  Anesthetic complications: no      Cardiovascular status: blood pressure returned to baseline, hemodynamically stable and stable  Respiratory status: unassisted, room air and spontaneous ventilation  Hydration status: euvolemic  Follow-up not needed.          Vitals Value Taken Time   /57 03/09/22 1048   Temp 36.5 °C (97.7 °F) 03/09/22 1022   Pulse 73 03/09/22 1048   Resp 18 03/09/22 1048   SpO2 100 % 03/09/22 1048         Event Time   Out of Recovery 10:59:34         Pain/Enoch Score: Enoch Score: 10 (3/9/2022 10:36 AM)

## 2022-03-09 NOTE — ANESTHESIA PREPROCEDURE EVALUATION
03/09/2022  Darinel Majano is a 81 y.o., male.    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; T spot negative May 2017; seen in Infectious Disease at that time    Psoriasis vulgaris    Age-related osteoporosis without current pathological fracture: see DEXA 2017; IMPROVED 2021    Squamous cell cancer of skin of right hand    Aortic atherosclerosis: see CT scan 5/17 also CT scan 9/18    Alcohol-induced polyneuropathy    Right lower lobe lung mass: see evaluation 2017, both bronchoscopic and surgical:  Nonspecific inflammation and fibrosis    Liver fibrosis    Helicobacter pylori ab+: treated 2/18 resolved; stool negative 4/18    Gastroesophageal reflux disease with gastritis: see EGD 2018    IFG (impaired fasting glucose)    Portal hypertension     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    Anemia 5/4/2017    Aortic atherosclerosis: see CT scan 5/17 6/9/2017    BPH (benign prostatic hypertrophy)     Chronic gout     Cortical senile cataract 5/15/2012    Degenerative disc disease     Diverticulosis of large intestine without hemorrhage: see CT scan 5/17 6/9/2017    Essential hypertension  2/19/2016    Gastroesophageal reflux disease with gastritis: see EGD 2018 10/31/2018    Gout     Hyperplastic polyp of transverse colon: 2012 repeat 5 years 5/4/2017    IFG (impaired fasting glucose) 5/7/2019    Kidney stone on left side 6/9/2017    Osteoporosis     Psoriasis     Squamous cell cancer of skin of right hand 6/9/2017    right 3rd digit    Substance abuse     Thrombocytopenia     Umbilical hernia: see CT 5/17 6/9/2017     Past Surgical History:   Procedure Laterality Date    CATARACT EXTRACTION      CHOLECYSTECTOMY  2012    COLONOSCOPY N/A 02/05/2018    Procedure: COLONOSCOPY;  Surgeon: Marcial Paredes MD;  Location: [address];  Service: Endoscopy;  Laterality: N/A;    EYE SURGERY      FRACTURE SURGERY      Right Hand Surgery      TONSILLECTOMY      UPPER GASTROINTESTINAL ENDOSCOPY             Pre-op Assessment    I have reviewed the Patient Summary Reports.       I have reviewed the Medications.     Review of Systems      Physical Exam  General: Well nourished, Alert and Oriented    Airway:  Mallampati: II / I  Mouth Opening: Normal  TM Distance: Normal  Tongue: Normal  Neck ROM: Normal ROM    Dental:  Intact        Anesthesia Plan  Type of Anesthesia, risks & benefits discussed:    Anesthesia Type: Gen Natural Airway  Intra-op Monitoring Plan: Standard ASA Monitors  Induction:  IV  Airway Plan: , Post-Induction  Informed Consent: Informed consent signed with the Patient and all parties understand the risks and agree with anesthesia plan.  All questions answered.   ASA Score: 3  Day of Surgery Review of History & Physical: I have interviewed and examined the patient. I have reviewed the patient's H&P dated: There are no significant changes.     Ready For Surgery From Anesthesia Perspective.     .

## 2022-03-09 NOTE — [employer]ATION PATIENT INSTRUCTIONS
Discharge Summary/Instructions after an Endoscopic Procedure  Patient Name: Darinel Majano  Patient MRN: [id number]  Patient YOB: 1940 Wednesday, March 9, 2022  Darinel Noe MD  Dear patient,  As a result of recent federal legislation (The Federal Cures Act), you may   receive lab or pathology results from your procedure in your [facility]   account before your physician is able to contact you. Your physician or   their representative will relay the results to you with their   recommendations at their soonest availability.  Thank you,  RESTRICTIONS:  During your procedure today, you received medications for sedation.  These   medications may affect your judgment, balance and coordination.  Therefore,   for 24 hours, you have the following restrictions:   - DO NOT drive a car, operate machinery, make legal/financial decisions,   sign important papers or drink alcohol.    ACTIVITY:  Today: no heavy lifting, straining or running due to procedural   sedation/anesthesia.  The following day: return to full activity including work.  DIET:  Eat and drink normally unless instructed otherwise.     TREATMENT FOR COMMON SIDE EFFECTS:  - Mild abdominal pain, nausea, belching, bloating or excessive gas:  rest,   eat lightly and use a heating pad.  - Sore Throat: treat with throat lozenges and/or gargle with warm salt   water.  - Because air was used during the procedure, expelling large amounts of air   from your rectum or belching is normal.  - If a bowel prep was taken, you may not have a bowel movement for 1-3 days.    This is normal.  SYMPTOMS TO WATCH FOR AND REPORT TO YOUR PHYSICIAN:  1. Abdominal pain or bloating, other than gas cramps.  2. Chest pain.  3. Back pain.  4. Signs of infection such as: chills or fever occurring within 24 hours   after the procedure.  5. Rectal bleeding, which would show as bright red, maroon, or black stools.   (A tablespoon of blood from the rectum is not serious, especially if    hemorrhoids are present.)  6. Vomiting.  7. Weakness or dizziness.  GO DIRECTLY TO THE NEAREST EMERGENCY ROOM IF YOU HAVE ANY OF THE FOLLOWING:      Difficulty breathing              Chills and/or fever over 101 F   Persistent vomiting and/or vomiting blood   Severe abdominal pain   Severe chest pain   Black, tarry stools   Bleeding- more than one tablespoon   Any other symptom or condition that you feel may need urgent attention  Your doctor recommends these additional instructions:  If any biopsies were taken, your doctors clinic will contact you in 1 to 2   weeks with any results.  - Patient has a contact number available for emergencies.  The signs and   symptoms of potential delayed complications were discussed with the   patient.  Return to normal activities tomorrow.  Written discharge   instructions were provided to the patient.   - Discharge patient to home (ambulatory).   - Resume previous diet.   - Continue present medications.   - Await pathology results.   - Return to referring physician after studies are complete.  For questions, problems or results please call your physician - Darinel Noe MD at Work:  [phone].  [facility] [place], EMERGENCY ROOM PHONE NUMBER: [phone]  IF A COMPLICATION OR EMERGENCY SITUATION ARISES AND YOU ARE UNABLE TO REACH   YOUR PHYSICIAN - GO DIRECTLY TO THE EMERGENCY ROOM.  Darinel Noe MD  3/9/2022 10:17:21 AM  This report has been verified and signed electronically.  Dear patient,  As a result of recent federal legislation (The Federal Cures Act), you may   receive lab or pathology results from your procedure in your [facility]   account before your physician is able to contact you. Your physician or   their representative will relay the results to you with their   recommendations at their soonest availability.  Thank you,  PROVATION

## 2022-03-21 ENCOUNTER — PATIENT MESSAGE (OUTPATIENT)
Dept: GASTROENTEROLOGY | Facility: HOSPITAL | Age: 82
End: 2022-03-21
Payer: MEDICARE

## 2022-03-21 LAB
FINAL PATHOLOGIC DIAGNOSIS: NORMAL
GROSS: NORMAL
Lab: NORMAL

## 2022-04-27 ENCOUNTER — OFFICE VISIT (OUTPATIENT)
Dept: PODIATRY | Facility: CLINIC | Age: 82
End: 2022-04-27
Payer: MEDICARE

## 2022-04-27 VITALS
HEIGHT: 62 IN | HEART RATE: 75 BPM | WEIGHT: 166.44 LBS | BODY MASS INDEX: 30.63 KG/M2 | DIASTOLIC BLOOD PRESSURE: 65 MMHG | SYSTOLIC BLOOD PRESSURE: 133 MMHG

## 2022-04-27 DIAGNOSIS — B35.1 ONYCHOMYCOSIS: ICD-10-CM

## 2022-04-27 DIAGNOSIS — G60.9 IDIOPATHIC PERIPHERAL NEUROPATHY: Primary | ICD-10-CM

## 2022-04-27 PROCEDURE — 99499 NO LOS: ICD-10-PCS | Mod: S$GLB,,, | Performed by: PODIATRIST

## 2022-04-27 PROCEDURE — 99999 PR PBB SHADOW E&M-EST. PATIENT-LVL III: CPT | Mod: PBBFAC,,, | Performed by: PODIATRIST

## 2022-04-27 PROCEDURE — 11721 PR DEBRIDEMENT OF NAILS, 6 OR MORE: ICD-10-PCS | Mod: Q9,S$GLB,, | Performed by: PODIATRIST

## 2022-04-27 PROCEDURE — 11721 DEBRIDE NAIL 6 OR MORE: CPT | Mod: Q9,S$GLB,, | Performed by: PODIATRIST

## 2022-04-27 PROCEDURE — 99999 PR PBB SHADOW E&M-EST. PATIENT-LVL III: ICD-10-PCS | Mod: PBBFAC,,, | Performed by: PODIATRIST

## 2022-04-27 PROCEDURE — 99499 UNLISTED E&M SERVICE: CPT | Mod: S$GLB,,, | Performed by: PODIATRIST

## 2022-04-27 NOTE — PROGRESS NOTES
Subjective:      Patient ID: Darinel Majano is a 81 y.o. male.    Chief Complaint: Nail Care    Darinel is a 81 y.o. male who presents to the clinic for evaluation and treatment of high risk feet. 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), Anemia (5/4/2017), Aortic atherosclerosis: see CT scan 5/17 (6/9/2017), BPH (benign prostatic hypertrophy), Chronic gout, Cortical senile cataract (5/15/2012), Degenerative disc disease, Diverticulosis of large intestine without hemorrhage: see CT scan 5/17 (6/9/2017), Essential hypertension (2/19/2016), Gastroesophageal reflux disease with gastritis: see EGD 2018 (10/31/2018), Gout, Hyperplastic polyp of transverse colon: 2012 repeat 5 years (5/4/2017), IFG (impaired fasting glucose) (5/7/2019), Kidney stone on left side (6/9/2017), Osteoporosis, Psoriasis, 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 The patient's chief complaint is long, thick toenails. This patient has documented high risk feet requiring routine maintenance secondary to peripheral neuropathy.    PCP: Annabella Montiel MD    Date Last Seen by PCP:  Chief Complaint   Patient presents with    Nail Care       Current shoe gear:  Affected Foot: Tennis shoes     Unaffected Foot: Tennis shoes    Last encounter in this department: Visit date not found    Hemoglobin A1C   Date Value Ref Range Status   09/01/2020 5.2 4.0 - 5.6 % Final     Comment:     ADA Screening Guidelines:  5.7-6.4%  Consistent with prediabetes  >or=6.5%  Consistent with diabetes  High levels of fetal hemoglobin interfere with the HbA1C  assay. Heterozygous hemoglobin variants (HbS, HgC, etc)do  not significantly interfere with this assay.   However, presence of multiple variants may affect accuracy.     06/29/2011 4.7 4.0 - 6.2 % Final    01/05/2011 4.6 4.0 - 6.2 % Final       Review of Systems   Constitutional: Negative for chills, fever and malaise/fatigue.   HENT: Negative for hearing loss.    Cardiovascular: Negative for claudication and leg swelling.   Respiratory: Negative for shortness of breath.    Skin: Positive for color change, nail changes and unusual hair distribution. Negative for flushing and rash.   Musculoskeletal: Negative for joint pain and myalgias.   Neurological: Positive for numbness, paresthesias and sensory change. Negative for loss of balance.   Psychiatric/Behavioral: Negative for altered mental status.           Objective:      Physical Exam  Vitals reviewed.   Constitutional:       Appearance: He is well-developed.   Cardiovascular:      Pulses:           Dorsalis pedis pulses are 1+ on the right side and 1+ on the left side.        Posterior tibial pulses are 1+ on the right side and 1+ on the left side.      Comments: Non pitting  edema noted to b/L LEs  Musculoskeletal:      Right knee: No swelling or ecchymosis.      Left knee: No swelling or ecchymosis.      Right lower leg: No swelling, deformity, tenderness or bony tenderness. No edema.      Left lower leg: No swelling or tenderness. No edema.      Right ankle: No swelling or ecchymosis. No lateral malleolus or medial malleolus tenderness. Normal range of motion. Normal pulse.      Right Achilles Tendon: No defects. Cheema's test negative.      Left ankle: No swelling or ecchymosis. No lateral malleolus or medial malleolus tenderness. Normal range of motion. Normal pulse.      Left Achilles Tendon: Cheema's test negative.      Right foot: Normal range of motion. No deformity.      Left foot: Normal range of motion. No deformity.      Comments: Decreased height of medial arches noted with loading of the foot b/L  Hammertoes noted, digits 4 and 5b/L with adductovarus rotation of b/L fifth digit.    Adequate joint ROM noted to all lower extremity muscle groups  with no pain or crepitation noted. Muscle strength is 5/5 in all groups bilaterally.     Feet:      Right foot:      Protective Sensation: 10 sites tested. 6 sites sensed.      Left foot:      Protective Sensation: 10 sites tested. 6 sites sensed.   Skin:     General: Skin is warm.      Capillary Refill: Capillary refill takes more than 3 seconds.      Findings: No abrasion, bruising, burn, ecchymosis or wound.      Comments: Skin temp is cool to cool from proximal to distal b/L.  Nails x10 are elongated by  4-5mm's, thickened by 1-3 mm's, dystrophic, and are yellowish in  coloration . Xerosis Bilaterally. No open lesions noted.        Neurological:      Mental Status: He is alert and oriented to person, place, and time.      Comments: Diminished protective sensation noted b/L     Psychiatric:         Attention and Perception: He is attentive.         Speech: Speech normal.         Behavior: Behavior normal.               Assessment:       Encounter Diagnoses   Name Primary?    Idiopathic peripheral neuropathy Yes    Onychomycosis          Plan:       Darinel was seen today for nail care.    Diagnoses and all orders for this visit:    Idiopathic peripheral neuropathy    Onychomycosis      I counseled the patient on his conditions, their implications and medical management.        - Shoe inspection. Patient instructed on proper foot hygeine. We discussed wearing proper shoe gear, daily foot inspections, never walking without protective shoe gear, never putting sharp instruments to feet, routine podiatric nail visits every 2-3 months.    - With patient's permission, nails were aggressively reduced and debrided x 10 to their soft tissue attachment mechanically and with electric , removing all offending nail and debris. Patient relates relief following the procedure. He will continue to monitor the areas daily, inspect his feet, wear protective shoe gear when ambulatory, moisturizer to maintain skin integrity and  follow in this office in approximately 2-3 months, sooner p.r.n.

## 2022-06-02 ENCOUNTER — HOSPITAL ENCOUNTER (OUTPATIENT)
Dept: RADIOLOGY | Facility: HOSPITAL | Age: 82
Discharge: HOME | End: 2022-06-02
Attending: PHYSICIAN ASSISTANT
Payer: MEDICARE

## 2022-06-02 DIAGNOSIS — K70.30 ALCOHOLIC CIRRHOSIS OF LIVER WITHOUT ASCITES: ICD-10-CM

## 2022-06-02 PROCEDURE — 76705 US ABDOMEN LIMITED: ICD-10-PCS | Mod: 26,,, | Performed by: RADIOLOGY

## 2022-06-02 PROCEDURE — 76705 ECHO EXAM OF ABDOMEN: CPT | Mod: TC

## 2022-06-02 PROCEDURE — 76705 ECHO EXAM OF ABDOMEN: CPT | Mod: 26,,, | Performed by: RADIOLOGY

## 2022-06-08 ENCOUNTER — OFFICE VISIT (OUTPATIENT)
Dept: HEPATOLOGY | Facility: CLINIC | Age: 82
End: 2022-06-08
Payer: MEDICARE

## 2022-06-08 VITALS
TEMPERATURE: 98 F | DIASTOLIC BLOOD PRESSURE: 67 MMHG | HEART RATE: 66 BPM | SYSTOLIC BLOOD PRESSURE: 156 MMHG | RESPIRATION RATE: 18 BRPM | HEIGHT: 62 IN | WEIGHT: 163.81 LBS | OXYGEN SATURATION: 99 % | BODY MASS INDEX: 30.14 KG/M2

## 2022-06-08 DIAGNOSIS — K70.30 ALCOHOLIC CIRRHOSIS OF LIVER WITHOUT ASCITES: Primary | ICD-10-CM

## 2022-06-08 DIAGNOSIS — D69.6 THROMBOCYTOPENIA: ICD-10-CM

## 2022-06-08 DIAGNOSIS — E66.9 OBESITY (BMI 30-39.9): ICD-10-CM

## 2022-06-08 DIAGNOSIS — K76.0 FATTY LIVER: ICD-10-CM

## 2022-06-08 DIAGNOSIS — R60.9 SWELLING: ICD-10-CM

## 2022-06-08 DIAGNOSIS — K74.00 LIVER FIBROSIS: ICD-10-CM

## 2022-06-08 DIAGNOSIS — K76.89 HEPATIC CYST: ICD-10-CM

## 2022-06-08 DIAGNOSIS — K76.6 PORTAL HYPERTENSION: ICD-10-CM

## 2022-06-08 PROCEDURE — 99214 PR OFFICE/OUTPT VISIT, EST, LEVL IV, 30-39 MIN: ICD-10-PCS | Mod: S$GLB,,, | Performed by: PHYSICIAN ASSISTANT

## 2022-06-08 PROCEDURE — 99999 PR PBB SHADOW E&M-EST. PATIENT-LVL IV: ICD-10-PCS | Mod: PBBFAC,,, | Performed by: PHYSICIAN ASSISTANT

## 2022-06-08 PROCEDURE — 99214 OFFICE O/P EST MOD 30 MIN: CPT | Mod: S$GLB,,, | Performed by: PHYSICIAN ASSISTANT

## 2022-06-08 PROCEDURE — 99999 PR PBB SHADOW E&M-EST. PATIENT-LVL IV: CPT | Mod: PBBFAC,,, | Performed by: PHYSICIAN ASSISTANT

## 2022-06-08 RX ORDER — TACROLIMUS 0.5 MG/1
CAPSULE ORAL
COMMUNITY
End: 2023-06-26

## 2022-06-08 NOTE — PATIENT INSTRUCTIONS
Because you have cirrhosis, it is important to attend clinic visits every 6 months with an Ultrasound and blood tests every 6 months to screen for liver cancer (you are at risk of developing liver cancer due to scar tissue in the liver)    Signs and symptoms of worsening liver disease include jaundice, fluid in the belly (ascites), and confusion/disorientation/slowed thought processes due to hepatic encephalopathy (toxins building up because of liver problems).   You should seek medical attention if any of these things occur.    Also, possible bleeding from esophageal varices (blood vessels in the stomach and foodpipe can burst and cause fatal bleeding).  Therefore, if you have symptoms of vomiting blood, blood in your stool, dark or black stools or vomiting coffee ground vomit, YOU SHOULD GO TO THE EMERGENCY ROOM IMMEDIATELY.     Cirrhosis can increase the risk of liver cancer, liver failure, and death. However, we will watch your liver function score (MELD score) closely with each clinic visit. A normal MELD score is 6, highest is 40. Your last one was a 6. We will check this with every clinic visit. A MELD 15 or higher is when we start to consider transplant because MELD 15 or higher indicates that the liver is not functioning as well       Cirrhosis Counseling  - strict abstinence of alcohol use (includes beer, wine, and/or liquor)  - avoid non-steroidal anti-inflammatory drugs (NSAIDs) such as ibuprofen, Motrin, naprosyn, Aleve due to the risk of kidney damage  - can take acetaminophen (Tylenol), no more than 2000 mg per day  - low sodium (salt) 2 gram per day diet  - high protein diet: 1.5 grams/kg per day to prevent muscle mass loss. Recommended at least 1 protein shake daily using Premier Protein shakes    - resistance exercises for muscle strength  - avoid raw seafoods due to the risk of fatal Vibrio vulnificus infection  - ultrasound of the liver every 6 months for liver cancer screening  - Upper endoscopy  every 1-2 years to screen for varices in the stomach and esophagus

## 2022-06-08 NOTE — PROGRESS NOTES
Hepatology Consultation: [facility]    ESTABLISHED PATIENT   PCP: Annabella Montiel MD    CHIEF COMPLAINT: Presumed liver fibrosis due to alcohol use     This is a 81 y.o. White male with PMH psoriasis, osteoporosis (on fosamax), alcohol dependence (stopped in 2017), right hemidiaphragm mass s/p VATS, SCC of R hand, s/p cholecystectomy 2012, with initial presentation to hepatology clinic for thrombocytopenia w/ elevated enzymes and hypoalbuminemia in 2017, returning for presumed liver fibrosis based on clinical findings such as thrombocytopenia, fluid overload, + splenomegaly in the initial context of alcohol use. The patient was previously followed by Raymond Nair PA-C, is new to me today.      The patient is being followed for [id number] HCC screening due to aforementioned findings; no history of tissue evidence of parenchymal hepatic fibrosis, however. He has persistently low platelet count. Last EGD 2018 without varioces. Other than fluid overload (well controlled at present moment), no other symptoms/signs of hepatic decompensation.    Interval history:   Patient here alone today. He reports he is still occasionally drinking 6oz bottle of wine with crushed ice, started about 1 month ago, about 1 per week. He is otherwise abstinent from beer and other alcohol.  He was put on spironolactone and lasix for BLE edema when he initially saw Raymond in 2017. Still taking this, no worsening of fluid issues and no increase in dosages. We refilled these medications for him this year.  No changes no new meds. Denies symptoms of hepatic decompensation including jaundice, ascites, cognitive problems to suggest hepatic encephalopathy, or GI bleeding.     Fibrosis staging: remote history of biopsy without reported fibrosis in 2011, last Fibroscan 2017 showed kPA of 4.1 thus F0-1 fibrosis. US elastography 2021 F2-3     Risk factors for fatty liver include obesity, pre-diabetes and alcohol  use     Immunization status of Hep A & Hep B: S/p Twinrix 2011    Interval history 06/08/2022:  Darinel Majano arrives today with his wife.   Since our last visit, has maintained weight loss and is not drinking. They moved back into their house after repairs. Doing well and without complaints. Fluid well controlled. Denies symptoms of hepatic decompensation including jaundice, ascites, cognitive problems to suggest hepatic encephalopathy, or GI bleeding.             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), Anemia (5/4/2017), Aortic atherosclerosis: see CT scan 5/17 (6/9/2017), BPH (benign prostatic hypertrophy), Chronic gout, Cortical senile cataract (5/15/2012), Degenerative disc disease, Diverticulosis of large intestine without hemorrhage: see CT scan 5/17 (6/9/2017), Essential hypertension (2/19/2016), Gastroesophageal reflux disease with gastritis: see EGD 2018 (10/31/2018), Gout, Hyperplastic polyp of transverse colon: 2012 repeat 5 years (5/4/2017), IFG (impaired fasting glucose) (5/7/2019), Kidney stone on left side (6/9/2017), Osteoporosis, Psoriasis, 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   PSXH Darinel has a past surgical history that includes Cataract extraction; Cholecystectomy (2012); Eye surgery; Right Hand Surgery; Colonoscopy (N/A, 02/05/2018); Upper gastrointestinal endoscopy; Tonsillectomy; Fracture surgery; and Esophagogastroduodenoscopy (N/A, 3/9/2022).    Darinel's family history includes Cancer in his father; Cataracts in his mother; Diabetes in his mother; Heart disease in his brother; Hypertension in his mother; No Known Problems in his sister; Stomach cancer in his father.    Darinel reports that he has quit smoking. He has a 20.00 pack-year smoking history. He has  "never used smokeless tobacco. He reports that he does not drink alcohol and does not use drugs.   TOMASA Tena is allergic to humira [adalimumab].   LEVI Tena has a current medication list which includes the following prescription(s): alendronate, atorvastatin, furosemide, milk thistle, multivitamin with minerals, spironolactone, tacrolimus, vitamin d, and zinc.       Review of Systems   Denies symptoms of hepatic decompensation including jaundice, ascites, cognitive problems to suggest hepatic encephalopathy, or GI bleeding.       DATA   Physical Exam   Constitutional: Friendly white male , well-nourished. No distress. Alert and oriented to person, place, and time.  Eyes: No scleral icterus.   Pulmonary/Chest: Respiratory effort and breath sounds normal. No respiratory distress.   Abdominal: Obese.   Musculoskeletal: No edema. Arthritic PIP joints noted on R hand.  Neurological: No tremor. Coordination and gait normal.   Skin:  No rash or erythema. No jaundice. No telangiectasias or palmar erythema noted.  Psychiatric: Normal mood and affect. Speech, behavior, and thought content normal. No depression or anxiety noted.     BP (!) 156/67 (BP Location: Right arm, Patient Position: Sitting, BP Method: Medium (Automatic))   Pulse 66   Temp 98 °F (36.7 °C) (Oral)   Resp 18   Ht 5' 2" (1.575 m)   Wt 74.3 kg (163 lb 12.8 oz)   SpO2 99%   BMI 29.96 kg/m²       LABS:    Lab Results   Component Value Date    ALT 24 06/02/2022    AST 20 06/02/2022    ALKPHOS 81 06/02/2022    BILITOT 0.9 06/02/2022    ALBUMIN 3.6 06/02/2022    INR 1.0 06/02/2022     (L) 06/02/2022         Prior serologic workup:   Lab Results   Component Value Date    SMOOTHMUSCAB Positive 1:40 (A) 05/24/2017    AMAIFA Negative 1:40 05/24/2017    ANASCREEN Negative <1:160 05/24/2017    FERRITIN 409 (H) 05/24/2017    FESATURATED 28 05/24/2017    PETH 149 (A) 05/24/2017    RVRXW8KWCRAR MM 05/24/2017    JSKGK7UVQYCO 196 (H) 05/24/2017 "         DIAGNOSTIC STUDIES:      US ELASTOGRAPHY PARENCHYMA (ORGAN)     CLINICAL HISTORY:  Alcoholic cirrhosis of liver without ascites     TECHNIQUE:  Quantitative Ultrasound Assessment of the Liver (QUAL) elastography was performed on the [facility] [employer].     COMPARISON:  None.     FINDINGS:  ELASTOGRAPHY     HRI: Indicates no significant fatty infiltration.  0.75     Median elastography: 1.82 m/sec, 10.29 kPa. Indicating mild-moderate or F2-F3 fibrosis.     IQR/median: 23, indicating an acceptable range.           US Abdomen Limited  Narrative: EXAMINATION:  US ABDOMEN LIMITED    CLINICAL HISTORY:  .    Alcoholic cirrhosis of liver without ascites    TECHNIQUE:  Limited ultrasound of the right upper quadrant of the abdomen including pancreas, liver, gallbladder, common bile duct, IVC was performed.    COMPARISON:  U/S abdomen limited 12/09/2021    FINDINGS:  Liver: Normal in size, measuring 13.2 cm.  Heterogeneous coarsened echotexture with a smooth contour.  Stable simple cyst in the right hepatic lobe measuring 1.1 x 1.0 x 1.3 cm.  No new focal hepatic lesions.  HRI: 0.9    Gallbladder: The gallbladder is surgically absent.    Biliary system: The common duct is not dilated, measuring 3 mm.  No intrahepatic ductal dilatation.    Spleen: Normal in size with a homogeneous echotexture, measuring 11.5 x 4.3 cm.    Pancreas: The visualized portions of pancreas appear normal.    IVC: Partially visualized.    Miscellaneous: No ascites.  Impression: Heterogeneous hepatic parenchyma.  No new focal hepatic lesion.    Electronically signed by resident: James Mcdaniel  Date:    06/02/2022  Time:    07:56    Electronically signed by: Ravin Anglin MD  Date:    06/02/2022  Time:    08:05          ASSESSMENT & PLAN     81 y.o. White male with:    1. Liver fibrosis (?) w/  Thrombocytopenia  - we are following out of abundance of caution   - US elastography 2021 suggested F3 fibrosis.  MELD-Na score: 6 at 6/2/2022  8:10 AM  MELD  score: 6 at 6/2/2022  8:10 AM  Calculated from:  Serum Creatinine: 0.8 mg/dL (Using min of 1 mg/dL) at 6/2/2022  8:10 AM  Serum Sodium: 136 mmol/L at 6/2/2022  8:10 AM  Total Bilirubin: 0.9 mg/dL (Using min of 1 mg/dL) at 6/2/2022  8:10 AM  INR(ratio): 1.0 at 6/2/2022  8:10 AM  Age: 81 years    -- Presumed Cirrhosis Health Maintenance:  -- HCC screening: UTD  -- Variceal screening: EGD 2022 without evidence of portal hypertension or varices   -- Hepatitis A & B vaccination: vaccinated 2011      2. Alcohol related liver disease without ascites, well compensated    -- Recommendations as per AVS   -- Recommend NO alcohol for life.  -- continue HCC screening q6months          3. Hepatic cyst  - stable simple cyst measuring 1.1x1.0x1.3cm    4. LE edema  - continue shruthi 50mg PO QD & lasix 20mg PO QD for swelling    5. Fatty liver  - RF include obesity & prior alcohol use    MELD-Na score: 6 at 6/2/2022  8:10 AM  MELD score: 6 at 6/2/2022  8:10 AM  Calculated from:  Serum Creatinine: 0.8 mg/dL (Using min of 1 mg/dL) at 6/2/2022  8:10 AM  Serum Sodium: 136 mmol/L at 6/2/2022  8:10 AM  Total Bilirubin: 0.9 mg/dL (Using min of 1 mg/dL) at 6/2/2022  8:10 AM  INR(ratio): 1.0 at 6/2/2022  8:10 AM  Age: 81 years      Orders Placed This Encounter   Procedures    US Abdomen Limited    AFP Tumor Marker    CBC Auto Differential    Comprehensive Metabolic Panel    Protime-INR     · Labs and ultrasound reviewed with patient: stable.  · Congratulated on sobriety & weight loss  · Follow up in about 6 months (around 12/8/2022).   · Total time: 33 min    Thank you for allowing me to participate in the care of CLARA CaroC  Hepatology

## 2022-07-27 ENCOUNTER — OFFICE VISIT (OUTPATIENT)
Dept: PODIATRY | Facility: CLINIC | Age: 82
End: 2022-07-27
Payer: MEDICARE

## 2022-07-27 VITALS
HEART RATE: 70 BPM | DIASTOLIC BLOOD PRESSURE: 68 MMHG | WEIGHT: 163.81 LBS | BODY MASS INDEX: 30.14 KG/M2 | SYSTOLIC BLOOD PRESSURE: 127 MMHG | HEIGHT: 62 IN

## 2022-07-27 DIAGNOSIS — G60.9 IDIOPATHIC PERIPHERAL NEUROPATHY: Primary | ICD-10-CM

## 2022-07-27 DIAGNOSIS — B35.1 ONYCHOMYCOSIS: ICD-10-CM

## 2022-07-27 PROCEDURE — 3074F PR MOST RECENT SYSTOLIC BLOOD PRESSURE < 130 MM HG: ICD-10-PCS | Mod: CPTII,S$GLB,, | Performed by: PODIATRIST

## 2022-07-27 PROCEDURE — 99499 UNLISTED E&M SERVICE: CPT | Mod: S$GLB,,, | Performed by: PODIATRIST

## 2022-07-27 PROCEDURE — 99999 PR PBB SHADOW E&M-EST. PATIENT-LVL III: ICD-10-PCS | Mod: PBBFAC,,, | Performed by: PODIATRIST

## 2022-07-27 PROCEDURE — 99499 NO LOS: ICD-10-PCS | Mod: S$GLB,,, | Performed by: PODIATRIST

## 2022-07-27 PROCEDURE — 11721 DEBRIDE NAIL 6 OR MORE: CPT | Mod: Q9,S$GLB,, | Performed by: PODIATRIST

## 2022-07-27 PROCEDURE — 11721 PR DEBRIDEMENT OF NAILS, 6 OR MORE: ICD-10-PCS | Mod: Q9,S$GLB,, | Performed by: PODIATRIST

## 2022-07-27 PROCEDURE — 1159F PR MEDICATION LIST DOCUMENTED IN MEDICAL RECORD: ICD-10-PCS | Mod: CPTII,S$GLB,, | Performed by: PODIATRIST

## 2022-07-27 PROCEDURE — 3078F DIAST BP <80 MM HG: CPT | Mod: CPTII,S$GLB,, | Performed by: PODIATRIST

## 2022-07-27 PROCEDURE — 1126F AMNT PAIN NOTED NONE PRSNT: CPT | Mod: CPTII,S$GLB,, | Performed by: PODIATRIST

## 2022-07-27 PROCEDURE — 1126F PR PAIN SEVERITY QUANTIFIED, NO PAIN PRESENT: ICD-10-PCS | Mod: CPTII,S$GLB,, | Performed by: PODIATRIST

## 2022-07-27 PROCEDURE — 3078F PR MOST RECENT DIASTOLIC BLOOD PRESSURE < 80 MM HG: ICD-10-PCS | Mod: CPTII,S$GLB,, | Performed by: PODIATRIST

## 2022-07-27 PROCEDURE — 1159F MED LIST DOCD IN RCRD: CPT | Mod: CPTII,S$GLB,, | Performed by: PODIATRIST

## 2022-07-27 PROCEDURE — 3074F SYST BP LT 130 MM HG: CPT | Mod: CPTII,S$GLB,, | Performed by: PODIATRIST

## 2022-07-27 PROCEDURE — 99999 PR PBB SHADOW E&M-EST. PATIENT-LVL III: CPT | Mod: PBBFAC,,, | Performed by: PODIATRIST

## 2022-07-27 NOTE — PROGRESS NOTES
Subjective:      Patient ID: Darinel Majano is a 81 y.o. male.    Chief Complaint: Nail Care    Darinel is a 81 y.o. male who presents to the clinic for evaluation and treatment of high risk feet. 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), Anemia (5/4/2017), Aortic atherosclerosis: see CT scan 5/17 (6/9/2017), BPH (benign prostatic hypertrophy), Chronic gout, Cortical senile cataract (5/15/2012), Degenerative disc disease, Diverticulosis of large intestine without hemorrhage: see CT scan 5/17 (6/9/2017), Essential hypertension (2/19/2016), Gastroesophageal reflux disease with gastritis: see EGD 2018 (10/31/2018), Gout, Hyperplastic polyp of transverse colon: 2012 repeat 5 years (5/4/2017), IFG (impaired fasting glucose) (5/7/2019), Kidney stone on left side (6/9/2017), Osteoporosis, Psoriasis, 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 The patient's chief complaint is long, thick toenails. This patient has documented high risk feet requiring routine maintenance secondary to peripheral neuropathy.    PCP: Annabella Montiel MD    Date Last Seen by PCP:  Chief Complaint   Patient presents with    Nail Care       Current shoe gear:  Affected Foot: Tennis shoes     Unaffected Foot: Tennis shoes    Last encounter in this department: Visit date not found    Hemoglobin A1C   Date Value Ref Range Status   09/01/2020 5.2 4.0 - 5.6 % Final     Comment:     ADA Screening Guidelines:  5.7-6.4%  Consistent with prediabetes  >or=6.5%  Consistent with diabetes  High levels of fetal hemoglobin interfere with the HbA1C  assay. Heterozygous hemoglobin variants (HbS, HgC, etc)do  not significantly interfere with this assay.   However, presence of multiple variants may affect accuracy.     06/29/2011 4.7 4.0 - 6.2 %  Final   01/05/2011 4.6 4.0 - 6.2 % Final       Review of Systems   Constitutional: Negative for chills, fever and malaise/fatigue.   HENT: Negative for hearing loss.    Cardiovascular: Negative for claudication and leg swelling.   Respiratory: Negative for shortness of breath.    Skin: Positive for color change, nail changes and unusual hair distribution. Negative for flushing and rash.   Musculoskeletal: Negative for joint pain and myalgias.   Neurological: Positive for numbness, paresthesias and sensory change. Negative for loss of balance.   Psychiatric/Behavioral: Negative for altered mental status.           Objective:      Physical Exam  Vitals reviewed.   Constitutional:       Appearance: He is well-developed.   Cardiovascular:      Pulses:           Dorsalis pedis pulses are 1+ on the right side and 1+ on the left side.        Posterior tibial pulses are 1+ on the right side and 1+ on the left side.      Comments: Non pitting  edema noted to b/L LEs  Musculoskeletal:      Right knee: No swelling or ecchymosis.      Left knee: No swelling or ecchymosis.      Right lower leg: No swelling, deformity, tenderness or bony tenderness. No edema.      Left lower leg: No swelling or tenderness. No edema.      Right ankle: No swelling or ecchymosis. No lateral malleolus or medial malleolus tenderness. Normal range of motion. Normal pulse.      Right Achilles Tendon: No defects. Cheema's test negative.      Left ankle: No swelling or ecchymosis. No lateral malleolus or medial malleolus tenderness. Normal range of motion. Normal pulse.      Left Achilles Tendon: Cheema's test negative.      Right foot: Normal range of motion. No deformity.      Left foot: Normal range of motion. No deformity.      Comments: Decreased height of medial arches noted with loading of the foot b/L  Hammertoes noted, digits 4 and 5b/L with adductovarus rotation of b/L fifth digit.    Adequate joint ROM noted to all lower extremity muscle  groups with no pain or crepitation noted. Muscle strength is 5/5 in all groups bilaterally.     Feet:      Right foot:      Protective Sensation: 10 sites tested. 6 sites sensed.      Toenail Condition: Right toenails are abnormally thick and long.      Left foot:      Protective Sensation: 10 sites tested. 6 sites sensed.      Toenail Condition: Left toenails are abnormally thick and long.   Skin:     General: Skin is warm.      Capillary Refill: Capillary refill takes more than 3 seconds.      Findings: No abrasion, bruising, burn, ecchymosis or wound.      Comments: Skin temp is cool to cool from proximal to distal b/L.  Nails x10 are elongated by  4-5mm's, thickened by 1-3 mm's, dystrophic, and are yellowish in  coloration . Xerosis Bilaterally. No open lesions noted.        Neurological:      Mental Status: He is alert and oriented to person, place, and time.      Comments: Diminished protective sensation noted b/L     Psychiatric:         Attention and Perception: He is attentive.         Speech: Speech normal.         Behavior: Behavior normal.               Assessment:       Encounter Diagnoses   Name Primary?    Idiopathic peripheral neuropathy Yes    Onychomycosis          Plan:       Darinel was seen today for nail care.    Diagnoses and all orders for this visit:    Idiopathic peripheral neuropathy    Onychomycosis      I counseled the patient on his conditions, their implications and medical management.        - Shoe inspection. Patient instructed on proper foot hygeine. We discussed wearing proper shoe gear, daily foot inspections, never walking without protective shoe gear, never putting sharp instruments to feet, routine podiatric nail visits every 2-3 months.    - With patient's permission, nails were aggressively reduced and debrided x 10 to their soft tissue attachment mechanically and with electric , removing all offending nail and debris. Patient relates relief following the procedure. He  will continue to monitor the areas daily, inspect his feet, wear protective shoe gear when ambulatory, moisturizer to maintain skin integrity and follow in this office in approximately 2-3 months, sooner p.r.n.

## 2022-10-13 ENCOUNTER — IMMUNIZATION (OUTPATIENT)
Dept: INTERNAL MEDICINE | Facility: CLINIC | Age: 82
End: 2022-10-13
Payer: MEDICARE

## 2022-10-13 ENCOUNTER — OFFICE VISIT (OUTPATIENT)
Dept: INTERNAL MEDICINE | Facility: CLINIC | Age: 82
End: 2022-10-13
Payer: MEDICARE

## 2022-10-13 ENCOUNTER — OFFICE VISIT (OUTPATIENT)
Dept: PODIATRY | Facility: CLINIC | Age: 82
End: 2022-10-13
Payer: MEDICARE

## 2022-10-13 VITALS
WEIGHT: 163.81 LBS | HEART RATE: 69 BPM | HEIGHT: 62 IN | SYSTOLIC BLOOD PRESSURE: 126 MMHG | BODY MASS INDEX: 30.14 KG/M2 | DIASTOLIC BLOOD PRESSURE: 62 MMHG

## 2022-10-13 VITALS
BODY MASS INDEX: 29.08 KG/M2 | DIASTOLIC BLOOD PRESSURE: 75 MMHG | SYSTOLIC BLOOD PRESSURE: 120 MMHG | HEIGHT: 62 IN | WEIGHT: 158 LBS

## 2022-10-13 DIAGNOSIS — E78.2 MIXED HYPERLIPIDEMIA: ICD-10-CM

## 2022-10-13 DIAGNOSIS — I10 ESSENTIAL HYPERTENSION: ICD-10-CM

## 2022-10-13 DIAGNOSIS — K63.5 HYPERPLASTIC POLYP OF TRANSVERSE COLON: ICD-10-CM

## 2022-10-13 DIAGNOSIS — R73.01 IFG (IMPAIRED FASTING GLUCOSE): ICD-10-CM

## 2022-10-13 DIAGNOSIS — R25.2 LEG CRAMPS: ICD-10-CM

## 2022-10-13 DIAGNOSIS — M81.0 AGE-RELATED OSTEOPOROSIS WITHOUT CURRENT PATHOLOGICAL FRACTURE: ICD-10-CM

## 2022-10-13 DIAGNOSIS — N40.0 BENIGN NON-NODULAR PROSTATIC HYPERPLASIA WITHOUT LOWER URINARY TRACT SYMPTOMS: ICD-10-CM

## 2022-10-13 DIAGNOSIS — L40.50 PSORIATIC ARTHRITIS: ICD-10-CM

## 2022-10-13 DIAGNOSIS — G60.9 IDIOPATHIC PERIPHERAL NEUROPATHY: Primary | ICD-10-CM

## 2022-10-13 DIAGNOSIS — I70.0 AORTIC ATHEROSCLEROSIS: ICD-10-CM

## 2022-10-13 DIAGNOSIS — B35.1 ONYCHOMYCOSIS: ICD-10-CM

## 2022-10-13 DIAGNOSIS — E55.9 VITAMIN D DEFICIENCY DISEASE: ICD-10-CM

## 2022-10-13 DIAGNOSIS — K74.00 LIVER FIBROSIS: ICD-10-CM

## 2022-10-13 DIAGNOSIS — K76.6 PORTAL HYPERTENSION: ICD-10-CM

## 2022-10-13 DIAGNOSIS — Z12.5 ENCOUNTER FOR SCREENING FOR MALIGNANT NEOPLASM OF PROSTATE: ICD-10-CM

## 2022-10-13 DIAGNOSIS — Z00.00 ANNUAL PHYSICAL EXAM: Primary | ICD-10-CM

## 2022-10-13 DIAGNOSIS — C44.622 SQUAMOUS CELL CANCER OF SKIN OF RIGHT HAND: ICD-10-CM

## 2022-10-13 DIAGNOSIS — M1A.0720 CHRONIC IDIOPATHIC GOUT INVOLVING TOE OF LEFT FOOT WITHOUT TOPHUS: ICD-10-CM

## 2022-10-13 DIAGNOSIS — K21.9 GASTROESOPHAGEAL REFLUX DISEASE WITHOUT ESOPHAGITIS: ICD-10-CM

## 2022-10-13 PROCEDURE — 3078F DIAST BP <80 MM HG: CPT | Mod: CPTII,S$GLB,, | Performed by: PODIATRIST

## 2022-10-13 PROCEDURE — 99499 UNLISTED E&M SERVICE: CPT | Mod: S$GLB,,, | Performed by: INTERNAL MEDICINE

## 2022-10-13 PROCEDURE — 90694 FLU VACCINE - QUADRIVALENT - ADJUVANTED: ICD-10-PCS | Mod: S$GLB,,, | Performed by: INTERNAL MEDICINE

## 2022-10-13 PROCEDURE — 1160F PR REVIEW ALL MEDS BY PRESCRIBER/CLIN PHARMACIST DOCUMENTED: ICD-10-PCS | Mod: CPTII,S$GLB,, | Performed by: INTERNAL MEDICINE

## 2022-10-13 PROCEDURE — 99499 RISK ADDL DX/OHS AUDIT: ICD-10-PCS | Mod: S$GLB,,, | Performed by: INTERNAL MEDICINE

## 2022-10-13 PROCEDURE — 99499 UNLISTED E&M SERVICE: CPT | Mod: S$GLB,,, | Performed by: PODIATRIST

## 2022-10-13 PROCEDURE — 3074F PR MOST RECENT SYSTOLIC BLOOD PRESSURE < 130 MM HG: ICD-10-PCS | Mod: CPTII,S$GLB,, | Performed by: INTERNAL MEDICINE

## 2022-10-13 PROCEDURE — 3078F PR MOST RECENT DIASTOLIC BLOOD PRESSURE < 80 MM HG: ICD-10-PCS | Mod: CPTII,S$GLB,, | Performed by: PODIATRIST

## 2022-10-13 PROCEDURE — 11721 PR DEBRIDEMENT OF NAILS, 6 OR MORE: ICD-10-PCS | Mod: Q9,S$GLB,, | Performed by: PODIATRIST

## 2022-10-13 PROCEDURE — 99397 PER PM REEVAL EST PAT 65+ YR: CPT | Mod: S$GLB,,, | Performed by: INTERNAL MEDICINE

## 2022-10-13 PROCEDURE — G0008 FLU VACCINE - QUADRIVALENT - ADJUVANTED: ICD-10-PCS | Mod: S$GLB,,, | Performed by: INTERNAL MEDICINE

## 2022-10-13 PROCEDURE — 1160F RVW MEDS BY RX/DR IN RCRD: CPT | Mod: CPTII,S$GLB,, | Performed by: INTERNAL MEDICINE

## 2022-10-13 PROCEDURE — 3288F PR FALLS RISK ASSESSMENT DOCUMENTED: ICD-10-PCS | Mod: CPTII,S$GLB,, | Performed by: INTERNAL MEDICINE

## 2022-10-13 PROCEDURE — 3288F FALL RISK ASSESSMENT DOCD: CPT | Mod: CPTII,S$GLB,, | Performed by: INTERNAL MEDICINE

## 2022-10-13 PROCEDURE — 99999 PR PBB SHADOW E&M-EST. PATIENT-LVL III: ICD-10-PCS | Mod: PBBFAC,,, | Performed by: PODIATRIST

## 2022-10-13 PROCEDURE — 99999 PR PBB SHADOW E&M-EST. PATIENT-LVL III: CPT | Mod: PBBFAC,,, | Performed by: INTERNAL MEDICINE

## 2022-10-13 PROCEDURE — 99999 PR PBB SHADOW E&M-EST. PATIENT-LVL III: ICD-10-PCS | Mod: PBBFAC,,, | Performed by: INTERNAL MEDICINE

## 2022-10-13 PROCEDURE — G0008 ADMIN INFLUENZA VIRUS VAC: HCPCS | Mod: S$GLB,,, | Performed by: INTERNAL MEDICINE

## 2022-10-13 PROCEDURE — 1159F PR MEDICATION LIST DOCUMENTED IN MEDICAL RECORD: ICD-10-PCS | Mod: CPTII,S$GLB,, | Performed by: INTERNAL MEDICINE

## 2022-10-13 PROCEDURE — 99999 PR PBB SHADOW E&M-EST. PATIENT-LVL III: CPT | Mod: PBBFAC,,, | Performed by: PODIATRIST

## 2022-10-13 PROCEDURE — 3074F SYST BP LT 130 MM HG: CPT | Mod: CPTII,S$GLB,, | Performed by: INTERNAL MEDICINE

## 2022-10-13 PROCEDURE — 1126F AMNT PAIN NOTED NONE PRSNT: CPT | Mod: CPTII,S$GLB,, | Performed by: PODIATRIST

## 2022-10-13 PROCEDURE — 1126F PR PAIN SEVERITY QUANTIFIED, NO PAIN PRESENT: ICD-10-PCS | Mod: CPTII,S$GLB,, | Performed by: PODIATRIST

## 2022-10-13 PROCEDURE — 1101F PR PT FALLS ASSESS DOC 0-1 FALLS W/OUT INJ PAST YR: ICD-10-PCS | Mod: CPTII,S$GLB,, | Performed by: INTERNAL MEDICINE

## 2022-10-13 PROCEDURE — 11721 DEBRIDE NAIL 6 OR MORE: CPT | Mod: Q9,S$GLB,, | Performed by: PODIATRIST

## 2022-10-13 PROCEDURE — 99397 PR PREVENTIVE VISIT,EST,65 & OVER: ICD-10-PCS | Mod: S$GLB,,, | Performed by: INTERNAL MEDICINE

## 2022-10-13 PROCEDURE — 1126F PR PAIN SEVERITY QUANTIFIED, NO PAIN PRESENT: ICD-10-PCS | Mod: CPTII,S$GLB,, | Performed by: INTERNAL MEDICINE

## 2022-10-13 PROCEDURE — 3074F SYST BP LT 130 MM HG: CPT | Mod: CPTII,S$GLB,, | Performed by: PODIATRIST

## 2022-10-13 PROCEDURE — 99499 NO LOS: ICD-10-PCS | Mod: S$GLB,,, | Performed by: PODIATRIST

## 2022-10-13 PROCEDURE — 3078F DIAST BP <80 MM HG: CPT | Mod: CPTII,S$GLB,, | Performed by: INTERNAL MEDICINE

## 2022-10-13 PROCEDURE — 3078F PR MOST RECENT DIASTOLIC BLOOD PRESSURE < 80 MM HG: ICD-10-PCS | Mod: CPTII,S$GLB,, | Performed by: INTERNAL MEDICINE

## 2022-10-13 PROCEDURE — 1101F PT FALLS ASSESS-DOCD LE1/YR: CPT | Mod: CPTII,S$GLB,, | Performed by: INTERNAL MEDICINE

## 2022-10-13 PROCEDURE — 1159F MED LIST DOCD IN RCRD: CPT | Mod: CPTII,S$GLB,, | Performed by: INTERNAL MEDICINE

## 2022-10-13 PROCEDURE — 1126F AMNT PAIN NOTED NONE PRSNT: CPT | Mod: CPTII,S$GLB,, | Performed by: INTERNAL MEDICINE

## 2022-10-13 PROCEDURE — 3074F PR MOST RECENT SYSTOLIC BLOOD PRESSURE < 130 MM HG: ICD-10-PCS | Mod: CPTII,S$GLB,, | Performed by: PODIATRIST

## 2022-10-13 PROCEDURE — 90694 VACC AIIV4 NO PRSRV 0.5ML IM: CPT | Mod: S$GLB,,, | Performed by: INTERNAL MEDICINE

## 2022-10-13 RX ORDER — ATORVASTATIN CALCIUM 20 MG/1
20 TABLET, FILM COATED ORAL DAILY
Qty: 90 TABLET | Refills: 3 | Status: SHIPPED | OUTPATIENT
Start: 2022-10-13 | End: 2022-12-20 | Stop reason: SDUPTHER

## 2022-10-13 RX ORDER — ALENDRONATE SODIUM 70 MG/1
70 TABLET ORAL WEEKLY
Qty: 12 TABLET | Refills: 3 | Status: SHIPPED | OUTPATIENT
Start: 2022-10-13 | End: 2023-01-04

## 2022-10-13 NOTE — PROGRESS NOTES
Patient ID: Darinel Majano is a 82 y.o. male.    Chief Complaint: Annual Exam      Assessment:       1. Annual physical exam    2. Essential hypertension    3. Aortic atherosclerosis: see CT scan 5/17 also CT scan 9/18    4. Benign non-nodular prostatic hyperplasia without lower urinary tract symptoms    5. Squamous cell cancer of skin of right hand    6. IFG (impaired fasting glucose)    7. Hyperplastic polyp of transverse colon: 2012 repeated 2018    8. Liver fibrosis    9. Gastroesophageal reflux disease with gastritis: see EGD 2018, 2022    10. Portal hypertension    11. Chronic idiopathic gout involving toe of left foot without tophus    12. Age-related osteoporosis without current pathological fracture: see DEXA 2017; IMPROVED 2021    13. Psoriatic arthritis    14. Encounter for screening for malignant neoplasm of prostate    15. Mixed hyperlipidemia    16. Vitamin D deficiency disease    17. Age-related osteoporosis without current pathological fracture    18. Aortic atherosclerosis    19. Leg cramps          Plan:         Darinel was seen today for annual exam.    Diagnoses and all orders for this visit:    Annual physical exam    Essential hypertension; stable on regimen  -     CBC Auto Differential; Future  -     Comprehensive Metabolic Panel; Future    Aortic atherosclerosis: see CT scan 5/17 also CT scan 9/18; no symptoms, continue current treatment  -     atorvastatin (LIPITOR) 20 MG tablet; Take 1 tablet (20 mg total) by mouth once daily.    Benign non-nodular prostatic hyperplasia without lower urinary tract symptoms; no alarm symptoms    Squamous cell cancer of skin of right hand  -     Ambulatory referral/consult to Dermatology; Future- he has not been seen since 2019, urged them to schedule this at their earliest convenience    IFG (impaired fasting glucose):  No alarm symptoms.  Labs and review  -     Hemoglobin A1C; Future    Hyperplastic polyp of transverse colon: 2012 repeated 2018    Liver  "fibrosis:  Keep hepatology follow-up    Gastroesophageal reflux disease with gastritis: see EGD 2018, 2022:  Stable, keep GI and hepatology follow-up    Portal hypertension: Stable, keep GI and hepatology follow-up    Chronic idiopathic gout involving toe of left foot without tophus:  Labs and review    Age-related osteoporosis without current pathological fracture: see DEXA 2017; IMPROVED 2021  -     alendronate (FOSAMAX) 70 MG tablet; Take 1 tablet (70 mg total) by mouth once a week.    Psoriatic arthritis; stable on regimen; keep Derm follow-up    Encounter for screening for malignant neoplasm of prostate  -     PSA, Screening; Future    Mixed hyperlipidemia:  Continue current regimen  -     Lipid Panel; Future    Vitamin D deficiency disease  -     Vitamin D; Future    Age-related osteoporosis without current pathological fracture  -     alendronate (FOSAMAX) 70 MG tablet; Take 1 tablet (70 mg total) by mouth once a week.    Aortic atherosclerosis  -     atorvastatin (LIPITOR) 20 MG tablet; Take 1 tablet (20 mg total) by mouth once daily.    Leg cramps  -     Magnesium; Future     Hydration; hygienic measures reviewed with regard to his leg cramps.  If symptoms worsen to let me know but they occur very infrequently  Schedule Derm  Keep ENT follow-up, [facility]  High-dose flu shot today  Consider COVID booster, he is ambivalent  Keep all other previously scheduled appointment    Subjective:   Annual exam     Wife with him     Due for labs.  Flu and COVID vaccines reviewed.  Follows closely in Gastro, Hpatology and Podiatry.     Recall lung mass on CT 2390-3516.  Had been seen in CTS and discharged:  "Seen by Dr. Chandra CTS 9/20/18: "77 y.o. male presents with PMH of BPH, HTN, Psoriasis, thrombocytopenia, squamous cell carcinoma of right hand x 2 and history of EtOH abuse (quit drinking May 2017) here today for 10 month f/u of right VATS wedge and diaphgram biopsy on 11/10/17. We have " "also been following a RLL mass measuring 4cm. Today he reports feeling well. SCC removed from hand. Recently got hearing aids. Denies fever, chills, SOB, CP, syncope, N/V or changes in bowel or bladder functioning."  Told to follow up prn.     He has not had fever, chills, sweats, cough or shortness of breath.  Alarm symptoms reviewed.  Discussed the option of doing a follow-up CT, he does not feel indicated currently.     Anemia and thrombocytopenia issues improved since discontinuing alcohol.  He was last seen in Hematology in 2017 in told to follow up p.r.n.     He has had a skin cancer and is overdue for Dermatology follow-up.     Podiatry October 2022  Hepatology June 2022  Abdominal ultrasound June 2022  Endoscopy March 2022  DEXA June 2021  Ophthalmology April 2021  Optometry March 2021  Dermatology July 2019  Colonoscopy/EGD 2018  CT chest 2018  CTS 2018 discharged  Hematology-Oncology 2017, discharged      Review of Systems   Constitutional:  Negative for activity change and unexpected weight change.   HENT:  Positive for hearing loss. Negative for rhinorrhea and trouble swallowing.         Stable hearing loss- [facility]   Eyes:  Negative for discharge and visual disturbance.   Respiratory:  Negative for chest tightness and wheezing.    Cardiovascular:  Negative for chest pain and palpitations.   Gastrointestinal:  Negative for blood in stool, constipation, diarrhea and vomiting.   Endocrine: Negative for polydipsia and polyuria.   Genitourinary:  Negative for difficulty urinating, hematuria and urgency.   Musculoskeletal:  Negative for arthralgias, joint swelling and neck pain.        Leg cramps rarely   Skin:         No new lesions; ongoing chronic dermatitis  Needs DERM follow up   Neurological:  Negative for weakness and headaches.   Psychiatric/Behavioral:  Negative for confusion and dysphoric mood.        Objective:      Physical Exam  Constitutional:       Appearance: He is well-developed. "   HENT:      Head: Normocephalic and atraumatic.      Right Ear: External ear normal.      Left Ear: External ear normal.   Eyes:      Extraocular Movements: Extraocular movements intact.      Conjunctiva/sclera: Conjunctivae normal.   Neck:      Thyroid: No thyromegaly.   Cardiovascular:      Rate and Rhythm: Normal rate and regular rhythm.      Heart sounds: No murmur heard.  Pulmonary:      Effort: Pulmonary effort is normal. No respiratory distress.      Breath sounds: Normal breath sounds. No wheezing.   Abdominal:      General: There is no distension.      Palpations: Abdomen is soft.      Tenderness: There is no abdominal tenderness.      Comments: Reducible umbilical hernia   Musculoskeletal:         General: No tenderness.      Cervical back: Normal range of motion and neck supple.      Right lower leg: No edema.      Left lower leg: No edema.   Lymphadenopathy:      Cervical: No cervical adenopathy.   Skin:     General: Skin is warm and dry.      Comments: Chronic dermatitis   Neurological:      General: No focal deficit present.      Mental Status: He is alert and oriented to person, place, and time.      Cranial Nerves: No cranial nerve deficit.   Psychiatric:         Mood and Affect: Mood normal.         Behavior: Behavior normal.         Thought Content: Thought content normal.         Judgment: Judgment normal.           Health Maintenance Due   Topic Date Due    COVID-19 Vaccine (4 - Booster for Pfizer series) 11/26/2021    Influenza Vaccine (1) 09/01/2022    PROSTATE-SPECIFIC ANTIGEN  09/20/2022

## 2022-10-14 NOTE — PROGRESS NOTES
Subjective:      Patient ID: Darinel Majano is a 82 y.o. male.    Chief Complaint: Nail Care    Darinel is a 82 y.o. male who presents to the clinic for evaluation and treatment of high risk feet. 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), Anemia (5/4/2017), Aortic atherosclerosis: see CT scan 5/17 (6/9/2017), BPH (benign prostatic hypertrophy), Chronic gout, Cortical senile cataract (5/15/2012), Degenerative disc disease, Diverticulosis of large intestine without hemorrhage: see CT scan 5/17 (6/9/2017), Essential hypertension (2/19/2016), Gastroesophageal reflux disease with gastritis: see EGD 2018 (10/31/2018), Gout, Hyperplastic polyp of transverse colon: 2012 repeat 5 years (5/4/2017), IFG (impaired fasting glucose) (5/7/2019), Kidney stone on left side (6/9/2017), Osteoporosis, Psoriasis, 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 The patient's chief complaint is long, thick toenails. This patient has documented high risk feet requiring routine maintenance secondary to peripheral neuropathy.    PCP: Annabella Montiel MD    Date Last Seen by PCP:  Chief Complaint   Patient presents with    Nail Care       Current shoe gear:  Affected Foot: Tennis shoes     Unaffected Foot: Tennis shoes    Last encounter in this department: Visit date not found    Hemoglobin A1C   Date Value Ref Range Status   09/01/2020 5.2 4.0 - 5.6 % Final     Comment:     ADA Screening Guidelines:  5.7-6.4%  Consistent with prediabetes  >or=6.5%  Consistent with diabetes  High levels of fetal hemoglobin interfere with the HbA1C  assay. Heterozygous hemoglobin variants (HbS, HgC, etc)do  not significantly interfere with this assay.   However, presence of multiple variants may affect accuracy.     06/29/2011 4.7 4.0 - 6.2 %  Final   01/05/2011 4.6 4.0 - 6.2 % Final       Review of Systems   Constitutional: Negative for chills, fever and malaise/fatigue.   HENT:  Negative for hearing loss.    Cardiovascular:  Negative for claudication and leg swelling.   Respiratory:  Negative for shortness of breath.    Skin:  Positive for color change, nail changes and unusual hair distribution. Negative for flushing and rash.   Musculoskeletal:  Negative for joint pain and myalgias.   Neurological:  Positive for numbness, paresthesias and sensory change. Negative for loss of balance.   Psychiatric/Behavioral:  Negative for altered mental status.          Objective:      Physical Exam  Vitals reviewed.   Constitutional:       Appearance: He is well-developed.   Cardiovascular:      Pulses:           Dorsalis pedis pulses are 1+ on the right side and 1+ on the left side.        Posterior tibial pulses are 1+ on the right side and 1+ on the left side.      Comments: Non pitting  edema noted to b/L LEs  Musculoskeletal:      Right knee: No swelling or ecchymosis.      Left knee: No swelling or ecchymosis.      Right lower leg: No swelling, deformity, tenderness or bony tenderness. No edema.      Left lower leg: No swelling or tenderness. No edema.      Right ankle: No swelling or ecchymosis. No lateral malleolus or medial malleolus tenderness. Normal range of motion. Normal pulse.      Right Achilles Tendon: No defects. Cheema's test negative.      Left ankle: No swelling or ecchymosis. No lateral malleolus or medial malleolus tenderness. Normal range of motion. Normal pulse.      Left Achilles Tendon: Cheema's test negative.      Right foot: Normal range of motion. No deformity.      Left foot: Normal range of motion. No deformity.      Comments: Decreased height of medial arches noted with loading of the foot b/L  Hammertoes noted, digits 4 and 5b/L with adductovarus rotation of b/L fifth digit.    Adequate joint ROM noted to all lower extremity  muscle groups with no pain or crepitation noted. Muscle strength is 5/5 in all groups bilaterally.     Feet:      Right foot:      Protective Sensation: 10 sites tested.  6 sites sensed.      Toenail Condition: Right toenails are abnormally thick and long.      Left foot:      Protective Sensation: 10 sites tested.  6 sites sensed.      Toenail Condition: Left toenails are abnormally thick and long.   Skin:     General: Skin is warm.      Capillary Refill: Capillary refill takes more than 3 seconds.      Findings: No abrasion, bruising, burn, ecchymosis or wound.      Comments: Skin temp is cool to cool from proximal to distal b/L.  Nails x10 are elongated by  4-7mm's, thickened by 1-3 mm's, dystrophic, and are yellowish in  coloration . Xerosis Bilaterally. No open lesions noted.        Neurological:      Mental Status: He is alert and oriented to person, place, and time.      Comments: Diminished protective sensation noted b/L     Psychiatric:         Attention and Perception: He is attentive.         Speech: Speech normal.         Behavior: Behavior normal.             Assessment:       Encounter Diagnoses   Name Primary?    Idiopathic peripheral neuropathy Yes    Onychomycosis          Plan:       Darinel was seen today for nail care.    Diagnoses and all orders for this visit:    Idiopathic peripheral neuropathy    Onychomycosis    I counseled the patient on his conditions, their implications and medical management.        - Shoe inspection. Patient instructed on proper foot hygeine. We discussed wearing proper shoe gear, daily foot inspections, never walking without protective shoe gear, never putting sharp instruments to feet, routine podiatric nail visits every 2-3 months.    - With patient's permission, nails were aggressively reduced and debrided x 10 to their soft tissue attachment mechanically and with electric , removing all offending nail and debris. Patient relates relief following the procedure.  He will continue to monitor the areas daily, inspect his feet, wear protective shoe gear when ambulatory, moisturizer to maintain skin integrity and follow in this office in approximately 2-3 months, sooner p.r.n.

## 2022-12-05 ENCOUNTER — HOSPITAL ENCOUNTER (OUTPATIENT)
Dept: RADIOLOGY | Facility: HOSPITAL | Age: 82
Discharge: HOME | End: 2022-12-05
Attending: PHYSICIAN ASSISTANT
Payer: MEDICARE

## 2022-12-05 DIAGNOSIS — K70.30 ALCOHOLIC CIRRHOSIS OF LIVER WITHOUT ASCITES: ICD-10-CM

## 2022-12-05 PROCEDURE — 76705 US ABDOMEN LIMITED: ICD-10-PCS | Mod: 26,,, | Performed by: INTERNAL MEDICINE

## 2022-12-05 PROCEDURE — 76705 ECHO EXAM OF ABDOMEN: CPT | Mod: 26,,, | Performed by: INTERNAL MEDICINE

## 2022-12-05 PROCEDURE — 76705 ECHO EXAM OF ABDOMEN: CPT | Mod: TC

## 2022-12-09 ENCOUNTER — OFFICE VISIT (OUTPATIENT)
Dept: HEPATOLOGY | Facility: CLINIC | Age: 82
End: 2022-12-09
Payer: MEDICARE

## 2022-12-09 VITALS
SYSTOLIC BLOOD PRESSURE: 127 MMHG | BODY MASS INDEX: 29.76 KG/M2 | DIASTOLIC BLOOD PRESSURE: 59 MMHG | TEMPERATURE: 99 F | OXYGEN SATURATION: 97 % | WEIGHT: 162.69 LBS | HEART RATE: 79 BPM

## 2022-12-09 DIAGNOSIS — K76.6 PORTAL HYPERTENSION: ICD-10-CM

## 2022-12-09 DIAGNOSIS — D69.6 THROMBOCYTOPENIA: ICD-10-CM

## 2022-12-09 DIAGNOSIS — K76.0 FATTY LIVER: ICD-10-CM

## 2022-12-09 DIAGNOSIS — K76.9 LIVER DISEASE, UNSPECIFIED: Primary | ICD-10-CM

## 2022-12-09 DIAGNOSIS — E66.9 OBESITY (BMI 30-39.9): ICD-10-CM

## 2022-12-09 DIAGNOSIS — K76.89 HEPATIC CYST: ICD-10-CM

## 2022-12-09 DIAGNOSIS — K74.00 LIVER FIBROSIS: ICD-10-CM

## 2022-12-09 DIAGNOSIS — K70.30 ALCOHOLIC CIRRHOSIS OF LIVER WITHOUT ASCITES: ICD-10-CM

## 2022-12-09 PROCEDURE — 99999 PR PBB SHADOW E&M-EST. PATIENT-LVL IV: CPT | Mod: PBBFAC,,, | Performed by: PHYSICIAN ASSISTANT

## 2022-12-09 PROCEDURE — 1101F PT FALLS ASSESS-DOCD LE1/YR: CPT | Mod: CPTII,S$GLB,, | Performed by: PHYSICIAN ASSISTANT

## 2022-12-09 PROCEDURE — 99214 PR OFFICE/OUTPT VISIT, EST, LEVL IV, 30-39 MIN: ICD-10-PCS | Mod: S$GLB,,, | Performed by: PHYSICIAN ASSISTANT

## 2022-12-09 PROCEDURE — 99999 PR PBB SHADOW E&M-EST. PATIENT-LVL IV: ICD-10-PCS | Mod: PBBFAC,,, | Performed by: PHYSICIAN ASSISTANT

## 2022-12-09 PROCEDURE — 3078F DIAST BP <80 MM HG: CPT | Mod: CPTII,S$GLB,, | Performed by: PHYSICIAN ASSISTANT

## 2022-12-09 PROCEDURE — 3078F PR MOST RECENT DIASTOLIC BLOOD PRESSURE < 80 MM HG: ICD-10-PCS | Mod: CPTII,S$GLB,, | Performed by: PHYSICIAN ASSISTANT

## 2022-12-09 PROCEDURE — 1159F MED LIST DOCD IN RCRD: CPT | Mod: CPTII,S$GLB,, | Performed by: PHYSICIAN ASSISTANT

## 2022-12-09 PROCEDURE — 3074F SYST BP LT 130 MM HG: CPT | Mod: CPTII,S$GLB,, | Performed by: PHYSICIAN ASSISTANT

## 2022-12-09 PROCEDURE — 1159F PR MEDICATION LIST DOCUMENTED IN MEDICAL RECORD: ICD-10-PCS | Mod: CPTII,S$GLB,, | Performed by: PHYSICIAN ASSISTANT

## 2022-12-09 PROCEDURE — 1126F PR PAIN SEVERITY QUANTIFIED, NO PAIN PRESENT: ICD-10-PCS | Mod: CPTII,S$GLB,, | Performed by: PHYSICIAN ASSISTANT

## 2022-12-09 PROCEDURE — 1126F AMNT PAIN NOTED NONE PRSNT: CPT | Mod: CPTII,S$GLB,, | Performed by: PHYSICIAN ASSISTANT

## 2022-12-09 PROCEDURE — 3288F FALL RISK ASSESSMENT DOCD: CPT | Mod: CPTII,S$GLB,, | Performed by: PHYSICIAN ASSISTANT

## 2022-12-09 PROCEDURE — 1160F PR REVIEW ALL MEDS BY PRESCRIBER/CLIN PHARMACIST DOCUMENTED: ICD-10-PCS | Mod: CPTII,S$GLB,, | Performed by: PHYSICIAN ASSISTANT

## 2022-12-09 PROCEDURE — 1101F PR PT FALLS ASSESS DOC 0-1 FALLS W/OUT INJ PAST YR: ICD-10-PCS | Mod: CPTII,S$GLB,, | Performed by: PHYSICIAN ASSISTANT

## 2022-12-09 PROCEDURE — 3288F PR FALLS RISK ASSESSMENT DOCUMENTED: ICD-10-PCS | Mod: CPTII,S$GLB,, | Performed by: PHYSICIAN ASSISTANT

## 2022-12-09 PROCEDURE — 1160F RVW MEDS BY RX/DR IN RCRD: CPT | Mod: CPTII,S$GLB,, | Performed by: PHYSICIAN ASSISTANT

## 2022-12-09 PROCEDURE — 99214 OFFICE O/P EST MOD 30 MIN: CPT | Mod: S$GLB,,, | Performed by: PHYSICIAN ASSISTANT

## 2022-12-09 PROCEDURE — 3074F PR MOST RECENT SYSTOLIC BLOOD PRESSURE < 130 MM HG: ICD-10-PCS | Mod: CPTII,S$GLB,, | Performed by: PHYSICIAN ASSISTANT

## 2022-12-09 NOTE — PATIENT INSTRUCTIONS
Triple phase CT scan now to further evaluation will discuss results with patient   Follow-up in 6 months with ultrasound and labs prior       Because you have cirrhosis, it is important to attend clinic visits every 6 months with an Ultrasound and blood tests every 6 months to screen for liver cancer (you are at risk of developing liver cancer due to scar tissue in the liver)    Signs and symptoms of worsening liver disease include jaundice, fluid in the belly (ascites), and confusion/disorientation/slowed thought processes due to hepatic encephalopathy (toxins building up because of liver problems).   You should seek medical attention if any of these things occur.    Also, possible bleeding from esophageal varices (blood vessels in the stomach and foodpipe can burst and cause fatal bleeding).  Therefore, if you have symptoms of vomiting blood, blood in your stool, dark or black stools or vomiting coffee ground vomit, YOU SHOULD GO TO THE EMERGENCY ROOM IMMEDIATELY.     Cirrhosis can increase the risk of liver cancer, liver failure, and death. However, we will watch your liver function score (MELD score) closely with each clinic visit. A normal MELD score is 6, highest is 40.  We will check this with every clinic visit. A MELD 15 or higher is when we start to consider transplant because MELD 15 or higher indicates that the liver is not functioning as well       Cirrhosis Counseling  - strict abstinence of alcohol use (includes beer, wine, and/or liquor)  - avoid non-steroidal anti-inflammatory drugs (NSAIDs) such as ibuprofen, Motrin, naprosyn, Aleve due to the risk of kidney damage  - can take acetaminophen (Tylenol), no more than 2000 mg per day  - low sodium (salt) 2 gram per day diet  - high protein diet: 1.5g/kg to prevent muscle mass loss. Recommended at least 1 protein shake daily using Premier Protein shakes    - resistance exercises for muscle strength  - avoid raw seafoods due to the risk of fatal Vibrio  vulnificus infection  - ultrasound of the liver every 6 months for liver cancer screening  - Upper endoscopy every 1-2 years to screen for varices in the stomach and esophagus

## 2022-12-09 NOTE — PROGRESS NOTES
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 White male with PMH psoriasis, osteoporosis (on fosamax), alcohol dependence (stopped in 2017), right hemidiaphragm mass s/p VATS, SCC of R hand, s/p cholecystectomy 2012, with initial presentation to hepatology clinic for thrombocytopenia w/ elevated enzymes and hypoalbuminemia in 2017, returning for presumed liver fibrosis based on clinical findings such as thrombocytopenia, fluid overload, + splenomegaly in the initial context of alcohol use. The patient was previously followed by Raymond Nair PA-C, is new to me today.      The patient is being followed for [id number] HCC screening due to aforementioned findings; no history of tissue evidence of parenchymal hepatic fibrosis, however. He has persistently low platelet count. Last EGD 2018 without varioces. Other than fluid overload (well controlled at present moment), no other symptoms/signs of hepatic decompensation.    Interval history:   Patient here alone today. He reports he is still occasionally drinking 6oz bottle of wine with crushed ice, started about 1 month ago, about 1 per week. He is otherwise abstinent from beer and other alcohol.  He was put on spironolactone and lasix for BLE edema when he initially saw Raymond in 2017. Still taking this, no worsening of fluid issues and no increase in dosages. We refilled these medications for him this year.  No changes no new meds. Denies symptoms of hepatic decompensation including jaundice, ascites, cognitive problems to suggest hepatic encephalopathy, or GI bleeding.     Fibrosis staging: remote history of biopsy without reported fibrosis in 2011, last Fibroscan 2017 showed kPA of 4.1 thus F0-1 fibrosis. US elastography 2021 F2-3     Risk factors for fatty liver include obesity, pre-diabetes and alcohol  use     Immunization status of Hep A & Hep B: S/p Twinrix 2011    Interval history 06/08/2022:  Darinel Majano arrives today with his wife.   Since our last visit, has maintained weight loss and is not drinking. They moved back into their house after repairs. Doing well and without complaints. Fluid well controlled. Denies symptoms of hepatic decompensation including jaundice, ascites, cognitive problems to suggest hepatic encephalopathy, or GI bleeding.       Interval history 12/09/2022:  Darinel Majano arrives today with his wife.   Since our last visit, he is doing well. No new medical issues. No complaints. Denies symptoms of hepatic decompensation including jaundice, ascites, cognitive problems to suggest hepatic encephalopathy, or GI bleeding.           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), Anemia (5/4/2017), Aortic atherosclerosis: see CT scan 5/17 (6/9/2017), BPH (benign prostatic hypertrophy), Chronic gout, Cortical senile cataract (5/15/2012), Degenerative disc disease, Diverticulosis of large intestine without hemorrhage: see CT scan 5/17 (6/9/2017), Essential hypertension (2/19/2016), Gastroesophageal reflux disease with gastritis: see EGD 2018 (10/31/2018), Gout, Hyperplastic polyp of transverse colon: 2012 repeat 5 years (5/4/2017), IFG (impaired fasting glucose) (5/7/2019), Kidney stone on left side (6/9/2017), Osteoporosis, Psoriasis, 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   PSXH Darinel has a past surgical history that includes Cataract extraction; Cholecystectomy (2012); Eye surgery; Right Hand Surgery; Colonoscopy (N/A, 02/05/2018); Upper gastrointestinal endoscopy; Tonsillectomy; Fracture surgery; and Esophagogastroduodenoscopy (N/A, 3/9/2022).    Darinel's family  history includes Cancer in his father; Cataracts in his mother; Diabetes in his mother; Heart disease in his brother; Hypertension in his mother; No Known Problems in his sister; Stomach cancer in his father.   NATACHA Tena reports that he has quit smoking. He has a 20.00 pack-year smoking history. He has never used smokeless tobacco. He reports that he does not drink alcohol and does not use drugs.   TOMASA Tena is allergic to humira [adalimumab].   LEVI Tena has a current medication list which includes the following prescription(s): alendronate, atorvastatin, furosemide, milk thistle, multivitamin with minerals, spironolactone, tacrolimus, vitamin d, and zinc.       Review of Systems   Denies symptoms of hepatic decompensation including jaundice, ascites, cognitive problems to suggest hepatic encephalopathy, or GI bleeding.       DATA   Physical Exam   Constitutional: Friendly white male , well-nourished. No distress. Alert and oriented to person, place, and time.  Eyes: No scleral icterus.   Pulmonary/Chest: Respiratory effort and breath sounds normal. No respiratory distress.   Abdominal: Obese.   Musculoskeletal: No edema. Arthritic PIP joints noted on R hand.  Neurological: No tremor. Coordination and gait normal.   Skin:  No rash or erythema. No jaundice. No telangiectasias or palmar erythema noted.  Psychiatric: Normal mood and affect. Speech, behavior, and thought content normal. No depression or anxiety noted.     BP (!) 127/59 (BP Location: Right arm, Patient Position: Sitting, BP Method: Medium (Automatic))   Pulse 79   Temp 98.6 °F (37 °C) (Oral)   Wt 73.8 kg (162 lb 11.2 oz)   SpO2 97%   BMI 29.76 kg/m²       LABS:  Lab Results   Component Value Date    ALT 24 12/05/2022    AST 21 12/05/2022    ALKPHOS 94 12/05/2022    BILITOT 1.1 (H) 12/05/2022    ALBUMIN 3.7 12/05/2022    INR 1.0 12/05/2022     (L) 12/05/2022       Prior serologic workup:   Lab Results   Component Value Date    SMOOTHMUSCAB  Positive 1:40 (A) 05/24/2017    AMAIFA Negative 1:40 05/24/2017    ANASCREEN Negative <1:160 05/24/2017    FERRITIN 409 (H) 05/24/2017    FESATURATED 28 05/24/2017    PETH 149 (A) 05/24/2017    DQRFG6WPSAGC MM 05/24/2017    [id number] 196 (H) 05/24/2017         DIAGNOSTIC STUDIES:      US ELASTOGRAPHY PARENCHYMA (ORGAN)     CLINICAL HISTORY:  Alcoholic cirrhosis of liver without ascites     TECHNIQUE:  Quantitative Ultrasound Assessment of the Liver (QUAL) elastography was performed on the [facility].     COMPARISON:  None.     FINDINGS:  ELASTOGRAPHY     HRI: Indicates no significant fatty infiltration.  0.75     Median elastography: 1.82 m/sec, 10.29 kPa. Indicating mild-moderate or F2-F3 fibrosis.     IQR/median: 23, indicating an acceptable range.           US Abdomen Limited  Narrative: EXAMINATION:  US ABDOMEN LIMITED    CLINICAL HISTORY:  .    Alcoholic cirrhosis of liver without ascites    TECHNIQUE:  Limited ultrasound of the right upper quadrant of the abdomen including pancreas, liver, gallbladder, common bile duct was performed.    COMPARISON:  Ultrasound abdomen 06/02/2022, 12/09/2021    FINDINGS:  Liver: Normal in size, measuring 14.3 cm. Heterogeneous echotexture.    Stable 1.0 x 1.1 x 0.9 right hepatic simple cyst, previously 1.1 x 1.0 x 1.3 cm.    Hyperechoic left hepatic focus measures 0.5 x 0.5 x 0.4 cm and not seen on prior exam.    Gallbladder: Absent.    Biliary system: The common duct is not dilated, measuring 4 mm.  No intrahepatic ductal dilatation.    Spleen: normal in size measuring 11.7 x 5.6 cm.    Pancreas: The visualized portions of pancreas appear normal.    IVC: Visualized and unremarkable.    Miscellaneous: No ascites.  Impression: Heterogeneous hepatic parenchyma.  New left hepatic 0.5 cm nonspecific hyperechoic focus.  Recommend follow-up ultrasound.    Unchanged right hepatic cyst.    This report was flagged in Epic as abnormal.    Electronically signed by resident: Hola  Walt  Date:    12/05/2022  Time:    08:48    Electronically signed by: Little Lorenz MD  Date:    12/05/2022  Time:    09:17        ASSESSMENT & PLAN     82 y.o. White male with:    1. Liver fibrosis (?) w/  Thrombocytopenia  - we are following out of abundance of caution   - US elastography 2021 suggested F3 fibrosis.  MELD-Na score: 7 at 12/5/2022  8:30 AM  MELD score: 7 at 12/5/2022  8:30 AM  Calculated from:  Serum Creatinine: 0.8 mg/dL (Using min of 1 mg/dL) at 12/5/2022  8:30 AM  Serum Sodium: 139 mmol/L (Using max of 137 mmol/L) at 12/5/2022  8:30 AM  Total Bilirubin: 1.1 mg/dL at 12/5/2022  8:30 AM  INR(ratio): 1.0 at 12/5/2022  8:30 AM  Age: 82 years    -- Presumed Cirrhosis Health Maintenance:  -- HCC screening: UTD  -- Variceal screening: EGD 2022 without evidence of portal hypertension or varices   -- Hepatitis A & B vaccination: vaccinated 2011    2. Alcohol related liver disease without ascites, well compensated    -- Recommendations as per AVS   -- Recommend NO alcohol for life.  -- continue HCC screening q6months    3. Hepatic cyst  - stable simple cyst measuring 1.1x1.0x1.3cm    4. LE edema  - continue shruthi 50mg PO QD & lasix 20mg PO QD for swelling    5. Fatty liver  - RF include obesity & prior alcohol use    6. Liver lesion  - new hyperechoic, 0.5cm left hepatic  - discussed with patient and wife we could either re-scan ultrasound in 3 mos vs cross sectional now, they are interested in obtaining cross sectional now, reasonable since he has underlying fibrosis  - TPCT now, patient with metal plate in arm cannot complete MRI     MELD-Na score: 7 at 12/5/2022  8:30 AM  MELD score: 7 at 12/5/2022  8:30 AM  Calculated from:  Serum Creatinine: 0.8 mg/dL (Using min of 1 mg/dL) at 12/5/2022  8:30 AM  Serum Sodium: 139 mmol/L (Using max of 137 mmol/L) at 12/5/2022  8:30 AM  Total Bilirubin: 1.1 mg/dL at 12/5/2022  8:30 AM  INR(ratio): 1.0 at 12/5/2022  8:30 AM  Age: 82 years      Orders Placed This  Encounter   Procedures    CT Abdomen With Contrast    US Abdomen Limited    Protime-INR    Comprehensive Metabolic Panel    CBC Auto Differential    AFP Tumor Marker       TPCT now for further eval of new hyperechoic lesion L hepatic lobe, f/u pending results, otherwise normal q6mo.   Follow up in about 6 months (around 6/9/2023).   Total time: 33 min    Thank you for allowing me to participate in the care of Darinel Choi PAGerardoC  Hepatology

## 2022-12-17 ENCOUNTER — HOSPITAL ENCOUNTER (OUTPATIENT)
Dept: RADIOLOGY | Facility: HOSPITAL | Age: 82
Discharge: HOME | End: 2022-12-17
Attending: PHYSICIAN ASSISTANT
Payer: MEDICARE

## 2022-12-17 DIAGNOSIS — K76.9 LIVER DISEASE, UNSPECIFIED: ICD-10-CM

## 2022-12-17 PROCEDURE — 25500020 PHARM REV CODE 255: Performed by: PHYSICIAN ASSISTANT

## 2022-12-17 PROCEDURE — 74160 CT ABDOMEN WITH CONTRAST: ICD-10-PCS | Mod: 26,,, | Performed by: RADIOLOGY

## 2022-12-17 PROCEDURE — 74160 CT ABDOMEN W/CONTRAST: CPT | Mod: 26,,, | Performed by: RADIOLOGY

## 2022-12-17 PROCEDURE — 74160 CT ABDOMEN W/CONTRAST: CPT | Mod: TC

## 2022-12-17 RX ADMIN — IOHEXOL 100 ML: 350 INJECTION, SOLUTION INTRAVENOUS at 10:12

## 2022-12-20 ENCOUNTER — TELEPHONE (OUTPATIENT)
Dept: INTERNAL MEDICINE | Facility: CLINIC | Age: 82
End: 2022-12-20

## 2022-12-20 DIAGNOSIS — E78.2 MIXED HYPERLIPIDEMIA: ICD-10-CM

## 2022-12-20 DIAGNOSIS — I70.0 AORTIC ATHEROSCLEROSIS: ICD-10-CM

## 2022-12-20 DIAGNOSIS — I70.0 AORTIC ATHEROSCLEROSIS: Primary | ICD-10-CM

## 2022-12-20 RX ORDER — ATORVASTATIN CALCIUM 40 MG/1
40 TABLET, FILM COATED ORAL DAILY
Qty: 90 TABLET | Refills: 3 | Status: SHIPPED | OUTPATIENT
Start: 2022-12-20 | End: 2023-06-17 | Stop reason: SDUPTHER

## 2022-12-20 NOTE — TELEPHONE ENCOUNTER
Spoke to patient's wife with pt at side and advised of recommendation. Patient and wife verbalized understanding.       He agrees to increase the Lipitor to 40mg, and to be seen in cardiology.

## 2022-12-20 NOTE — TELEPHONE ENCOUNTER
----- Message from Avelina Choi PA-C sent at 12/19/2022  1:12 PM CST -----  Just FYI incidental significant atherosclerosis/calcification

## 2022-12-20 NOTE — TELEPHONE ENCOUNTER
Please call to let him know that I got a message from his liver doctor saying that his CT scan shows significant atherosclerosis    I know he is on Lipitor 20, options would include increasing the Lipitor to 40 mg but I would also recommend getting established with a cardiologist.  Is he willing to do so?

## 2022-12-23 ENCOUNTER — TELEPHONE (OUTPATIENT)
Dept: HEPATOLOGY | Facility: CLINIC | Age: 82
End: 2022-12-23

## 2022-12-23 NOTE — TELEPHONE ENCOUNTER
Called daughter and gave her results a couple of days ago but calling to ensure pt & wife informed.       Unable to reach at home or mobile. Third attempt.     Results communicated via patient portal.

## 2023-01-09 ENCOUNTER — PATIENT MESSAGE (OUTPATIENT)
Dept: DERMATOLOGY | Facility: CLINIC | Age: 83
End: 2023-01-09
Payer: MEDICARE

## 2023-01-10 ENCOUNTER — TELEPHONE (OUTPATIENT)
Dept: HEPATOLOGY | Facility: CLINIC | Age: 83
End: 2023-01-10
Payer: MEDICARE

## 2023-01-11 ENCOUNTER — TELEPHONE (OUTPATIENT)
Dept: HEPATOLOGY | Facility: CLINIC | Age: 83
End: 2023-01-11
Payer: MEDICARE

## 2023-01-11 NOTE — TELEPHONE ENCOUNTER
----- Message from Dinorah Amezcua sent at 1/10/2023  3:49 PM CST -----  Regarding: Questions  Pt is requesting to speak with Avelina Choi. They have some questions.      Darinel @ [phone]

## 2023-01-11 NOTE — TELEPHONE ENCOUNTER
Regarding: Questions  Pt is requesting to speak with Avelina Choi. They have some questions.     Darinel @ [phone].    Call returned to [phone]. Natividad wife to the patient stated he called, but he is hard of hearing.    We were calling because he is all nervous because he has another US Abd so soon. He had the US on 12/5 then the CT on 12/17.    She told us it was OK.  Now he has another one scheduled for in the morning at 8:45 am.  I will send a message to Avelina  and we will call or send a My Chart message.    Will send a secure chat.

## 2023-01-11 NOTE — TELEPHONE ENCOUNTER
Received secure chat response from Avelina Choi PA-C.  The patient does not have to repeat US Abd scheduled for this morning. Appt was cancelled.     Call returned to Natividad, wife of the patient.  Natividad stated Avelina called them last night.  Thanks for calling me back and letting me know.

## 2023-01-12 ENCOUNTER — OFFICE VISIT (OUTPATIENT)
Dept: PODIATRY | Facility: CLINIC | Age: 83
End: 2023-01-12
Payer: MEDICARE

## 2023-01-12 ENCOUNTER — IMMUNIZATION (OUTPATIENT)
Dept: INTERNAL MEDICINE | Facility: CLINIC | Age: 83
End: 2023-01-12
Payer: MEDICARE

## 2023-01-12 VITALS
HEIGHT: 62 IN | WEIGHT: 168.19 LBS | BODY MASS INDEX: 30.95 KG/M2 | SYSTOLIC BLOOD PRESSURE: 119 MMHG | HEART RATE: 69 BPM | DIASTOLIC BLOOD PRESSURE: 63 MMHG

## 2023-01-12 DIAGNOSIS — G60.9 IDIOPATHIC PERIPHERAL NEUROPATHY: Primary | ICD-10-CM

## 2023-01-12 DIAGNOSIS — L84 CORN OR CALLUS: ICD-10-CM

## 2023-01-12 DIAGNOSIS — B35.1 ONYCHOMYCOSIS: ICD-10-CM

## 2023-01-12 DIAGNOSIS — Z23 NEED FOR VACCINATION: Primary | ICD-10-CM

## 2023-01-12 PROCEDURE — 99499 UNLISTED E&M SERVICE: CPT | Mod: S$GLB,,, | Performed by: PODIATRIST

## 2023-01-12 PROCEDURE — 11056 PARNG/CUTG B9 HYPRKR LES 2-4: CPT | Mod: Q9,S$GLB,, | Performed by: PODIATRIST

## 2023-01-12 PROCEDURE — 3078F PR MOST RECENT DIASTOLIC BLOOD PRESSURE < 80 MM HG: ICD-10-PCS | Mod: CPTII,S$GLB,, | Performed by: PODIATRIST

## 2023-01-12 PROCEDURE — 1159F MED LIST DOCD IN RCRD: CPT | Mod: CPTII,S$GLB,, | Performed by: PODIATRIST

## 2023-01-12 PROCEDURE — 91312 COVID-19, MRNA, LNP-S, BIVALENT BOOSTER, PF, 30 MCG/0.3 ML DOSE: ICD-10-PCS | Mod: S$GLB,,, | Performed by: INTERNAL MEDICINE

## 2023-01-12 PROCEDURE — 99999 PR PBB SHADOW E&M-EST. PATIENT-LVL III: CPT | Mod: PBBFAC,,, | Performed by: PODIATRIST

## 2023-01-12 PROCEDURE — 1159F PR MEDICATION LIST DOCUMENTED IN MEDICAL RECORD: ICD-10-PCS | Mod: CPTII,S$GLB,, | Performed by: PODIATRIST

## 2023-01-12 PROCEDURE — 1126F PR PAIN SEVERITY QUANTIFIED, NO PAIN PRESENT: ICD-10-PCS | Mod: CPTII,S$GLB,, | Performed by: PODIATRIST

## 2023-01-12 PROCEDURE — 99999 PR PBB SHADOW E&M-EST. PATIENT-LVL III: ICD-10-PCS | Mod: PBBFAC,,, | Performed by: PODIATRIST

## 2023-01-12 PROCEDURE — 11721 PR DEBRIDEMENT OF NAILS, 6 OR MORE: ICD-10-PCS | Mod: 59,Q9,S$GLB, | Performed by: PODIATRIST

## 2023-01-12 PROCEDURE — 1126F AMNT PAIN NOTED NONE PRSNT: CPT | Mod: CPTII,S$GLB,, | Performed by: PODIATRIST

## 2023-01-12 PROCEDURE — 91312 COVID-19, MRNA, LNP-S, BIVALENT BOOSTER, PF, 30 MCG/0.3 ML DOSE: CPT | Mod: S$GLB,,, | Performed by: INTERNAL MEDICINE

## 2023-01-12 PROCEDURE — 3078F DIAST BP <80 MM HG: CPT | Mod: CPTII,S$GLB,, | Performed by: PODIATRIST

## 2023-01-12 PROCEDURE — 3074F SYST BP LT 130 MM HG: CPT | Mod: CPTII,S$GLB,, | Performed by: PODIATRIST

## 2023-01-12 PROCEDURE — 99499 RISK ADDL DX/OHS AUDIT: ICD-10-PCS | Mod: S$GLB,,, | Performed by: PODIATRIST

## 2023-01-12 PROCEDURE — 0124A COVID-19, MRNA, LNP-S, BIVALENT BOOSTER, PF, 30 MCG/0.3 ML DOSE: CPT | Mod: CV19,PBBFAC | Performed by: INTERNAL MEDICINE

## 2023-01-12 PROCEDURE — 3074F PR MOST RECENT SYSTOLIC BLOOD PRESSURE < 130 MM HG: ICD-10-PCS | Mod: CPTII,S$GLB,, | Performed by: PODIATRIST

## 2023-01-12 PROCEDURE — 11721 DEBRIDE NAIL 6 OR MORE: CPT | Mod: 59,Q9,S$GLB, | Performed by: PODIATRIST

## 2023-01-12 PROCEDURE — 11056 PR TRIM BENIGN HYPERKERATOTIC SKIN LESION,2-4: ICD-10-PCS | Mod: Q9,S$GLB,, | Performed by: PODIATRIST

## 2023-01-12 NOTE — PROGRESS NOTES
Subjective:      Patient ID: Darinel Majano is a 82 y.o. male.    Chief Complaint: Nail Care (Peripheral neuropathy)      Darinel is a 82 y.o. male who presents to the clinic for evaluation and treatment of high risk feet. 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), Anemia (5/4/2017), Aortic atherosclerosis: see CT scan 5/17 (6/9/2017), BPH (benign prostatic hypertrophy), Chronic gout, Cortical senile cataract (5/15/2012), Degenerative disc disease, Diverticulosis of large intestine without hemorrhage: see CT scan 5/17 (6/9/2017), Essential hypertension (2/19/2016), Gastroesophageal reflux disease with gastritis: see EGD 2018 (10/31/2018), Gout, Hyperplastic polyp of transverse colon: 2012 repeat 5 years (5/4/2017), IFG (impaired fasting glucose) (5/7/2019), Kidney stone on left side (6/9/2017), Osteoporosis, Psoriasis, 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 The patient's chief complaint is long, thick toenails. This patient has documented high risk feet requiring routine maintenance secondary to peripheral neuropathy.    PCP: Annabella Montiel MD    Date Last Seen by PCP:  Chief Complaint   Patient presents with    Nail Care     Peripheral neuropathy       Current shoe gear:  Affected Foot: Tennis shoes     Unaffected Foot: Tennis shoes    Last encounter in this department: Visit date not found    Hemoglobin A1C   Date Value Ref Range Status   12/05/2022 5.2 4.0 - 5.6 % Final     Comment:     ADA Screening Guidelines:  5.7-6.4%  Consistent with prediabetes  >or=6.5%  Consistent with diabetes    High levels of fetal hemoglobin interfere with the HbA1C  assay. Heterozygous hemoglobin variants (HbS, HgC, etc)do  not significantly interfere with this assay.   However, presence of multiple variants  may affect accuracy.     09/01/2020 5.2 4.0 - 5.6 % Final     Comment:     ADA Screening Guidelines:  5.7-6.4%  Consistent with prediabetes  >or=6.5%  Consistent with diabetes  High levels of fetal hemoglobin interfere with the HbA1C  assay. Heterozygous hemoglobin variants (HbS, HgC, etc)do  not significantly interfere with this assay.   However, presence of multiple variants may affect accuracy.     06/29/2011 4.7 4.0 - 6.2 % Final       Review of Systems   Constitutional: Negative for chills, fever and malaise/fatigue.   HENT:  Negative for hearing loss.    Cardiovascular:  Negative for claudication and leg swelling.   Respiratory:  Negative for shortness of breath.    Skin:  Positive for color change, nail changes and unusual hair distribution. Negative for flushing and rash.   Musculoskeletal:  Negative for joint pain and myalgias.   Neurological:  Positive for numbness, paresthesias and sensory change. Negative for loss of balance.   Psychiatric/Behavioral:  Negative for altered mental status.    Allergic/Immunologic: Negative for hives.         Objective:      Physical Exam  Vitals reviewed.   Constitutional:       Appearance: He is well-developed.   Cardiovascular:      Pulses:           Dorsalis pedis pulses are 1+ on the right side and 1+ on the left side.        Posterior tibial pulses are 1+ on the right side and 1+ on the left side.      Comments: Non pitting  edema noted to b/L LEs  Musculoskeletal:      Right knee: No swelling or ecchymosis.      Left knee: No swelling or ecchymosis.      Right lower leg: No swelling, deformity, tenderness or bony tenderness. No edema.      Left lower leg: No swelling or tenderness. No edema.      Right ankle: No swelling or ecchymosis. No lateral malleolus or medial malleolus tenderness. Normal range of motion. Normal pulse.      Right Achilles Tendon: No defects. Cheema's test negative.      Left ankle: No swelling or ecchymosis. No lateral malleolus or medial  malleolus tenderness. Normal range of motion. Normal pulse.      Left Achilles Tendon: Cheema's test negative.      Right foot: Normal range of motion. No deformity.      Left foot: Normal range of motion. No deformity.      Comments: Decreased height of medial arches noted with loading of the foot b/L  Hammertoes noted, digits 4 and 5b/L with adductovarus rotation of b/L fifth digit.    Adequate joint ROM noted to all lower extremity muscle groups with no pain or crepitation noted. Muscle strength is 5/5 in all groups bilaterally.     Feet:      Right foot:      Protective Sensation: 10 sites tested.  6 sites sensed.      Toenail Condition: Right toenails are abnormally thick and long.      Left foot:      Protective Sensation: 10 sites tested.  6 sites sensed.      Toenail Condition: Left toenails are abnormally thick and long.   Skin:     General: Skin is warm.      Capillary Refill: Capillary refill takes more than 3 seconds.      Findings: No abrasion, bruising, burn, ecchymosis or wound.      Comments: Skin temp is cool to cool from proximal to distal b/L.  Nails x10 are elongated by  5-7mm's, thickened by 1-3 mm's, dystrophic, and are yellowish in  coloration . Xerosis Bilaterally. No open lesions noted.        Neurological:      Mental Status: He is alert and oriented to person, place, and time.      Comments: Diminished protective sensation noted b/L     Psychiatric:         Attention and Perception: He is attentive.         Speech: Speech normal.         Behavior: Behavior normal.     HKLs noted to b/L sub 2nd met        Assessment:       Encounter Diagnoses   Name Primary?    Idiopathic peripheral neuropathy Yes    Onychomycosis          Plan:       Darinel was seen today for nail care.    Diagnoses and all orders for this visit:    Idiopathic peripheral neuropathy    Onychomycosis    I counseled the patient on his conditions, their implications and medical management.        - Shoe inspection. Patient  instructed on proper foot hygeine. We discussed wearing proper shoe gear, daily foot inspections, never walking without protective shoe gear, never putting sharp instruments to feet, routine podiatric nail visits every 2-3 months.    After cleansing with an alcohol prep pad, the about mentioned hyperkeratotic lesions were sharply debrided X 2 utilizing a #15 blade to a smooth base without incident. Pt tolerated the procedure well and reported comfort to the debarment sites. Pt will continue to use padding and moisture the callused areas.     - With patient's permission, nails were aggressively reduced and debrided x 10 to their soft tissue attachment mechanically and with electric , removing all offending nail and debris. Patient relates relief following the procedure. He will continue to monitor the areas daily, inspect his feet, wear protective shoe gear when ambulatory, moisturizer to maintain skin integrity and follow in this office in approximately 2-3 months, sooner p.r.n.

## 2023-04-19 ENCOUNTER — LAB VISIT (OUTPATIENT)
Dept: LAB | Facility: HOSPITAL | Age: 83
End: 2023-04-19
Attending: INTERNAL MEDICINE
Payer: MEDICARE

## 2023-04-19 DIAGNOSIS — E78.2 MIXED HYPERLIPIDEMIA: ICD-10-CM

## 2023-04-19 LAB
AST SERPL-CCNC: 23 U/L (ref 10–40)
CHOLEST SERPL-MCNC: 107 MG/DL (ref 120–199)
CHOLEST/HDLC SERPL: 2.7 {RATIO} (ref 2–5)
HDLC SERPL-MCNC: 39 MG/DL (ref 40–75)
HDLC SERPL: 36.4 % (ref 20–50)
LDLC SERPL CALC-MCNC: 55.6 MG/DL (ref 63–159)
NONHDLC SERPL-MCNC: 68 MG/DL
TRIGL SERPL-MCNC: 62 MG/DL (ref 30–150)

## 2023-04-19 PROCEDURE — 36415 COLL VENOUS BLD VENIPUNCTURE: CPT | Performed by: INTERNAL MEDICINE

## 2023-04-19 PROCEDURE — 80061 LIPID PANEL: CPT | Performed by: INTERNAL MEDICINE

## 2023-04-19 PROCEDURE — 84450 TRANSFERASE (AST) (SGOT): CPT | Performed by: INTERNAL MEDICINE

## 2023-04-28 ENCOUNTER — PES CALL (OUTPATIENT)
Dept: ADMINISTRATIVE | Facility: CLINIC | Age: 83
End: 2023-04-28
Payer: MEDICARE

## 2023-05-25 ENCOUNTER — TELEPHONE (OUTPATIENT)
Dept: PODIATRY | Facility: CLINIC | Age: 83
End: 2023-05-25
Payer: MEDICARE

## 2023-05-25 NOTE — TELEPHONE ENCOUNTER
Patient was called regarding the need to reschedule their 5/25 appointment with Dr. Little.    Patient was contacted via telephone.    Patient's wife did answer due to starred number being home phone.     Appointment was rescheduled and appointment was confirmed by both pt and wife. Appt sheet was mailed.

## 2023-06-02 ENCOUNTER — OFFICE VISIT (OUTPATIENT)
Dept: PODIATRY | Facility: CLINIC | Age: 83
End: 2023-06-02
Payer: MEDICARE

## 2023-06-02 VITALS — WEIGHT: 168.19 LBS | BODY MASS INDEX: 30.95 KG/M2 | HEIGHT: 62 IN

## 2023-06-02 DIAGNOSIS — G60.9 IDIOPATHIC PERIPHERAL NEUROPATHY: Primary | ICD-10-CM

## 2023-06-02 DIAGNOSIS — B35.1 ONYCHOMYCOSIS: ICD-10-CM

## 2023-06-02 DIAGNOSIS — L84 CORN OR CALLUS: ICD-10-CM

## 2023-06-02 PROCEDURE — 99999 PR PBB SHADOW E&M-EST. PATIENT-LVL III: CPT | Mod: PBBFAC,,, | Performed by: PODIATRIST

## 2023-06-02 PROCEDURE — 11056 PARNG/CUTG B9 HYPRKR LES 2-4: CPT | Mod: Q9,S$GLB,, | Performed by: PODIATRIST

## 2023-06-02 PROCEDURE — 99999 PR PBB SHADOW E&M-EST. PATIENT-LVL III: ICD-10-PCS | Mod: PBBFAC,,, | Performed by: PODIATRIST

## 2023-06-02 PROCEDURE — 11056 PR TRIM BENIGN HYPERKERATOTIC SKIN LESION,2-4: ICD-10-PCS | Mod: Q9,S$GLB,, | Performed by: PODIATRIST

## 2023-06-02 PROCEDURE — 11721 DEBRIDE NAIL 6 OR MORE: CPT | Mod: 59,Q9,S$GLB, | Performed by: PODIATRIST

## 2023-06-02 PROCEDURE — 99499 NO LOS: ICD-10-PCS | Mod: S$GLB,,, | Performed by: PODIATRIST

## 2023-06-02 PROCEDURE — 11721 PR DEBRIDEMENT OF NAILS, 6 OR MORE: ICD-10-PCS | Mod: 59,Q9,S$GLB, | Performed by: PODIATRIST

## 2023-06-02 PROCEDURE — 99499 UNLISTED E&M SERVICE: CPT | Mod: S$GLB,,, | Performed by: PODIATRIST

## 2023-06-09 ENCOUNTER — HOSPITAL ENCOUNTER (OUTPATIENT)
Dept: RADIOLOGY | Facility: HOSPITAL | Age: 83
Discharge: HOME | End: 2023-06-09
Attending: PHYSICIAN ASSISTANT
Payer: MEDICARE

## 2023-06-09 DIAGNOSIS — K70.30 ALCOHOLIC CIRRHOSIS OF LIVER WITHOUT ASCITES: ICD-10-CM

## 2023-06-09 PROCEDURE — 76705 US ABDOMEN LIMITED: ICD-10-PCS | Mod: 26,,, | Performed by: STUDENT IN AN ORGANIZED HEALTH CARE EDUCATION/TRAINING PROGRAM

## 2023-06-09 PROCEDURE — 76705 ECHO EXAM OF ABDOMEN: CPT | Mod: TC

## 2023-06-09 PROCEDURE — 76705 ECHO EXAM OF ABDOMEN: CPT | Mod: 26,,, | Performed by: STUDENT IN AN ORGANIZED HEALTH CARE EDUCATION/TRAINING PROGRAM

## 2023-06-15 ENCOUNTER — OFFICE VISIT (OUTPATIENT)
Dept: PODIATRY | Facility: CLINIC | Age: 83
End: 2023-06-15
Payer: MEDICARE

## 2023-06-15 VITALS
BODY MASS INDEX: 30.91 KG/M2 | DIASTOLIC BLOOD PRESSURE: 70 MMHG | HEIGHT: 62 IN | WEIGHT: 168 LBS | HEART RATE: 79 BPM | SYSTOLIC BLOOD PRESSURE: 126 MMHG

## 2023-06-15 DIAGNOSIS — G57.53 TARSAL TUNNEL SYNDROME OF BOTH LOWER EXTREMITIES: ICD-10-CM

## 2023-06-15 DIAGNOSIS — G60.9 IDIOPATHIC PERIPHERAL NEUROPATHY: Primary | ICD-10-CM

## 2023-06-15 DIAGNOSIS — M79.673 PAIN OF FOOT, UNSPECIFIED LATERALITY: ICD-10-CM

## 2023-06-15 DIAGNOSIS — B35.1 ONYCHOMYCOSIS: ICD-10-CM

## 2023-06-15 PROCEDURE — 3074F PR MOST RECENT SYSTOLIC BLOOD PRESSURE < 130 MM HG: ICD-10-PCS | Mod: CPTII,S$GLB,, | Performed by: PODIATRIST

## 2023-06-15 PROCEDURE — 1126F PR PAIN SEVERITY QUANTIFIED, NO PAIN PRESENT: ICD-10-PCS | Mod: CPTII,S$GLB,, | Performed by: PODIATRIST

## 2023-06-15 PROCEDURE — 1126F AMNT PAIN NOTED NONE PRSNT: CPT | Mod: CPTII,S$GLB,, | Performed by: PODIATRIST

## 2023-06-15 PROCEDURE — 99999 PR PBB SHADOW E&M-EST. PATIENT-LVL III: ICD-10-PCS | Mod: PBBFAC,,, | Performed by: PODIATRIST

## 2023-06-15 PROCEDURE — 64450 PR NERVE BLOCK INJ, ANES/STEROID, OTHER PERIPHERAL: ICD-10-PCS | Mod: 50,S$GLB,, | Performed by: PODIATRIST

## 2023-06-15 PROCEDURE — 3288F FALL RISK ASSESSMENT DOCD: CPT | Mod: CPTII,S$GLB,, | Performed by: PODIATRIST

## 2023-06-15 PROCEDURE — 99214 OFFICE O/P EST MOD 30 MIN: CPT | Mod: 25,S$GLB,, | Performed by: PODIATRIST

## 2023-06-15 PROCEDURE — 3078F PR MOST RECENT DIASTOLIC BLOOD PRESSURE < 80 MM HG: ICD-10-PCS | Mod: CPTII,S$GLB,, | Performed by: PODIATRIST

## 2023-06-15 PROCEDURE — 3288F PR FALLS RISK ASSESSMENT DOCUMENTED: ICD-10-PCS | Mod: CPTII,S$GLB,, | Performed by: PODIATRIST

## 2023-06-15 PROCEDURE — 64450 NJX AA&/STRD OTHER PN/BRANCH: CPT | Mod: 50,S$GLB,, | Performed by: PODIATRIST

## 2023-06-15 PROCEDURE — 1101F PR PT FALLS ASSESS DOC 0-1 FALLS W/OUT INJ PAST YR: ICD-10-PCS | Mod: CPTII,S$GLB,, | Performed by: PODIATRIST

## 2023-06-15 PROCEDURE — 3078F DIAST BP <80 MM HG: CPT | Mod: CPTII,S$GLB,, | Performed by: PODIATRIST

## 2023-06-15 PROCEDURE — 99999 PR PBB SHADOW E&M-EST. PATIENT-LVL III: CPT | Mod: PBBFAC,,, | Performed by: PODIATRIST

## 2023-06-15 PROCEDURE — 1101F PT FALLS ASSESS-DOCD LE1/YR: CPT | Mod: CPTII,S$GLB,, | Performed by: PODIATRIST

## 2023-06-15 PROCEDURE — 99214 PR OFFICE/OUTPT VISIT, EST, LEVL IV, 30-39 MIN: ICD-10-PCS | Mod: 25,S$GLB,, | Performed by: PODIATRIST

## 2023-06-15 PROCEDURE — 3074F SYST BP LT 130 MM HG: CPT | Mod: CPTII,S$GLB,, | Performed by: PODIATRIST

## 2023-06-15 RX ORDER — DICLOFENAC SODIUM 10 MG/G
2 GEL TOPICAL 4 TIMES DAILY
Qty: 100 G | Refills: 2 | Status: SHIPPED | OUTPATIENT
Start: 2023-06-15

## 2023-06-16 ENCOUNTER — OFFICE VISIT (OUTPATIENT)
Dept: HEPATOLOGY | Facility: CLINIC | Age: 83
End: 2023-06-16
Payer: MEDICARE

## 2023-06-16 VITALS — BODY MASS INDEX: 30.73 KG/M2 | HEIGHT: 62 IN | WEIGHT: 167 LBS

## 2023-06-16 DIAGNOSIS — R73.01 IFG (IMPAIRED FASTING GLUCOSE): ICD-10-CM

## 2023-06-16 DIAGNOSIS — E78.2 MIXED HYPERLIPIDEMIA: ICD-10-CM

## 2023-06-16 DIAGNOSIS — M1A.0720 CHRONIC IDIOPATHIC GOUT INVOLVING TOE OF LEFT FOOT WITHOUT TOPHUS: ICD-10-CM

## 2023-06-16 DIAGNOSIS — I10 ESSENTIAL HYPERTENSION: Primary | ICD-10-CM

## 2023-06-16 DIAGNOSIS — K70.30 ALCOHOLIC CIRRHOSIS OF LIVER WITHOUT ASCITES: Primary | ICD-10-CM

## 2023-06-16 DIAGNOSIS — I70.0 AORTIC ATHEROSCLEROSIS: ICD-10-CM

## 2023-06-16 DIAGNOSIS — M81.0 AGE-RELATED OSTEOPOROSIS WITHOUT CURRENT PATHOLOGICAL FRACTURE: ICD-10-CM

## 2023-06-16 PROCEDURE — 99214 OFFICE O/P EST MOD 30 MIN: CPT | Mod: S$GLB,,, | Performed by: PHYSICIAN ASSISTANT

## 2023-06-16 PROCEDURE — 1101F PR PT FALLS ASSESS DOC 0-1 FALLS W/OUT INJ PAST YR: ICD-10-PCS | Mod: CPTII,S$GLB,, | Performed by: PHYSICIAN ASSISTANT

## 2023-06-16 PROCEDURE — 1160F RVW MEDS BY RX/DR IN RCRD: CPT | Mod: CPTII,S$GLB,, | Performed by: PHYSICIAN ASSISTANT

## 2023-06-16 PROCEDURE — 3288F FALL RISK ASSESSMENT DOCD: CPT | Mod: CPTII,S$GLB,, | Performed by: PHYSICIAN ASSISTANT

## 2023-06-16 PROCEDURE — 1126F AMNT PAIN NOTED NONE PRSNT: CPT | Mod: CPTII,S$GLB,, | Performed by: PHYSICIAN ASSISTANT

## 2023-06-16 PROCEDURE — 1101F PT FALLS ASSESS-DOCD LE1/YR: CPT | Mod: CPTII,S$GLB,, | Performed by: PHYSICIAN ASSISTANT

## 2023-06-16 PROCEDURE — 99214 PR OFFICE/OUTPT VISIT, EST, LEVL IV, 30-39 MIN: ICD-10-PCS | Mod: S$GLB,,, | Performed by: PHYSICIAN ASSISTANT

## 2023-06-16 PROCEDURE — 3288F PR FALLS RISK ASSESSMENT DOCUMENTED: ICD-10-PCS | Mod: CPTII,S$GLB,, | Performed by: PHYSICIAN ASSISTANT

## 2023-06-16 PROCEDURE — 1126F PR PAIN SEVERITY QUANTIFIED, NO PAIN PRESENT: ICD-10-PCS | Mod: CPTII,S$GLB,, | Performed by: PHYSICIAN ASSISTANT

## 2023-06-16 PROCEDURE — 1159F PR MEDICATION LIST DOCUMENTED IN MEDICAL RECORD: ICD-10-PCS | Mod: CPTII,S$GLB,, | Performed by: PHYSICIAN ASSISTANT

## 2023-06-16 PROCEDURE — 1160F PR REVIEW ALL MEDS BY PRESCRIBER/CLIN PHARMACIST DOCUMENTED: ICD-10-PCS | Mod: CPTII,S$GLB,, | Performed by: PHYSICIAN ASSISTANT

## 2023-06-16 PROCEDURE — 99999 PR PBB SHADOW E&M-EST. PATIENT-LVL IV: ICD-10-PCS | Mod: PBBFAC,,, | Performed by: PHYSICIAN ASSISTANT

## 2023-06-16 PROCEDURE — 1159F MED LIST DOCD IN RCRD: CPT | Mod: CPTII,S$GLB,, | Performed by: PHYSICIAN ASSISTANT

## 2023-06-16 PROCEDURE — 99999 PR PBB SHADOW E&M-EST. PATIENT-LVL IV: CPT | Mod: PBBFAC,,, | Performed by: PHYSICIAN ASSISTANT

## 2023-06-16 NOTE — TELEPHONE ENCOUNTER
Please let him know that in reviewing his chart, he does have a lot of atherosclerosis and would like to have him increase his Lipitor to 80, please find out which pharmacy and I will send in    Labs before his appointment with me in October, orders in, thank you    Also, DEXA scan is due, I ordered that as well

## 2023-06-16 NOTE — PROGRESS NOTES
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 White male with PMH psoriasis, osteoporosis (on fosamax), alcohol dependence (stopped in 2017), right hemidiaphragm mass s/p VATS, SCC of R hand, s/p cholecystectomy 2012, with initial presentation to hepatology clinic for thrombocytopenia w/ elevated enzymes and hypoalbuminemia in 2017, returning for presumed liver fibrosis based on clinical findings such as thrombocytopenia, fluid overload, + splenomegaly in the initial context of alcohol use. The patient was previously followed by Raymond Nair PA-C, is new to me today.      The patient is being followed for a9otgeu HCC screening due to aforementioned findings; no history of tissue evidence of parenchymal hepatic fibrosis, however. He has persistently low platelet count. Last EGD 2018 without varioces. Other than fluid overload (well controlled at present moment), no other symptoms/signs of hepatic decompensation.    Interval history:   Patient here alone today. He reports he is still occasionally drinking 6oz bottle of wine with crushed ice, started about 1 month ago, about 1 per week. He is otherwise abstinent from beer and other alcohol.  He was put on spironolactone and lasix for BLE edema when he initially saw Raymond in 2017. Still taking this, no worsening of fluid issues and no increase in dosages. We refilled these medications for him this year.  No changes no new meds. Denies symptoms of hepatic decompensation including jaundice, ascites, cognitive problems to suggest hepatic encephalopathy, or GI bleeding.     Fibrosis staging: remote history of biopsy without reported fibrosis in 2011, last Fibroscan 2017 showed kPA of 4.1 thus F0-1 fibrosis. US elastography 2021 F2-3     Risk factors for fatty liver include obesity, pre-diabetes and alcohol  use     Immunization status of Hep A & Hep B: S/p Twinrix 2011    Interval history 06/08/2022:  Darinel Majano arrives today with his wife.   Since our last visit, has maintained weight loss and is not drinking. They moved back into their house after repairs. Doing well and without complaints. Fluid well controlled. Denies symptoms of hepatic decompensation including jaundice, ascites, cognitive problems to suggest hepatic encephalopathy, or GI bleeding.     Interval history 12/09/2022:  Darinel Majano arrives today with his wife.   Since our last visit, he is doing well. No new medical issues. No complaints. Denies symptoms of hepatic decompensation including jaundice, ascites, cognitive problems to suggest hepatic encephalopathy, or GI bleeding.     Interval history 06/16/2023:  Darinel Majano arrives today alone.   Since our last visit, underwent TPCT and then repeat ultrasound.   Denies symptoms of hepatic decompensation including jaundice, ascites, cognitive problems to suggest hepatic encephalopathy, or GI bleeding.           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), Anemia (5/4/2017), Aortic atherosclerosis: see CT scan 5/17 (6/9/2017), BPH (benign prostatic hypertrophy), Chronic gout, Cortical senile cataract (5/15/2012), Degenerative disc disease, Diverticulosis of large intestine without hemorrhage: see CT scan 5/17 (6/9/2017), Essential hypertension (2/19/2016), Gastroesophageal reflux disease with gastritis: see EGD 2018 (10/31/2018), Gout, Hyperplastic polyp of transverse colon: 2012 repeat 5 years (5/4/2017), IFG (impaired fasting glucose) (5/7/2019), Kidney stone on left side (6/9/2017), Osteoporosis, Psoriasis, 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   PSXH Darinel has a past  "surgical history that includes Cataract extraction; Cholecystectomy (2012); Eye surgery; Right Hand Surgery; Colonoscopy (N/A, 02/05/2018); Upper gastrointestinal endoscopy; Tonsillectomy; Fracture surgery; and Esophagogastroduodenoscopy (N/A, 3/9/2022).   SARI Tena's family history includes Cancer in his father; Cataracts in his mother; Diabetes in his mother; Heart disease in his brother; Hypertension in his mother; No Known Problems in his sister; Stomach cancer in his father.   NATACHA Tena reports that he has quit smoking. His smoking use included cigarettes. He has a 20.00 pack-year smoking history. He has never used smokeless tobacco. He reports that he does not drink alcohol and does not use drugs.   TOMASA Tena is allergic to humira [adalimumab].   LEVI Tena has a current medication list which includes the following prescription(s): atorvastatin, diclofenac sodium, furosemide, milk thistle, multivitamin with minerals, spironolactone, tacrolimus, vitamin d, zinc, and alendronate.       Review of Systems   Denies symptoms of hepatic decompensation including jaundice, ascites, cognitive problems to suggest hepatic encephalopathy, or GI bleeding.       DATA   Physical Exam   Constitutional: Friendly white male , well-nourished. No distress. Alert and oriented to person, place, and time.  Eyes: No scleral icterus.   Pulmonary/Chest: Respiratory effort and breath sounds normal. No respiratory distress.   Abdominal: Obese.   Musculoskeletal: No edema. Arthritic PIP joints noted on R hand.  Neurological: No tremor. Coordination and gait normal.   Skin:  No rash or erythema. No jaundice. No telangiectasias or palmar erythema noted.  Psychiatric: Normal mood and affect. Speech, behavior, and thought content normal. No depression or anxiety noted.     Ht 5' 2" (1.575 m)   Wt 75.8 kg (167 lb)   BMI 30.54 kg/m²       LABS:  Lab Results   Component Value Date    ALT 27 06/09/2023    AST 23 06/09/2023    ALKPHOS 86 06/09/2023 "    BILITOT 0.9 06/09/2023    ALBUMIN 3.8 06/09/2023    INR 1.0 06/09/2023     (L) 06/09/2023       Prior serologic workup:   Lab Results   Component Value Date    SMOOTHMUSCAB Positive 1:40 (A) 05/24/2017    AMAIFA Negative 1:40 05/24/2017    ANASCREEN Negative <1:160 05/24/2017    FERRITIN 409 (H) 05/24/2017    FESATURATED 28 05/24/2017    PETH 149 (A) 05/24/2017    DMEMY5SEKHCS MM 05/24/2017    QQGDM6XEOHRJ 196 (H) 05/24/2017         DIAGNOSTIC STUDIES:      US ELASTOGRAPHY PARENCHYMA (ORGAN)     CLINICAL HISTORY:  Alcoholic cirrhosis of liver without ascites     TECHNIQUE:  Quantitative Ultrasound Assessment of the Liver (QUAL) elastography was performed on the [facility].     COMPARISON:  None.     FINDINGS:  ELASTOGRAPHY     HRI: Indicates no significant fatty infiltration.  0.75     Median elastography: 1.82 m/sec, 10.29 kPa. Indicating mild-moderate or F2-F3 fibrosis.     IQR/median: 23, indicating an acceptable range.           US Abdomen Limited  Narrative: EXAMINATION:  US ABDOMEN LIMITED    CLINICAL HISTORY:  Alcoholic cirrhosis of liver without ascites    TECHNIQUE:  Limited ultrasound of the right upper quadrant of the abdomen including pancreas, liver, gallbladder, common bile duct, IVC was performed.    COMPARISON:  CT abdomen 12/17/2022; U/S abdomen limited 12/05/2022    FINDINGS:  Liver: Normal in size, measuring 14.4 cm. Heterogeneous echotexture.  Previously seen hyperechoic focus in left hepatic lobe is not visualized on this examination.  Small cyst in the medial aspect of the right hepatic lobe measuring 1.4 x 0.8 x 1.2 cm, similar to prior imaging.  No new focal hepatic lesion.  HRI: 1.0, suggesting less than 5% steatosis.    Gallbladder: The gallbladder is surgically absent.    Biliary system: The common duct is not dilated, measuring 3 mm.  No intrahepatic ductal dilatation.    Spleen: Normal in size with a homogeneous echotexture, measuring 11.0 x 4.2 cm.    Pancreas: The  visualized portions of pancreas appear normal.    IVC: Normal    Miscellaneous: No ascites.  Impression: Heterogeneous hepatic parenchyma.  Stable right hepatic lobe cyst.  Previously seen hyperechoic focus in left hepatic lobe is not visualized on this examination.  No new focal hepatic lesion.    Electronically signed by resident: James Mcdaniel  Date:    06/09/2023  Time:    08:50    Electronically signed by: Joss Giles  Date:    06/09/2023  Time:    10:06          ASSESSMENT & PLAN     82 y.o. White male with:    1. Liver fibrosis, presumed advanced/cirrhosis (?) w/  Thrombocytopenia  - we are following out of abundance of caution   - US elastography 2021 suggested F3 fibrosis.  MELD-Na: 6 at 6/9/2023  9:05 AM  MELD: 6 at 6/9/2023  9:05 AM  Calculated from:  Serum Creatinine: 0.8 mg/dL (Using min of 1 mg/dL) at 6/9/2023  9:05 AM  Serum Sodium: 140 mmol/L (Using max of 137 mmol/L) at 6/9/2023  9:05 AM  Total Bilirubin: 0.9 mg/dL (Using min of 1 mg/dL) at 6/9/2023  9:05 AM  INR(ratio): 1.0 at 6/9/2023  9:05 AM      -- Presumed Cirrhosis Health Maintenance:  -- HCC screening: UTD  -- Variceal screening: EGD 2022 without evidence of portal hypertension or varices   -- Hepatitis A & B vaccination: vaccinated 2011    2. Alcohol related liver disease without ascites, well compensated    -- Recommendations as per AVS   -- Recommend NO alcohol for life.  -- continue HCC screening q6months    3. Hepatic cyst  - stable simple cyst measuring 1.1x1.0x1.3cm    4. LE edema  - continue shruthi 50mg PO QD & lasix 20mg PO QD for swelling    5. Fatty liver  - RF include obesity & prior alcohol use    6. Liver lesion  - 2023 hyperechoic, 0.5cm left hepatic -- CT scan without any concerning findings  - repeat ultrasound 6/2023 without any new masses or identification of hyperechoic area  - will return to q6mo ultrasound  - PCP notified of atherosclerosis on the CT scan     MELD-Na: 6 at 6/9/2023  9:05 AM  MELD: 6 at 6/9/2023  9:05  AM  Calculated from:  Serum Creatinine: 0.8 mg/dL (Using min of 1 mg/dL) at 6/9/2023  9:05 AM  Serum Sodium: 140 mmol/L (Using max of 137 mmol/L) at 6/9/2023  9:05 AM  Total Bilirubin: 0.9 mg/dL (Using min of 1 mg/dL) at 6/9/2023  9:05 AM  INR(ratio): 1.0 at 6/9/2023  9:05 AM      Orders Placed This Encounter   Procedures    US Abdomen Limited    Protime-INR    Comprehensive Metabolic Panel    CBC Auto Differential    AFP Tumor Marker       Ultrasound without new findings. Q6mo f/u recommended.  Follow up in about 6 months (around 12/16/2023).   Total time: 30 min    Thank you for allowing me to participate in the care of Darinel Choi PA-C  Hepatology

## 2023-06-16 NOTE — PATIENT INSTRUCTIONS
Because you have cirrhosis, it is important to attend clinic visits every 6 months with an Ultrasound and blood tests every 6 months to screen for liver cancer (you are at risk of developing liver cancer due to scar tissue in the liver)    Signs and symptoms of worsening liver disease include jaundice, fluid in the belly (ascites), and confusion/disorientation/slowed thought processes due to hepatic encephalopathy (toxins building up because of liver problems).   You should seek medical attention if any of these things occur.    Also, possible bleeding from esophageal varices (blood vessels in the stomach and foodpipe can burst and cause fatal bleeding).  Therefore, if you have symptoms of vomiting blood, blood in your stool, dark or black stools or vomiting coffee ground vomit, YOU SHOULD GO TO THE EMERGENCY ROOM IMMEDIATELY.     Cirrhosis can increase the risk of liver cancer, liver failure, and death. However, we will watch your liver function score (MELD score) closely with each clinic visit. A normal MELD score is 6, highest is 40.  We will check this with every clinic visit. A MELD 15 or higher is when we start to consider transplant because MELD 15 or higher indicates that the liver is not functioning as well       Counseling  - strict abstinence of alcohol use (includes beer, wine, and/or liquor)  - avoid non-steroidal anti-inflammatory drugs (NSAIDs) such as ibuprofen, Motrin, naprosyn, Aleve due to the risk of kidney damage  - can take acetaminophen (Tylenol), no more than 2000 mg per day  - low sodium (salt) 2 gram per day diet  - high protein diet: 1.5g/kg to prevent muscle mass loss. Recommended at least 1 protein shake daily using Premier Protein shakes    - resistance exercises for muscle strength  - avoid raw seafoods due to the risk of fatal Vibrio vulnificus infection  - ultrasound of the liver every 6 months for liver cancer screening  - Upper endoscopy every 1-2 years to screen for varices in the  stomach and esophagus

## 2023-06-17 RX ORDER — ATORVASTATIN CALCIUM 80 MG/1
80 TABLET, FILM COATED ORAL DAILY
Qty: 90 TABLET | Refills: 1 | Status: SHIPPED | OUTPATIENT
Start: 2023-06-17 | End: 2023-10-19 | Stop reason: SDUPTHER

## 2023-06-17 NOTE — TELEPHONE ENCOUNTER
Called and spoke to wife. Relayed Dr. Montiel's message. (Below). Wife verbalized underdstanding.    They will increase Lipitor dose now and wait for refill from pharmacy.  Med pended to chosen pharmacy.  Labs scheduled prior to apt with Dr. Montiel.  DXA scheduled     Please let him know that in reviewing his chart, he does have a lot of atherosclerosis and would like to have him increase his Lipitor to 80, please find out which pharmacy and I will send in  Labs before his appointment with me in October, orders in, thank you  Also, DEXA scan is due, I ordered that as well

## 2023-06-17 NOTE — TELEPHONE ENCOUNTER
No care due was identified.  Health [facility] Embedded Care Due Messages. Reference number: [id number].   6/17/2023 9:16:23 AM CDT

## 2023-06-19 ENCOUNTER — HOSPITAL ENCOUNTER (OUTPATIENT)
Dept: RADIOLOGY | Facility: HOSPITAL | Age: 83
Discharge: HOME | End: 2023-06-19
Attending: INTERNAL MEDICINE
Payer: MEDICARE

## 2023-06-19 DIAGNOSIS — M81.0 AGE-RELATED OSTEOPOROSIS WITHOUT CURRENT PATHOLOGICAL FRACTURE: ICD-10-CM

## 2023-06-19 PROCEDURE — 77080 DXA BONE DENSITY AXIAL: CPT | Mod: TC

## 2023-06-19 PROCEDURE — 77080 DXA BONE DENSITY AXIAL: CPT | Mod: 26,,, | Performed by: INTERNAL MEDICINE

## 2023-06-19 PROCEDURE — 77080 DXA BONE DENSITY AXIAL SKELETON 1 OR MORE SITES: ICD-10-PCS | Mod: 26,,, | Performed by: INTERNAL MEDICINE

## 2023-06-26 ENCOUNTER — OFFICE VISIT (OUTPATIENT)
Dept: HOME HEALTH SERVICES | Facility: CLINIC | Age: 83
End: 2023-06-26
Payer: MEDICARE

## 2023-06-26 VITALS
WEIGHT: 164 LBS | DIASTOLIC BLOOD PRESSURE: 70 MMHG | HEIGHT: 62 IN | TEMPERATURE: 97 F | OXYGEN SATURATION: 99 % | SYSTOLIC BLOOD PRESSURE: 118 MMHG | RESPIRATION RATE: 16 BRPM | BODY MASS INDEX: 30.18 KG/M2 | HEART RATE: 70 BPM

## 2023-06-26 DIAGNOSIS — K76.6 PORTAL HYPERTENSION: ICD-10-CM

## 2023-06-26 DIAGNOSIS — Z00.00 ENCOUNTER FOR PREVENTIVE HEALTH EXAMINATION: Primary | ICD-10-CM

## 2023-06-26 DIAGNOSIS — G62.1 ALCOHOL-INDUCED POLYNEUROPATHY: ICD-10-CM

## 2023-06-26 DIAGNOSIS — I10 ESSENTIAL HYPERTENSION: ICD-10-CM

## 2023-06-26 DIAGNOSIS — D84.9 IMMUNOSUPPRESSED STATUS: ICD-10-CM

## 2023-06-26 DIAGNOSIS — D69.6 THROMBOCYTOPENIA: ICD-10-CM

## 2023-06-26 DIAGNOSIS — N40.0 BENIGN NON-NODULAR PROSTATIC HYPERPLASIA WITHOUT LOWER URINARY TRACT SYMPTOMS: ICD-10-CM

## 2023-06-26 DIAGNOSIS — F10.20 ALCOHOL DEPENDENCE, UNCOMPLICATED: ICD-10-CM

## 2023-06-26 DIAGNOSIS — M81.0 AGE-RELATED OSTEOPOROSIS WITHOUT CURRENT PATHOLOGICAL FRACTURE: ICD-10-CM

## 2023-06-26 DIAGNOSIS — K21.9 GASTROESOPHAGEAL REFLUX DISEASE WITHOUT ESOPHAGITIS: ICD-10-CM

## 2023-06-26 DIAGNOSIS — K70.30 ALCOHOLIC CIRRHOSIS OF LIVER WITHOUT ASCITES: ICD-10-CM

## 2023-06-26 DIAGNOSIS — I77.1 TORTUOUS AORTA: ICD-10-CM

## 2023-06-26 DIAGNOSIS — I70.0 AORTIC ATHEROSCLEROSIS: ICD-10-CM

## 2023-06-26 DIAGNOSIS — L40.50 PSORIATIC ARTHRITIS: ICD-10-CM

## 2023-06-26 PROCEDURE — 1170F FXNL STATUS ASSESSED: CPT | Mod: CPTII,S$GLB,,

## 2023-06-26 PROCEDURE — 3078F PR MOST RECENT DIASTOLIC BLOOD PRESSURE < 80 MM HG: ICD-10-PCS | Mod: CPTII,S$GLB,,

## 2023-06-26 PROCEDURE — 3074F PR MOST RECENT SYSTOLIC BLOOD PRESSURE < 130 MM HG: ICD-10-PCS | Mod: CPTII,S$GLB,,

## 2023-06-26 PROCEDURE — G0439 PPPS, SUBSEQ VISIT: HCPCS | Mod: S$GLB,,,

## 2023-06-26 PROCEDURE — 1126F AMNT PAIN NOTED NONE PRSNT: CPT | Mod: CPTII,S$GLB,,

## 2023-06-26 PROCEDURE — 3288F FALL RISK ASSESSMENT DOCD: CPT | Mod: CPTII,S$GLB,,

## 2023-06-26 PROCEDURE — 1159F PR MEDICATION LIST DOCUMENTED IN MEDICAL RECORD: ICD-10-PCS | Mod: CPTII,S$GLB,,

## 2023-06-26 PROCEDURE — 3074F SYST BP LT 130 MM HG: CPT | Mod: CPTII,S$GLB,,

## 2023-06-26 PROCEDURE — 1101F PR PT FALLS ASSESS DOC 0-1 FALLS W/OUT INJ PAST YR: ICD-10-PCS | Mod: CPTII,S$GLB,,

## 2023-06-26 PROCEDURE — 3078F DIAST BP <80 MM HG: CPT | Mod: CPTII,S$GLB,,

## 2023-06-26 PROCEDURE — 1170F PR FUNCTIONAL STATUS ASSESSED: ICD-10-PCS | Mod: CPTII,S$GLB,,

## 2023-06-26 PROCEDURE — 1160F PR REVIEW ALL MEDS BY PRESCRIBER/CLIN PHARMACIST DOCUMENTED: ICD-10-PCS | Mod: CPTII,S$GLB,,

## 2023-06-26 PROCEDURE — 1159F MED LIST DOCD IN RCRD: CPT | Mod: CPTII,S$GLB,,

## 2023-06-26 PROCEDURE — 1101F PT FALLS ASSESS-DOCD LE1/YR: CPT | Mod: CPTII,S$GLB,,

## 2023-06-26 PROCEDURE — G0439 PR MEDICARE ANNUAL WELLNESS SUBSEQUENT VISIT: ICD-10-PCS | Mod: S$GLB,,,

## 2023-06-26 PROCEDURE — 1160F RVW MEDS BY RX/DR IN RCRD: CPT | Mod: CPTII,S$GLB,,

## 2023-06-26 PROCEDURE — 3288F PR FALLS RISK ASSESSMENT DOCUMENTED: ICD-10-PCS | Mod: CPTII,S$GLB,,

## 2023-06-26 PROCEDURE — 1126F PR PAIN SEVERITY QUANTIFIED, NO PAIN PRESENT: ICD-10-PCS | Mod: CPTII,S$GLB,,

## 2023-06-26 NOTE — PATIENT INSTRUCTIONS
Counseling and Referral of Other Preventative  (Italic type indicates deductible and co-insurance are waived)    Patient Name: Darinel Majano  Today's Date: 6/26/2023    Health Maintenance       Date Due Completion Date    COVID-19 Vaccine (5 - Pfizer series) 05/12/2023 1/12/2023    PROSTATE-SPECIFIC ANTIGEN 12/05/2023 12/5/2022    Hemoglobin A1c (Prediabetes) 12/05/2023 12/5/2022    Lipid Panel 04/19/2024 4/19/2023    Colonoscopy 02/05/2025 2/5/2018    DEXA Scan 06/19/2025 6/19/2023    TETANUS VACCINE 11/07/2029 11/7/2019        No orders of the defined types were placed in this encounter.      The following information is provided to all patients.  This information is to help you find resources for any of the problems found today that may be affecting your health:                Living healthy guide: www.[facility].[place]      Understanding Diabetes: www.diabetes.org      Eating healthy: www.cdc.gov/healthyweight      [facility] home safety checklist: www.cdc.gov/steadi/patient.html      Agency on Aging: www.goea.[place].[facility]      Alcoholics anonymous (AA): www.aa.org      Physical Activity: www.[place].nih.gov/in8toua      Tobacco use: www.quitwithusla.org

## 2023-06-26 NOTE — PROGRESS NOTES
"Darinel Majano presented for a  Medicare AWV and comprehensive Health Risk Assessment today. The following components were reviewed and updated:    Medical history  Family History  Social history  Allergies and Current Medications  Health Risk Assessment  Health Maintenance  Care Team         ** See Completed Assessments for Annual Wellness Visit within the encounter summary.**         The following assessments were completed:  Living Situation  CAGE  Depression Screening  Timed Get Up and Go  Whisper Test: Deferred, hearing impaired  Cognitive Function Screening    Nutrition Screening  ADL Screening  PAQ Screening        Vitals:    06/26/23 1038   BP: 118/70   BP Location: Left arm   Patient Position: Sitting   Pulse: 70   Resp: 16   Temp: 97 °F (36.1 °C)   SpO2: 99%   Weight: 74.4 kg (164 lb)   Height: 5' 2" (1.575 m)     Body mass index is 30 kg/m².    Physical Exam  Vitals reviewed.   Constitutional:       General: He is not in acute distress.     Appearance: Normal appearance.   HENT:      Right Ear: Decreased hearing noted.      Left Ear: Decreased hearing noted.   Cardiovascular:      Rate and Rhythm: Normal rate and regular rhythm.      Pulses: Normal pulses.      Heart sounds: No murmur heard.  Pulmonary:      Effort: Pulmonary effort is normal. No respiratory distress.      Breath sounds: Normal breath sounds.   Abdominal:      General: Bowel sounds are normal.   Musculoskeletal:      Right lower leg: No edema.      Left lower leg: No edema.   Neurological:      General: No focal deficit present.      Mental Status: He is alert and oriented to person, place, and time.   Psychiatric:         Mood and Affect: Mood normal.         Behavior: Behavior normal.             Diagnoses and health risks identified today and associated recommendations/orders:    1. Encounter for preventive health examination  Assessments completed.  HM recommendations reviewed.  F/u with PCP as instructed.     Patient not on chronic " opioids.   Risk factors reviewed for any potential opioid use disorder   Pain evaluated during visit.  Current treatment plan documented.  Will refer to specialist, as appropriate.      2. Aortic atherosclerosis: see CT scan 5/17 also CT scan 9/18  Chronic, stable on current statin regimen. Followed by PCP.    3. Tortuous aorta: see CXR May 2017  Chronic, stable on current regimen. Followed by PCP.     4. Alcohol-induced polyneuropathy  Chronic, stable on current regimen. Followed by PCP.     5. Alcohol dependence, uncomplicated: quit 2017  Chronic, stable on current regimen. Followed by PCP.     6. Thrombocytopenia  Chronic, stable on current regimen. Followed by PCP.     7. Immunosuppressed status  Chronic, stable on current regimen. No longer taking Prograf. Followed by PCP.     8. Psoriatic arthritis  Chronic, stable on current regimen. Followed by PCP.     9. Alcoholic cirrhosis of liver without ascites  Chronic, stable on current regimen. Followed by PCP.     10. Portal hypertension  Chronic, stable on current regimen. Followed by PCP.     11. Essential hypertension  Chronic, stable on current regimen. Followed by PCP.     12. Benign non-nodular prostatic hyperplasia without lower urinary tract symptoms  Chronic, stable on current regimen. Followed by PCP.     13. Gastroesophageal reflux disease with gastritis: see EGD 2018, 2022  Chronic, stable on current regimen. Followed by PCP.     14. Age-related osteoporosis without current pathological fracture: see DEXA 2017; IMPROVED 2021  Chronic, stable on current Fosamax regimen. Followed by PCP.       Provided Darinel with a 5-10 year written screening schedule and personal prevention plan. Recommendations were developed using the USPSTF age appropriate recommendations. Education, counseling, and referrals were provided as needed. After Visit Summary printed and given to patient which includes a list of additional screenings\tests needed.    Follow up in about 1  year (around 6/26/2024) for Medicare AWV.    Sirisha Linn NP    I offered to discuss advanced care planning, including how to pick a person who would make decisions for you if you were unable to make them for yourself, called a health care power of , and what kind of decisions you might make such as use of life sustaining treatments such as ventilators and tube feeding when faced with a life limiting illness recorded on a living will that they will need to know. (How you want to be cared for as you near the end of your natural life)     X Patient is interested in learning more about how to make advanced directives.  I provided them paperwork and offered to discuss this with them.

## 2023-06-28 NOTE — PROGRESS NOTES
Subjective:      Patient ID: Darinel Majano is a 82 y.o. male.    Chief Complaint: Foot Pain (Bilateral foot pain from top of arch into ball of feet)      Darinel is a 82 y.o. male who presents to the clinic for evaluation and treatment of high risk feet. 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), Anemia (5/4/2017), Aortic atherosclerosis: see CT scan 5/17 (6/9/2017), BPH (benign prostatic hypertrophy), Chronic gout, Cortical senile cataract (5/15/2012), Degenerative disc disease, Diverticulosis of large intestine without hemorrhage: see CT scan 5/17 (6/9/2017), Essential hypertension (2/19/2016), Gastroesophageal reflux disease with gastritis: see EGD 2018 (10/31/2018), Gout, Hyperplastic polyp of transverse colon: 2012 repeat 5 years (5/4/2017), IFG (impaired fasting glucose) (5/7/2019), Kidney stone on left side (6/9/2017), Osteoporosis, Psoriasis, 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 The patient's chief complaint is painful burning to the bottoms of both feet.  This patient has documented high risk feet requiring routine maintenance secondary to peripheral neuropathy.    PCP: Annabella Montiel MD    Date Last Seen by PCP:  Chief Complaint   Patient presents with    Foot Pain     Bilateral foot pain from top of arch into ball of feet       Current shoe gear:  Affected Foot: Tennis shoes     Unaffected Foot: Tennis shoes    Last encounter in this department: Visit date not found    Hemoglobin A1C   Date Value Ref Range Status   12/05/2022 5.2 4.0 - 5.6 % Final     Comment:     ADA Screening Guidelines:  5.7-6.4%  Consistent with prediabetes  >or=6.5%  Consistent with diabetes    High levels of fetal hemoglobin interfere with the HbA1C  assay. Heterozygous hemoglobin variants (HbS, HgC,  etc)do  not significantly interfere with this assay.   However, presence of multiple variants may affect accuracy.     09/01/2020 5.2 4.0 - 5.6 % Final     Comment:     ADA Screening Guidelines:  5.7-6.4%  Consistent with prediabetes  >or=6.5%  Consistent with diabetes  High levels of fetal hemoglobin interfere with the HbA1C  assay. Heterozygous hemoglobin variants (HbS, HgC, etc)do  not significantly interfere with this assay.   However, presence of multiple variants may affect accuracy.     06/29/2011 4.7 4.0 - 6.2 % Final       Review of Systems   Constitutional: Negative for chills, fever and malaise/fatigue.   HENT:  Negative for hearing loss.    Cardiovascular:  Negative for claudication and leg swelling.   Respiratory:  Negative for shortness of breath.    Skin:  Positive for color change, nail changes and unusual hair distribution. Negative for flushing and rash.   Musculoskeletal:  Negative for joint pain and myalgias.   Neurological:  Positive for numbness, paresthesias and sensory change. Negative for loss of balance.   Psychiatric/Behavioral:  Negative for altered mental status.    Allergic/Immunologic: Negative for hives.         Objective:      Physical Exam  Vitals reviewed.   Constitutional:       Appearance: He is well-developed.   Cardiovascular:      Pulses:           Dorsalis pedis pulses are 1+ on the right side and 1+ on the left side.        Posterior tibial pulses are 1+ on the right side and 1+ on the left side.      Comments: Non pitting  edema noted to b/L LEs  Musculoskeletal:      Right knee: No swelling or ecchymosis.      Left knee: No swelling or ecchymosis.      Right lower leg: No swelling, deformity, tenderness or bony tenderness. No edema.      Left lower leg: No swelling or tenderness. No edema.      Right ankle: No swelling or ecchymosis. No lateral malleolus or medial malleolus tenderness. Normal range of motion. Normal pulse.      Right Achilles Tendon: No defects. Cheema's  test negative.      Left ankle: No swelling or ecchymosis. No lateral malleolus or medial malleolus tenderness. Normal range of motion. Normal pulse.      Left Achilles Tendon: Cheema's test negative.      Right foot: Normal range of motion. No deformity.      Left foot: Normal range of motion. No deformity.      Comments: Decreased height of medial arches noted with loading of the foot b/L  Hammertoes noted, digits 4 and 5b/L with adductovarus rotation of b/L fifth digit.    Adequate joint ROM noted to all lower extremity muscle groups with no pain or crepitation noted. Muscle strength is 5/5 in all groups bilaterally.     Feet:      Right foot:      Protective Sensation: 10 sites tested.  6 sites sensed.      Toenail Condition: Right toenails are abnormally thick and long.      Left foot:      Protective Sensation: 10 sites tested.  6 sites sensed.      Toenail Condition: Left toenails are abnormally thick and long.   Skin:     General: Skin is warm.      Capillary Refill: Capillary refill takes more than 3 seconds.      Findings: No abrasion, bruising, burn, ecchymosis or wound.      Comments: Skin temp is cool to cool from proximal to distal b/L.  Nails x10 are elongated by  5-7mm's, thickened by 1-3 mm's, dystrophic, and are yellowish in  coloration . Xerosis Bilaterally. No open lesions noted.        Neurological:      Mental Status: He is alert and oriented to person, place, and time.      Comments: Diminished protective sensation noted b/L    Positive Tinel sign and provocation sign noted to bilateral tarsal tunnel nerves.     Psychiatric:         Attention and Perception: He is attentive.         Speech: Speech normal.         Behavior: Behavior normal.     HKLs noted to b/L sub 2nd met        Assessment:       Encounter Diagnoses   Name Primary?    Idiopathic peripheral neuropathy Yes    Onychomycosis     Pain of foot, unspecified laterality     Tarsal tunnel syndrome of both lower extremities           Plan:       Darinel was seen today for foot pain.    Diagnoses and all orders for this visit:    Idiopathic peripheral neuropathy    Onychomycosis    Pain of foot, unspecified laterality    Tarsal tunnel syndrome of both lower extremities    Other orders  -     diclofenac sodium (VOLTAREN) 1 % Gel; Apply 2 g topically 4 (four) times daily.      I counseled the patient on his conditions, their implications and medical management.    The nature of the condition, options for management, as well as potential risks and complications were discussed in detail with patient. Patient was amenable to my recommendations and left my office fully informed and will follow up as instructed or sooner if necessary.      Discussed options for peripheral neuropathy/nerve entrapment syndrome including nerve block therapy, surgical nerve entrapment decompression procedures, and various vitamins and supplementation available shown to improve nerve function.    Nerve injection:    Performed by:  Elder Pappas DPM    Preop diagnosis:  Neuropathy symptoms/paresthesias/pain    The area overlying the bilateral tarsal tunnel nerve(s) was sterilely prepped, verbal consent was obtained, 2 cc's of 0.5% Marcaine plain was infiltrated around the affected nerve(s) for a diagnostic nerve block.  A total of 2 nerves were injected.  This was well tolerated with no complications.      Follow-up in 4 weeks to discuss effectiveness of nerve blocks.

## 2023-07-05 DIAGNOSIS — M81.0 AGE-RELATED OSTEOPOROSIS WITHOUT CURRENT PATHOLOGICAL FRACTURE: ICD-10-CM

## 2023-07-05 RX ORDER — ALENDRONATE SODIUM 70 MG/1
TABLET ORAL
Qty: 12 TABLET | Refills: 3 | Status: SHIPPED | OUTPATIENT
Start: 2023-07-05 | End: 2023-10-19 | Stop reason: SDUPTHER

## 2023-07-05 NOTE — TELEPHONE ENCOUNTER
Refill Routing Note   Medication(s) are not appropriate for processing by [facility] for the following reason(s):      Medication outside of protocol    [facility] action(s):  Route None identified          Appointments  past 12m or future 3m with PCP    Date Provider   Last Visit   10/13/2022 Annabella Montiel MD   Next Visit   7/5/2023 Annabella Montiel MD   ED visits in past 90 days: 0        Note composed:3:33 PM 07/05/2023

## 2023-07-12 ENCOUNTER — PATIENT MESSAGE (OUTPATIENT)
Dept: INTERNAL MEDICINE | Facility: CLINIC | Age: 83
End: 2023-07-12
Payer: MEDICARE

## 2023-07-30 DIAGNOSIS — R60.9 SWELLING: ICD-10-CM

## 2023-07-31 RX ORDER — SPIRONOLACTONE 25 MG/1
50 TABLET ORAL
Qty: 180 TABLET | Refills: 3 | Status: SHIPPED | OUTPATIENT
Start: 2023-07-31

## 2023-08-23 ENCOUNTER — OFFICE VISIT (OUTPATIENT)
Dept: PODIATRY | Facility: CLINIC | Age: 83
End: 2023-08-23
Payer: MEDICARE

## 2023-08-23 VITALS
HEIGHT: 62 IN | DIASTOLIC BLOOD PRESSURE: 53 MMHG | HEART RATE: 75 BPM | WEIGHT: 167.31 LBS | SYSTOLIC BLOOD PRESSURE: 143 MMHG | BODY MASS INDEX: 30.79 KG/M2

## 2023-08-23 DIAGNOSIS — G60.9 IDIOPATHIC PERIPHERAL NEUROPATHY: Primary | ICD-10-CM

## 2023-08-23 DIAGNOSIS — G57.53 TARSAL TUNNEL SYNDROME OF BOTH LOWER EXTREMITIES: ICD-10-CM

## 2023-08-23 DIAGNOSIS — M79.673 PAIN OF FOOT, UNSPECIFIED LATERALITY: ICD-10-CM

## 2023-08-23 PROCEDURE — 3077F PR MOST RECENT SYSTOLIC BLOOD PRESSURE >= 140 MM HG: ICD-10-PCS | Mod: CPTII,S$GLB,, | Performed by: PODIATRIST

## 2023-08-23 PROCEDURE — 99214 OFFICE O/P EST MOD 30 MIN: CPT | Mod: S$GLB,,, | Performed by: PODIATRIST

## 2023-08-23 PROCEDURE — 1159F PR MEDICATION LIST DOCUMENTED IN MEDICAL RECORD: ICD-10-PCS | Mod: CPTII,S$GLB,, | Performed by: PODIATRIST

## 2023-08-23 PROCEDURE — 99214 PR OFFICE/OUTPT VISIT, EST, LEVL IV, 30-39 MIN: ICD-10-PCS | Mod: S$GLB,,, | Performed by: PODIATRIST

## 2023-08-23 PROCEDURE — 3077F SYST BP >= 140 MM HG: CPT | Mod: CPTII,S$GLB,, | Performed by: PODIATRIST

## 2023-08-23 PROCEDURE — 3078F DIAST BP <80 MM HG: CPT | Mod: CPTII,S$GLB,, | Performed by: PODIATRIST

## 2023-08-23 PROCEDURE — 99999 PR PBB SHADOW E&M-EST. PATIENT-LVL III: CPT | Mod: PBBFAC,,, | Performed by: PODIATRIST

## 2023-08-23 PROCEDURE — 1126F PR PAIN SEVERITY QUANTIFIED, NO PAIN PRESENT: ICD-10-PCS | Mod: CPTII,S$GLB,, | Performed by: PODIATRIST

## 2023-08-23 PROCEDURE — 3078F PR MOST RECENT DIASTOLIC BLOOD PRESSURE < 80 MM HG: ICD-10-PCS | Mod: CPTII,S$GLB,, | Performed by: PODIATRIST

## 2023-08-23 PROCEDURE — 1126F AMNT PAIN NOTED NONE PRSNT: CPT | Mod: CPTII,S$GLB,, | Performed by: PODIATRIST

## 2023-08-23 PROCEDURE — 1159F MED LIST DOCD IN RCRD: CPT | Mod: CPTII,S$GLB,, | Performed by: PODIATRIST

## 2023-08-23 PROCEDURE — 99999 PR PBB SHADOW E&M-EST. PATIENT-LVL III: ICD-10-PCS | Mod: PBBFAC,,, | Performed by: PODIATRIST

## 2023-08-25 NOTE — PROGRESS NOTES
Subjective:      Patient ID: Darinel Majano is a 83 y.o. male.    Chief Complaint: Peripheral Neuropathy and Nail Care (3m nailcare)      Darinel is a 83 y.o. male who presents to the clinic for evaluation and treatment of high risk feet. 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), Anemia (5/4/2017), Aortic atherosclerosis: see CT scan 5/17 (6/9/2017), BPH (benign prostatic hypertrophy), Chronic gout, Cortical senile cataract (5/15/2012), Degenerative disc disease, Diverticulosis of large intestine without hemorrhage: see CT scan 5/17 (6/9/2017), Essential hypertension (2/19/2016), Gastroesophageal reflux disease with gastritis: see EGD 2018 (10/31/2018), Gout, Hyperplastic polyp of transverse colon: 2012 repeat 5 years (5/4/2017), IFG (impaired fasting glucose) (5/7/2019), Kidney stone on left side (6/9/2017), Osteoporosis, Psoriasis, 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 The patient's chief complaint is long, thick toenails. This patient has documented high risk feet requiring routine maintenance secondary to peripheral neuropathy.    PCP: Annabella Montiel MD    Date Last Seen by PCP:  Chief Complaint   Patient presents with    Peripheral Neuropathy    Nail Care     3m nailcare       Current shoe gear:  Affected Foot: Tennis shoes     Unaffected Foot: Tennis shoes    Last encounter in this department: Visit date not found    Hemoglobin A1C   Date Value Ref Range Status   12/05/2022 5.2 4.0 - 5.6 % Final     Comment:     ADA Screening Guidelines:  5.7-6.4%  Consistent with prediabetes  >or=6.5%  Consistent with diabetes    High levels of fetal hemoglobin interfere with the HbA1C  assay. Heterozygous hemoglobin variants (HbS, HgC, etc)do  not significantly interfere with this assay.   However,  presence of multiple variants may affect accuracy.     09/01/2020 5.2 4.0 - 5.6 % Final     Comment:     ADA Screening Guidelines:  5.7-6.4%  Consistent with prediabetes  >or=6.5%  Consistent with diabetes  High levels of fetal hemoglobin interfere with the HbA1C  assay. Heterozygous hemoglobin variants (HbS, HgC, etc)do  not significantly interfere with this assay.   However, presence of multiple variants may affect accuracy.     06/29/2011 4.7 4.0 - 6.2 % Final       Review of Systems   Constitutional: Negative for chills, fever and malaise/fatigue.   HENT:  Negative for hearing loss.    Cardiovascular:  Negative for claudication and leg swelling.   Respiratory:  Negative for shortness of breath.    Skin:  Positive for color change, nail changes and unusual hair distribution. Negative for flushing and rash.   Musculoskeletal:  Negative for joint pain and myalgias.   Gastrointestinal:  Negative for nausea and vomiting.   Neurological:  Positive for numbness, paresthesias and sensory change. Negative for loss of balance.   Psychiatric/Behavioral:  Negative for altered mental status.    Allergic/Immunologic: Negative for hives.           Objective:      Physical Exam  Vitals reviewed.   Constitutional:       Appearance: He is well-developed.   Cardiovascular:      Pulses:           Dorsalis pedis pulses are 1+ on the right side and 1+ on the left side.        Posterior tibial pulses are 1+ on the right side and 1+ on the left side.      Comments: Non pitting  edema noted to b/L LEs  Musculoskeletal:      Right knee: No swelling or ecchymosis.      Left knee: No swelling or ecchymosis.      Right lower leg: No swelling, deformity, tenderness or bony tenderness. No edema.      Left lower leg: No swelling or tenderness. No edema.      Right ankle: No swelling or ecchymosis. No lateral malleolus or medial malleolus tenderness. Normal range of motion. Normal pulse.      Right Achilles Tendon: No defects. Cheema's test  negative.      Left ankle: No swelling or ecchymosis. No lateral malleolus or medial malleolus tenderness. Normal range of motion. Normal pulse.      Left Achilles Tendon: Cheema's test negative.      Right foot: Normal range of motion. No deformity.      Left foot: Normal range of motion. No deformity.      Comments: Decreased height of medial arches noted with loading of the foot b/L  Hammertoes noted, digits 4 and 5b/L with adductovarus rotation of b/L fifth digit.    Adequate joint ROM noted to all lower extremity muscle groups with no pain or crepitation noted. Muscle strength is 5/5 in all groups bilaterally.     Feet:      Right foot:      Protective Sensation: 10 sites tested.  6 sites sensed.      Toenail Condition: Right toenails are abnormally thick and long.      Left foot:      Protective Sensation: 10 sites tested.  6 sites sensed.      Toenail Condition: Left toenails are abnormally thick and long.   Skin:     General: Skin is warm.      Capillary Refill: Capillary refill takes more than 3 seconds.      Findings: No abrasion, bruising, burn, ecchymosis or wound.      Comments: Skin temp is cool to cool from proximal to distal b/L.  Nails x10 are elongated by  4-6mm's, thickened by 1-3 mm's, dystrophic, and are yellowish in  coloration . Xerosis Bilaterally. No open lesions noted.        Neurological:      Mental Status: He is alert and oriented to person, place, and time.      Comments: Diminished protective sensation noted b/L     Psychiatric:         Attention and Perception: He is attentive.         Speech: Speech normal.         Behavior: Behavior normal.       HKLs noted to b/L sub 2nd met        Assessment:       Encounter Diagnoses   Name Primary?    Idiopathic peripheral neuropathy Yes    Onychomycosis     Corn or callus          Plan:       Darinel was seen today for peripheral neuropathy and nail care.    Diagnoses and all orders for this visit:    Idiopathic peripheral  neuropathy    Onychomycosis    Corn or callus      I counseled the patient on his conditions, their implications and medical management.        - Shoe inspection. Patient instructed on proper foot hygeine. We discussed wearing proper shoe gear, daily foot inspections, never walking without protective shoe gear, never putting sharp instruments to feet, routine podiatric nail visits every 2-3 months.    After cleansing with an alcohol prep pad, the about mentioned hyperkeratotic lesions were sharply debrided X 2 utilizing a #15 blade to a smooth base without incident. Pt tolerated the procedure well and reported comfort to the debarment sites. Pt will continue to use padding and moisture the callused areas.     - With patient's permission, nails were aggressively reduced and debrided x 10 to their soft tissue attachment mechanically and with electric , removing all offending nail and debris. Patient relates relief following the procedure. He will continue to monitor the areas daily, inspect his feet, wear protective shoe gear when ambulatory, moisturizer to maintain skin integrity and follow in this office in approximately 2-3 months, sooner p.r.n.

## 2023-08-27 NOTE — PROGRESS NOTES
Subjective:      Patient ID: Darinel Majano is a 83 y.o. male.    Chief Complaint: Follow-up (Bilateral )      Darinel is a 83 y.o. male who presents to the clinic for evaluation and treatment of high risk feet. 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), Anemia (5/4/2017), Aortic atherosclerosis: see CT scan 5/17 (6/9/2017), BPH (benign prostatic hypertrophy), Chronic gout, Cortical senile cataract (5/15/2012), Degenerative disc disease, Diverticulosis of large intestine without hemorrhage: see CT scan 5/17 (6/9/2017), Essential hypertension (2/19/2016), Gastroesophageal reflux disease with gastritis: see EGD 2018 (10/31/2018), Gout, Hyperplastic polyp of transverse colon: 2012 repeat 5 years (5/4/2017), IFG (impaired fasting glucose) (5/7/2019), Kidney stone on left side (6/9/2017), Osteoporosis, Psoriasis, 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 The patient's chief complaint is painful burning to the bottoms of both feet.  This patient has documented high risk feet requiring routine maintenance secondary to peripheral neuropathy.  Which improved since last visit/bilateral tarsal tunnel injections.    PCP: Annabella Montiel MD    Date Last Seen by PCP:  Chief Complaint   Patient presents with    Follow-up     Bilateral        Current shoe gear:  Affected Foot: Tennis shoes     Unaffected Foot: Tennis shoes    Last encounter in this department: Visit date not found    Hemoglobin A1C   Date Value Ref Range Status   12/05/2022 5.2 4.0 - 5.6 % Final     Comment:     ADA Screening Guidelines:  5.7-6.4%  Consistent with prediabetes  >or=6.5%  Consistent with diabetes    High levels of fetal hemoglobin interfere with the HbA1C  assay. Heterozygous hemoglobin variants (HbS, HgC, etc)do  not  significantly interfere with this assay.   However, presence of multiple variants may affect accuracy.     09/01/2020 5.2 4.0 - 5.6 % Final     Comment:     ADA Screening Guidelines:  5.7-6.4%  Consistent with prediabetes  >or=6.5%  Consistent with diabetes  High levels of fetal hemoglobin interfere with the HbA1C  assay. Heterozygous hemoglobin variants (HbS, HgC, etc)do  not significantly interfere with this assay.   However, presence of multiple variants may affect accuracy.     06/29/2011 4.7 4.0 - 6.2 % Final       Review of Systems   Constitutional: Negative for chills, fever and malaise/fatigue.   HENT:  Negative for hearing loss.    Cardiovascular:  Negative for claudication and leg swelling.   Respiratory:  Negative for shortness of breath.    Skin:  Positive for color change, nail changes and unusual hair distribution. Negative for flushing and rash.   Musculoskeletal:  Negative for joint pain and myalgias.   Neurological:  Positive for numbness, paresthesias and sensory change. Negative for loss of balance.   Psychiatric/Behavioral:  Negative for altered mental status.    Allergic/Immunologic: Negative for hives.           Objective:      Physical Exam  Vitals reviewed.   Constitutional:       Appearance: He is well-developed.   Cardiovascular:      Pulses:           Dorsalis pedis pulses are 1+ on the right side and 1+ on the left side.        Posterior tibial pulses are 1+ on the right side and 1+ on the left side.      Comments: Non pitting  edema noted to b/L LEs  Musculoskeletal:      Right knee: No swelling or ecchymosis.      Left knee: No swelling or ecchymosis.      Right lower leg: No swelling, deformity, tenderness or bony tenderness. No edema.      Left lower leg: No swelling or tenderness. No edema.      Right ankle: No swelling or ecchymosis. No lateral malleolus or medial malleolus tenderness. Normal range of motion. Normal pulse.      Right Achilles Tendon: No defects. Cheema's test  negative.      Left ankle: No swelling or ecchymosis. No lateral malleolus or medial malleolus tenderness. Normal range of motion. Normal pulse.      Left Achilles Tendon: Cheema's test negative.      Right foot: Normal range of motion. No deformity.      Left foot: Normal range of motion. No deformity.      Comments: Decreased height of medial arches noted with loading of the foot b/L  Hammertoes noted, digits 4 and 5b/L with adductovarus rotation of b/L fifth digit.    Adequate joint ROM noted to all lower extremity muscle groups with no pain or crepitation noted. Muscle strength is 5/5 in all groups bilaterally.     Feet:      Right foot:      Protective Sensation: 10 sites tested.  6 sites sensed.      Toenail Condition: Right toenails are abnormally thick and long.      Left foot:      Protective Sensation: 10 sites tested.  6 sites sensed.      Toenail Condition: Left toenails are abnormally thick and long.   Skin:     General: Skin is warm.      Capillary Refill: Capillary refill takes more than 3 seconds.      Findings: No abrasion, bruising, burn, ecchymosis or wound.      Comments: Skin temp is cool to cool from proximal to distal b/L.  Nails x10 are elongated by  5-7mm's, thickened by 1-3 mm's, dystrophic, and are yellowish in  coloration . Xerosis Bilaterally. No open lesions noted.        Neurological:      Mental Status: He is alert and oriented to person, place, and time.      Comments: Diminished protective sensation noted b/L    Positive Tinel sign and provocation sign noted to bilateral tarsal tunnel nerves, improved.     Psychiatric:         Attention and Perception: He is attentive.         Speech: Speech normal.         Behavior: Behavior normal.       HKLs noted to b/L sub 2nd met        Assessment:       Encounter Diagnoses   Name Primary?    Idiopathic peripheral neuropathy Yes    Tarsal tunnel syndrome of both lower extremities     Pain of foot, unspecified laterality          Plan:        Darinel was seen today for follow-up.    Diagnoses and all orders for this visit:    Idiopathic peripheral neuropathy    Tarsal tunnel syndrome of both lower extremities    Pain of foot, unspecified laterality      I counseled the patient on his conditions, their implications and medical management.    The nature of the condition, options for management, as well as potential risks and complications were discussed in detail with patient. Patient was amenable to my recommendations and left my office fully informed and will follow up as instructed or sooner if necessary.      Discussed options for peripheral neuropathy/nerve entrapment syndrome including nerve block therapy, surgical nerve entrapment decompression procedures, and various vitamins and supplementation available shown to improve nerve function.      Follow-up in 3 months.

## 2023-09-11 ENCOUNTER — OFFICE VISIT (OUTPATIENT)
Dept: PODIATRY | Facility: CLINIC | Age: 83
End: 2023-09-11
Payer: MEDICARE

## 2023-09-11 VITALS
DIASTOLIC BLOOD PRESSURE: 67 MMHG | HEART RATE: 93 BPM | BODY MASS INDEX: 31.36 KG/M2 | HEIGHT: 62 IN | SYSTOLIC BLOOD PRESSURE: 109 MMHG | WEIGHT: 170.44 LBS

## 2023-09-11 DIAGNOSIS — B35.1 ONYCHOMYCOSIS: ICD-10-CM

## 2023-09-11 DIAGNOSIS — G60.9 IDIOPATHIC PERIPHERAL NEUROPATHY: Primary | ICD-10-CM

## 2023-09-11 DIAGNOSIS — L84 CORN OR CALLUS: ICD-10-CM

## 2023-09-11 PROCEDURE — 99499 UNLISTED E&M SERVICE: CPT | Mod: S$GLB,,, | Performed by: PODIATRIST

## 2023-09-11 PROCEDURE — 11056 PARNG/CUTG B9 HYPRKR LES 2-4: CPT | Mod: Q9,59,S$GLB, | Performed by: PODIATRIST

## 2023-09-11 PROCEDURE — 11721 DEBRIDE NAIL 6 OR MORE: CPT | Mod: 59,Q9,S$GLB, | Performed by: PODIATRIST

## 2023-09-11 PROCEDURE — 11056 PR TRIM BENIGN HYPERKERATOTIC SKIN LESION,2-4: ICD-10-PCS | Mod: Q9,59,S$GLB, | Performed by: PODIATRIST

## 2023-09-11 PROCEDURE — 99999 PR PBB SHADOW E&M-EST. PATIENT-LVL III: CPT | Mod: PBBFAC,,, | Performed by: PODIATRIST

## 2023-09-11 PROCEDURE — 11721 PR DEBRIDEMENT OF NAILS, 6 OR MORE: ICD-10-PCS | Mod: 59,Q9,S$GLB, | Performed by: PODIATRIST

## 2023-09-11 PROCEDURE — 99999 PR PBB SHADOW E&M-EST. PATIENT-LVL III: ICD-10-PCS | Mod: PBBFAC,,, | Performed by: PODIATRIST

## 2023-09-11 PROCEDURE — 99499 NO LOS: ICD-10-PCS | Mod: S$GLB,,, | Performed by: PODIATRIST

## 2023-09-11 NOTE — PROGRESS NOTES
Subjective:      Patient ID: Darinel Majano is a 83 y.o. male.    Chief Complaint: Peripheral Neuropathy (3m f/u )      Darinel is a 83 y.o. male who presents to the clinic for evaluation and treatment of high risk feet. 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), Anemia (5/4/2017), Aortic atherosclerosis: see CT scan 5/17 (6/9/2017), BPH (benign prostatic hypertrophy), Chronic gout, Cortical senile cataract (5/15/2012), Degenerative disc disease, Diverticulosis of large intestine without hemorrhage: see CT scan 5/17 (6/9/2017), Essential hypertension (2/19/2016), Gastroesophageal reflux disease with gastritis: see EGD 2018 (10/31/2018), Gout, Hyperplastic polyp of transverse colon: 2012 repeat 5 years (5/4/2017), IFG (impaired fasting glucose) (5/7/2019), Kidney stone on left side (6/9/2017), Osteoporosis, Psoriasis, 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 The patient's chief complaint is long, thick toenails. This patient has documented high risk feet requiring routine maintenance secondary to peripheral neuropathy.    PCP: Annabella Montiel MD    Date Last Seen by PCP:  Chief Complaint   Patient presents with    Peripheral Neuropathy     3m f/u        Current shoe gear:  Affected Foot: Tennis shoes     Unaffected Foot: Tennis shoes    Last encounter in this department: Visit date not found    Hemoglobin A1C   Date Value Ref Range Status   12/05/2022 5.2 4.0 - 5.6 % Final     Comment:     ADA Screening Guidelines:  5.7-6.4%  Consistent with prediabetes  >or=6.5%  Consistent with diabetes    High levels of fetal hemoglobin interfere with the HbA1C  assay. Heterozygous hemoglobin variants (HbS, HgC, etc)do  not significantly interfere with this assay.   However, presence of multiple variants may  affect accuracy.     09/01/2020 5.2 4.0 - 5.6 % Final     Comment:     ADA Screening Guidelines:  5.7-6.4%  Consistent with prediabetes  >or=6.5%  Consistent with diabetes  High levels of fetal hemoglobin interfere with the HbA1C  assay. Heterozygous hemoglobin variants (HbS, HgC, etc)do  not significantly interfere with this assay.   However, presence of multiple variants may affect accuracy.     06/29/2011 4.7 4.0 - 6.2 % Final       Review of Systems   Constitutional: Negative for chills, fever and malaise/fatigue.   HENT:  Negative for hearing loss.    Cardiovascular:  Negative for claudication and leg swelling.   Respiratory:  Negative for shortness of breath.    Skin:  Positive for color change, nail changes and unusual hair distribution. Negative for flushing and rash.   Musculoskeletal:  Negative for joint pain and myalgias.   Gastrointestinal:  Negative for nausea and vomiting.   Neurological:  Positive for numbness, paresthesias and sensory change. Negative for loss of balance.   Psychiatric/Behavioral:  Negative for altered mental status.    Allergic/Immunologic: Negative for hives.           Objective:      Physical Exam  Vitals reviewed.   Constitutional:       Appearance: He is well-developed.   Cardiovascular:      Pulses:           Dorsalis pedis pulses are 1+ on the right side and 1+ on the left side.        Posterior tibial pulses are 1+ on the right side and 1+ on the left side.      Comments: Non pitting  edema noted to b/L LEs  Musculoskeletal:      Right knee: No swelling or ecchymosis.      Left knee: No swelling or ecchymosis.      Right lower leg: No swelling, deformity, tenderness or bony tenderness. No edema.      Left lower leg: No swelling or tenderness. No edema.      Right ankle: No swelling or ecchymosis. No lateral malleolus or medial malleolus tenderness. Normal range of motion. Normal pulse.      Right Achilles Tendon: No defects. Cheema's test negative.      Left ankle: No  swelling or ecchymosis. No lateral malleolus or medial malleolus tenderness. Normal range of motion. Normal pulse.      Left Achilles Tendon: Cheema's test negative.      Right foot: Normal range of motion. No deformity.      Left foot: Normal range of motion. No deformity.      Comments: Decreased height of medial arches noted with loading of the foot b/L  Hammertoes noted, digits 4 and 5b/L with adductovarus rotation of b/L fifth digit.    Adequate joint ROM noted to all lower extremity muscle groups with no pain or crepitation noted. Muscle strength is 5/5 in all groups bilaterally.     Feet:      Right foot:      Protective Sensation: 10 sites tested.  6 sites sensed.      Toenail Condition: Right toenails are abnormally thick and long.      Left foot:      Protective Sensation: 10 sites tested.  6 sites sensed.      Toenail Condition: Left toenails are abnormally thick and long.   Skin:     General: Skin is warm.      Capillary Refill: Capillary refill takes more than 3 seconds.      Findings: No abrasion, bruising, burn, ecchymosis or wound.      Comments: Skin temp is cool to cool from proximal to distal b/L.  Nails x10 are elongated by  4-7mm's, thickened by 1-3 mm's, dystrophic, and are yellowish in  coloration . Xerosis Bilaterally. No open lesions noted.        Neurological:      Mental Status: He is alert and oriented to person, place, and time.      Comments: Diminished protective sensation noted b/L     Psychiatric:         Attention and Perception: He is attentive.         Speech: Speech normal.         Behavior: Behavior normal.       HKLs noted to b/L sub 2nd met        Assessment:       Encounter Diagnoses   Name Primary?    Idiopathic peripheral neuropathy Yes    Onychomycosis     Corn or callus          Plan:       Darinel was seen today for peripheral neuropathy.    Diagnoses and all orders for this visit:    Idiopathic peripheral neuropathy    Onychomycosis    Corn or callus      I counseled  the patient on his conditions, their implications and medical management.        - Shoe inspection. Patient instructed on proper foot hygeine. We discussed wearing proper shoe gear, daily foot inspections, never walking without protective shoe gear, never putting sharp instruments to feet, routine podiatric nail visits every 2-3 months.    After cleansing with an alcohol prep pad, the about mentioned hyperkeratotic lesions were sharply debrided X 2 utilizing a #15 blade to a smooth base without incident. Pt tolerated the procedure well and reported comfort to the debarment sites. Pt will continue to use padding and moisture the callused areas.     - With patient's permission, nails were aggressively reduced and debrided x 10 to their soft tissue attachment mechanically and with electric , removing all offending nail and debris. Patient relates relief following the procedure. He will continue to monitor the areas daily, inspect his feet, wear protective shoe gear when ambulatory, moisturizer to maintain skin integrity and follow in this office in approximately 2-3 months, sooner p.r.n.

## 2023-09-20 ENCOUNTER — TELEPHONE (OUTPATIENT)
Dept: HEPATOLOGY | Facility: CLINIC | Age: 83
End: 2023-09-20
Payer: MEDICARE

## 2023-10-16 ENCOUNTER — LAB VISIT (OUTPATIENT)
Dept: LAB | Facility: HOSPITAL | Age: 83
End: 2023-10-16
Attending: INTERNAL MEDICINE
Payer: MEDICARE

## 2023-10-16 DIAGNOSIS — R73.01 IFG (IMPAIRED FASTING GLUCOSE): ICD-10-CM

## 2023-10-16 DIAGNOSIS — I10 ESSENTIAL HYPERTENSION: ICD-10-CM

## 2023-10-16 DIAGNOSIS — E78.2 MIXED HYPERLIPIDEMIA: ICD-10-CM

## 2023-10-16 DIAGNOSIS — M1A.0720 CHRONIC IDIOPATHIC GOUT INVOLVING TOE OF LEFT FOOT WITHOUT TOPHUS: ICD-10-CM

## 2023-10-16 LAB
ALBUMIN SERPL BCP-MCNC: 3.8 G/DL (ref 3.5–5.2)
ALP SERPL-CCNC: 87 U/L (ref 55–135)
ALT SERPL W/O P-5'-P-CCNC: 29 U/L (ref 10–44)
ANION GAP SERPL CALC-SCNC: 7 MMOL/L (ref 8–16)
AST SERPL-CCNC: 24 U/L (ref 10–40)
BASOPHILS # BLD AUTO: 0.03 K/UL (ref 0–0.2)
BASOPHILS NFR BLD: 0.3 % (ref 0–1.9)
BILIRUB SERPL-MCNC: 1.1 MG/DL (ref 0.1–1)
BUN SERPL-MCNC: 14 MG/DL (ref 8–23)
CALCIUM SERPL-MCNC: 9.4 MG/DL (ref 8.7–10.5)
CHLORIDE SERPL-SCNC: 106 MMOL/L (ref 95–110)
CHOLEST SERPL-MCNC: 93 MG/DL (ref 120–199)
CHOLEST/HDLC SERPL: 2.4 {RATIO} (ref 2–5)
CO2 SERPL-SCNC: 26 MMOL/L (ref 23–29)
CREAT SERPL-MCNC: 0.8 MG/DL (ref 0.5–1.4)
DIFFERENTIAL METHOD: ABNORMAL
EOSINOPHIL # BLD AUTO: 0.2 K/UL (ref 0–0.5)
EOSINOPHIL NFR BLD: 1.6 % (ref 0–8)
ERYTHROCYTE [DISTWIDTH] IN BLOOD BY AUTOMATED COUNT: 13.2 % (ref 11.5–14.5)
EST. GFR  (NO RACE VARIABLE): >60 ML/MIN/1.73 M^2
ESTIMATED AVG GLUCOSE: 100 MG/DL (ref 68–131)
GLUCOSE SERPL-MCNC: 104 MG/DL (ref 70–110)
HBA1C MFR BLD: 5.1 % (ref 4–5.6)
HCT VFR BLD AUTO: 43 % (ref 40–54)
HDLC SERPL-MCNC: 38 MG/DL (ref 40–75)
HDLC SERPL: 40.9 % (ref 20–50)
HGB BLD-MCNC: 14.2 G/DL (ref 14–18)
IMM GRANULOCYTES # BLD AUTO: 0.03 K/UL (ref 0–0.04)
IMM GRANULOCYTES NFR BLD AUTO: 0.3 % (ref 0–0.5)
LDLC SERPL CALC-MCNC: 41 MG/DL (ref 63–159)
LYMPHOCYTES # BLD AUTO: 2.4 K/UL (ref 1–4.8)
LYMPHOCYTES NFR BLD: 25 % (ref 18–48)
MCH RBC QN AUTO: 31 PG (ref 27–31)
MCHC RBC AUTO-ENTMCNC: 33 G/DL (ref 32–36)
MCV RBC AUTO: 94 FL (ref 82–98)
MONOCYTES # BLD AUTO: 0.9 K/UL (ref 0.3–1)
MONOCYTES NFR BLD: 8.8 % (ref 4–15)
NEUTROPHILS # BLD AUTO: 6.2 K/UL (ref 1.8–7.7)
NEUTROPHILS NFR BLD: 64 % (ref 38–73)
NONHDLC SERPL-MCNC: 55 MG/DL
NRBC BLD-RTO: 0 /100 WBC
PLATELET # BLD AUTO: 154 K/UL (ref 150–450)
PMV BLD AUTO: 10 FL (ref 9.2–12.9)
POTASSIUM SERPL-SCNC: 4.2 MMOL/L (ref 3.5–5.1)
PROT SERPL-MCNC: 7 G/DL (ref 6–8.4)
RBC # BLD AUTO: 4.58 M/UL (ref 4.6–6.2)
SODIUM SERPL-SCNC: 139 MMOL/L (ref 136–145)
TRIGL SERPL-MCNC: 70 MG/DL (ref 30–150)
URATE SERPL-MCNC: 7 MG/DL (ref 3.4–7)
WBC # BLD AUTO: 9.76 K/UL (ref 3.9–12.7)

## 2023-10-16 PROCEDURE — 80053 COMPREHEN METABOLIC PANEL: CPT | Performed by: INTERNAL MEDICINE

## 2023-10-16 PROCEDURE — 84550 ASSAY OF BLOOD/URIC ACID: CPT | Performed by: INTERNAL MEDICINE

## 2023-10-16 PROCEDURE — 83036 HEMOGLOBIN GLYCOSYLATED A1C: CPT | Performed by: INTERNAL MEDICINE

## 2023-10-16 PROCEDURE — 80061 LIPID PANEL: CPT | Performed by: INTERNAL MEDICINE

## 2023-10-16 PROCEDURE — 36415 COLL VENOUS BLD VENIPUNCTURE: CPT | Performed by: INTERNAL MEDICINE

## 2023-10-16 PROCEDURE — 85025 COMPLETE CBC W/AUTO DIFF WBC: CPT | Performed by: INTERNAL MEDICINE

## 2023-10-19 ENCOUNTER — OFFICE VISIT (OUTPATIENT)
Dept: INTERNAL MEDICINE | Facility: CLINIC | Age: 83
End: 2023-10-19
Payer: MEDICARE

## 2023-10-19 ENCOUNTER — IMMUNIZATION (OUTPATIENT)
Dept: INTERNAL MEDICINE | Facility: CLINIC | Age: 83
End: 2023-10-19
Payer: MEDICARE

## 2023-10-19 VITALS
SYSTOLIC BLOOD PRESSURE: 115 MMHG | BODY MASS INDEX: 29.98 KG/M2 | DIASTOLIC BLOOD PRESSURE: 70 MMHG | WEIGHT: 162.94 LBS | HEIGHT: 62 IN

## 2023-10-19 DIAGNOSIS — K21.9 GASTROESOPHAGEAL REFLUX DISEASE WITHOUT ESOPHAGITIS: ICD-10-CM

## 2023-10-19 DIAGNOSIS — R91.8 RIGHT LOWER LOBE LUNG MASS: ICD-10-CM

## 2023-10-19 DIAGNOSIS — M1A.0720 CHRONIC IDIOPATHIC GOUT INVOLVING TOE OF LEFT FOOT WITHOUT TOPHUS: ICD-10-CM

## 2023-10-19 DIAGNOSIS — K42.9 UMBILICAL HERNIA WITHOUT OBSTRUCTION AND WITHOUT GANGRENE: ICD-10-CM

## 2023-10-19 DIAGNOSIS — I10 ESSENTIAL HYPERTENSION: ICD-10-CM

## 2023-10-19 DIAGNOSIS — K74.00 LIVER FIBROSIS: ICD-10-CM

## 2023-10-19 DIAGNOSIS — M81.0 AGE-RELATED OSTEOPOROSIS WITHOUT CURRENT PATHOLOGICAL FRACTURE: ICD-10-CM

## 2023-10-19 DIAGNOSIS — I70.0 AORTIC ATHEROSCLEROSIS: ICD-10-CM

## 2023-10-19 DIAGNOSIS — K76.6 PORTAL HYPERTENSION: ICD-10-CM

## 2023-10-19 DIAGNOSIS — C44.622 SQUAMOUS CELL CANCER OF SKIN OF RIGHT HAND: ICD-10-CM

## 2023-10-19 DIAGNOSIS — Z00.00 ANNUAL PHYSICAL EXAM: Primary | ICD-10-CM

## 2023-10-19 DIAGNOSIS — Z12.5 ENCOUNTER FOR SCREENING FOR MALIGNANT NEOPLASM OF PROSTATE: ICD-10-CM

## 2023-10-19 DIAGNOSIS — R73.01 IFG (IMPAIRED FASTING GLUCOSE): ICD-10-CM

## 2023-10-19 PROCEDURE — 1126F PR PAIN SEVERITY QUANTIFIED, NO PAIN PRESENT: ICD-10-PCS | Mod: CPTII,S$GLB,, | Performed by: INTERNAL MEDICINE

## 2023-10-19 PROCEDURE — 1101F PR PT FALLS ASSESS DOC 0-1 FALLS W/OUT INJ PAST YR: ICD-10-PCS | Mod: CPTII,S$GLB,, | Performed by: INTERNAL MEDICINE

## 2023-10-19 PROCEDURE — 1159F MED LIST DOCD IN RCRD: CPT | Mod: CPTII,S$GLB,, | Performed by: INTERNAL MEDICINE

## 2023-10-19 PROCEDURE — 1101F PT FALLS ASSESS-DOCD LE1/YR: CPT | Mod: CPTII,S$GLB,, | Performed by: INTERNAL MEDICINE

## 2023-10-19 PROCEDURE — 99397 PER PM REEVAL EST PAT 65+ YR: CPT | Mod: GZ,S$GLB,, | Performed by: INTERNAL MEDICINE

## 2023-10-19 PROCEDURE — 99999 PR PBB SHADOW E&M-EST. PATIENT-LVL IV: ICD-10-PCS | Mod: PBBFAC,,, | Performed by: INTERNAL MEDICINE

## 2023-10-19 PROCEDURE — 3078F DIAST BP <80 MM HG: CPT | Mod: CPTII,S$GLB,, | Performed by: INTERNAL MEDICINE

## 2023-10-19 PROCEDURE — 3288F FALL RISK ASSESSMENT DOCD: CPT | Mod: CPTII,S$GLB,, | Performed by: INTERNAL MEDICINE

## 2023-10-19 PROCEDURE — 1126F AMNT PAIN NOTED NONE PRSNT: CPT | Mod: CPTII,S$GLB,, | Performed by: INTERNAL MEDICINE

## 2023-10-19 PROCEDURE — 3078F PR MOST RECENT DIASTOLIC BLOOD PRESSURE < 80 MM HG: ICD-10-PCS | Mod: CPTII,S$GLB,, | Performed by: INTERNAL MEDICINE

## 2023-10-19 PROCEDURE — 3288F PR FALLS RISK ASSESSMENT DOCUMENTED: ICD-10-PCS | Mod: CPTII,S$GLB,, | Performed by: INTERNAL MEDICINE

## 2023-10-19 PROCEDURE — G0008 FLU VACCINE - QUADRIVALENT - ADJUVANTED: ICD-10-PCS | Mod: S$GLB,,, | Performed by: INTERNAL MEDICINE

## 2023-10-19 PROCEDURE — 3074F PR MOST RECENT SYSTOLIC BLOOD PRESSURE < 130 MM HG: ICD-10-PCS | Mod: CPTII,S$GLB,, | Performed by: INTERNAL MEDICINE

## 2023-10-19 PROCEDURE — 99999 PR PBB SHADOW E&M-EST. PATIENT-LVL IV: CPT | Mod: PBBFAC,,, | Performed by: INTERNAL MEDICINE

## 2023-10-19 PROCEDURE — 90694 VACC AIIV4 NO PRSRV 0.5ML IM: CPT | Mod: S$GLB,,, | Performed by: INTERNAL MEDICINE

## 2023-10-19 PROCEDURE — 1159F PR MEDICATION LIST DOCUMENTED IN MEDICAL RECORD: ICD-10-PCS | Mod: CPTII,S$GLB,, | Performed by: INTERNAL MEDICINE

## 2023-10-19 PROCEDURE — 90694 FLU VACCINE - QUADRIVALENT - ADJUVANTED: ICD-10-PCS | Mod: S$GLB,,, | Performed by: INTERNAL MEDICINE

## 2023-10-19 PROCEDURE — 3074F SYST BP LT 130 MM HG: CPT | Mod: CPTII,S$GLB,, | Performed by: INTERNAL MEDICINE

## 2023-10-19 PROCEDURE — 99397 PR PREVENTIVE VISIT,EST,65 & OVER: ICD-10-PCS | Mod: GZ,S$GLB,, | Performed by: INTERNAL MEDICINE

## 2023-10-19 PROCEDURE — G0008 ADMIN INFLUENZA VIRUS VAC: HCPCS | Mod: S$GLB,,, | Performed by: INTERNAL MEDICINE

## 2023-10-19 RX ORDER — ATORVASTATIN CALCIUM 80 MG/1
80 TABLET, FILM COATED ORAL DAILY
Qty: 90 TABLET | Refills: 3 | Status: SHIPPED | OUTPATIENT
Start: 2023-10-19 | End: 2023-12-16

## 2023-10-19 RX ORDER — ALENDRONATE SODIUM 70 MG/1
70 TABLET ORAL
Qty: 12 TABLET | Refills: 3 | Status: SHIPPED | OUTPATIENT
Start: 2023-10-19

## 2023-10-19 NOTE — PROGRESS NOTES
Patient ID: Darinel Majano is a 83 y.o. male.    Chief Complaint: Annual Exam      Assessment:       1. Annual physical exam    2. Right lower lobe lung mass: see evaluation 2017, both bronchoscopic and surgical:  Nonspecific inflammation and fibrosis    3. Essential hypertension    4. Aortic atherosclerosis: see CT scan 5/17 also CT scan 9/18    5. Squamous cell cancer of skin of right hand    6. IFG (impaired fasting glucose)    7. Age-related osteoporosis without current pathological fracture: see DEXA 2017; IMPROVED 2021    8. Umbilical hernia without obstruction and without gangrene    9. Portal hypertension    10. Liver fibrosis    11. Gastroesophageal reflux disease with gastritis: see EGD 2018, 2022    12. Chronic idiopathic gout involving toe of left foot without tophus    13. Encounter for screening for malignant neoplasm of prostate    14. Aortic atherosclerosis    15. Age-related osteoporosis without current pathological fracture          Plan:         1. Annual physical exam    2. Right lower lobe lung mass: see evaluation 2017, both bronchoscopic and surgical:  Nonspecific inflammation and fibrosis    3. Essential hypertension    4. Aortic atherosclerosis: see CT scan 5/17 also CT scan 9/18  -     atorvastatin (LIPITOR) 80 MG tablet; Take 1 tablet (80 mg total) by mouth once daily.  Dispense: 90 tablet; Refill: 3    5. Squamous cell cancer of skin of right hand  -     Ambulatory referral/consult to Dermatology; Future; Expected date: 10/26/2023    6. IFG (impaired fasting glucose)    7. Age-related osteoporosis without current pathological fracture: see DEXA 2017; IMPROVED 2021  -     alendronate (FOSAMAX) 70 MG tablet; Take 1 tablet (70 mg total) by mouth every 7 days.  Dispense: 12 tablet; Refill: 3    8. Umbilical hernia without obstruction and without gangrene    9. Portal hypertension    10. Liver fibrosis    11. Gastroesophageal reflux disease with gastritis: see EGD 2018, 2022    12. Chronic  "idiopathic gout involving toe of left foot without tophus    13. Encounter for screening for malignant neoplasm of prostate  -     PSA, Screening; Future; Expected date: 12/05/2023    14. Aortic atherosclerosis  -     atorvastatin (LIPITOR) 80 MG tablet; Take 1 tablet (80 mg total) by mouth once daily.  Dispense: 90 tablet; Refill: 3    15. Age-related osteoporosis without current pathological fracture  -     alendronate (FOSAMAX) 70 MG tablet; Take 1 tablet (70 mg total) by mouth every 7 days.  Dispense: 12 tablet; Refill: 3       Stable on current regimen  Continue current treatment, exercise and healthy habits  Schedule dermatology follow-up  High-dose flu shot today  He declines COVID booster currently  Keep Podiatry and hepatology follow-up  Atherosclerosis issues reviewed, he is having no syncope, chest pain, pressure, tightness or shortness a breath  No respiratory issues  If all goes well, follow-up in 1 year, sooner with problems in the interim    Subjective:   Annual exam     Wife with him     Had labs, recently acceptable.    Flu and COVID vaccines reviewed.   He would like to get his flu shot today.    He follows closely in Hepatology and Podiatry.     Recall lung mass on CT 5941-1143.  Had been seen in CTS and discharged:  "Seen by Dr. Chandra CTS 9/20/18: "77 y.o. male presents with PMH of BPH, HTN, Psoriasis, thrombocytopenia, squamous cell carcinoma of right hand x 2 and history of EtOH abuse (quit drinking May 2017) here today for 10 month f/u of right VATS wedge and diaphgram biopsy on 11/10/17. We have also been following a RLL mass measuring 4cm. Today he reports feeling well. SCC removed from hand. Recently got hearing aids. Denies fever, chills, SOB, CP, syncope, N/V or changes in bowel or bladder functioning."  Told to follow up prn.     He has not had fever, chills, sweats, cough or shortness of breath.  Alarm symptoms reviewed.  Discussed the option of doing a follow-up CT, he does not feel " "indicated currently.     Anemia and thrombocytopenia issues improved since discontinuing alcohol.  He was last seen in Hematology in 2017 in told to follow up p.r.n.     He has had a skin cancer and is overdue for Dermatology follow-up.  His last office visit with Dermatology was in 2019.  He was told he is now a "new patient" but he had been seen for many years, and did not go during COVID.  Wife says they are having trouble getting an appointment.  Needs appointment and he is willing.     Podiatry September 2023  Hepatology June 2023  Abdominal ultrasound June 2022  Endoscopy March 2022  DEXA June 2021  Ophthalmology April 2021  Optometry March 2021  Dermatology July 2019  Colonoscopy/EGD 2018  CT chest 2018  CTS 2018 discharged  Hematology-Oncology 2017, discharged    Patient Active Problem List:     Benign non-nodular prostatic hyperplasia without lower urinary tract symptoms     Thrombocytopenia     Chronic idiopathic gout involving toe of left foot without tophus     Psoriatic arthritis     Alcohol dependence, uncomplicated: quit 2017     Essential hypertension     Hyperplastic polyp of transverse colon: 2012 repeated 2018     Immunosuppressed status     Tortuous aorta: see CXR May 2017     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 May 2017; T spot negative May 2017; seen in Infectious Disease at that time     Psoriasis vulgaris     Age-related osteoporosis without current pathological fracture: see DEXA 2017; IMPROVED 2021     Squamous cell cancer of skin of right hand     Aortic atherosclerosis: see CT scan 5/17 also CT scan 9/18     Umbilical hernia without obstruction and without gangrene     Alcohol-induced polyneuropathy     Right lower lobe lung mass: see evaluation 2017, both bronchoscopic and surgical:  Nonspecific inflammation and fibrosis     Liver fibrosis     Helicobacter pylori ab+: treated 2/18 resolved; stool negative 4/18     Gastroesophageal reflux disease with gastritis: see EGD 2018, " 2022     IFG (impaired fasting glucose)     Portal hypertension              Review of Systems   Constitutional: Negative.    HENT:  Negative for sinus pressure.    Eyes:  Negative for visual disturbance.   Respiratory:  Negative for apnea, choking, shortness of breath and stridor.    Cardiovascular:  Negative for chest pain.   Gastrointestinal:  Negative for abdominal pain, constipation and diarrhea.   Genitourinary:  Negative for difficulty urinating, flank pain, frequency, testicular pain and urgency.   Musculoskeletal:  Negative for arthralgias and back pain.   Skin:  Negative for color change and rash.   Neurological: Negative.    Psychiatric/Behavioral: Negative.           Objective:      Physical Exam  Constitutional:       Appearance: He is well-developed.   HENT:      Head: Normocephalic and atraumatic.      Right Ear: External ear normal.      Left Ear: External ear normal.   Eyes:      Extraocular Movements: Extraocular movements intact.      Conjunctiva/sclera: Conjunctivae normal.   Neck:      Thyroid: No thyromegaly.   Cardiovascular:      Rate and Rhythm: Normal rate and regular rhythm.      Heart sounds: No murmur heard.  Pulmonary:      Effort: Pulmonary effort is normal. No respiratory distress.      Breath sounds: Normal breath sounds. No wheezing.   Abdominal:      General: There is no distension.      Palpations: Abdomen is soft.      Tenderness: There is no abdominal tenderness.   Musculoskeletal:         General: No tenderness.      Cervical back: Normal range of motion and neck supple.      Right lower leg: No edema.      Left lower leg: No edema.   Lymphadenopathy:      Cervical: No cervical adenopathy.   Skin:     General: Skin is warm and dry.   Neurological:      General: No focal deficit present.      Mental Status: He is alert and oriented to person, place, and time.      Cranial Nerves: No cranial nerve deficit.   Psychiatric:         Mood and Affect: Mood normal.         Behavior:  Behavior normal.         Thought Content: Thought content normal.         Judgment: Judgment normal.             Health Maintenance Due   Topic Date Due    Influenza Vaccine (1) 09/01/2023

## 2023-12-04 ENCOUNTER — HOSPITAL ENCOUNTER (OUTPATIENT)
Dept: RADIOLOGY | Facility: HOSPITAL | Age: 83
Discharge: HOME | End: 2023-12-04
Attending: PHYSICIAN ASSISTANT
Payer: MEDICARE

## 2023-12-04 DIAGNOSIS — K70.30 ALCOHOLIC CIRRHOSIS OF LIVER WITHOUT ASCITES: ICD-10-CM

## 2023-12-04 PROCEDURE — 76705 ECHO EXAM OF ABDOMEN: CPT | Mod: TC

## 2023-12-04 PROCEDURE — 76705 ECHO EXAM OF ABDOMEN: CPT | Mod: 26,,, | Performed by: RADIOLOGY

## 2023-12-04 PROCEDURE — 76705 US ABDOMEN LIMITED: ICD-10-PCS | Mod: 26,,, | Performed by: RADIOLOGY

## 2023-12-08 ENCOUNTER — PATIENT MESSAGE (OUTPATIENT)
Dept: PODIATRY | Facility: CLINIC | Age: 83
End: 2023-12-08
Payer: MEDICARE

## 2023-12-08 ENCOUNTER — TELEPHONE (OUTPATIENT)
Dept: PODIATRY | Facility: CLINIC | Age: 83
End: 2023-12-08
Payer: MEDICARE

## 2023-12-08 NOTE — TELEPHONE ENCOUNTER
Left vm for pt with callback number of [phone] in regards to r/s 12/11/2023 appt due to provider not being in clinic

## 2023-12-11 ENCOUNTER — OFFICE VISIT (OUTPATIENT)
Dept: HEPATOLOGY | Facility: CLINIC | Age: 83
End: 2023-12-11
Payer: MEDICARE

## 2023-12-11 VITALS — HEIGHT: 62 IN | BODY MASS INDEX: 28.11 KG/M2 | WEIGHT: 152.75 LBS

## 2023-12-11 DIAGNOSIS — K74.60 HEPATIC CIRRHOSIS, UNSPECIFIED HEPATIC CIRRHOSIS TYPE, UNSPECIFIED WHETHER ASCITES PRESENT: ICD-10-CM

## 2023-12-11 DIAGNOSIS — K76.9 LIVER LESION: Primary | ICD-10-CM

## 2023-12-11 DIAGNOSIS — K76.0 FATTY LIVER: ICD-10-CM

## 2023-12-11 PROCEDURE — 1160F RVW MEDS BY RX/DR IN RCRD: CPT | Mod: CPTII,S$GLB,, | Performed by: PHYSICIAN ASSISTANT

## 2023-12-11 PROCEDURE — 99999 PR PBB SHADOW E&M-EST. PATIENT-LVL III: ICD-10-PCS | Mod: PBBFAC,,, | Performed by: PHYSICIAN ASSISTANT

## 2023-12-11 PROCEDURE — 3288F FALL RISK ASSESSMENT DOCD: CPT | Mod: CPTII,S$GLB,, | Performed by: PHYSICIAN ASSISTANT

## 2023-12-11 PROCEDURE — 1159F MED LIST DOCD IN RCRD: CPT | Mod: CPTII,S$GLB,, | Performed by: PHYSICIAN ASSISTANT

## 2023-12-11 PROCEDURE — 99214 OFFICE O/P EST MOD 30 MIN: CPT | Mod: S$GLB,,, | Performed by: PHYSICIAN ASSISTANT

## 2023-12-11 PROCEDURE — 1101F PR PT FALLS ASSESS DOC 0-1 FALLS W/OUT INJ PAST YR: ICD-10-PCS | Mod: CPTII,S$GLB,, | Performed by: PHYSICIAN ASSISTANT

## 2023-12-11 PROCEDURE — 1101F PT FALLS ASSESS-DOCD LE1/YR: CPT | Mod: CPTII,S$GLB,, | Performed by: PHYSICIAN ASSISTANT

## 2023-12-11 PROCEDURE — 99214 PR OFFICE/OUTPT VISIT, EST, LEVL IV, 30-39 MIN: ICD-10-PCS | Mod: S$GLB,,, | Performed by: PHYSICIAN ASSISTANT

## 2023-12-11 PROCEDURE — 1126F PR PAIN SEVERITY QUANTIFIED, NO PAIN PRESENT: ICD-10-PCS | Mod: CPTII,S$GLB,, | Performed by: PHYSICIAN ASSISTANT

## 2023-12-11 PROCEDURE — 99999 PR PBB SHADOW E&M-EST. PATIENT-LVL III: CPT | Mod: PBBFAC,,, | Performed by: PHYSICIAN ASSISTANT

## 2023-12-11 PROCEDURE — 1126F AMNT PAIN NOTED NONE PRSNT: CPT | Mod: CPTII,S$GLB,, | Performed by: PHYSICIAN ASSISTANT

## 2023-12-11 PROCEDURE — 1160F PR REVIEW ALL MEDS BY PRESCRIBER/CLIN PHARMACIST DOCUMENTED: ICD-10-PCS | Mod: CPTII,S$GLB,, | Performed by: PHYSICIAN ASSISTANT

## 2023-12-11 PROCEDURE — 1159F PR MEDICATION LIST DOCUMENTED IN MEDICAL RECORD: ICD-10-PCS | Mod: CPTII,S$GLB,, | Performed by: PHYSICIAN ASSISTANT

## 2023-12-11 PROCEDURE — 3288F PR FALLS RISK ASSESSMENT DOCUMENTED: ICD-10-PCS | Mod: CPTII,S$GLB,, | Performed by: PHYSICIAN ASSISTANT

## 2023-12-11 NOTE — PROGRESS NOTES
"HEPATOLOGY CLINIC VISIT NOTE  CHIEF COMPLAINT: Advanced liver fibrosis  (accompanied by: wife)    HISTORY       This is an 83 y.o. White male previously followed in Hepatology by Raymond CANTU and Enrique CANTU, seeing me for the first time today for routine follow up. He has liver fibrosis, presumably cirrhosis, due to prior alcohol    Interval history:  Routine cirrhosis labs / imaging  U/S 12/4/23: new 0.5 hypoechoic focus in right lobe, no ascites. Spleen normal  Labs 12/4/23: stable CBC, CMP, INR. Normal AFP    Current symptoms of hepatic decompensation:  Ascites - None  KORTNEY - None  Diuretic use - yes, stable on lasix 20 + shruthi 50  TBili elevation - yes, TBili 1.2  HE / confusion / memory problems - no  EV bleed / hematemesis / melena - no      Liver staging:  Liver Biopsy 2011 - "minimal nonspecific inflammation, no cirrhosis"  FibroScan 5/2017 - kPa 4.1 (F0-1)  Elastography 6/2021 - kPa 10.29 (F2-3)  Hx of labs / clinical history concerning for advanced fibrosis / cirrhosis -> following as such      Liver history / monitoring:  Well compensated  No evidence of portal HTN    MELD 3.0: 7 at 12/4/2023  9:33 AM  MELD-Na: 7 at 12/4/2023  9:33 AM  Calculated from:  Serum Creatinine: 0.9 mg/dL (Using min of 1 mg/dL) at 12/4/2023  9:33 AM  Serum Sodium: 140 mmol/L (Using max of 137 mmol/L) at 12/4/2023  9:33 AM  Total Bilirubin: 1.2 mg/dL at 12/4/2023  9:33 AM  Serum Albumin: 4.0 g/dL (Using max of 3.5 g/dL) at 12/4/2023  9:33 AM  INR(ratio): 1.0 at 12/4/2023  9:33 AM  Age at listing (hypothetical): 83 years  Sex: Male at 12/4/2023  9:33 AM    Cirrhosis health maintenance:  - HCC screening - new liver lesion per above - ?cyst. AFP normal  12/2022 U/S: new 0.5cm hyperechoic focus in LEFT lobe, not seen on f/u tpCT or U/S  - Varices screening -  EGD 3/2022: no EV  - HAV status - Immunity documented 2017  - HBV status - Prior vaccination [id number]      PMH, PSH, SOCIAL HX, FAMILY HX      Reviewed in Epic  Pertinent " findings:  FAMILY HX: neg for liver diease  SOCIAL HX: . Resides in [place]. Retired . Is a twin and has many sets of twins in he and wife's family  Alcohol - prior regular alcohol use, today reports none x 6 years  Drugs - no  Spiritual - Yes, Congregation      ROS: as per HPI  Decreased hearing    PHYSICAL EXAM:  Friendly White male, in no acute distress; alert and oriented to person, place and time  HEENT: Sclerae anicteric.   NECK: Supple  LUNGS: Normal respiratory effort.   ABDOMEN: Flat, soft, nontender.   SKIN: Warm and dry. No jaundice, No obvious rashes.   EXTREMITIES: No lower extremity edema  NEURO/PSYCH: Normal gate. Memory intact. Thought and speech pattern appropriate. Behavior normal. No depression or anxiety noted.    PERTINENT DIAGNOSTIC RESULTS      Lab Results   Component Value Date    WBC 9.86 12/04/2023    HGB 14.2 12/04/2023     12/04/2023     Lab Results   Component Value Date    INR 1.0 12/04/2023     Lab Results   Component Value Date    AST 25 12/04/2023    ALT 31 12/04/2023    BILITOT 1.2 (H) 12/04/2023    ALBUMIN 4.0 12/04/2023    ALKPHOS 98 12/04/2023    CREATININE 0.9 12/04/2023    BUN 14 12/04/2023     12/04/2023    K 4.5 12/04/2023    AFP 5.5 12/04/2023     Results for orders placed during the hospital encounter of 12/04/23  US Abdomen Limited  CLINICAL HISTORY:Alcoholic cirrhosis of liver without ascites  TECHNIQUE:Limited ultrasound of the right upper quadrant of the abdomen including pancreas, liver, gallbladder, common bile duct was performed.  COMPARISON:Multiple prior exams, most recent from ultrasound 06/09/2023 and CT abdomen from 12/17/2022    FINDINGS:  Liver: Normal in size, measuring 14.7 cm. Heterogeneous parenchyma.  Hypoechoic focus measuring 0.5 x 0.4 x 0.4 cm in the right hepatic lobe.  Anechoic focus in the medial aspect of the right hepatic lobe measuring 1.2 x 0.9 x 1.1 cm, previously measuring 1.4 x 0.8 x 1.2 cm.  Gallbladder: The  gallbladder is surgically absent.  Biliary system: The common duct is not dilated, measuring 4 mm.  No intrahepatic ductal dilatation.  Spleen: Normal in size with a homogeneous echotexture, measuring 9.6 x 4.7 cm.  Pancreas: The visualized portions of pancreas appear normal.  Miscellaneous: No ascites.    Impression  Heterogeneous liver parenchyma similar to prior exam.  Stable right hepatic cyst.  Additional 0.5 cm hypoechoic area in the right hepatic lobe, not definitively seen on prior exam.  This may represent a cyst but is too small to characterize.  Attention on follow-up.      ASSESSMENT        83 y.o. White male with:  1. LIVER FIBROSIS, PRESUMED CIRRHOSIS, well-compensated  -- MELD score - 7  -- HCC screening - New 0.5cm hypoechoic liver lesion - ?cyst. AFP normal    2. PRIOR REGULAR ALCOHOL USE  -- reports none in 6 yrs      PLAN      U/S abdomen + AFP in 3 months to f/u on liver lesion. Review by phone / [facility]    For future f/u discussed option of doing testing every 6 months w/ visit once per year.    __________________________________________________________________    Duration of encounter: 33 min  This includes face-to-face time and non face-to-face time preparing to see the patient (eg, review of tests), obtaining and/or reviewing separately obtained history, documenting clinical information in the electronic or other health record, independently interpreting resultsand communicating results to the patient/family/caregiver, or care coordination.

## 2023-12-16 DIAGNOSIS — I70.0 AORTIC ATHEROSCLEROSIS: ICD-10-CM

## 2023-12-16 RX ORDER — ATORVASTATIN CALCIUM 80 MG/1
80 TABLET, FILM COATED ORAL
Qty: 90 TABLET | Refills: 3 | Status: SHIPPED | OUTPATIENT
Start: 2023-12-16

## 2023-12-16 NOTE — TELEPHONE ENCOUNTER
Care Due:                  Date            Visit Type   Department     Provider  --------------------------------------------------------------------------------                                EP -                              PRIMARY      [facility]  Last Visit: 10-      CARE ([facility])   MEDICINE       Annabella Montiel  Next Visit: None Scheduled  None         None Found                                                            Last  Test          Frequency    Reason                     Performed    Due Date  --------------------------------------------------------------------------------    Mg Level....  12 months..  alendronate..............  12- 11-    Phosphate...  12 months..  alendronate..............  Not Found    Overdue    Vitamin D...  12 months..  alendronate..............  12- 11-    Health [facility] Embedded Care Due Messages. Reference number: [id number].   12/16/2023 8:51:28 AM CST

## 2023-12-16 NOTE — TELEPHONE ENCOUNTER
Refill Decision Note   Darinel Majano  is requesting a refill authorization.  Brief Assessment and Rationale for Refill:  Approve     Medication Therapy Plan:         Comments:     Note composed:5:44 PM 12/16/2023

## 2024-02-05 ENCOUNTER — OFFICE VISIT (OUTPATIENT)
Dept: PODIATRY | Facility: CLINIC | Age: 84
End: 2024-02-05
Payer: MEDICARE

## 2024-02-05 VITALS
WEIGHT: 154.31 LBS | HEIGHT: 62 IN | HEART RATE: 81 BPM | BODY MASS INDEX: 28.39 KG/M2 | DIASTOLIC BLOOD PRESSURE: 70 MMHG | SYSTOLIC BLOOD PRESSURE: 118 MMHG

## 2024-02-05 DIAGNOSIS — L84 CORN OR CALLUS: ICD-10-CM

## 2024-02-05 DIAGNOSIS — B35.1 ONYCHOMYCOSIS: ICD-10-CM

## 2024-02-05 DIAGNOSIS — G60.9 IDIOPATHIC PERIPHERAL NEUROPATHY: Primary | ICD-10-CM

## 2024-02-05 PROCEDURE — 11056 PARNG/CUTG B9 HYPRKR LES 2-4: CPT | Mod: Q9,S$GLB,, | Performed by: PODIATRIST

## 2024-02-05 PROCEDURE — 99499 UNLISTED E&M SERVICE: CPT | Mod: S$GLB,,, | Performed by: PODIATRIST

## 2024-02-05 PROCEDURE — 11721 DEBRIDE NAIL 6 OR MORE: CPT | Mod: Q9,59,S$GLB, | Performed by: PODIATRIST

## 2024-02-05 PROCEDURE — 99999 PR PBB SHADOW E&M-EST. PATIENT-LVL III: CPT | Mod: PBBFAC,,, | Performed by: PODIATRIST

## 2024-02-05 NOTE — PROGRESS NOTES
Subjective:      Patient ID: Darinel Majano is a 83 y.o. male.    Chief Complaint: Peripheral Neuropathy      Darinel is a 83 y.o. male who presents to the clinic for evaluation and treatment of high risk feet. 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), Anemia (5/4/2017), Aortic atherosclerosis: see CT scan 5/17 (6/9/2017), BPH (benign prostatic hypertrophy), Chronic gout, Cortical senile cataract (5/15/2012), Degenerative disc disease, Diverticulosis of large intestine without hemorrhage: see CT scan 5/17 (6/9/2017), Essential hypertension (2/19/2016), Gastroesophageal reflux disease with gastritis: see EGD 2018 (10/31/2018), Gout, Hyperplastic polyp of transverse colon: 2012 repeat 5 years (5/4/2017), IFG (impaired fasting glucose) (5/7/2019), Kidney stone on left side (6/9/2017), Osteoporosis, Psoriasis, 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 The patient's chief complaint is long, thick toenails. This patient has documented high risk feet requiring routine maintenance secondary to peripheral neuropathy.    PCP: Annabella Montiel MD    Date Last Seen by PCP:  Chief Complaint   Patient presents with    Peripheral Neuropathy       Current shoe gear:  Affected Foot: Tennis shoes     Unaffected Foot: Tennis shoes    Last encounter in this department: Visit date not found    Hemoglobin A1C   Date Value Ref Range Status   10/16/2023 5.1 4.0 - 5.6 % Final     Comment:     ADA Screening Guidelines:  5.7-6.4%  Consistent with prediabetes  >or=6.5%  Consistent with diabetes    High levels of fetal hemoglobin interfere with the HbA1C  assay. Heterozygous hemoglobin variants (HbS, HgC, etc)do  not significantly interfere with this assay.   However, presence of multiple variants may affect accuracy.      12/05/2022 5.2 4.0 - 5.6 % Final     Comment:     ADA Screening Guidelines:  5.7-6.4%  Consistent with prediabetes  >or=6.5%  Consistent with diabetes    High levels of fetal hemoglobin interfere with the HbA1C  assay. Heterozygous hemoglobin variants (HbS, HgC, etc)do  not significantly interfere with this assay.   However, presence of multiple variants may affect accuracy.     09/01/2020 5.2 4.0 - 5.6 % Final     Comment:     ADA Screening Guidelines:  5.7-6.4%  Consistent with prediabetes  >or=6.5%  Consistent with diabetes  High levels of fetal hemoglobin interfere with the HbA1C  assay. Heterozygous hemoglobin variants (HbS, HgC, etc)do  not significantly interfere with this assay.   However, presence of multiple variants may affect accuracy.         Review of Systems   Constitutional: Negative for chills, fever and malaise/fatigue.   HENT:  Negative for hearing loss.    Cardiovascular:  Negative for claudication and leg swelling.   Respiratory:  Negative for shortness of breath.    Skin:  Positive for color change, nail changes and unusual hair distribution. Negative for flushing and rash.   Musculoskeletal:  Negative for joint pain and myalgias.   Gastrointestinal:  Negative for nausea and vomiting.   Neurological:  Positive for numbness, paresthesias and sensory change. Negative for loss of balance.   Psychiatric/Behavioral:  Negative for altered mental status.    Allergic/Immunologic: Negative for hives.           Objective:      Physical Exam  Vitals reviewed.   Constitutional:       Appearance: He is well-developed.   Cardiovascular:      Pulses:           Dorsalis pedis pulses are 1+ on the right side and 1+ on the left side.        Posterior tibial pulses are 1+ on the right side and 1+ on the left side.      Comments: Non pitting  edema noted to b/L LEs  Musculoskeletal:      Right knee: No swelling or ecchymosis.      Left knee: No swelling or ecchymosis.      Right lower leg: No swelling,  deformity, tenderness or bony tenderness. No edema.      Left lower leg: No swelling or tenderness. No edema.      Right ankle: No swelling or ecchymosis. No lateral malleolus or medial malleolus tenderness. Normal range of motion. Normal pulse.      Right Achilles Tendon: No defects. Cheema's test negative.      Left ankle: No swelling or ecchymosis. No lateral malleolus or medial malleolus tenderness. Normal range of motion. Normal pulse.      Left Achilles Tendon: Cheema's test negative.      Right foot: Normal range of motion. No deformity.      Left foot: Normal range of motion. No deformity.      Comments: Decreased height of medial arches noted with loading of the foot b/L  Hammertoes noted, digits 4 and 5b/L with adductovarus rotation of b/L fifth digit.    Adequate joint ROM noted to all lower extremity muscle groups with no pain or crepitation noted. Muscle strength is 5/5 in all groups bilaterally.     Feet:      Right foot:      Protective Sensation: 10 sites tested.  6 sites sensed.      Toenail Condition: Right toenails are abnormally thick and long.      Left foot:      Protective Sensation: 10 sites tested.  6 sites sensed.      Toenail Condition: Left toenails are abnormally thick and long.   Skin:     General: Skin is warm.      Capillary Refill: Capillary refill takes more than 3 seconds.      Findings: No abrasion, bruising, burn, ecchymosis or wound.      Comments: Skin temp is cool to cool from proximal to distal b/L.  Nails x10 are elongated by  4-8mm's, thickened by 1-3 mm's, dystrophic, and are yellowish in  coloration . Xerosis Bilaterally. No open lesions noted.        Neurological:      Mental Status: He is alert and oriented to person, place, and time.      Comments: Diminished protective sensation noted b/L     Psychiatric:         Attention and Perception: He is attentive.         Speech: Speech normal.         Behavior: Behavior normal.     HKLs noted to b/L sub 2nd met         Assessment:       Encounter Diagnoses   Name Primary?    Idiopathic peripheral neuropathy Yes    Onychomycosis     Corn or callus          Plan:       Darinel was seen today for peripheral neuropathy.    Diagnoses and all orders for this visit:    Idiopathic peripheral neuropathy    Onychomycosis    Corn or callus      I counseled the patient on his conditions, their implications and medical management.        - Shoe inspection. Patient instructed on proper foot hygeine. We discussed wearing proper shoe gear, daily foot inspections, never walking without protective shoe gear, never putting sharp instruments to feet, routine podiatric nail visits every 2-3 months.    After cleansing with an alcohol prep pad, the about mentioned hyperkeratotic lesions were sharply debrided X 2 utilizing a #15 blade to a smooth base without incident. Pt tolerated the procedure well and reported comfort to the debarment sites. Pt will continue to use padding and moisture the callused areas.     - With patient's permission, nails were aggressively reduced and debrided x 10 to their soft tissue attachment mechanically and with electric , removing all offending nail and debris. Patient relates relief following the procedure. He will continue to monitor the areas daily, inspect his feet, wear protective shoe gear when ambulatory, moisturizer to maintain skin integrity and follow in this office in approximately 2-3 months, sooner p.r.n.

## 2024-03-11 ENCOUNTER — HOSPITAL ENCOUNTER (OUTPATIENT)
Dept: RADIOLOGY | Facility: HOSPITAL | Age: 84
Discharge: HOME | End: 2024-03-11
Attending: PHYSICIAN ASSISTANT
Payer: MEDICARE

## 2024-03-11 DIAGNOSIS — K74.60 HEPATIC CIRRHOSIS, UNSPECIFIED HEPATIC CIRRHOSIS TYPE, UNSPECIFIED WHETHER ASCITES PRESENT: ICD-10-CM

## 2024-03-11 DIAGNOSIS — K76.9 LIVER LESION: ICD-10-CM

## 2024-03-11 PROCEDURE — 76705 ECHO EXAM OF ABDOMEN: CPT | Mod: 26,,, | Performed by: STUDENT IN AN ORGANIZED HEALTH CARE EDUCATION/TRAINING PROGRAM

## 2024-03-11 PROCEDURE — 76705 ECHO EXAM OF ABDOMEN: CPT | Mod: TC

## 2024-03-12 ENCOUNTER — TELEPHONE (OUTPATIENT)
Dept: HEPATOLOGY | Facility: CLINIC | Age: 84
End: 2024-03-12
Payer: MEDICARE

## 2024-03-12 DIAGNOSIS — K74.60 HEPATIC CIRRHOSIS, UNSPECIFIED HEPATIC CIRRHOSIS TYPE, UNSPECIFIED WHETHER ASCITES PRESENT: Primary | ICD-10-CM

## 2024-03-12 NOTE — TELEPHONE ENCOUNTER
3/11/24 AFP Normal, U/S w/ stable hypoechoic liver lesion    Schedule: CBC, CMP, INR, AFP, U/S, VISIT 9/2024

## 2024-05-20 DIAGNOSIS — R60.9 SWELLING: ICD-10-CM

## 2024-05-20 RX ORDER — SPIRONOLACTONE 25 MG/1
50 TABLET ORAL DAILY
Qty: 180 TABLET | Refills: 1 | Status: SHIPPED | OUTPATIENT
Start: 2024-05-20

## 2024-05-23 ENCOUNTER — OFFICE VISIT (OUTPATIENT)
Dept: PODIATRY | Facility: CLINIC | Age: 84
End: 2024-05-23
Payer: MEDICARE

## 2024-05-23 VITALS
DIASTOLIC BLOOD PRESSURE: 76 MMHG | HEIGHT: 62 IN | SYSTOLIC BLOOD PRESSURE: 131 MMHG | HEART RATE: 73 BPM | WEIGHT: 147.06 LBS | BODY MASS INDEX: 27.06 KG/M2

## 2024-05-23 DIAGNOSIS — B35.1 ONYCHOMYCOSIS: ICD-10-CM

## 2024-05-23 DIAGNOSIS — G60.9 IDIOPATHIC PERIPHERAL NEUROPATHY: Primary | ICD-10-CM

## 2024-05-23 DIAGNOSIS — L84 CORN OR CALLUS: ICD-10-CM

## 2024-05-23 PROCEDURE — 99999 PR PBB SHADOW E&M-EST. PATIENT-LVL III: CPT | Mod: PBBFAC,,, | Performed by: PODIATRIST

## 2024-05-23 PROCEDURE — 99499 UNLISTED E&M SERVICE: CPT | Mod: S$GLB,,, | Performed by: PODIATRIST

## 2024-05-23 PROCEDURE — 11721 DEBRIDE NAIL 6 OR MORE: CPT | Mod: Q9,59,S$GLB, | Performed by: PODIATRIST

## 2024-05-23 PROCEDURE — 11056 PARNG/CUTG B9 HYPRKR LES 2-4: CPT | Mod: Q9,S$GLB,, | Performed by: PODIATRIST

## 2024-05-29 NOTE — PROGRESS NOTES
Subjective:      Patient ID: Darinel Majano is a 83 y.o. male.    Chief Complaint: Nail Care      Darinel is a 83 y.o. male who presents to the clinic for evaluation and treatment of high risk feet. 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), Anemia (5/4/2017), Aortic atherosclerosis: see CT scan 5/17 (6/9/2017), BPH (benign prostatic hypertrophy), Chronic gout, Cortical senile cataract (5/15/2012), Degenerative disc disease, Diverticulosis of large intestine without hemorrhage: see CT scan 5/17 (6/9/2017), Essential hypertension (2/19/2016), Gastroesophageal reflux disease with gastritis: see EGD 2018 (10/31/2018), Gout, Hyperplastic polyp of transverse colon: 2012 repeat 5 years (5/4/2017), IFG (impaired fasting glucose) (5/7/2019), Kidney stone on left side (6/9/2017), Osteoporosis, Psoriasis, 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 The patient's chief complaint is long, thick toenails. This patient has documented high risk feet requiring routine maintenance secondary to peripheral neuropathy.    PCP: Annabella Montiel MD    Date Last Seen by PCP:  Chief Complaint   Patient presents with    Nail Care       Current shoe gear:  Affected Foot: Tennis shoes     Unaffected Foot: Tennis shoes    Last encounter in this department: Visit date not found    Hemoglobin A1C   Date Value Ref Range Status   10/16/2023 5.1 4.0 - 5.6 % Final     Comment:     ADA Screening Guidelines:  5.7-6.4%  Consistent with prediabetes  >or=6.5%  Consistent with diabetes    High levels of fetal hemoglobin interfere with the HbA1C  assay. Heterozygous hemoglobin variants (HbS, HgC, etc)do  not significantly interfere with this assay.   However, presence of multiple variants may affect accuracy.     12/05/2022 5.2 4.0 - 5.6 %  Final     Comment:     ADA Screening Guidelines:  5.7-6.4%  Consistent with prediabetes  >or=6.5%  Consistent with diabetes    High levels of fetal hemoglobin interfere with the HbA1C  assay. Heterozygous hemoglobin variants (HbS, HgC, etc)do  not significantly interfere with this assay.   However, presence of multiple variants may affect accuracy.     09/01/2020 5.2 4.0 - 5.6 % Final     Comment:     ADA Screening Guidelines:  5.7-6.4%  Consistent with prediabetes  >or=6.5%  Consistent with diabetes  High levels of fetal hemoglobin interfere with the HbA1C  assay. Heterozygous hemoglobin variants (HbS, HgC, etc)do  not significantly interfere with this assay.   However, presence of multiple variants may affect accuracy.         Review of Systems   Constitutional: Negative for chills, fever and malaise/fatigue.   HENT:  Negative for hearing loss.    Cardiovascular:  Negative for claudication and leg swelling.   Respiratory:  Negative for shortness of breath.    Skin:  Positive for color change, nail changes and unusual hair distribution. Negative for flushing and rash.   Musculoskeletal:  Negative for joint pain and myalgias.   Gastrointestinal:  Negative for nausea and vomiting.   Neurological:  Positive for numbness, paresthesias and sensory change. Negative for loss of balance.   Psychiatric/Behavioral:  Negative for altered mental status.    Allergic/Immunologic: Negative for hives.           Objective:      Physical Exam  Vitals reviewed.   Constitutional:       Appearance: He is well-developed.   Cardiovascular:      Pulses:           Dorsalis pedis pulses are 1+ on the right side and 1+ on the left side.        Posterior tibial pulses are 1+ on the right side and 1+ on the left side.      Comments: Non pitting  edema noted to b/L LEs  Musculoskeletal:      Right knee: No swelling or ecchymosis.      Left knee: No swelling or ecchymosis.      Right lower leg: No swelling, deformity, tenderness or bony  tenderness. No edema.      Left lower leg: No swelling or tenderness. No edema.      Right ankle: No swelling or ecchymosis. No lateral malleolus or medial malleolus tenderness. Normal range of motion. Normal pulse.      Right Achilles Tendon: No defects. Cheema's test negative.      Left ankle: No swelling or ecchymosis. No lateral malleolus or medial malleolus tenderness. Normal range of motion. Normal pulse.      Left Achilles Tendon: Cheema's test negative.      Right foot: Normal range of motion. No deformity.      Left foot: Normal range of motion. No deformity.      Comments: Decreased height of medial arches noted with loading of the foot b/L  Hammertoes noted, digits 4 and 5b/L with adductovarus rotation of b/L fifth digit.    Adequate joint ROM noted to all lower extremity muscle groups with no pain or crepitation noted. Muscle strength is 5/5 in all groups bilaterally.     Feet:      Right foot:      Protective Sensation: 10 sites tested.  6 sites sensed.      Toenail Condition: Right toenails are abnormally thick and long.      Left foot:      Protective Sensation: 10 sites tested.  6 sites sensed.      Toenail Condition: Left toenails are abnormally thick and long.   Skin:     General: Skin is warm.      Capillary Refill: Capillary refill takes more than 3 seconds.      Findings: No abrasion, bruising, burn, ecchymosis or wound.      Comments: Skin temp is cool to cool from proximal to distal b/L.  Nails x10 are elongated by  5-7mm's, thickened by 1-3 mm's, dystrophic, and are yellowish in  coloration . Xerosis Bilaterally. No open lesions noted.        Neurological:      Mental Status: He is alert and oriented to person, place, and time.      Comments: Diminished protective sensation noted b/L     Psychiatric:         Attention and Perception: He is attentive.         Speech: Speech normal.         Behavior: Behavior normal. Behavior is cooperative.       HKLs noted to b/L sub 2nd met       "  Assessment:       Encounter Diagnoses   Name Primary?    Idiopathic peripheral neuropathy Yes    Onychomycosis     Corn or callus          Plan:       Darinel Rodrigues" was seen today for nail care.    Diagnoses and all orders for this visit:    Idiopathic peripheral neuropathy    Onychomycosis    Corn or callus      I counseled the patient on his conditions, their implications and medical management.        - Shoe inspection. Patient instructed on proper foot hygeine. We discussed wearing proper shoe gear, daily foot inspections, never walking without protective shoe gear, never putting sharp instruments to feet, routine podiatric nail visits every 2-3 months.    After cleansing with an alcohol prep pad, the about mentioned hyperkeratotic lesions were sharply debrided X 2 utilizing a #15 blade to a smooth base without incident. Pt tolerated the procedure well and reported comfort to the debarment sites. Pt will continue to use padding and moisture the callused areas.     - With patient's permission, nails were aggressively reduced and debrided x 10 to their soft tissue attachment mechanically and with electric , removing all offending nail and debris. Patient relates relief following the procedure. He will continue to monitor the areas daily, inspect his feet, wear protective shoe gear when ambulatory, moisturizer to maintain skin integrity and follow in this office in approximately 2-3 months, sooner p.r.n.                            "

## 2024-06-03 DIAGNOSIS — M81.0 AGE-RELATED OSTEOPOROSIS WITHOUT CURRENT PATHOLOGICAL FRACTURE: ICD-10-CM

## 2024-06-03 RX ORDER — ALENDRONATE SODIUM 70 MG/1
TABLET ORAL
Qty: 12 TABLET | Refills: 3 | Status: SHIPPED | OUTPATIENT
Start: 2024-06-03

## 2024-06-03 NOTE — TELEPHONE ENCOUNTER
Care Due:                  Date            Visit Type   Department     Provider  --------------------------------------------------------------------------------                                EP -                              PRIMARY      [facility]  Last Visit: 10-      CARE ([facility])   MEDICINE       Annabella Montiel  Next Visit: None Scheduled  None         None Found                                                            Last  Test          Frequency    Reason                     Performed    Due Date  --------------------------------------------------------------------------------    Mg Level....  12 months..  alendronate..............  12- 11-    Phosphate...  12 months..  alendronate..............  Not Found    Overdue    Vitamin D...  12 months..  alendronate..............  12- 11-    Health [facility] Embedded Care Due Messages. Reference number: [id number].   6/03/2024 8:25:06 AM CDT

## 2024-06-10 ENCOUNTER — PATIENT MESSAGE (OUTPATIENT)
Dept: INTERNAL MEDICINE | Facility: CLINIC | Age: 84
End: 2024-06-10
Payer: MEDICARE

## 2024-07-12 ENCOUNTER — OFFICE VISIT (OUTPATIENT)
Dept: PRIMARY CARE CLINIC | Facility: CLINIC | Age: 84
End: 2024-07-12
Payer: MEDICARE

## 2024-07-12 VITALS
HEART RATE: 77 BPM | SYSTOLIC BLOOD PRESSURE: 126 MMHG | HEIGHT: 62 IN | DIASTOLIC BLOOD PRESSURE: 64 MMHG | BODY MASS INDEX: 27.02 KG/M2 | WEIGHT: 146.81 LBS | OXYGEN SATURATION: 100 %

## 2024-07-12 DIAGNOSIS — K70.30 ALCOHOLIC CIRRHOSIS OF LIVER WITHOUT ASCITES: ICD-10-CM

## 2024-07-12 DIAGNOSIS — Z00.00 ENCOUNTER FOR PREVENTIVE HEALTH EXAMINATION: Primary | ICD-10-CM

## 2024-07-12 DIAGNOSIS — L40.0 PSORIASIS VULGARIS: ICD-10-CM

## 2024-07-12 DIAGNOSIS — N40.0 BENIGN NON-NODULAR PROSTATIC HYPERPLASIA WITHOUT LOWER URINARY TRACT SYMPTOMS: ICD-10-CM

## 2024-07-12 DIAGNOSIS — G62.1 ALCOHOL-INDUCED POLYNEUROPATHY: ICD-10-CM

## 2024-07-12 DIAGNOSIS — I70.0 AORTIC ATHEROSCLEROSIS: ICD-10-CM

## 2024-07-12 DIAGNOSIS — F10.20 ALCOHOL DEPENDENCE, UNCOMPLICATED: ICD-10-CM

## 2024-07-12 DIAGNOSIS — I10 ESSENTIAL HYPERTENSION: ICD-10-CM

## 2024-07-12 DIAGNOSIS — M81.0 AGE-RELATED OSTEOPOROSIS WITHOUT CURRENT PATHOLOGICAL FRACTURE: ICD-10-CM

## 2024-07-12 PROBLEM — K76.6 PORTAL HYPERTENSION: Status: RESOLVED | Noted: 2020-09-01 | Resolved: 2024-07-12

## 2024-07-12 PROBLEM — D84.9 IMMUNOSUPPRESSED STATUS: Status: RESOLVED | Noted: 2017-05-04 | Resolved: 2024-07-12

## 2024-07-12 PROCEDURE — 99999 PR PBB SHADOW E&M-EST. PATIENT-LVL III: CPT | Mod: PBBFAC,,, | Performed by: PHYSICIAN ASSISTANT

## 2024-07-12 NOTE — PROGRESS NOTES
"  Darinel Majano presented for a follow-up Medicare AWV today. The following components were reviewed and updated:    Medical history  Family History  Social history  Allergies and Current Medications  Health Risk Assessment  Health Maintenance  Care Team    **See Completed Assessments for Annual Wellness visit with in the encounter summary    The following assessments were completed:  Depression Screening  Cognitive function Screening  Timed Get Up Test  Whisper Test      Opioid documentation:      Patient does not have a current opioid prescription.          Vitals:    07/12/24 0929   BP: 126/64   BP Location: Left arm   Pulse: 77   SpO2: 100%   Weight: 66.6 kg (146 lb 13.2 oz)   Height: 5' 2" (1.575 m)     Body mass index is 26.85 kg/m².       Physical Exam  Constitutional:       Appearance: Normal appearance.   HENT:      Head: Normocephalic and atraumatic.   Cardiovascular:      Rate and Rhythm: Normal rate and regular rhythm.   Pulmonary:      Effort: Pulmonary effort is normal.      Breath sounds: Normal breath sounds.   Neurological:      Mental Status: He is alert.   Psychiatric:         Mood and Affect: Mood normal.         Thought Content: Thought content normal.           Diagnoses and health risks identified today and associated recommendations/orders:  1. Encounter for preventive health examination  Provided Darinel with a 5-10 year written screening schedule and personal prevention plan. Recommendations were developed using the USPSTF age appropriate recommendations. Education, counseling, and referrals were provided as needed.  After Visit Summary printed and given to patient which includes a list of additional screenings\tests needed.    2. Alcohol dependence, uncomplicated: quit 2017  Assessment & Plan:  Has remained alcohol free      3. Aortic atherosclerosis: see CT scan 5/17 also CT scan 9/18  Assessment & Plan:  Stable, continue aspirin and statin, LDL at goal      4. Essential " hypertension  Assessment & Plan:  Controlled on spironolactone      5. Alcohol-induced polyneuropathy  Assessment & Plan:  Neuropathy is stable. Has remained alcohol free      6. Benign non-nodular prostatic hyperplasia without lower urinary tract symptoms  Assessment & Plan:  No incontinence symptoms, monitor      7. Psoriasis vulgaris  Assessment & Plan:  Stable, no issues      8. Age-related osteoporosis without current pathological fracture: see DEXA 2017; IMPROVED 2021  Assessment & Plan:  On fosamax, continue. Dexa due next year      9. Alcoholic cirrhosis of liver without ascites  Assessment & Plan:  Followed by hepatology. Stable. Has remained alcohol free. Cholesterol at goal        No follow-ups on file.      Amy Lado, PA-C [facility] [place]

## 2024-07-12 NOTE — PATIENT INSTRUCTIONS
Counseling and Referral of Other Preventative  (Italic type indicates deductible and co-insurance are waived)    Patient Name: Darinel Majano  Today's Date: 7/12/2024    Health Maintenance       Date Due Completion Date    RSV Vaccine (Age 60+ and Pregnant patients) (1 - 1-dose 60+ series) Never done ---    COVID-19 Vaccine (5 - 2023-24 season) 10/19/2024 (Originally 9/1/2023) 1/12/2023    Influenza Vaccine (1) 09/01/2024 10/19/2023    Hemoglobin A1c (Prediabetes) 10/16/2024 10/16/2023    Lipid Panel 10/16/2024 10/16/2023    PROSTATE-SPECIFIC ANTIGEN 12/04/2024 12/4/2023    Colonoscopy 02/05/2025 2/5/2018    DEXA Scan 06/19/2025 6/19/2023    TETANUS VACCINE 11/07/2029 11/7/2019        No orders of the defined types were placed in this encounter.      The following information is provided to all patients.  This information is to help you find resources for any of the problems found today that may be affecting your health:                  Living healthy guide: www.[facility].[place].gov      Understanding Diabetes: www.diabetes.org      Eating healthy: www.cdc.gov/healthyweight      CDC home safety checklist: www.cdc.gov/steadi/patient.html      Agency on Aging: www.goea.[place].[facility]      Alcoholics anonymous (AA): www.aa.org      Physical Activity: www.roxanne.nih.gov/tv6cffe      Tobacco use: www.quitwithusla.org

## 2024-08-22 ENCOUNTER — PATIENT MESSAGE (OUTPATIENT)
Dept: PODIATRY | Facility: CLINIC | Age: 84
End: 2024-08-22
Payer: MEDICARE

## 2024-08-26 ENCOUNTER — OFFICE VISIT (OUTPATIENT)
Dept: PODIATRY | Facility: CLINIC | Age: 84
End: 2024-08-26
Payer: MEDICARE

## 2024-08-26 VITALS
HEART RATE: 72 BPM | DIASTOLIC BLOOD PRESSURE: 62 MMHG | HEIGHT: 62 IN | SYSTOLIC BLOOD PRESSURE: 120 MMHG | WEIGHT: 145.75 LBS | BODY MASS INDEX: 26.82 KG/M2

## 2024-08-26 DIAGNOSIS — L84 CORN OR CALLUS: ICD-10-CM

## 2024-08-26 DIAGNOSIS — G60.9 IDIOPATHIC PERIPHERAL NEUROPATHY: Primary | ICD-10-CM

## 2024-08-26 DIAGNOSIS — B35.1 ONYCHOMYCOSIS: ICD-10-CM

## 2024-08-26 PROCEDURE — 11056 PARNG/CUTG B9 HYPRKR LES 2-4: CPT | Mod: Q9,S$GLB,, | Performed by: PODIATRIST

## 2024-08-26 PROCEDURE — 99499 UNLISTED E&M SERVICE: CPT | Mod: S$GLB,,, | Performed by: PODIATRIST

## 2024-08-26 PROCEDURE — 99999 PR PBB SHADOW E&M-EST. PATIENT-LVL III: CPT | Mod: PBBFAC,,, | Performed by: PODIATRIST

## 2024-08-26 PROCEDURE — 11721 DEBRIDE NAIL 6 OR MORE: CPT | Mod: Q9,59,S$GLB, | Performed by: PODIATRIST

## 2024-08-26 NOTE — PROGRESS NOTES
Subjective:      Patient ID: Darinel Majano is a 84 y.o. male.    Chief Complaint: Nail Care      Darinel is a 84 y.o. male who presents to the clinic for evaluation and treatment of high risk feet. 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), Anemia (5/4/2017), Aortic atherosclerosis: see CT scan 5/17 (6/9/2017), BPH (benign prostatic hypertrophy), Chronic gout, Cortical senile cataract (5/15/2012), Degenerative disc disease, Diverticulosis of large intestine without hemorrhage: see CT scan 5/17 (6/9/2017), Essential hypertension (2/19/2016), Gastroesophageal reflux disease with gastritis: see EGD 2018 (10/31/2018), Gout, Hyperplastic polyp of transverse colon: 2012 repeat 5 years (5/4/2017), IFG (impaired fasting glucose) (5/7/2019), Kidney stone on left side (6/9/2017), Osteoporosis, Psoriasis, 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 The patient's chief complaint is long, thick toenails. This patient has documented high risk feet requiring routine maintenance secondary to peripheral neuropathy.    PCP: Annabella Montiel MD    Date Last Seen by PCP:  Chief Complaint   Patient presents with    Nail Care       Current shoe gear:  Affected Foot: Tennis shoes     Unaffected Foot: Tennis shoes    Last encounter in this department: Visit date not found    Hemoglobin A1C   Date Value Ref Range Status   10/16/2023 5.1 4.0 - 5.6 % Final     Comment:     ADA Screening Guidelines:  5.7-6.4%  Consistent with prediabetes  >or=6.5%  Consistent with diabetes    High levels of fetal hemoglobin interfere with the HbA1C  assay. Heterozygous hemoglobin variants (HbS, HgC, etc)do  not significantly interfere with this assay.   However, presence of multiple variants may affect accuracy.     12/05/2022 5.2 4.0 - 5.6 %  Final     Comment:     ADA Screening Guidelines:  5.7-6.4%  Consistent with prediabetes  >or=6.5%  Consistent with diabetes    High levels of fetal hemoglobin interfere with the HbA1C  assay. Heterozygous hemoglobin variants (HbS, HgC, etc)do  not significantly interfere with this assay.   However, presence of multiple variants may affect accuracy.     09/01/2020 5.2 4.0 - 5.6 % Final     Comment:     ADA Screening Guidelines:  5.7-6.4%  Consistent with prediabetes  >or=6.5%  Consistent with diabetes  High levels of fetal hemoglobin interfere with the HbA1C  assay. Heterozygous hemoglobin variants (HbS, HgC, etc)do  not significantly interfere with this assay.   However, presence of multiple variants may affect accuracy.         Review of Systems   Constitutional: Negative for chills, fever and malaise/fatigue.   HENT:  Negative for hearing loss.    Cardiovascular:  Negative for claudication and leg swelling.   Respiratory:  Negative for shortness of breath.    Skin:  Positive for color change, nail changes and unusual hair distribution. Negative for flushing and rash.   Musculoskeletal:  Negative for joint pain and myalgias.   Gastrointestinal:  Negative for nausea and vomiting.   Neurological:  Positive for numbness, paresthesias and sensory change. Negative for loss of balance.   Psychiatric/Behavioral:  Negative for altered mental status.    Allergic/Immunologic: Negative for hives.           Objective:      Physical Exam  Vitals reviewed.   Constitutional:       Appearance: He is well-developed.   Cardiovascular:      Pulses:           Dorsalis pedis pulses are 1+ on the right side and 1+ on the left side.        Posterior tibial pulses are 1+ on the right side and 1+ on the left side.      Comments: Non pitting  edema noted to b/L LEs  Musculoskeletal:      Right knee: No swelling or ecchymosis.      Left knee: No swelling or ecchymosis.      Right lower leg: No swelling, deformity, tenderness or bony  tenderness. No edema.      Left lower leg: No swelling or tenderness. No edema.      Right ankle: No swelling or ecchymosis. No lateral malleolus or medial malleolus tenderness. Normal range of motion. Normal pulse.      Right Achilles Tendon: No defects. Cheema's test negative.      Left ankle: No swelling or ecchymosis. No lateral malleolus or medial malleolus tenderness. Normal range of motion. Normal pulse.      Left Achilles Tendon: Cheema's test negative.      Right foot: Normal range of motion. No deformity.      Left foot: Normal range of motion. No deformity.      Comments: Decreased height of medial arches noted with loading of the foot b/L  Hammertoes noted, digits 4 and 5b/L with adductovarus rotation of b/L fifth digit.    Adequate joint ROM noted to all lower extremity muscle groups with no pain or crepitation noted. Muscle strength is 5/5 in all groups bilaterally.     Feet:      Right foot:      Protective Sensation: 10 sites tested.  6 sites sensed.      Toenail Condition: Right toenails are abnormally thick and long.      Left foot:      Protective Sensation: 10 sites tested.  6 sites sensed.      Toenail Condition: Left toenails are abnormally thick and long.   Skin:     General: Skin is warm.      Capillary Refill: Capillary refill takes more than 3 seconds.      Findings: No abrasion, bruising, burn, ecchymosis or wound.      Comments: Skin temp is cool to cool from proximal to distal b/L.  Nails x10 are elongated by  4-6mm's, thickened by 1-3 mm's, dystrophic, and are yellowish in  coloration . Xerosis Bilaterally. No open lesions noted.        Neurological:      Mental Status: He is alert and oriented to person, place, and time.      Comments: Diminished protective sensation noted b/L     Psychiatric:         Attention and Perception: He is attentive.         Speech: Speech normal.         Behavior: Behavior normal. Behavior is cooperative.       HKLs noted to b/L sub 2nd met       "  Assessment:       Encounter Diagnoses   Name Primary?    Idiopathic peripheral neuropathy Yes    Corn or callus     Onychomycosis          Plan:       Darinel Rodrigues" was seen today for nail care.    Diagnoses and all orders for this visit:    Idiopathic peripheral neuropathy    Corn or callus    Onychomycosis      I counseled the patient on his conditions, their implications and medical management.        - Shoe inspection. Patient instructed on proper foot hygeine. We discussed wearing proper shoe gear, daily foot inspections, never walking without protective shoe gear, never putting sharp instruments to feet, routine podiatric nail visits every 2-3 months.    After cleansing with an alcohol prep pad, the about mentioned hyperkeratotic lesions were sharply debrided X 2 utilizing a #15 blade to a smooth base without incident. Pt tolerated the procedure well and reported comfort to the debarment sites. Pt will continue to use padding and moisture the callused areas.     - With patient's permission, nails were aggressively reduced and debrided x 10 to their soft tissue attachment mechanically and with electric , removing all offending nail and debris. Patient relates relief following the procedure. He will continue to monitor the areas daily, inspect his feet, wear protective shoe gear when ambulatory, moisturizer to maintain skin integrity and follow in this office in approximately 2-3 months, sooner p.r.n.                              "

## 2024-09-23 ENCOUNTER — HOSPITAL ENCOUNTER (OUTPATIENT)
Dept: RADIOLOGY | Facility: HOSPITAL | Age: 84
Discharge: HOME | End: 2024-09-23
Attending: PHYSICIAN ASSISTANT
Payer: MEDICARE

## 2024-09-23 ENCOUNTER — TELEPHONE (OUTPATIENT)
Dept: HEPATOLOGY | Facility: CLINIC | Age: 84
End: 2024-09-23
Payer: MEDICARE

## 2024-09-23 DIAGNOSIS — K74.00 LIVER FIBROSIS: Primary | ICD-10-CM

## 2024-09-23 DIAGNOSIS — K74.60 HEPATIC CIRRHOSIS, UNSPECIFIED HEPATIC CIRRHOSIS TYPE, UNSPECIFIED WHETHER ASCITES PRESENT: ICD-10-CM

## 2024-09-23 PROCEDURE — 76705 ECHO EXAM OF ABDOMEN: CPT | Mod: TC

## 2024-09-23 PROCEDURE — 76705 ECHO EXAM OF ABDOMEN: CPT | Mod: 26,,, | Performed by: RADIOLOGY

## 2024-09-23 NOTE — TELEPHONE ENCOUNTER
I spoke with patient's wife and msg from PA Scheuermann relayed.  Testing scheduled 3/10/25 and f/u visit scheduled 3/13/25.

## 2024-09-23 NOTE — TELEPHONE ENCOUNTER
9/23 labs and ultrasound reviewed - stable    Please notify patient:  I have reviewed the recent labs and ultrasound.  Liver is working well.  Liver cancer screening looked fine. There are no tumors in the liver. Liver cancer blood test was normal.  Next liver monitoring is due in 6 months.    Please schedule: CBC, CMP, INR, AFP, U/S, VISIT in 3/2025      Thanks

## 2024-10-13 NOTE — LETTER
October 19, 2017      Latricia Ruffin MD  [address]  [facility] [address]           [place] [facility] Thoracic [facility]  [address]  [facility] [address]  Phone: [phone]  Fax: [phone]          Patient: Darinel Majano   MR Number: [id number]   YOB: 1940   Date of Visit: 10/19/2017       Dear Dr. Latricia Ruffin:    Thank you for referring Darinel Majano to me for evaluation. Attached you will find relevant portions of my assessment and plan of care.    If you have questions, please do not hesitate to call me. I look forward to following Darinel Majano along with you.    Sincerely,    Oswaldo Chandra MD    Enclosure  CC:  No Recipients    If you would like to receive this communication electronically, please contact [email or web address] or [phone] to request more information on [employer] access.    For providers and/or their staff who would like to refer a patient to [facility], please contact us through our one-stop-shop provider referral line, [facility], at [phone].    If you feel you have received this communication in error or would no longer like to receive these types of communications, please e-mail [email or web address]         
leg swelling

## 2024-10-16 ENCOUNTER — LAB VISIT (OUTPATIENT)
Dept: LAB | Facility: HOSPITAL | Age: 84
End: 2024-10-16
Payer: MEDICARE

## 2024-10-16 ENCOUNTER — IMMUNIZATION (OUTPATIENT)
Dept: INTERNAL MEDICINE | Facility: CLINIC | Age: 84
End: 2024-10-16
Payer: MEDICARE

## 2024-10-16 ENCOUNTER — OFFICE VISIT (OUTPATIENT)
Dept: INTERNAL MEDICINE | Facility: CLINIC | Age: 84
End: 2024-10-16
Payer: MEDICARE

## 2024-10-16 VITALS
WEIGHT: 147 LBS | HEIGHT: 62 IN | BODY MASS INDEX: 27.05 KG/M2 | SYSTOLIC BLOOD PRESSURE: 120 MMHG | DIASTOLIC BLOOD PRESSURE: 70 MMHG

## 2024-10-16 DIAGNOSIS — M81.0 AGE-RELATED OSTEOPOROSIS WITHOUT CURRENT PATHOLOGICAL FRACTURE: ICD-10-CM

## 2024-10-16 DIAGNOSIS — K70.30 ALCOHOLIC CIRRHOSIS OF LIVER WITHOUT ASCITES: ICD-10-CM

## 2024-10-16 DIAGNOSIS — M1A.0720 CHRONIC IDIOPATHIC GOUT INVOLVING TOE OF LEFT FOOT WITHOUT TOPHUS: ICD-10-CM

## 2024-10-16 DIAGNOSIS — R73.01 IFG (IMPAIRED FASTING GLUCOSE): ICD-10-CM

## 2024-10-16 DIAGNOSIS — E78.2 MIXED HYPERLIPIDEMIA: ICD-10-CM

## 2024-10-16 DIAGNOSIS — I10 ESSENTIAL HYPERTENSION: ICD-10-CM

## 2024-10-16 DIAGNOSIS — Z12.5 ENCOUNTER FOR SCREENING FOR MALIGNANT NEOPLASM OF PROSTATE: ICD-10-CM

## 2024-10-16 DIAGNOSIS — C44.622 SQUAMOUS CELL CANCER OF SKIN OF RIGHT HAND: ICD-10-CM

## 2024-10-16 DIAGNOSIS — K74.00 LIVER FIBROSIS: ICD-10-CM

## 2024-10-16 DIAGNOSIS — R91.8 RIGHT LOWER LOBE LUNG MASS: ICD-10-CM

## 2024-10-16 DIAGNOSIS — L40.0 PSORIASIS VULGARIS: ICD-10-CM

## 2024-10-16 DIAGNOSIS — Z00.00 ANNUAL PHYSICAL EXAM: Primary | ICD-10-CM

## 2024-10-16 DIAGNOSIS — D69.2 SENILE PURPURA: ICD-10-CM

## 2024-10-16 DIAGNOSIS — Z23 NEED FOR VACCINATION: Primary | ICD-10-CM

## 2024-10-16 DIAGNOSIS — I70.0 AORTIC ATHEROSCLEROSIS: ICD-10-CM

## 2024-10-16 LAB
ALBUMIN SERPL BCP-MCNC: 3.7 G/DL (ref 3.5–5.2)
ALP SERPL-CCNC: 90 U/L (ref 55–135)
ALT SERPL W/O P-5'-P-CCNC: 25 U/L (ref 10–44)
ANION GAP SERPL CALC-SCNC: 7 MMOL/L (ref 8–16)
AST SERPL-CCNC: 23 U/L (ref 10–40)
BASOPHILS # BLD AUTO: 0.04 K/UL (ref 0–0.2)
BASOPHILS NFR BLD: 0.5 % (ref 0–1.9)
BILIRUB SERPL-MCNC: 0.8 MG/DL (ref 0.1–1)
BUN SERPL-MCNC: 10 MG/DL (ref 8–23)
CALCIUM SERPL-MCNC: 9.7 MG/DL (ref 8.7–10.5)
CHLORIDE SERPL-SCNC: 105 MMOL/L (ref 95–110)
CHOLEST SERPL-MCNC: 92 MG/DL (ref 120–199)
CHOLEST/HDLC SERPL: 2.4 {RATIO} (ref 2–5)
CO2 SERPL-SCNC: 26 MMOL/L (ref 23–29)
COMPLEXED PSA SERPL-MCNC: 1.5 NG/ML (ref 0–4)
CREAT SERPL-MCNC: 0.9 MG/DL (ref 0.5–1.4)
DIFFERENTIAL METHOD BLD: ABNORMAL
EOSINOPHIL # BLD AUTO: 0.1 K/UL (ref 0–0.5)
EOSINOPHIL NFR BLD: 1.2 % (ref 0–8)
ERYTHROCYTE [DISTWIDTH] IN BLOOD BY AUTOMATED COUNT: 13.4 % (ref 11.5–14.5)
EST. GFR  (NO RACE VARIABLE): >60 ML/MIN/1.73 M^2
ESTIMATED AVG GLUCOSE: 97 MG/DL (ref 68–131)
GLUCOSE SERPL-MCNC: 84 MG/DL (ref 70–110)
HBA1C MFR BLD: 5 % (ref 4–5.6)
HCT VFR BLD AUTO: 40.6 % (ref 40–54)
HDLC SERPL-MCNC: 38 MG/DL (ref 40–75)
HDLC SERPL: 41.3 % (ref 20–50)
HGB BLD-MCNC: 13.4 G/DL (ref 14–18)
IMM GRANULOCYTES # BLD AUTO: 0.02 K/UL (ref 0–0.04)
IMM GRANULOCYTES NFR BLD AUTO: 0.2 % (ref 0–0.5)
LDLC SERPL CALC-MCNC: 40.4 MG/DL (ref 63–159)
LYMPHOCYTES # BLD AUTO: 2.1 K/UL (ref 1–4.8)
LYMPHOCYTES NFR BLD: 24.7 % (ref 18–48)
MCH RBC QN AUTO: 31.2 PG (ref 27–31)
MCHC RBC AUTO-ENTMCNC: 33 G/DL (ref 32–36)
MCV RBC AUTO: 94 FL (ref 82–98)
MONOCYTES # BLD AUTO: 0.8 K/UL (ref 0.3–1)
MONOCYTES NFR BLD: 9.6 % (ref 4–15)
NEUTROPHILS # BLD AUTO: 5.4 K/UL (ref 1.8–7.7)
NEUTROPHILS NFR BLD: 63.8 % (ref 38–73)
NONHDLC SERPL-MCNC: 54 MG/DL
NRBC BLD-RTO: 0 /100 WBC
PLATELET # BLD AUTO: 139 K/UL (ref 150–450)
PMV BLD AUTO: 10.2 FL (ref 9.2–12.9)
POTASSIUM SERPL-SCNC: 4.9 MMOL/L (ref 3.5–5.1)
PROT SERPL-MCNC: 6.6 G/DL (ref 6–8.4)
RBC # BLD AUTO: 4.3 M/UL (ref 4.6–6.2)
SODIUM SERPL-SCNC: 138 MMOL/L (ref 136–145)
TRIGL SERPL-MCNC: 68 MG/DL (ref 30–150)
URATE SERPL-MCNC: 5.9 MG/DL (ref 3.4–7)
WBC # BLD AUTO: 8.47 K/UL (ref 3.9–12.7)

## 2024-10-16 PROCEDURE — 83036 HEMOGLOBIN GLYCOSYLATED A1C: CPT | Performed by: INTERNAL MEDICINE

## 2024-10-16 PROCEDURE — 1159F MED LIST DOCD IN RCRD: CPT | Mod: CPTII,S$GLB,, | Performed by: INTERNAL MEDICINE

## 2024-10-16 PROCEDURE — 1101F PT FALLS ASSESS-DOCD LE1/YR: CPT | Mod: CPTII,S$GLB,, | Performed by: INTERNAL MEDICINE

## 2024-10-16 PROCEDURE — 85025 COMPLETE CBC W/AUTO DIFF WBC: CPT | Performed by: INTERNAL MEDICINE

## 2024-10-16 PROCEDURE — 90653 IIV ADJUVANT VACCINE IM: CPT | Mod: S$GLB,,, | Performed by: INTERNAL MEDICINE

## 2024-10-16 PROCEDURE — G0008 ADMIN INFLUENZA VIRUS VAC: HCPCS | Mod: S$GLB,,, | Performed by: INTERNAL MEDICINE

## 2024-10-16 PROCEDURE — 80053 COMPREHEN METABOLIC PANEL: CPT | Performed by: INTERNAL MEDICINE

## 2024-10-16 PROCEDURE — 1126F AMNT PAIN NOTED NONE PRSNT: CPT | Mod: CPTII,S$GLB,, | Performed by: INTERNAL MEDICINE

## 2024-10-16 PROCEDURE — 3074F SYST BP LT 130 MM HG: CPT | Mod: CPTII,S$GLB,, | Performed by: INTERNAL MEDICINE

## 2024-10-16 PROCEDURE — 84153 ASSAY OF PSA TOTAL: CPT | Performed by: INTERNAL MEDICINE

## 2024-10-16 PROCEDURE — 80061 LIPID PANEL: CPT | Performed by: INTERNAL MEDICINE

## 2024-10-16 PROCEDURE — 84550 ASSAY OF BLOOD/URIC ACID: CPT | Performed by: INTERNAL MEDICINE

## 2024-10-16 PROCEDURE — 99397 PER PM REEVAL EST PAT 65+ YR: CPT | Mod: S$GLB,,, | Performed by: INTERNAL MEDICINE

## 2024-10-16 PROCEDURE — 3288F FALL RISK ASSESSMENT DOCD: CPT | Mod: CPTII,S$GLB,, | Performed by: INTERNAL MEDICINE

## 2024-10-16 PROCEDURE — 99999 PR PBB SHADOW E&M-EST. PATIENT-LVL IV: CPT | Mod: PBBFAC,,, | Performed by: INTERNAL MEDICINE

## 2024-10-16 PROCEDURE — 3078F DIAST BP <80 MM HG: CPT | Mod: CPTII,S$GLB,, | Performed by: INTERNAL MEDICINE

## 2024-10-16 NOTE — PROGRESS NOTES
Patient ID: Darinel Majano is a 84 y.o. male.    Chief Complaint: Annual Exam      Assessment:       1. Annual physical exam    2. Psoriasis vulgaris    3. Right lower lobe lung mass: see evaluation 2017, both bronchoscopic and surgical:  Nonspecific inflammation and fibrosis; stable on CT 12/22    4. Aortic atherosclerosis: see CT scan 5/17 also CT scan 9/18    5. Essential hypertension    6. Squamous cell cancer of skin of right hand    7. Age-related osteoporosis without current pathological fracture: see DEXA 2017; IMPROVED 2021, improved 2023    8. IFG (impaired fasting glucose)    9. Alcoholic cirrhosis of liver without ascites    10. Liver fibrosis    11. Chronic idiopathic gout involving toe of left foot without tophus    12. Mixed hyperlipidemia    13. Encounter for screening for malignant neoplasm of prostate    14. Senile purpura          Plan:           1. Annual physical exam    2. Psoriasis vulgaris    3. Right lower lobe lung mass: see evaluation 2017, both bronchoscopic and surgical:  Nonspecific inflammation and fibrosis; stable on CT 12/22    4. Aortic atherosclerosis: see CT scan 5/17 also CT scan 9/18    5. Essential hypertension  -     Comprehensive Metabolic Panel; Future; Expected date: 10/16/2024  -     CBC Auto Differential; Future; Expected date: 10/16/2024    6. Squamous cell cancer of skin of right hand  -     Ambulatory referral/consult to Dermatology; Future; Expected date: 10/23/2024    7. Age-related osteoporosis without current pathological fracture: see DEXA 2017; IMPROVED 2021, improved 2023  -     Cancel: DXA Bone Density Axial Skeleton 1 or more sites; Future; Expected date: 10/16/2024    8. IFG (impaired fasting glucose)  -     Hemoglobin A1C; Future; Expected date: 10/16/2024    9. Alcoholic cirrhosis of liver without ascites    10. Liver fibrosis    11. Chronic idiopathic gout involving toe of left foot without tophus  -     Uric Acid; Future; Expected date:  "10/16/2024    12. Mixed hyperlipidemia  -     Lipid Panel; Future; Expected date: 10/16/2024    13. Encounter for screening for malignant neoplasm of prostate  -     PSA, Screening; Future; Expected date: 10/16/2024    14. Senile purpura       Labs and review  Flu shot today  RSV vaccine today  Sleep hygiene, exercise, fall prevention reviewed  Dermatology appointment  He declines any assessment for hearing loss and dental issues  He follows closely in hepatology  Most recent lung issues were evaluated on a CT scan and no changes identified; he has had no wheezing, cough or hemoptysis  No weight loss  Anticipate DEXA within the next 1-2 years, last DEXA in 2023 was acceptable    Subjective:   Annual exam     Wife with him     Had labs, recently acceptable.     Flu vaccine given today.     He follows closely in Hepatology and Podiatry.  New liver lesion identified, following closely.    Overdue for Dermatology follow-up.  This was ordered recommended last year but never scheduled.     Recall lung mass on CT 5787-6772.  Had been seen in CTS and discharged:  "Seen by Dr. Chandra CTS 9/20/18: "77 y.o. male presents with PMH of BPH, HTN, Psoriasis, thrombocytopenia, squamous cell carcinoma of right hand x 2 and history of EtOH abuse (quit drinking May 2017) here today for 10 month f/u of right VATS wedge and diaphgram biopsy on 11/10/17. We have also been following a RLL mass measuring 4cm. Today he reports feeling well. SCC removed from hand. Recently got hearing aids. Denies fever, chills, SOB, CP, syncope, N/V or changes in bowel or bladder functioning."  Told to follow up prn.     He has not had fever, chills, sweats, cough or shortness of breath.  Alarm symptoms reviewed.  Discussed the option of doing a follow-up CT, he does not feel indicated currently.     Anemia and thrombocytopenia issues improved since discontinuing alcohol.  He was last seen in Hematology in 2017 in told to follow up p.r.n.    CHIEF " COMPLAINT:  Darinel presents today for follow up.    FAMILY HISTORY:  He reports his sister is in good health at 86 years old. His twin brother passed away from COVID-19 during the initial wave of the pandemic. There is a significant family history of early tooth loss, affecting his mother, father, and twin brother. He is unsure about his sister's dental status.    MEDICAL HISTORY:  He has a history of gout without recent flares. A small, stable liver lesion is being monitored every 6 months, with the last evaluation in September. He has a history of a nodule between the chest wall and rib cage, previously thought to be in the lung, last examined in December 2022 and noted to be stable. He reports service-related hearing loss[facility] He expresses difficulty adjusting to background noise with hearing aids, particularly while driving.    DENTAL HISTORY:  He reports loss of all teeth and declines false teeth. He denies any gum issues.    LIFESTYLE AND HABITS:  He quit smoking approximately 50 years ago and has abstained from alcohol for 6-7 years. He reports generally good sleep. His 's sleep pattern involves going to bed around 10:00 PM, waking up at 2:00 or 3:00 AM, and then returning to bed until about 9:00 AM.    SKIN:  He reports easy bruising, which he notes is a family trait. Sun damage is noted on exam. He denies any specific skin concerns.    VACCINATIONS AND PREVENTIVE CARE:  He received a recent flu vaccine and was offered the RSV vaccine as a one-time immunization but has not yet received it. His bone density improved as of June 2023, with the next scan due in 2 years.    PHYSICAL ACTIVITY:  He reports no difficulty with ambulation and denies any recent falls. He is feeling good and getting around well.    GASTROINTESTINAL:  He denies abdominal pain and reports normal bowel function.    CURRENT MEDICATIONS:  He reports no refills needed for medications at this time.           Abdominal ultrasound September 2024  Hepatology December 2023  Podiatry August 2023  DEXA June 2023  Endoscopy March 2022  Ophthalmology April 2021  Optometry March 2021  Dermatology July 2019  Colonoscopy/EGD 2018  CT chest 2018  CTS 2018 discharged  Hematology-Oncology 2017, discharged               Patient Active Problem List   Diagnosis    Benign non-nodular prostatic hyperplasia without lower urinary tract symptoms    Chronic idiopathic gout involving toe of left foot without tophus    Alcohol dependence, uncomplicated: quit 2017    Essential hypertension    Hyperplastic polyp of transverse colon: 2012 repeated 2018    Tortuous aorta: see CXR May 2017    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 May 2017; T spot negative May 2017; seen in Infectious Disease at that time    Psoriasis vulgaris    Age-related osteoporosis without current pathological fracture: see DEXA 2017; IMPROVED 2021, 2023    Squamous cell cancer of skin of right hand    Aortic atherosclerosis: see CT scan 5/17 also CT scan 9/18    Umbilical hernia without obstruction and without gangrene    Alcohol-induced polyneuropathy    Right lower lobe lung mass: see evaluation 2017, both bronchoscopic and surgical:  Nonspecific inflammation and fibrosis; stable 12/22    Liver fibrosis    Helicobacter pylori ab+: treated 2/18 resolved; stool negative 4/18    Gastroesophageal reflux disease with gastritis: see EGD 2018, 2022    IFG (impaired fasting glucose)    Alcoholic cirrhosis of liver without ascites    Mixed hyperlipidemia    Senile purpura        Review of Systems   Constitutional:  Negative for activity change and unexpected weight change.   HENT:  Positive for dental problem and hearing loss. Negative for rhinorrhea and trouble swallowing.         Has lost teeth.  No gum issues.  Does not want false teeth.   Eyes:  Negative for discharge and visual disturbance.   Respiratory:  Negative for chest tightness and wheezing.    Cardiovascular:   Negative for chest pain and palpitations.   Gastrointestinal:  Negative for blood in stool, constipation, diarrhea and vomiting.   Endocrine: Negative for polydipsia and polyuria.   Genitourinary:  Negative for difficulty urinating, hematuria and urgency.   Musculoskeletal:  Negative for arthralgias, joint swelling and neck pain.        Denies Gout issues   Skin:         No new skin issues.  Needs Dermatology follow-up   Neurological:  Negative for weakness and headaches.   Psychiatric/Behavioral:  Negative for confusion and dysphoric mood.          Objective:      Physical Exam  Constitutional:       Appearance: He is well-developed.   HENT:      Head: Normocephalic and atraumatic.      Right Ear: External ear normal.      Left Ear: External ear normal.   Eyes:      Extraocular Movements: Extraocular movements intact.      Conjunctiva/sclera: Conjunctivae normal.   Neck:      Thyroid: No thyromegaly.   Cardiovascular:      Rate and Rhythm: Normal rate and regular rhythm.      Heart sounds: No murmur heard.  Pulmonary:      Effort: Pulmonary effort is normal. No respiratory distress.      Breath sounds: Normal breath sounds. No wheezing.   Abdominal:      General: There is no distension.      Palpations: Abdomen is soft.      Tenderness: There is no abdominal tenderness.   Musculoskeletal:         General: No tenderness.      Cervical back: Normal range of motion and neck supple.      Right lower leg: No edema.      Left lower leg: No edema.   Lymphadenopathy:      Cervical: No cervical adenopathy.   Skin:     General: Skin is warm and dry.      Comments: Senile purpura  Actinic changes   Neurological:      General: No focal deficit present.      Mental Status: He is alert and oriented to person, place, and time.      Cranial Nerves: No cranial nerve deficit.   Psychiatric:         Mood and Affect: Mood normal.         Behavior: Behavior normal.         Thought Content: Thought content normal.         Judgment:  Judgment normal.             Health Maintenance Due   Topic Date Due    Hemoglobin A1c (Prediabetes)  10/16/2024    Lipid Panel  10/16/2024

## 2024-11-13 ENCOUNTER — TELEPHONE (OUTPATIENT)
Dept: PODIATRY | Facility: CLINIC | Age: 84
End: 2024-11-13
Payer: MEDICARE

## 2024-11-13 NOTE — TELEPHONE ENCOUNTER
Spoke with pt in regards to r/s appt due to provider being out of clinic. Pt r/s and wife verbalized understanding.

## 2024-12-09 ENCOUNTER — OFFICE VISIT (OUTPATIENT)
Dept: PODIATRY | Facility: CLINIC | Age: 84
End: 2024-12-09
Payer: MEDICARE

## 2024-12-09 VITALS — HEIGHT: 62 IN | BODY MASS INDEX: 26.89 KG/M2

## 2024-12-09 DIAGNOSIS — G60.9 IDIOPATHIC PERIPHERAL NEUROPATHY: Primary | ICD-10-CM

## 2024-12-09 DIAGNOSIS — B35.1 ONYCHOMYCOSIS: ICD-10-CM

## 2024-12-09 DIAGNOSIS — L84 CORN OR CALLUS: ICD-10-CM

## 2024-12-09 PROCEDURE — 11721 DEBRIDE NAIL 6 OR MORE: CPT | Mod: Q9,59,S$GLB, | Performed by: PODIATRIST

## 2024-12-09 PROCEDURE — 99499 UNLISTED E&M SERVICE: CPT | Mod: S$GLB,,, | Performed by: PODIATRIST

## 2024-12-09 PROCEDURE — 99999 PR PBB SHADOW E&M-EST. PATIENT-LVL II: CPT | Mod: PBBFAC,,, | Performed by: PODIATRIST

## 2024-12-09 PROCEDURE — 11056 PARNG/CUTG B9 HYPRKR LES 2-4: CPT | Mod: Q9,S$GLB,, | Performed by: PODIATRIST

## 2024-12-09 NOTE — PROGRESS NOTES
Subjective:      Patient ID: Darinel Majano is a 84 y.o. male.    Chief Complaint: Diabetic Foot Exam (10/16/24 - Annabella Montiel MD, PCP)      Darinel is a 84 y.o. male who presents to the clinic for evaluation and treatment of high risk feet. 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), Anemia (5/4/2017), Aortic atherosclerosis: see CT scan 5/17 (6/9/2017), BPH (benign prostatic hypertrophy), Chronic gout, Cortical senile cataract (5/15/2012), Degenerative disc disease, Diverticulosis of large intestine without hemorrhage: see CT scan 5/17 (6/9/2017), Essential hypertension (2/19/2016), Gastroesophageal reflux disease with gastritis: see EGD 2018 (10/31/2018), Gout, Hyperplastic polyp of transverse colon: 2012 repeat 5 years (5/4/2017), IFG (impaired fasting glucose) (5/7/2019), Kidney stone on left side (6/9/2017), Mixed hyperlipidemia (10/16/2024), Osteoporosis, Psoriasis, 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 The patient's chief complaint is long, thick toenails. This patient has documented high risk feet requiring routine maintenance secondary to peripheral neuropathy.    PCP: Annabella Montiel MD    Date Last Seen by PCP:  Chief Complaint   Patient presents with    Diabetic Foot Exam     10/16/24 - Annabella Montiel MD, PCP       Current shoe gear:  Affected Foot: Tennis shoes     Unaffected Foot: Tennis shoes    Last encounter in this department: Visit date not found    Hemoglobin A1C   Date Value Ref Range Status   10/16/2024 5.0 4.0 - 5.6 % Final     Comment:     ADA Screening Guidelines:  5.7-6.4%  Consistent with prediabetes  >or=6.5%  Consistent with diabetes    High levels of fetal hemoglobin interfere with the HbA1C  assay. Heterozygous hemoglobin variants (HbS, HgC, etc)do  not  significantly interfere with this assay.   However, presence of multiple variants may affect accuracy.     10/16/2023 5.1 4.0 - 5.6 % Final     Comment:     ADA Screening Guidelines:  5.7-6.4%  Consistent with prediabetes  >or=6.5%  Consistent with diabetes    High levels of fetal hemoglobin interfere with the HbA1C  assay. Heterozygous hemoglobin variants (HbS, HgC, etc)do  not significantly interfere with this assay.   However, presence of multiple variants may affect accuracy.     12/05/2022 5.2 4.0 - 5.6 % Final     Comment:     ADA Screening Guidelines:  5.7-6.4%  Consistent with prediabetes  >or=6.5%  Consistent with diabetes    High levels of fetal hemoglobin interfere with the HbA1C  assay. Heterozygous hemoglobin variants (HbS, HgC, etc)do  not significantly interfere with this assay.   However, presence of multiple variants may affect accuracy.         Review of Systems   Constitutional: Negative for chills, fever and malaise/fatigue.   HENT:  Negative for hearing loss.    Cardiovascular:  Negative for claudication and leg swelling.   Respiratory:  Negative for shortness of breath.    Skin:  Positive for color change, nail changes and unusual hair distribution. Negative for flushing and rash.   Musculoskeletal:  Negative for joint pain and myalgias.   Gastrointestinal:  Negative for nausea and vomiting.   Neurological:  Positive for numbness, paresthesias and sensory change. Negative for loss of balance.   Psychiatric/Behavioral:  Negative for altered mental status.    Allergic/Immunologic: Negative for hives.           Objective:      Physical Exam  Vitals reviewed.   Constitutional:       Appearance: He is well-developed.   Cardiovascular:      Pulses:           Dorsalis pedis pulses are 1+ on the right side and 1+ on the left side.        Posterior tibial pulses are 1+ on the right side and 1+ on the left side.      Comments: Non pitting  edema noted to b/L LEs  Musculoskeletal:      Right knee: No  swelling or ecchymosis.      Left knee: No swelling or ecchymosis.      Right lower leg: No swelling, deformity, tenderness or bony tenderness. No edema.      Left lower leg: No swelling or tenderness. No edema.      Right ankle: No swelling or ecchymosis. No lateral malleolus or medial malleolus tenderness. Normal range of motion. Normal pulse.      Right Achilles Tendon: No defects. Cheema's test negative.      Left ankle: No swelling or ecchymosis. No lateral malleolus or medial malleolus tenderness. Normal range of motion. Normal pulse.      Left Achilles Tendon: Cheema's test negative.      Right foot: Normal range of motion. No deformity.      Left foot: Normal range of motion. No deformity.      Comments: Decreased height of medial arches noted with loading of the foot b/L  Hammertoes noted, digits 4 and 5b/L with adductovarus rotation of b/L fifth digit.    Adequate joint ROM noted to all lower extremity muscle groups with no pain or crepitation noted. Muscle strength is 5/5 in all groups bilaterally.     Feet:      Right foot:      Protective Sensation: 10 sites tested.  6 sites sensed.      Toenail Condition: Right toenails are abnormally thick and long.      Left foot:      Protective Sensation: 10 sites tested.  6 sites sensed.      Toenail Condition: Left toenails are abnormally thick and long.   Skin:     General: Skin is warm.      Capillary Refill: Capillary refill takes more than 3 seconds.      Findings: No abrasion, bruising, burn, ecchymosis or wound.      Comments: Skin temp is cool to cool from proximal to distal b/L.  Nails x10 are elongated by  6-7mm's, thickened by 1-3 mm's, dystrophic, and are yellowish in  coloration . Xerosis Bilaterally. No open lesions noted.        Neurological:      Mental Status: He is alert and oriented to person, place, and time.      Comments: Diminished protective sensation noted b/L     Psychiatric:         Attention and Perception: He is attentive.          "Speech: Speech normal.         Behavior: Behavior normal. Behavior is cooperative.       HKLs noted to b/L sub 2nd met        Assessment:       Encounter Diagnoses   Name Primary?    Idiopathic peripheral neuropathy Yes    Corn or callus     Onychomycosis          Plan:       Darinel Rodrigues" was seen today for diabetic foot exam.    Diagnoses and all orders for this visit:    Idiopathic peripheral neuropathy    Corn or callus    Onychomycosis      I counseled the patient on his conditions, their implications and medical management.        - Shoe inspection. Patient instructed on proper foot hygeine. We discussed wearing proper shoe gear, daily foot inspections, never walking without protective shoe gear, never putting sharp instruments to feet, routine podiatric nail visits every 2-3 months.    After cleansing with an alcohol prep pad, the about mentioned hyperkeratotic lesions were sharply debrided X 2 utilizing a #15 blade to a smooth base without incident. Pt tolerated the procedure well and reported comfort to the debarment sites. Pt will continue to use padding and moisture the callused areas.     - With patient's permission, nails were aggressively reduced and debrided x 10 to their soft tissue attachment mechanically and with electric , removing all offending nail and debris. Patient relates relief following the procedure. He will continue to monitor the areas daily, inspect his feet, wear protective shoe gear when ambulatory, moisturizer to maintain skin integrity and follow in this office in approximately 2-3 months, sooner p.r.n.                                "

## 2024-12-14 DIAGNOSIS — I70.0 AORTIC ATHEROSCLEROSIS: ICD-10-CM

## 2024-12-14 NOTE — TELEPHONE ENCOUNTER
Care Due:                  Date            Visit Type   Department     Provider  --------------------------------------------------------------------------------                                MYCHART                              ANNUAL                              CHECKUP/PHY  [facility] INTERNAL  Last Visit: 10-      [facility]       Annabella Montiel  Next Visit: None Scheduled  None         None Found                                                            Last  Test          Frequency    Reason                     Performed    Due Date  --------------------------------------------------------------------------------    Mg Level....  12 months..  alendronate..............  Not Found    Overdue    Phosphate...  12 months..  alendronate..............  Not Found    Overdue    [employer] Embedded Care Due Messages. Reference number: [id number].   12/14/2024 9:23:07 AM CST

## 2024-12-15 RX ORDER — ATORVASTATIN CALCIUM 80 MG/1
80 TABLET, FILM COATED ORAL
Qty: 90 TABLET | Refills: 3 | Status: SHIPPED | OUTPATIENT
Start: 2024-12-15

## 2024-12-16 NOTE — TELEPHONE ENCOUNTER
Refill Decision Note   Darinel Majano  is requesting a refill authorization.  Brief Assessment and Rationale for Refill:  Approve     Medication Therapy Plan:         Comments:     Note composed:10:44 PM 12/15/2024

## 2025-02-13 DIAGNOSIS — R60.9 SWELLING: ICD-10-CM

## 2025-02-14 RX ORDER — SPIRONOLACTONE 25 MG/1
TABLET ORAL
Qty: 180 TABLET | Refills: 1 | Status: SHIPPED | OUTPATIENT
Start: 2025-02-14

## 2025-03-10 ENCOUNTER — HOSPITAL ENCOUNTER (OUTPATIENT)
Dept: RADIOLOGY | Facility: HOSPITAL | Age: 85
Discharge: HOME | End: 2025-03-10
Attending: PHYSICIAN ASSISTANT
Payer: MEDICARE

## 2025-03-10 DIAGNOSIS — K74.00 LIVER FIBROSIS: ICD-10-CM

## 2025-03-10 PROCEDURE — 76705 ECHO EXAM OF ABDOMEN: CPT | Mod: TC

## 2025-03-10 PROCEDURE — 76705 ECHO EXAM OF ABDOMEN: CPT | Mod: 26,,, | Performed by: RADIOLOGY

## 2025-03-13 ENCOUNTER — OFFICE VISIT (OUTPATIENT)
Dept: HEPATOLOGY | Facility: CLINIC | Age: 85
End: 2025-03-13
Payer: MEDICARE

## 2025-03-13 VITALS — BODY MASS INDEX: 27.06 KG/M2 | HEIGHT: 62 IN | WEIGHT: 147.06 LBS

## 2025-03-13 DIAGNOSIS — K70.30 ALCOHOLIC CIRRHOSIS OF LIVER WITHOUT ASCITES: ICD-10-CM

## 2025-03-13 DIAGNOSIS — F10.21 ALCOHOL DEPENDENCE IN REMISSION: ICD-10-CM

## 2025-03-13 DIAGNOSIS — K74.00 HEPATIC FIBROSIS: Primary | ICD-10-CM

## 2025-03-13 PROCEDURE — 1160F RVW MEDS BY RX/DR IN RCRD: CPT | Mod: CPTII,S$GLB,, | Performed by: PHYSICIAN ASSISTANT

## 2025-03-13 PROCEDURE — 99213 OFFICE O/P EST LOW 20 MIN: CPT | Mod: S$GLB,,, | Performed by: PHYSICIAN ASSISTANT

## 2025-03-13 PROCEDURE — 99999 PR PBB SHADOW E&M-EST. PATIENT-LVL III: CPT | Mod: PBBFAC,,, | Performed by: PHYSICIAN ASSISTANT

## 2025-03-13 PROCEDURE — 1126F AMNT PAIN NOTED NONE PRSNT: CPT | Mod: CPTII,S$GLB,, | Performed by: PHYSICIAN ASSISTANT

## 2025-03-13 PROCEDURE — 1101F PT FALLS ASSESS-DOCD LE1/YR: CPT | Mod: CPTII,S$GLB,, | Performed by: PHYSICIAN ASSISTANT

## 2025-03-13 PROCEDURE — 3288F FALL RISK ASSESSMENT DOCD: CPT | Mod: CPTII,S$GLB,, | Performed by: PHYSICIAN ASSISTANT

## 2025-03-13 PROCEDURE — 1159F MED LIST DOCD IN RCRD: CPT | Mod: CPTII,S$GLB,, | Performed by: PHYSICIAN ASSISTANT

## 2025-03-13 NOTE — PROGRESS NOTES
"HEPATOLOGY CLINIC VISIT NOTE  CHIEF COMPLAINT: Advanced liver fibrosis  (accompanied by: wife & daughter)    HISTORY       This is an 84 y.o. White male w/ liver fibrosis, presumably cirrhosis, due to prior alcohol    Liver staging:  Liver Biopsy 2011 - "minimal nonspecific inflammation, no cirrhosis"  FibroScan 5/2017 - kPa 4.1 (F0-1)  Elastography 6/2021 - kPa 10.29 (F2-3)  Hx of labs / clinical history concerning for advanced fibrosis / cirrhosis -> following as such    Liver history / monitoring:  Well compensated  No evidence of portal HTN. Plt mildly decreased 130s-160s    Cirrhosis health maintenance:  - HCC screening   12/2022 U/S: 0.5cm hyperechoic focus in LEFT lobe, not seen on f/u tpCT or U/S  - Varices screening -  EGD 3/2022: no EV  - HAV status - Immunity documented 2017  - HBV status - Prior vaccination [id number]    Interval history (12/11/23):  U/S 12/4/23: new 0.5 hypoechoic focus in right lobe, no ascites. Spleen normal  Labs 12/4/23: stable CBC, CMP, INR. Normal AFP    Current liver sx:  Ascites/ KORTNEY - None  Diuretic use - yes, stable on lasix 20 + shruthi 50  TBili elevation - yes, TBili 1.2  HE / confusion / memory problems - no  EV bleed / hematemesis / melena - no    Interval history (03/13/2025):  Routine cirrhosis labs / imaging  U/S 3/10/25: unchanged liver cyst in right lobe. No solid masses or ascites  Labs 3/10/25:   MELD 3.0: 8 at 3/10/2025  8:26 AM  MELD-Na: 8 at 3/10/2025  8:26 AM  Calculated from:  Serum Creatinine: 0.8 mg/dL (Using min of 1 mg/dL) at 3/10/2025  8:26 AM  Serum Sodium: 137 mmol/L at 3/10/2025  8:26 AM  Total Bilirubin: 1.4 mg/dL at 3/10/2025  8:26 AM  Serum Albumin: 3.7 g/dL (Using max of 3.5 g/dL) at 3/10/2025  8:26 AM  INR(ratio): 1 at 3/10/2025  8:26 AM  Age at listing (hypothetical): 84 years  Sex: Male at 3/10/2025  8:26 AM      Current liver sx:  Ascites/ KORTNEY - None  Diuretic use - yes, stable on lasix 20 + shruthi 50  TBili elevation - yes, TBili 1.2  HE / " confusion / memory problems - no  EV bleed / hematemesis / melena - no        PMH, PSH, SOCIAL HX, FAMILY HX      Reviewed in Epic  Pertinent findings:  FAMILY HX: neg for liver diease  SOCIAL HX: . Resides in [place]. Retired . Is a twin and has many sets of twins in he and wife's family  Alcohol - prior regular alcohol use, reports none x 6 years (2017)  Drugs - no  Spiritual - Yes, Nondenominational    ROS: as per HPI    PHYSICAL EXAM:  Friendly White male, in no acute distress; alert and oriented to person, place and time  HEENT: Sclerae anicteric.   NECK: Supple  LUNGS: Normal respiratory effort.   ABDOMEN: Flat, soft, nontender.   SKIN: Warm and dry. No jaundice, No obvious rashes.   EXTREMITIES: No lower extremity edema  NEURO/PSYCH: Normal gate. Memory intact. Thought and speech pattern appropriate. Behavior normal. No depression or anxiety noted.    PERTINENT DIAGNOSTIC RESULTS      Lab Results   Component Value Date    WBC 7.38 03/10/2025    HGB 13.8 (L) 03/10/2025     (L) 03/10/2025     Lab Results   Component Value Date    INR 1.0 03/10/2025     Lab Results   Component Value Date    AST 25 03/10/2025    ALT 19 03/10/2025    BILITOT 1.4 (H) 03/10/2025    ALBUMIN 3.7 03/10/2025    ALKPHOS 74 03/10/2025    CREATININE 0.8 03/10/2025    BUN 12 03/10/2025     03/10/2025    K 4.4 03/10/2025    AFP 4.9 03/10/2025     Results for orders placed during the hospital encounter of 03/10/25  US Abdomen Limited  CLINICAL HISTORY:Hepatic fibrosis, unspecified  TECHNIQUE:Limited ultrasound of the right upper quadrant of the abdomen including pancreas, liver, gallbladder, common bile duct was performed.  COMPARISON:Ultrasound 09/23/2024, 03/11/2024    FINDINGS:  Pancreas: The visualized portions of pancreas appear normal.  Liver: Normal in size, measuring 14.1 cm. Heterogeneous echotexture, unchanged.  Anechoic 1.1 x 0.8 x 1.4 cm simple cyst in the right hepatic lobe, similar to prior  Gallbladder:  Cholecystectomy.  Biliary system: The common duct is not dilated, measuring 4 mm.  No intrahepatic ductal dilatation.  Spleen: Normal in size with a homogeneous echotexture, measuring 10.0 cm.  Miscellaneous: No ascites.    Impression  Stable heterogeneous hepatic echotexture.  Unchanged simple cyst in the right hepatic lobe.      ASSESSMENT        84 y.o. White male with:  1. Liver fibrosis, presumed cirrhosis: well-compensated  -- MELD score - 7  -- HCC screening - up to date 3/2025    2. History of regular alcohol use: none since 2017      PLAN      CBC, CMP ,INR, AFP, U/S every 6 months: due 9/2025  Yearly clinic visit: due 3/2026, earlier PRN    __________________________________________________________________    Duration of encounter: 29 min  This includes face-to-face time and non face-to-face time preparing to see the patient (eg, review of tests), obtaining and/or reviewing separately obtained history, documenting clinical information in the electronic or other health record, independently interpreting resultsand communicating results to the patient/family/caregiver, or care coordination.

## 2025-03-17 ENCOUNTER — OFFICE VISIT (OUTPATIENT)
Dept: PODIATRY | Facility: CLINIC | Age: 85
End: 2025-03-17
Payer: MEDICARE

## 2025-03-17 VITALS
DIASTOLIC BLOOD PRESSURE: 66 MMHG | HEIGHT: 62 IN | WEIGHT: 147.69 LBS | HEART RATE: 77 BPM | SYSTOLIC BLOOD PRESSURE: 145 MMHG | BODY MASS INDEX: 27.18 KG/M2

## 2025-03-17 DIAGNOSIS — B35.1 ONYCHOMYCOSIS: ICD-10-CM

## 2025-03-17 DIAGNOSIS — G60.9 IDIOPATHIC PERIPHERAL NEUROPATHY: Primary | ICD-10-CM

## 2025-03-17 DIAGNOSIS — L84 CORN OR CALLUS: ICD-10-CM

## 2025-03-17 PROCEDURE — 99999 PR PBB SHADOW E&M-EST. PATIENT-LVL III: CPT | Mod: PBBFAC,,, | Performed by: PODIATRIST

## 2025-03-17 PROCEDURE — 99499 UNLISTED E&M SERVICE: CPT | Mod: S$GLB,,, | Performed by: PODIATRIST

## 2025-03-17 PROCEDURE — 11721 DEBRIDE NAIL 6 OR MORE: CPT | Mod: 59,Q9,S$GLB, | Performed by: PODIATRIST

## 2025-03-17 PROCEDURE — 11056 PARNG/CUTG B9 HYPRKR LES 2-4: CPT | Mod: Q9,S$GLB,, | Performed by: PODIATRIST

## 2025-03-17 NOTE — PROGRESS NOTES
Subjective:      Patient ID: Darinel Majano is a 84 y.o. male.    Chief Complaint: Routine Foot Care (10/16/24 - Annabella Montiel MD, PCP) and Peripheral Neuropathy      Darinel is a 84 y.o. male who presents to the clinic for evaluation and treatment of high risk feet. 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), Anemia (5/4/2017), Aortic atherosclerosis: see CT scan 5/17 (6/9/2017), BPH (benign prostatic hypertrophy), Chronic gout, Cortical senile cataract (5/15/2012), Degenerative disc disease, Diverticulosis of large intestine without hemorrhage: see CT scan 5/17 (6/9/2017), Essential hypertension (2/19/2016), Gastroesophageal reflux disease with gastritis: see EGD 2018 (10/31/2018), Gout, Hyperplastic polyp of transverse colon: 2012 repeat 5 years (5/4/2017), IFG (impaired fasting glucose) (5/7/2019), Kidney stone on left side (6/9/2017), Mixed hyperlipidemia (10/16/2024), Osteoporosis, Psoriasis, 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 The patient's chief complaint is long, thick toenails. This patient has documented high risk feet requiring routine maintenance secondary to peripheral neuropathy.    PCP: Annabella Montiel MD    Date Last Seen by PCP:  Chief Complaint   Patient presents with    Routine Foot Care     10/16/24 - Annabella Montiel MD, PCP    Peripheral Neuropathy       Current shoe gear:  Affected Foot: Tennis shoes     Unaffected Foot: Tennis shoes    Last encounter in this department: Visit date not found    Hemoglobin A1C   Date Value Ref Range Status   10/16/2024 5.0 4.0 - 5.6 % Final     Comment:     ADA Screening Guidelines:  5.7-6.4%  Consistent with prediabetes  >or=6.5%  Consistent with diabetes    High levels of fetal hemoglobin interfere with the HbA1C  assay.  Heterozygous hemoglobin variants (HbS, HgC, etc)do  not significantly interfere with this assay.   However, presence of multiple variants may affect accuracy.     10/16/2023 5.1 4.0 - 5.6 % Final     Comment:     ADA Screening Guidelines:  5.7-6.4%  Consistent with prediabetes  >or=6.5%  Consistent with diabetes    High levels of fetal hemoglobin interfere with the HbA1C  assay. Heterozygous hemoglobin variants (HbS, HgC, etc)do  not significantly interfere with this assay.   However, presence of multiple variants may affect accuracy.     12/05/2022 5.2 4.0 - 5.6 % Final     Comment:     ADA Screening Guidelines:  5.7-6.4%  Consistent with prediabetes  >or=6.5%  Consistent with diabetes    High levels of fetal hemoglobin interfere with the HbA1C  assay. Heterozygous hemoglobin variants (HbS, HgC, etc)do  not significantly interfere with this assay.   However, presence of multiple variants may affect accuracy.         Review of Systems   Constitutional: Negative for chills, fever and malaise/fatigue.   HENT:  Negative for hearing loss.    Cardiovascular:  Negative for claudication and leg swelling.   Respiratory:  Negative for shortness of breath.    Skin:  Positive for color change, nail changes and unusual hair distribution. Negative for flushing and rash.   Musculoskeletal:  Negative for joint pain and myalgias.   Gastrointestinal:  Negative for nausea and vomiting.   Neurological:  Positive for numbness, paresthesias and sensory change. Negative for loss of balance.   Psychiatric/Behavioral:  Negative for altered mental status.    Allergic/Immunologic: Negative for hives.           Objective:      Physical Exam  Vitals reviewed.   Constitutional:       Appearance: He is well-developed.   Cardiovascular:      Pulses:           Dorsalis pedis pulses are 1+ on the right side and 1+ on the left side.        Posterior tibial pulses are 1+ on the right side and 1+ on the left side.      Comments: Non pitting  edema  noted to b/L LEs  Musculoskeletal:      Right knee: No swelling or ecchymosis.      Left knee: No swelling or ecchymosis.      Right lower leg: No swelling, deformity, tenderness or bony tenderness. No edema.      Left lower leg: No swelling or tenderness. No edema.      Right ankle: No swelling or ecchymosis. No lateral malleolus or medial malleolus tenderness. Normal range of motion. Normal pulse.      Right Achilles Tendon: No defects. Cheema's test negative.      Left ankle: No swelling or ecchymosis. No lateral malleolus or medial malleolus tenderness. Normal range of motion. Normal pulse.      Left Achilles Tendon: Cheema's test negative.      Right foot: Normal range of motion. No deformity.      Left foot: Normal range of motion. No deformity.      Comments: Decreased height of medial arches noted with loading of the foot b/L  Hammertoes noted, digits 4 and 5b/L with adductovarus rotation of b/L fifth digit.    Adequate joint ROM noted to all lower extremity muscle groups with no pain or crepitation noted. Muscle strength is 5/5 in all groups bilaterally.     Feet:      Right foot:      Protective Sensation: 10 sites tested.  6 sites sensed.      Toenail Condition: Right toenails are abnormally thick and long.      Left foot:      Protective Sensation: 10 sites tested.  6 sites sensed.      Toenail Condition: Left toenails are abnormally thick and long.   Skin:     General: Skin is warm.      Capillary Refill: Capillary refill takes more than 3 seconds.      Findings: No abrasion, bruising, burn, ecchymosis or wound.      Comments: Skin temp is cool to cool from proximal to distal b/L.  Nails x10 are elongated by  4-5mm's, thickened by 1-3 mm's, dystrophic, and are yellowish in  coloration . Xerosis Bilaterally. No open lesions noted.        Neurological:      Mental Status: He is alert and oriented to person, place, and time.      Comments: Diminished protective sensation noted b/L     Psychiatric:     "     Attention and Perception: He is attentive.         Speech: Speech normal.         Behavior: Behavior normal. Behavior is cooperative.       HKLs noted to b/L sub 2nd met        Assessment:       Encounter Diagnoses   Name Primary?    Idiopathic peripheral neuropathy Yes    Onychomycosis     Corn or callus          Plan:       Darinel Rodrigues" was seen today for routine foot care and peripheral neuropathy.    Diagnoses and all orders for this visit:    Idiopathic peripheral neuropathy    Onychomycosis    Corn or callus      I counseled the patient on his conditions, their implications and medical management.        - Shoe inspection. Patient instructed on proper foot hygeine. We discussed wearing proper shoe gear, daily foot inspections, never walking without protective shoe gear, never putting sharp instruments to feet, routine podiatric nail visits every 2-3 months.    After cleansing with an alcohol prep pad, the about mentioned hyperkeratotic lesions were sharply debrided X 2 utilizing a #15 blade to a smooth base without incident. Pt tolerated the procedure well and reported comfort to the debarment sites. Pt will continue to use padding and moisture the callused areas.     - With patient's permission, nails were aggressively reduced and debrided x 10 to their soft tissue attachment mechanically and with electric , removing all offending nail and debris. Patient relates relief following the procedure. He will continue to monitor the areas daily, inspect his feet, wear protective shoe gear when ambulatory, moisturizer to maintain skin integrity and follow in this office in approximately 2-3 months, sooner p.r.n.                                  "

## 2025-05-05 DIAGNOSIS — M81.0 AGE-RELATED OSTEOPOROSIS WITHOUT CURRENT PATHOLOGICAL FRACTURE: ICD-10-CM

## 2025-05-05 RX ORDER — ALENDRONATE SODIUM 70 MG/1
TABLET ORAL
Qty: 12 TABLET | Refills: 3 | Status: SHIPPED | OUTPATIENT
Start: 2025-05-05

## 2025-05-05 NOTE — TELEPHONE ENCOUNTER
Refill Routing Note   Medication(s) are not appropriate for processing by [facility] for the following reason(s):        Outside of protocol    [facility] action(s):  Route     Requires labs : Yes             Appointments  past 12m or future 3m with PCP    Date Provider   Last Visit   10/16/2024 Annabella Montiel MD   Next Visit   Visit date not found Annabella Montiel MD   ED visits in past 90 days: 0        Note composed:9:30 AM 05/05/2025

## 2025-05-05 NOTE — TELEPHONE ENCOUNTER
Care Due:                  Date            Visit Type   Department     Provider  --------------------------------------------------------------------------------                                MYCHART                              ANNUAL                              CHECKUP/PHY  [facility] INTERNAL  Last Visit: 10-      [facility]       Annabella Montiel  Next Visit: None Scheduled  None         None Found                                                            Last  Test          Frequency    Reason                     Performed    Due Date  --------------------------------------------------------------------------------    Mg Level....  12 months..  alendronate..............  Not Found    Overdue    Phosphate...  12 months..  alendronate..............  Not Found    Overdue    [employer] Embedded Care Due Messages. Reference number: [id number].   5/05/2025 7:48:50 AM CDT

## 2025-06-23 DIAGNOSIS — Z00.00 ENCOUNTER FOR MEDICARE ANNUAL WELLNESS EXAM: ICD-10-CM

## 2025-07-25 ENCOUNTER — OFFICE VISIT (OUTPATIENT)
Dept: PODIATRY | Facility: CLINIC | Age: 85
End: 2025-07-25
Payer: MEDICARE

## 2025-07-25 VITALS
HEIGHT: 62 IN | BODY MASS INDEX: 27.02 KG/M2 | SYSTOLIC BLOOD PRESSURE: 115 MMHG | HEART RATE: 71 BPM | WEIGHT: 146.81 LBS | DIASTOLIC BLOOD PRESSURE: 67 MMHG

## 2025-07-25 DIAGNOSIS — B35.1 ONYCHOMYCOSIS: ICD-10-CM

## 2025-07-25 DIAGNOSIS — G60.9 IDIOPATHIC PERIPHERAL NEUROPATHY: Primary | ICD-10-CM

## 2025-07-25 PROCEDURE — 99999 PR PBB SHADOW E&M-EST. PATIENT-LVL III: CPT | Mod: PBBFAC,,, | Performed by: PODIATRIST

## 2025-07-25 NOTE — PROGRESS NOTES
Subjective:      Patient ID: Darinel Majano is a 84 y.o. male.    Chief Complaint: Peripheral Neuropathy and Routine Foot Care (3m )      Darinel is a 84 y.o. male who presents to the clinic for evaluation and treatment of high risk feet. 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), Anemia (5/4/2017), Aortic atherosclerosis: see CT scan 5/17 (6/9/2017), BPH (benign prostatic hypertrophy), Chronic gout, Cortical senile cataract (5/15/2012), Degenerative disc disease, Diverticulosis of large intestine without hemorrhage: see CT scan 5/17 (6/9/2017), Essential hypertension (2/19/2016), Gastroesophageal reflux disease with gastritis: see EGD 2018 (10/31/2018), Gout, Hyperplastic polyp of transverse colon: 2012 repeat 5 years (5/4/2017), IFG (impaired fasting glucose) (5/7/2019), Kidney stone on left side (6/9/2017), Mixed hyperlipidemia (10/16/2024), Osteoporosis, Psoriasis, 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 The patient's chief complaint is long, thick toenails. This patient has documented high risk feet requiring routine maintenance secondary to peripheral neuropathy.    PCP: Annabella Montiel MD    Date Last Seen by PCP:  Chief Complaint   Patient presents with    Peripheral Neuropathy    Routine Foot Care             Current shoe gear:  Affected Foot: Tennis shoes     Unaffected Foot: Tennis shoes    Last encounter in this department: Visit date not found    Hemoglobin A1C   Date Value Ref Range Status   10/16/2024 5.0 4.0 - 5.6 % Final     Comment:     ADA Screening Guidelines:  5.7-6.4%  Consistent with prediabetes  >or=6.5%  Consistent with diabetes    High levels of fetal hemoglobin interfere with the HbA1C  assay. Heterozygous hemoglobin variants (HbS, HgC, etc)do  not significantly  interfere with this assay.   However, presence of multiple variants may affect accuracy.     10/16/2023 5.1 4.0 - 5.6 % Final     Comment:     ADA Screening Guidelines:  5.7-6.4%  Consistent with prediabetes  >or=6.5%  Consistent with diabetes    High levels of fetal hemoglobin interfere with the HbA1C  assay. Heterozygous hemoglobin variants (HbS, HgC, etc)do  not significantly interfere with this assay.   However, presence of multiple variants may affect accuracy.     12/05/2022 5.2 4.0 - 5.6 % Final     Comment:     ADA Screening Guidelines:  5.7-6.4%  Consistent with prediabetes  >or=6.5%  Consistent with diabetes    High levels of fetal hemoglobin interfere with the HbA1C  assay. Heterozygous hemoglobin variants (HbS, HgC, etc)do  not significantly interfere with this assay.   However, presence of multiple variants may affect accuracy.         Review of Systems   Constitutional: Negative for chills, fever and malaise/fatigue.   HENT:  Negative for hearing loss.    Cardiovascular:  Negative for claudication and leg swelling.   Respiratory:  Negative for shortness of breath.    Skin:  Positive for color change, nail changes and unusual hair distribution. Negative for flushing and rash.   Musculoskeletal:  Negative for joint pain and myalgias.   Gastrointestinal:  Negative for nausea and vomiting.   Neurological:  Positive for numbness, paresthesias and sensory change. Negative for loss of balance.   Psychiatric/Behavioral:  Negative for altered mental status.    Allergic/Immunologic: Negative for hives.           Objective:      Physical Exam  Vitals reviewed.   Constitutional:       Appearance: He is well-developed.   Cardiovascular:      Pulses:           Dorsalis pedis pulses are 1+ on the right side and 1+ on the left side.        Posterior tibial pulses are 1+ on the right side and 1+ on the left side.      Comments: Non pitting  edema noted to b/L LEs  Musculoskeletal:      Right knee: No swelling or  ecchymosis.      Left knee: No swelling or ecchymosis.      Right lower leg: No swelling, deformity, tenderness or bony tenderness. No edema.      Left lower leg: No swelling or tenderness. No edema.      Right ankle: No swelling or ecchymosis. No lateral malleolus or medial malleolus tenderness. Normal range of motion. Normal pulse.      Right Achilles Tendon: No defects. Cheema's test negative.      Left ankle: No swelling or ecchymosis. No lateral malleolus or medial malleolus tenderness. Normal range of motion. Normal pulse.      Left Achilles Tendon: Cheema's test negative.      Right foot: Normal range of motion. No deformity.      Left foot: Normal range of motion. No deformity.      Comments: Decreased height of medial arches noted with loading of the foot b/L  Hammertoes noted, digits 4 and 5b/L with adductovarus rotation of b/L fifth digit.    Adequate joint ROM noted to all lower extremity muscle groups with no pain or crepitation noted. Muscle strength is 5/5 in all groups bilaterally.     Feet:      Right foot:      Protective Sensation: 10 sites tested.  6 sites sensed.      Toenail Condition: Right toenails are abnormally thick and long.      Left foot:      Protective Sensation: 10 sites tested.  6 sites sensed.      Toenail Condition: Left toenails are abnormally thick and long.   Skin:     General: Skin is warm.      Capillary Refill: Capillary refill takes more than 3 seconds.      Findings: No abrasion, bruising, burn, ecchymosis or wound.      Comments: Skin temp is cool to cool from proximal to distal b/L.  Nails x10 are elongated by  4-7mm's, thickened by 1-3 mm's, dystrophic, and are yellowish in  coloration . Xerosis Bilaterally. No open lesions noted.        Neurological:      Mental Status: He is alert and oriented to person, place, and time.      Comments: Diminished protective sensation noted b/L     Psychiatric:         Attention and Perception: He is attentive.         Speech:  "Speech normal.         Behavior: Behavior normal. Behavior is cooperative.           Assessment:       Encounter Diagnoses   Name Primary?    Idiopathic peripheral neuropathy Yes    Onychomycosis          Plan:       Darinel Rodrigues" was seen today for peripheral neuropathy and routine foot care.    Diagnoses and all orders for this visit:    Idiopathic peripheral neuropathy    Onychomycosis      I counseled the patient on his conditions, their implications and medical management.        - Shoe inspection. Patient instructed on proper foot hygeine. We discussed wearing proper shoe gear, daily foot inspections, never walking without protective shoe gear, never putting sharp instruments to feet, routine podiatric nail visits every 2-3 months.      - With patient's permission, nails were aggressively reduced and debrided x 10 to their soft tissue attachment mechanically and with electric , removing all offending nail and debris. Patient relates relief following the procedure. He will continue to monitor the areas daily, inspect his feet, wear protective shoe gear when ambulatory, moisturizer to maintain skin integrity and follow in this office in approximately 2-3 months, sooner p.r.n.                                    "

## 2025-08-12 DIAGNOSIS — R60.9 SWELLING: ICD-10-CM

## 2025-08-13 RX ORDER — SPIRONOLACTONE 25 MG/1
TABLET ORAL
Qty: 180 TABLET | Refills: 1 | Status: SHIPPED | OUTPATIENT
Start: 2025-08-13

## (undated) DEVICE — DRAPE ABDOMINAL TIBURON 14X11

## (undated) DEVICE — SPONGE TONSIL LARGE

## (undated) DEVICE — KIT ANTIFOG

## (undated) DEVICE — CATH THORACIC 24FR ST

## (undated) DEVICE — SYR SLIP TIP 10ML SHIELD

## (undated) DEVICE — DRESSING LEUKOPLAST FLEX 1X3IN

## (undated) DEVICE — TRAY FOLEY 16FR INFECTION CONT

## (undated) DEVICE — TRAY MINOR GEN SURG

## (undated) DEVICE — DRESSING TRANS 4X4 TEGADERM

## (undated) DEVICE — SYR ONLY LUER LOCK 20CC

## (undated) DEVICE — SEE MEDLINE ITEM 152622

## (undated) DEVICE — SEE MEDLINE ITEM 146313

## (undated) DEVICE — SUT ETHILON 2-0 BLK PS-2

## (undated) DEVICE — CONTAINER SPECIMEN STRL 4OZ

## (undated) DEVICE — Device

## (undated) DEVICE — GAUZE SPONGE 4X4 12PLY

## (undated) DEVICE — ELECTRODE REM PLYHSV RETURN 9

## (undated) DEVICE — SUT CTD VICRYL 0 UND BR CT

## (undated) DEVICE — NDL SPINAL 20GX3.5 HUB

## (undated) DEVICE — BLADE ELECTRO EDGE INSULATED

## (undated) DEVICE — TAPE SILK 3IN

## (undated) DEVICE — SUT 2-0 VICRYL / CT-1

## (undated) DEVICE — DRAPE INCISE IOBAN 2 23X17IN

## (undated) DEVICE — CLOSURE SKIN STERI STRIP 1/2X4

## (undated) DEVICE — ADHESIVE MASTISOL VIAL 48/BX

## (undated) DEVICE — SEE MEDLINE ITEM 157117

## (undated) DEVICE — SEE MEDLINE ITEM 146417

## (undated) DEVICE — ELECTRODE BLADE INSULATED 1 IN

## (undated) DEVICE — SEE MEDLINE ITEM 157128

## (undated) DEVICE — DRAIN CHEST DRY SUCTION

## (undated) DEVICE — SYR 30CC LUER LOCK